# Patient Record
Sex: MALE | Race: WHITE | NOT HISPANIC OR LATINO | Employment: UNEMPLOYED | RURAL
[De-identification: names, ages, dates, MRNs, and addresses within clinical notes are randomized per-mention and may not be internally consistent; named-entity substitution may affect disease eponyms.]

---

## 2020-08-24 ENCOUNTER — HISTORICAL (OUTPATIENT)
Dept: ADMINISTRATIVE | Facility: HOSPITAL | Age: 65
End: 2020-08-24

## 2020-12-12 ENCOUNTER — HISTORICAL (OUTPATIENT)
Dept: ADMINISTRATIVE | Facility: HOSPITAL | Age: 65
End: 2020-12-12

## 2020-12-12 LAB
ALBUMIN SERPL BCP-MCNC: 3.6 G/DL (ref 3.5–5)
ALBUMIN/GLOB SERPL: 0.9 {RATIO}
ALP SERPL-CCNC: 39 U/L (ref 45–115)
ALT SERPL W P-5'-P-CCNC: 22 U/L (ref 16–61)
ANION GAP SERPL CALCULATED.3IONS-SCNC: 13 MMOL/L
APTT PPP: 26.6 SECONDS (ref 25.2–37.3)
AST SERPL W P-5'-P-CCNC: 25 U/L (ref 15–37)
BASOPHILS # BLD AUTO: 0.03 X10E3/UL (ref 0–0.2)
BASOPHILS NFR BLD AUTO: 0.5 % (ref 0–1)
BILIRUB SERPL-MCNC: 0.5 MG/DL (ref 0–1.2)
BUN SERPL-MCNC: 27 MG/DL (ref 7–18)
BUN/CREAT SERPL: 14.5
CALCIUM SERPL-MCNC: 8.9 MG/DL (ref 8.5–10.1)
CHLORIDE SERPL-SCNC: 102 MMOL/L (ref 98–107)
CO2 SERPL-SCNC: 27 MMOL/L (ref 21–32)
CREAT SERPL-MCNC: 1.86 MG/DL (ref 0.7–1.3)
EOSINOPHIL # BLD AUTO: 0.22 X10E3/UL (ref 0–0.5)
EOSINOPHIL NFR BLD AUTO: 3.8 % (ref 1–4)
ERYTHROCYTE [DISTWIDTH] IN BLOOD BY AUTOMATED COUNT: 13.2 % (ref 11.5–14.5)
GLOBULIN SER-MCNC: 3.9 G/DL (ref 2–4)
GLUCOSE SERPL-MCNC: 156 MG/DL (ref 70–105)
GLUCOSE SERPL-MCNC: 175 MG/DL (ref 74–106)
HCT VFR BLD AUTO: 41.7 % (ref 40–54)
HGB BLD-MCNC: 13.4 G/DL (ref 13.5–18)
IMM GRANULOCYTES # BLD AUTO: 0.01 X10E3/UL (ref 0–0.04)
IMM GRANULOCYTES NFR BLD: 0.2 % (ref 0–0.4)
INR BLD: 1 (ref 0–3.4)
LYMPHOCYTES # BLD AUTO: 1.77 X10E3/UL (ref 1–4.8)
LYMPHOCYTES NFR BLD AUTO: 30.9 % (ref 27–41)
MCH RBC QN AUTO: 26.2 PG (ref 27–31)
MCHC RBC AUTO-ENTMCNC: 32.1 G/DL (ref 32–36)
MCV RBC AUTO: 81.4 FL (ref 80–96)
MONOCYTES # BLD AUTO: 0.52 X10E3/UL (ref 0–0.8)
MONOCYTES NFR BLD AUTO: 9.1 % (ref 2–6)
MPC BLD CALC-MCNC: 11.4 FL (ref 9.4–12.4)
NEUTROPHILS # BLD AUTO: 3.17 X10E3/UL (ref 1.8–7.7)
NEUTROPHILS NFR BLD AUTO: 55.5 % (ref 53–65)
PLATELET # BLD AUTO: 206 X10E3/UL (ref 150–400)
POTASSIUM SERPL-SCNC: 4.3 MMOL/L (ref 3.5–5.1)
PROT SERPL-MCNC: 7.5 G/DL (ref 6.4–8.2)
PROTHROMBIN TIME: 13.2 SECONDS (ref 11.7–14.7)
RBC # BLD AUTO: 5.12 X10E6/UL (ref 4.6–6.2)
SARS-COV+SARS-COV-2 AG RESP QL IA.RAPID: NEGATIVE
SODIUM SERPL-SCNC: 138 MMOL/L (ref 136–145)
WBC # BLD AUTO: 5.72 X10E3/UL (ref 4.5–11)

## 2021-03-12 LAB
LEFT EYE DM RETINOPATHY: NORMAL
RIGHT EYE DM RETINOPATHY: NORMAL

## 2021-03-22 RX ORDER — HYDRALAZINE HYDROCHLORIDE 50 MG/1
50 TABLET, FILM COATED ORAL 4 TIMES DAILY
Qty: 120 TABLET | Refills: 5 | Status: SHIPPED | OUTPATIENT
Start: 2021-03-22 | End: 2021-08-11 | Stop reason: SDUPTHER

## 2021-03-22 RX ORDER — HYDRALAZINE HYDROCHLORIDE 50 MG/1
50 TABLET, FILM COATED ORAL 4 TIMES DAILY
COMMUNITY
End: 2021-03-22 | Stop reason: SDUPTHER

## 2021-04-05 RX ORDER — FENOFIBRATE 145 MG/1
145 TABLET, FILM COATED ORAL DAILY
Qty: 30 TABLET | Refills: 3 | Status: SHIPPED | OUTPATIENT
Start: 2021-04-05 | End: 2021-08-10 | Stop reason: SDUPTHER

## 2021-04-05 RX ORDER — FENOFIBRATE 145 MG/1
145 TABLET, FILM COATED ORAL DAILY
COMMUNITY
Start: 2021-03-08 | End: 2021-04-05 | Stop reason: SDUPTHER

## 2021-05-07 VITALS
BODY MASS INDEX: 25.58 KG/M2 | SYSTOLIC BLOOD PRESSURE: 130 MMHG | WEIGHT: 193 LBS | HEART RATE: 62 BPM | HEIGHT: 73 IN | DIASTOLIC BLOOD PRESSURE: 68 MMHG

## 2021-05-12 ENCOUNTER — OFFICE VISIT (OUTPATIENT)
Dept: CARDIOLOGY | Facility: CLINIC | Age: 66
End: 2021-05-12
Payer: COMMERCIAL

## 2021-05-12 VITALS
HEIGHT: 73 IN | RESPIRATION RATE: 16 BRPM | SYSTOLIC BLOOD PRESSURE: 140 MMHG | HEART RATE: 79 BPM | DIASTOLIC BLOOD PRESSURE: 68 MMHG | BODY MASS INDEX: 26.11 KG/M2 | WEIGHT: 197 LBS | OXYGEN SATURATION: 97 %

## 2021-05-12 DIAGNOSIS — I51.9 LEFT VENTRICULAR DIASTOLIC DYSFUNCTION: ICD-10-CM

## 2021-05-12 DIAGNOSIS — I34.0 MILD MITRAL REGURGITATION BY PRIOR ECHOCARDIOGRAM: ICD-10-CM

## 2021-05-12 DIAGNOSIS — I50.22 CHRONIC SYSTOLIC CONGESTIVE HEART FAILURE: ICD-10-CM

## 2021-05-12 DIAGNOSIS — I25.10 CORONARY ARTERY DISEASE INVOLVING NATIVE CORONARY ARTERY OF NATIVE HEART, ANGINA PRESENCE UNSPECIFIED: ICD-10-CM

## 2021-05-12 DIAGNOSIS — I10 HYPERTENSION, UNSPECIFIED TYPE: ICD-10-CM

## 2021-05-12 DIAGNOSIS — I35.8 AORTIC VALVE SCLEROSIS: ICD-10-CM

## 2021-05-12 DIAGNOSIS — I25.10 CORONARY ARTERY DISEASE, ANGINA PRESENCE UNSPECIFIED, UNSPECIFIED VESSEL OR LESION TYPE, UNSPECIFIED WHETHER NATIVE OR TRANSPLANTED HEART: Primary | ICD-10-CM

## 2021-05-12 DIAGNOSIS — R06.02 SHORTNESS OF BREATH ON EXERTION: ICD-10-CM

## 2021-05-12 DIAGNOSIS — E78.5 HYPERLIPIDEMIA, UNSPECIFIED HYPERLIPIDEMIA TYPE: ICD-10-CM

## 2021-05-12 PROCEDURE — 99212 PR OFFICE/OUTPT VISIT, EST, LEVL II, 10-19 MIN: ICD-10-PCS | Mod: S$PBB,,, | Performed by: NURSE PRACTITIONER

## 2021-05-12 PROCEDURE — 99213 OFFICE O/P EST LOW 20 MIN: CPT | Mod: PBBFAC | Performed by: NURSE PRACTITIONER

## 2021-05-12 PROCEDURE — 99213 OFFICE O/P EST LOW 20 MIN: CPT | Mod: PBBFAC,25,, | Performed by: NURSE PRACTITIONER

## 2021-05-12 PROCEDURE — 93010 EKG 12-LEAD: ICD-10-PCS | Mod: S$PBB,,, | Performed by: INTERNAL MEDICINE

## 2021-05-12 PROCEDURE — 93005 ELECTROCARDIOGRAM TRACING: CPT | Mod: PBBFAC | Performed by: INTERNAL MEDICINE

## 2021-05-12 PROCEDURE — 93010 ELECTROCARDIOGRAM REPORT: CPT | Mod: S$PBB,,, | Performed by: INTERNAL MEDICINE

## 2021-05-12 PROCEDURE — 99213 HC OFFICE/OUTPT VISIT, EST, LEVL III, 20-29 MIN: ICD-10-PCS | Mod: PBBFAC,25,, | Performed by: NURSE PRACTITIONER

## 2021-05-12 PROCEDURE — 99212 OFFICE O/P EST SF 10 MIN: CPT | Mod: S$PBB,,, | Performed by: NURSE PRACTITIONER

## 2021-05-12 RX ORDER — ATORVASTATIN CALCIUM 20 MG/1
20 TABLET, FILM COATED ORAL NIGHTLY
COMMUNITY
End: 2021-08-10 | Stop reason: ALTCHOICE

## 2021-05-12 RX ORDER — ASPIRIN 81 MG/1
81 TABLET ORAL DAILY
COMMUNITY

## 2021-06-09 RX ORDER — GABAPENTIN 300 MG/1
1 CAPSULE ORAL 2 TIMES DAILY
COMMUNITY
End: 2021-06-09 | Stop reason: SDUPTHER

## 2021-06-09 RX ORDER — GABAPENTIN 300 MG/1
300 CAPSULE ORAL 2 TIMES DAILY
Qty: 180 CAPSULE | Refills: 0 | Status: SHIPPED | OUTPATIENT
Start: 2021-06-09 | End: 2021-08-11 | Stop reason: SDUPTHER

## 2021-06-10 ENCOUNTER — OFFICE VISIT (OUTPATIENT)
Dept: PAIN MEDICINE | Facility: CLINIC | Age: 66
End: 2021-06-10
Payer: COMMERCIAL

## 2021-06-10 VITALS
HEIGHT: 73 IN | RESPIRATION RATE: 19 BRPM | SYSTOLIC BLOOD PRESSURE: 178 MMHG | WEIGHT: 199.38 LBS | BODY MASS INDEX: 26.43 KG/M2 | DIASTOLIC BLOOD PRESSURE: 67 MMHG | HEART RATE: 61 BPM

## 2021-06-10 DIAGNOSIS — Z79.899 ENCOUNTER FOR LONG-TERM (CURRENT) USE OF OTHER MEDICATIONS: ICD-10-CM

## 2021-06-10 DIAGNOSIS — M54.17 LUMBOSACRAL RADICULOPATHY: Primary | Chronic | ICD-10-CM

## 2021-06-10 DIAGNOSIS — M96.1 POSTLAMINECTOMY SYNDROME, LUMBAR REGION: Chronic | ICD-10-CM

## 2021-06-10 DIAGNOSIS — M47.812 CERVICAL SPONDYLOSIS WITHOUT MYELOPATHY: Chronic | ICD-10-CM

## 2021-06-10 LAB

## 2021-06-10 PROCEDURE — 99214 OFFICE O/P EST MOD 30 MIN: CPT | Mod: S$PBB,,, | Performed by: PHYSICIAN ASSISTANT

## 2021-06-10 PROCEDURE — 99215 OFFICE O/P EST HI 40 MIN: CPT | Mod: PBBFAC | Performed by: PHYSICIAN ASSISTANT

## 2021-06-10 PROCEDURE — G0481 DRUG SCREEN DEFINITIVE 14, URINE: ICD-10-PCS | Mod: ,,, | Performed by: CLINICAL MEDICAL LABORATORY

## 2021-06-10 PROCEDURE — 99214 PR OFFICE/OUTPT VISIT, EST, LEVL IV, 30-39 MIN: ICD-10-PCS | Mod: S$PBB,,, | Performed by: PHYSICIAN ASSISTANT

## 2021-06-10 PROCEDURE — G0481 DRUG TEST DEF 8-14 CLASSES: HCPCS | Mod: ,,, | Performed by: CLINICAL MEDICAL LABORATORY

## 2021-06-10 PROCEDURE — 80305 DRUG TEST PRSMV DIR OPT OBS: CPT | Mod: PBBFAC | Performed by: PHYSICIAN ASSISTANT

## 2021-06-10 RX ORDER — VIT C/E/ZN/COPPR/LUTEIN/ZEAXAN 250MG-90MG
1000 CAPSULE ORAL DAILY
COMMUNITY

## 2021-06-10 RX ORDER — BENAZEPRIL HYDROCHLORIDE 10 MG/1
10 TABLET ORAL
COMMUNITY
End: 2021-08-10

## 2021-06-10 RX ORDER — PHENYLEPHRINE HCL 10 MG
500 TABLET ORAL DAILY
COMMUNITY

## 2021-06-10 RX ORDER — EZETIMIBE 10 MG/1
10 TABLET ORAL DAILY
COMMUNITY
Start: 2021-04-12 | End: 2021-07-12 | Stop reason: SDUPTHER

## 2021-06-10 RX ORDER — HYDROCODONE BITARTRATE AND ACETAMINOPHEN 10; 325 MG/1; MG/1
1 TABLET ORAL EVERY 8 HOURS
Qty: 90 TABLET | Refills: 0 | Status: SHIPPED | OUTPATIENT
Start: 2021-07-12 | End: 2021-08-11

## 2021-06-10 RX ORDER — HYDROCODONE BITARTRATE AND ACETAMINOPHEN 10; 325 MG/1; MG/1
1 TABLET ORAL EVERY 8 HOURS
Qty: 90 TABLET | Refills: 0 | Status: SHIPPED | OUTPATIENT
Start: 2021-06-11 | End: 2021-07-11

## 2021-06-10 RX ORDER — INSULIN DEGLUDEC 200 U/ML
54 INJECTION, SOLUTION SUBCUTANEOUS DAILY
COMMUNITY
Start: 2021-06-08

## 2021-06-10 RX ORDER — TIZANIDINE 4 MG/1
4 TABLET ORAL EVERY 8 HOURS
COMMUNITY
End: 2021-06-10 | Stop reason: SDUPTHER

## 2021-06-10 RX ORDER — AMLODIPINE BESYLATE 5 MG/1
5 TABLET ORAL DAILY
COMMUNITY
End: 2021-08-11 | Stop reason: SDUPTHER

## 2021-06-10 RX ORDER — CLOPIDOGREL BISULFATE 75 MG/1
75 TABLET ORAL DAILY
COMMUNITY
Start: 2021-06-01 | End: 2021-08-11 | Stop reason: SDUPTHER

## 2021-06-10 RX ORDER — PLANT STANOL ESTER 450 MG
8000 TABLET ORAL
COMMUNITY

## 2021-06-10 RX ORDER — METOPROLOL SUCCINATE 25 MG/1
25 TABLET, EXTENDED RELEASE ORAL DAILY
COMMUNITY
Start: 2021-06-08 | End: 2021-08-11 | Stop reason: SDUPTHER

## 2021-06-10 RX ORDER — VITAMIN E 268 MG
400 CAPSULE ORAL DAILY
COMMUNITY

## 2021-06-10 RX ORDER — POTASSIUM CHLORIDE 20 MEQ/1
20 TABLET, EXTENDED RELEASE ORAL DAILY
COMMUNITY
Start: 2021-05-10 | End: 2021-08-11 | Stop reason: SDUPTHER

## 2021-06-10 RX ORDER — ROSUVASTATIN CALCIUM 5 MG/1
5 TABLET, COATED ORAL DAILY
COMMUNITY
Start: 2021-05-06 | End: 2021-08-04 | Stop reason: SDUPTHER

## 2021-06-10 RX ORDER — TIZANIDINE 4 MG/1
4 TABLET ORAL EVERY 8 HOURS
Qty: 90 TABLET | Refills: 2 | Status: SHIPPED | OUTPATIENT
Start: 2021-06-10 | End: 2021-09-08 | Stop reason: SDUPTHER

## 2021-06-10 RX ORDER — OMEPRAZOLE 20 MG/1
20 CAPSULE, DELAYED RELEASE ORAL DAILY
COMMUNITY
Start: 2021-05-25 | End: 2021-08-11 | Stop reason: SDUPTHER

## 2021-06-10 RX ORDER — MAGNESIUM 250 MG
1 TABLET ORAL DAILY
COMMUNITY

## 2021-06-10 RX ORDER — HYDROCODONE BITARTRATE AND ACETAMINOPHEN 10; 325 MG/1; MG/1
1 TABLET ORAL EVERY 8 HOURS
COMMUNITY
End: 2021-06-10

## 2021-06-10 RX ORDER — FENOFIBRIC ACID 135 MG/1
135 CAPSULE, DELAYED RELEASE ORAL
COMMUNITY
End: 2021-08-10 | Stop reason: SDUPTHER

## 2021-06-10 RX ORDER — HYDROCODONE BITARTRATE AND ACETAMINOPHEN 10; 325 MG/1; MG/1
1 TABLET ORAL EVERY 8 HOURS
Qty: 90 TABLET | Refills: 0 | Status: SHIPPED | OUTPATIENT
Start: 2021-08-11 | End: 2021-09-08 | Stop reason: SDUPTHER

## 2021-06-10 RX ORDER — AMIODARONE HYDROCHLORIDE 200 MG/1
TABLET ORAL
COMMUNITY
End: 2021-08-10

## 2021-06-16 LAB
6-ACETYLMORPHINE, URINE (RUSH): NEGATIVE 10 NG/ML
7-AMINOCLONAZEPAM, URINE (RUSH): NEGATIVE 25 NG/ML
A-HYDROXYALPRAZOLAM, URINE (RUSH): NEGATIVE 25 NG/ML
ACETYL FENTANYL, URINE (RUSH): NEGATIVE 2.5 NG/ML
ACETYL NORFENTANYL OXALATE, URINE (RUSH): NEGATIVE 5 NG/ML
AMPHET UR QL SCN: NEGATIVE 100 NG/ML
BENZOYLECGONINE, URINE (RUSH): NEGATIVE 100 NG/ML
BUPRENORPHINE UR QL SCN: NEGATIVE 25 NG/ML
CODEINE, URINE (RUSH): NEGATIVE 25 NG/ML
CREAT UR-MCNC: 133 MG/DL (ref 39–259)
EDDP, URINE (RUSH): NEGATIVE 25 NG/ML
FENTANYL, URINE (RUSH): NEGATIVE 2.5 NG/ML
HYDROCODONE, URINE (RUSH): >250 25 NG/ML
HYDROMORPHONE, URINE (RUSH): 102.7 25 NG/ML
LORAZEPAM, URINE (RUSH): NEGATIVE 25 NG/ML
METHADONE UR QL SCN: NEGATIVE 25 NG/ML
METHAMPHET UR QL SCN: NEGATIVE 100 NG/ML
MORPHINE, URINE (RUSH): NEGATIVE 25 NG/ML
NORBUPRENORPHINE, URINE (RUSH): NEGATIVE 25 NG/ML
NORDIAZEPAM, URINE (RUSH): NEGATIVE 25 NG/ML
NORFENTANYL OXALATE, URINE (RUSH): NEGATIVE 5 NG/ML
NORHYDROCODONE, URINE (RUSH): >500 50 NG/ML
NOROXYCODONE HCL, URINE (RUSH): NEGATIVE 50 NG/ML
OXAZEPAM, URINE (RUSH): NEGATIVE 25 NG/ML
OXYCODONE UR QL SCN: NEGATIVE 25 NG/ML
OXYMORPHONE, URINE (RUSH): NEGATIVE 25 NG/ML
PH UR STRIP: 7 PH UNITS
SP GR UR STRIP: 1.02
TAPENTADOL, URINE (RUSH): NEGATIVE 25 NG/ML
TEMAZEPAM, URINE (RUSH): NEGATIVE 25 NG/ML
THC-COOH, URINE (RUSH): NEGATIVE 25 NG/ML
TRAMADOL, URINE (RUSH): NEGATIVE 100 NG/ML

## 2021-07-12 RX ORDER — EZETIMIBE 10 MG/1
10 TABLET ORAL DAILY
Qty: 90 TABLET | Refills: 0 | Status: SHIPPED | OUTPATIENT
Start: 2021-07-12 | End: 2021-08-11 | Stop reason: SDUPTHER

## 2021-08-04 RX ORDER — ROSUVASTATIN CALCIUM 5 MG/1
5 TABLET, COATED ORAL DAILY
Qty: 90 TABLET | Refills: 0 | Status: SHIPPED | OUTPATIENT
Start: 2021-08-04 | End: 2021-08-11 | Stop reason: SDUPTHER

## 2021-08-10 RX ORDER — FENOFIBRATE 145 MG/1
145 TABLET, FILM COATED ORAL DAILY
Qty: 30 TABLET | Refills: 3 | Status: SHIPPED | OUTPATIENT
Start: 2021-08-10 | End: 2021-08-11 | Stop reason: SDUPTHER

## 2021-08-11 ENCOUNTER — OFFICE VISIT (OUTPATIENT)
Dept: FAMILY MEDICINE | Facility: CLINIC | Age: 66
End: 2021-08-11
Payer: COMMERCIAL

## 2021-08-11 VITALS
OXYGEN SATURATION: 98 % | TEMPERATURE: 99 F | DIASTOLIC BLOOD PRESSURE: 68 MMHG | BODY MASS INDEX: 26.13 KG/M2 | WEIGHT: 197.19 LBS | HEART RATE: 68 BPM | SYSTOLIC BLOOD PRESSURE: 147 MMHG | HEIGHT: 73 IN

## 2021-08-11 DIAGNOSIS — E11.9 TYPE 2 DIABETES MELLITUS WITHOUT COMPLICATION, WITH LONG-TERM CURRENT USE OF INSULIN: ICD-10-CM

## 2021-08-11 DIAGNOSIS — Z79.4 TYPE 2 DIABETES MELLITUS WITHOUT COMPLICATION, WITH LONG-TERM CURRENT USE OF INSULIN: ICD-10-CM

## 2021-08-11 DIAGNOSIS — Z12.5 SCREENING FOR MALIGNANT NEOPLASM OF PROSTATE: ICD-10-CM

## 2021-08-11 DIAGNOSIS — E78.5 HYPERLIPIDEMIA, UNSPECIFIED HYPERLIPIDEMIA TYPE: Primary | ICD-10-CM

## 2021-08-11 DIAGNOSIS — I10 HYPERTENSION, UNSPECIFIED TYPE: ICD-10-CM

## 2021-08-11 DIAGNOSIS — H10.533 CONTACT BLEPHAROCONJUNCTIVITIS OF BOTH EYES: ICD-10-CM

## 2021-08-11 LAB
ALBUMIN SERPL BCP-MCNC: 3.9 G/DL (ref 3.5–5)
ALBUMIN/GLOB SERPL: 1.2 {RATIO}
ALP SERPL-CCNC: 38 U/L (ref 45–115)
ALT SERPL W P-5'-P-CCNC: 28 U/L (ref 16–61)
ANION GAP SERPL CALCULATED.3IONS-SCNC: 9 MMOL/L (ref 7–16)
AST SERPL W P-5'-P-CCNC: 30 U/L (ref 15–37)
BASOPHILS # BLD AUTO: 0.05 K/UL (ref 0–0.2)
BASOPHILS NFR BLD AUTO: 0.8 % (ref 0–1)
BILIRUB SERPL-MCNC: 0.5 MG/DL (ref 0–1.2)
BUN SERPL-MCNC: 16 MG/DL (ref 7–18)
BUN/CREAT SERPL: 10 (ref 6–20)
CALCIUM SERPL-MCNC: 9 MG/DL (ref 8.5–10.1)
CHLORIDE SERPL-SCNC: 104 MMOL/L (ref 98–107)
CHOLEST SERPL-MCNC: 114 MG/DL (ref 0–200)
CHOLEST/HDLC SERPL: 3.6 {RATIO}
CO2 SERPL-SCNC: 31 MMOL/L (ref 21–32)
CREAT SERPL-MCNC: 1.58 MG/DL (ref 0.7–1.3)
CREAT UR-MCNC: 158 MG/DL (ref 39–259)
DIFFERENTIAL METHOD BLD: ABNORMAL
EOSINOPHIL # BLD AUTO: 0.28 K/UL (ref 0–0.5)
EOSINOPHIL NFR BLD AUTO: 4.2 % (ref 1–4)
ERYTHROCYTE [DISTWIDTH] IN BLOOD BY AUTOMATED COUNT: 13.7 % (ref 11.5–14.5)
EST. AVERAGE GLUCOSE BLD GHB EST-MCNC: 147 MG/DL
GLOBULIN SER-MCNC: 3.2 G/DL (ref 2–4)
GLUCOSE SERPL-MCNC: 149 MG/DL (ref 74–106)
HBA1C MFR BLD HPLC: 7 % (ref 4.5–6.6)
HCT VFR BLD AUTO: 41.8 % (ref 40–54)
HDLC SERPL-MCNC: 32 MG/DL (ref 40–60)
HGB BLD-MCNC: 13.2 G/DL (ref 13.5–18)
IMM GRANULOCYTES # BLD AUTO: 0.02 K/UL (ref 0–0.04)
IMM GRANULOCYTES NFR BLD: 0.3 % (ref 0–0.4)
LDLC SERPL CALC-MCNC: 42 MG/DL
LDLC/HDLC SERPL: 1.3 {RATIO}
LYMPHOCYTES # BLD AUTO: 1.82 K/UL (ref 1–4.8)
LYMPHOCYTES NFR BLD AUTO: 27.5 % (ref 27–41)
MCH RBC QN AUTO: 25.8 PG (ref 27–31)
MCHC RBC AUTO-ENTMCNC: 31.6 G/DL (ref 32–36)
MCV RBC AUTO: 81.6 FL (ref 80–96)
MICROALBUMIN UR-MCNC: 26.9 MG/DL (ref 0–2.8)
MICROALBUMIN/CREAT RATIO PNL UR: 170.3 MG/G (ref 0–30)
MONOCYTES # BLD AUTO: 0.54 K/UL (ref 0–0.8)
MONOCYTES NFR BLD AUTO: 8.1 % (ref 2–6)
MPC BLD CALC-MCNC: 12.2 FL (ref 9.4–12.4)
NEUTROPHILS # BLD AUTO: 3.92 K/UL (ref 1.8–7.7)
NEUTROPHILS NFR BLD AUTO: 59.1 % (ref 53–65)
NONHDLC SERPL-MCNC: 82 MG/DL
NRBC # BLD AUTO: 0 X10E3/UL
NRBC, AUTO (.00): 0 %
PLATELET # BLD AUTO: 200 K/UL (ref 150–400)
POTASSIUM SERPL-SCNC: 4.4 MMOL/L (ref 3.5–5.1)
PROT SERPL-MCNC: 7.1 G/DL (ref 6.4–8.2)
PSA SERPL-MCNC: 0.54 NG/ML (ref 0–4.1)
RBC # BLD AUTO: 5.12 M/UL (ref 4.6–6.2)
SODIUM SERPL-SCNC: 140 MMOL/L (ref 136–145)
TRIGL SERPL-MCNC: 201 MG/DL (ref 35–150)
VLDLC SERPL-MCNC: 40 MG/DL
WBC # BLD AUTO: 6.63 K/UL (ref 4.5–11)

## 2021-08-11 PROCEDURE — 83036 HEMOGLOBIN GLYCOSYLATED A1C: CPT | Mod: QW,,, | Performed by: CLINICAL MEDICAL LABORATORY

## 2021-08-11 PROCEDURE — 82043 UR ALBUMIN QUANTITATIVE: CPT | Mod: QW,,, | Performed by: CLINICAL MEDICAL LABORATORY

## 2021-08-11 PROCEDURE — G0103 PSA SCREENING: HCPCS | Mod: ,,, | Performed by: CLINICAL MEDICAL LABORATORY

## 2021-08-11 PROCEDURE — 82570 MICROALBUMIN / CREATININE RATIO URINE: ICD-10-PCS | Mod: QW,,, | Performed by: CLINICAL MEDICAL LABORATORY

## 2021-08-11 PROCEDURE — 85025 CBC WITH DIFFERENTIAL: ICD-10-PCS | Mod: QW,,, | Performed by: CLINICAL MEDICAL LABORATORY

## 2021-08-11 PROCEDURE — 99214 OFFICE O/P EST MOD 30 MIN: CPT | Mod: ,,, | Performed by: FAMILY MEDICINE

## 2021-08-11 PROCEDURE — 85025 COMPLETE CBC W/AUTO DIFF WBC: CPT | Mod: QW,,, | Performed by: CLINICAL MEDICAL LABORATORY

## 2021-08-11 PROCEDURE — 83036 HEMOGLOBIN A1C: ICD-10-PCS | Mod: QW,,, | Performed by: CLINICAL MEDICAL LABORATORY

## 2021-08-11 PROCEDURE — 80061 LIPID PANEL: CPT | Mod: QW,,, | Performed by: CLINICAL MEDICAL LABORATORY

## 2021-08-11 PROCEDURE — 82570 ASSAY OF URINE CREATININE: CPT | Mod: QW,,, | Performed by: CLINICAL MEDICAL LABORATORY

## 2021-08-11 PROCEDURE — 80053 COMPREHENSIVE METABOLIC PANEL: ICD-10-PCS | Mod: QW,,, | Performed by: CLINICAL MEDICAL LABORATORY

## 2021-08-11 PROCEDURE — 80053 COMPREHEN METABOLIC PANEL: CPT | Mod: QW,,, | Performed by: CLINICAL MEDICAL LABORATORY

## 2021-08-11 PROCEDURE — 80061 LIPID PANEL: ICD-10-PCS | Mod: QW,,, | Performed by: CLINICAL MEDICAL LABORATORY

## 2021-08-11 PROCEDURE — G0103 PSA, SCREENING: ICD-10-PCS | Mod: ,,, | Performed by: CLINICAL MEDICAL LABORATORY

## 2021-08-11 PROCEDURE — 82043 MICROALBUMIN / CREATININE RATIO URINE: ICD-10-PCS | Mod: QW,,, | Performed by: CLINICAL MEDICAL LABORATORY

## 2021-08-11 PROCEDURE — 99214 PR OFFICE/OUTPT VISIT, EST, LEVL IV, 30-39 MIN: ICD-10-PCS | Mod: ,,, | Performed by: FAMILY MEDICINE

## 2021-08-11 RX ORDER — GABAPENTIN 300 MG/1
300 CAPSULE ORAL 2 TIMES DAILY
Qty: 180 CAPSULE | Refills: 1 | Status: SHIPPED | OUTPATIENT
Start: 2021-08-11 | End: 2022-02-28

## 2021-08-11 RX ORDER — CLOPIDOGREL BISULFATE 75 MG/1
75 TABLET ORAL DAILY
Qty: 90 TABLET | Refills: 1 | Status: SHIPPED | OUTPATIENT
Start: 2021-08-11 | End: 2022-02-28 | Stop reason: SDUPTHER

## 2021-08-11 RX ORDER — EZETIMIBE 10 MG/1
10 TABLET ORAL DAILY
Qty: 90 TABLET | Refills: 1 | Status: SHIPPED | OUTPATIENT
Start: 2021-08-11 | End: 2022-02-28 | Stop reason: SDUPTHER

## 2021-08-11 RX ORDER — POTASSIUM CHLORIDE 20 MEQ/1
20 TABLET, EXTENDED RELEASE ORAL DAILY
Qty: 90 TABLET | Refills: 1 | Status: SHIPPED | OUTPATIENT
Start: 2021-08-11 | End: 2022-02-28 | Stop reason: SDUPTHER

## 2021-08-11 RX ORDER — AMLODIPINE BESYLATE 5 MG/1
5 TABLET ORAL DAILY
Qty: 90 TABLET | Refills: 1 | Status: SHIPPED | OUTPATIENT
Start: 2021-08-11 | End: 2022-02-28 | Stop reason: SDUPTHER

## 2021-08-11 RX ORDER — METOPROLOL SUCCINATE 25 MG/1
25 TABLET, EXTENDED RELEASE ORAL DAILY
Qty: 90 TABLET | Refills: 1 | Status: SHIPPED | OUTPATIENT
Start: 2021-08-11 | End: 2022-01-24

## 2021-08-11 RX ORDER — ROSUVASTATIN CALCIUM 5 MG/1
5 TABLET, COATED ORAL DAILY
Qty: 90 TABLET | Refills: 1 | Status: SHIPPED | OUTPATIENT
Start: 2021-08-11 | End: 2022-02-28 | Stop reason: SDUPTHER

## 2021-08-11 RX ORDER — OMEPRAZOLE 20 MG/1
20 CAPSULE, DELAYED RELEASE ORAL DAILY
Qty: 90 CAPSULE | Refills: 1 | Status: SHIPPED | OUTPATIENT
Start: 2021-08-11 | End: 2022-02-28 | Stop reason: SDUPTHER

## 2021-08-11 RX ORDER — HYDRALAZINE HYDROCHLORIDE 50 MG/1
50 TABLET, FILM COATED ORAL 4 TIMES DAILY
Qty: 360 TABLET | Refills: 1 | Status: SHIPPED | OUTPATIENT
Start: 2021-08-11 | End: 2022-02-28 | Stop reason: SDUPTHER

## 2021-08-11 RX ORDER — OLOPATADINE HYDROCHLORIDE 2 MG/ML
1 SOLUTION/ DROPS OPHTHALMIC DAILY
Qty: 5 ML | Refills: 0 | Status: SHIPPED | OUTPATIENT
Start: 2021-08-11 | End: 2023-06-07

## 2021-08-11 RX ORDER — FENOFIBRATE 145 MG/1
145 TABLET, FILM COATED ORAL DAILY
Qty: 90 TABLET | Refills: 1 | Status: SHIPPED | OUTPATIENT
Start: 2021-08-11 | End: 2022-02-28 | Stop reason: SDUPTHER

## 2021-08-11 RX ORDER — INSULIN DEGLUDEC 200 U/ML
54 INJECTION, SOLUTION SUBCUTANEOUS DAILY
Qty: 3 PEN | Refills: 2 | Status: CANCELLED | OUTPATIENT
Start: 2021-08-11

## 2021-08-25 ENCOUNTER — HOSPITAL ENCOUNTER (OUTPATIENT)
Dept: RADIOLOGY | Facility: HOSPITAL | Age: 66
Discharge: HOME | End: 2021-08-25
Attending: SURGERY
Payer: COMMERCIAL

## 2021-08-25 ENCOUNTER — OFFICE VISIT (OUTPATIENT)
Dept: SURGERY | Facility: CLINIC | Age: 66
End: 2021-08-25
Payer: COMMERCIAL

## 2021-08-25 VITALS — BODY MASS INDEX: 25.71 KG/M2 | WEIGHT: 194 LBS | HEIGHT: 73 IN | OXYGEN SATURATION: 99 % | HEART RATE: 69 BPM

## 2021-08-25 DIAGNOSIS — I73.9 PERIPHERAL VASCULAR DISEASE: ICD-10-CM

## 2021-08-25 DIAGNOSIS — I73.9 PVD (PERIPHERAL VASCULAR DISEASE): Primary | ICD-10-CM

## 2021-08-25 PROCEDURE — 93924 LWR XTR VASC STDY BILAT: CPT | Mod: TC

## 2021-08-25 PROCEDURE — 93924 LWR XTR VASC STDY BILAT: CPT | Mod: 26,,, | Performed by: SURGERY

## 2021-08-25 PROCEDURE — 99214 OFFICE O/P EST MOD 30 MIN: CPT | Mod: PBBFAC,25 | Performed by: SURGERY

## 2021-08-25 PROCEDURE — 99212 OFFICE O/P EST SF 10 MIN: CPT | Mod: S$PBB,,, | Performed by: SURGERY

## 2021-08-25 PROCEDURE — 99212 PR OFFICE/OUTPT VISIT, EST, LEVL II, 10-19 MIN: ICD-10-PCS | Mod: S$PBB,,, | Performed by: SURGERY

## 2021-08-25 PROCEDURE — 93924 US ARTERIAL DOPPLER W/ EXERCISE: ICD-10-PCS | Mod: 26,,, | Performed by: SURGERY

## 2021-09-09 ENCOUNTER — OFFICE VISIT (OUTPATIENT)
Dept: PAIN MEDICINE | Facility: CLINIC | Age: 66
End: 2021-09-09
Payer: COMMERCIAL

## 2021-09-09 VITALS
DIASTOLIC BLOOD PRESSURE: 65 MMHG | RESPIRATION RATE: 18 BRPM | WEIGHT: 199 LBS | SYSTOLIC BLOOD PRESSURE: 135 MMHG | HEIGHT: 73 IN | BODY MASS INDEX: 26.37 KG/M2 | HEART RATE: 63 BPM

## 2021-09-09 DIAGNOSIS — Z79.899 ENCOUNTER FOR LONG-TERM (CURRENT) USE OF OTHER MEDICATIONS: ICD-10-CM

## 2021-09-09 DIAGNOSIS — M47.812 CERVICAL SPONDYLOSIS WITHOUT MYELOPATHY: Chronic | ICD-10-CM

## 2021-09-09 DIAGNOSIS — M54.17 LUMBOSACRAL RADICULOPATHY: Primary | Chronic | ICD-10-CM

## 2021-09-09 LAB
CTP QC/QA: YES
POC (AMP) AMPHETAMINE: NEGATIVE
POC (BAR) BARBITURATES: NEGATIVE
POC (BUP) BUPRENORPHINE: NEGATIVE
POC (BZO) BENZODIAZEPINES: NEGATIVE
POC (COC) COCAINE: NEGATIVE
POC (MDMA) METHYLENEDIOXYMETHAMPHETAMINE 3,4: NEGATIVE
POC (MET) METHAMPHETAMINE: NEGATIVE
POC (MOP) OPIATES: ABNORMAL
POC (MTD) METHADONE: NEGATIVE
POC (OXY) OXYCODONE: NEGATIVE
POC (PCP) PHENCYCLIDINE: NEGATIVE
POC (TCA) TRICYCLIC ANTIDEPRESSANTS: NEGATIVE
POC TEMPERATURE (URINE): 94
POC THC: NEGATIVE

## 2021-09-09 PROCEDURE — 99215 OFFICE O/P EST HI 40 MIN: CPT | Mod: PBBFAC | Performed by: PHYSICIAN ASSISTANT

## 2021-09-09 PROCEDURE — 99214 PR OFFICE/OUTPT VISIT, EST, LEVL IV, 30-39 MIN: ICD-10-PCS | Mod: S$PBB,,, | Performed by: PHYSICIAN ASSISTANT

## 2021-09-09 PROCEDURE — 99214 OFFICE O/P EST MOD 30 MIN: CPT | Mod: S$PBB,,, | Performed by: PHYSICIAN ASSISTANT

## 2021-09-09 PROCEDURE — 80305 DRUG TEST PRSMV DIR OPT OBS: CPT | Mod: PBBFAC | Performed by: PHYSICIAN ASSISTANT

## 2021-09-09 RX ORDER — HYDROCODONE BITARTRATE AND ACETAMINOPHEN 10; 325 MG/1; MG/1
1 TABLET ORAL EVERY 8 HOURS
Qty: 90 TABLET | Refills: 0 | Status: SHIPPED | OUTPATIENT
Start: 2021-09-11 | End: 2021-10-11

## 2021-09-09 RX ORDER — HYDROCODONE BITARTRATE AND ACETAMINOPHEN 10; 325 MG/1; MG/1
1 TABLET ORAL EVERY 8 HOURS
Qty: 90 TABLET | Refills: 0 | Status: SHIPPED | OUTPATIENT
Start: 2021-10-11 | End: 2021-11-10

## 2021-09-09 RX ORDER — HYDROCODONE BITARTRATE AND ACETAMINOPHEN 10; 325 MG/1; MG/1
1 TABLET ORAL EVERY 8 HOURS
Qty: 90 TABLET | Refills: 0 | Status: SHIPPED | OUTPATIENT
Start: 2021-11-10 | End: 2021-12-08 | Stop reason: SDUPTHER

## 2021-09-09 RX ORDER — TIZANIDINE 4 MG/1
4 TABLET ORAL EVERY 8 HOURS
Qty: 90 TABLET | Refills: 2 | Status: SHIPPED | OUTPATIENT
Start: 2021-09-09 | End: 2021-12-08 | Stop reason: SDUPTHER

## 2021-12-09 ENCOUNTER — OFFICE VISIT (OUTPATIENT)
Dept: PAIN MEDICINE | Facility: CLINIC | Age: 66
End: 2021-12-09
Payer: COMMERCIAL

## 2021-12-09 VITALS
BODY MASS INDEX: 25.58 KG/M2 | WEIGHT: 193 LBS | RESPIRATION RATE: 17 BRPM | HEART RATE: 60 BPM | HEIGHT: 73 IN | SYSTOLIC BLOOD PRESSURE: 134 MMHG | DIASTOLIC BLOOD PRESSURE: 57 MMHG

## 2021-12-09 DIAGNOSIS — M47.812 CERVICAL SPONDYLOSIS WITHOUT MYELOPATHY: Chronic | ICD-10-CM

## 2021-12-09 DIAGNOSIS — Z79.899 ENCOUNTER FOR LONG-TERM (CURRENT) USE OF OTHER MEDICATIONS: Primary | ICD-10-CM

## 2021-12-09 DIAGNOSIS — M54.17 LUMBOSACRAL RADICULOPATHY: Chronic | ICD-10-CM

## 2021-12-09 LAB

## 2021-12-09 PROCEDURE — 4010F ACE/ARB THERAPY RXD/TAKEN: CPT | Mod: CPTII,,, | Performed by: PHYSICIAN ASSISTANT

## 2021-12-09 PROCEDURE — 4010F PR ACE/ARB THEARPY RXD/TAKEN: ICD-10-PCS | Mod: CPTII,,, | Performed by: PHYSICIAN ASSISTANT

## 2021-12-09 PROCEDURE — 3066F PR DOCUMENTATION OF TREATMENT FOR NEPHROPATHY: ICD-10-PCS | Mod: CPTII,,, | Performed by: PHYSICIAN ASSISTANT

## 2021-12-09 PROCEDURE — 99214 OFFICE O/P EST MOD 30 MIN: CPT | Mod: S$PBB,,, | Performed by: PHYSICIAN ASSISTANT

## 2021-12-09 PROCEDURE — 80305 DRUG TEST PRSMV DIR OPT OBS: CPT | Mod: PBBFAC | Performed by: PHYSICIAN ASSISTANT

## 2021-12-09 PROCEDURE — 3060F POS MICROALBUMINURIA REV: CPT | Mod: CPTII,,, | Performed by: PHYSICIAN ASSISTANT

## 2021-12-09 PROCEDURE — 99214 PR OFFICE/OUTPT VISIT, EST, LEVL IV, 30-39 MIN: ICD-10-PCS | Mod: S$PBB,,, | Performed by: PHYSICIAN ASSISTANT

## 2021-12-09 PROCEDURE — G0481 PR DRUG TEST DEF 8-14 CLASSES: ICD-10-PCS | Mod: ,,, | Performed by: CLINICAL MEDICAL LABORATORY

## 2021-12-09 PROCEDURE — G0481 DRUG TEST DEF 8-14 CLASSES: HCPCS | Mod: ,,, | Performed by: CLINICAL MEDICAL LABORATORY

## 2021-12-09 PROCEDURE — 3060F PR POS MICROALBUMINURIA RESULT DOCUMENTED/REVIEW: ICD-10-PCS | Mod: CPTII,,, | Performed by: PHYSICIAN ASSISTANT

## 2021-12-09 PROCEDURE — 99215 OFFICE O/P EST HI 40 MIN: CPT | Mod: PBBFAC | Performed by: PHYSICIAN ASSISTANT

## 2021-12-09 PROCEDURE — 3066F NEPHROPATHY DOC TX: CPT | Mod: CPTII,,, | Performed by: PHYSICIAN ASSISTANT

## 2021-12-09 RX ORDER — HYDROCODONE BITARTRATE AND ACETAMINOPHEN 10; 325 MG/1; MG/1
1 TABLET ORAL EVERY 8 HOURS
Qty: 90 TABLET | Refills: 0 | Status: SHIPPED | OUTPATIENT
Start: 2022-01-10 | End: 2022-02-09

## 2021-12-09 RX ORDER — TIZANIDINE 4 MG/1
4 TABLET ORAL EVERY 8 HOURS
Qty: 90 TABLET | Refills: 2 | Status: SHIPPED | OUTPATIENT
Start: 2021-12-09 | End: 2022-07-14 | Stop reason: SDUPTHER

## 2021-12-09 RX ORDER — HYDROCODONE BITARTRATE AND ACETAMINOPHEN 10; 325 MG/1; MG/1
1 TABLET ORAL EVERY 8 HOURS
Qty: 90 TABLET | Refills: 0 | Status: SHIPPED | OUTPATIENT
Start: 2022-02-09 | End: 2022-03-11

## 2021-12-09 RX ORDER — HYDROCODONE BITARTRATE AND ACETAMINOPHEN 10; 325 MG/1; MG/1
1 TABLET ORAL EVERY 8 HOURS
Qty: 90 TABLET | Refills: 0 | Status: SHIPPED | OUTPATIENT
Start: 2021-12-11 | End: 2022-01-10

## 2021-12-15 LAB
6-ACETYLMORPHINE, URINE (RUSH): NEGATIVE 10 NG/ML
7-AMINOCLONAZEPAM, URINE (RUSH): NEGATIVE 25 NG/ML
A-HYDROXYALPRAZOLAM, URINE (RUSH): NEGATIVE 25 NG/ML
ACETYL FENTANYL, URINE (RUSH): NEGATIVE 2.5 NG/ML
ACETYL NORFENTANYL OXALATE, URINE (RUSH): NEGATIVE 5 NG/ML
AMPHET UR QL SCN: NEGATIVE 100 NG/ML
BENZOYLECGONINE, URINE (RUSH): NEGATIVE 100 NG/ML
BUPRENORPHINE UR QL SCN: NEGATIVE 25 NG/ML
CODEINE, URINE (RUSH): NEGATIVE 25 NG/ML
CREAT UR-MCNC: 56 MG/DL (ref 39–259)
EDDP, URINE (RUSH): NEGATIVE 25 NG/ML
FENTANYL, URINE (RUSH): NEGATIVE 2.5 NG/ML
HYDROCODONE, URINE (RUSH): >250 25 NG/ML
HYDROMORPHONE, URINE (RUSH): >250 25 NG/ML
LORAZEPAM, URINE (RUSH): NEGATIVE 25 NG/ML
METHADONE UR QL SCN: NEGATIVE 25 NG/ML
METHAMPHET UR QL SCN: NEGATIVE 100 NG/ML
MORPHINE, URINE (RUSH): NEGATIVE 25 NG/ML
NORBUPRENORPHINE, URINE (RUSH): NEGATIVE 25 NG/ML
NORDIAZEPAM, URINE (RUSH): NEGATIVE 25 NG/ML
NORFENTANYL OXALATE, URINE (RUSH): NEGATIVE 5 NG/ML
NORHYDROCODONE, URINE (RUSH): 219.1 50 NG/ML
NOROXYCODONE HCL, URINE (RUSH): NEGATIVE 50 NG/ML
OXAZEPAM, URINE (RUSH): NEGATIVE 25 NG/ML
OXYCODONE UR QL SCN: NEGATIVE 25 NG/ML
OXYMORPHONE, URINE (RUSH): NEGATIVE 25 NG/ML
PH UR STRIP: 7 PH UNITS
SP GR UR STRIP: 1.02
TAPENTADOL, URINE (RUSH): NEGATIVE 25 NG/ML
TEMAZEPAM, URINE (RUSH): NEGATIVE 25 NG/ML
THC-COOH, URINE (RUSH): NEGATIVE 25 NG/ML
TRAMADOL, URINE (RUSH): NEGATIVE 100 NG/ML

## 2022-02-16 ENCOUNTER — OFFICE VISIT (OUTPATIENT)
Dept: PAIN MEDICINE | Facility: CLINIC | Age: 67
End: 2022-02-16
Payer: MEDICARE

## 2022-02-16 VITALS
BODY MASS INDEX: 24.92 KG/M2 | HEIGHT: 73 IN | SYSTOLIC BLOOD PRESSURE: 141 MMHG | HEART RATE: 68 BPM | RESPIRATION RATE: 20 BRPM | DIASTOLIC BLOOD PRESSURE: 65 MMHG | WEIGHT: 188 LBS

## 2022-02-16 DIAGNOSIS — G89.29 CHRONIC BILATERAL LOW BACK PAIN WITHOUT SCIATICA: Chronic | ICD-10-CM

## 2022-02-16 DIAGNOSIS — M54.50 CHRONIC BILATERAL LOW BACK PAIN WITHOUT SCIATICA: Chronic | ICD-10-CM

## 2022-02-16 DIAGNOSIS — M96.1 POSTLAMINECTOMY SYNDROME, LUMBAR REGION: Primary | Chronic | ICD-10-CM

## 2022-02-16 DIAGNOSIS — M54.17 LUMBOSACRAL RADICULOPATHY: Chronic | ICD-10-CM

## 2022-02-16 PROCEDURE — 3008F PR BODY MASS INDEX (BMI) DOCUMENTED: ICD-10-PCS | Mod: CPTII,,, | Performed by: PAIN MEDICINE

## 2022-02-16 PROCEDURE — 3077F PR MOST RECENT SYSTOLIC BLOOD PRESSURE >= 140 MM HG: ICD-10-PCS | Mod: CPTII,,, | Performed by: PAIN MEDICINE

## 2022-02-16 PROCEDURE — 3288F FALL RISK ASSESSMENT DOCD: CPT | Mod: CPTII,,, | Performed by: PAIN MEDICINE

## 2022-02-16 PROCEDURE — 1101F PR PT FALLS ASSESS DOC 0-1 FALLS W/OUT INJ PAST YR: ICD-10-PCS | Mod: CPTII,,, | Performed by: PAIN MEDICINE

## 2022-02-16 PROCEDURE — 3077F SYST BP >= 140 MM HG: CPT | Mod: CPTII,,, | Performed by: PAIN MEDICINE

## 2022-02-16 PROCEDURE — 3008F BODY MASS INDEX DOCD: CPT | Mod: CPTII,,, | Performed by: PAIN MEDICINE

## 2022-02-16 PROCEDURE — 3078F DIAST BP <80 MM HG: CPT | Mod: CPTII,,, | Performed by: PAIN MEDICINE

## 2022-02-16 PROCEDURE — 99214 PR OFFICE/OUTPT VISIT, EST, LEVL IV, 30-39 MIN: ICD-10-PCS | Mod: S$PBB,,, | Performed by: PAIN MEDICINE

## 2022-02-16 PROCEDURE — 1159F PR MEDICATION LIST DOCUMENTED IN MEDICAL RECORD: ICD-10-PCS | Mod: CPTII,,, | Performed by: PAIN MEDICINE

## 2022-02-16 PROCEDURE — 3078F PR MOST RECENT DIASTOLIC BLOOD PRESSURE < 80 MM HG: ICD-10-PCS | Mod: CPTII,,, | Performed by: PAIN MEDICINE

## 2022-02-16 PROCEDURE — 1159F MED LIST DOCD IN RCRD: CPT | Mod: CPTII,,, | Performed by: PAIN MEDICINE

## 2022-02-16 PROCEDURE — 1125F AMNT PAIN NOTED PAIN PRSNT: CPT | Mod: CPTII,,, | Performed by: PAIN MEDICINE

## 2022-02-16 PROCEDURE — 4010F PR ACE/ARB THEARPY RXD/TAKEN: ICD-10-PCS | Mod: CPTII,,, | Performed by: PAIN MEDICINE

## 2022-02-16 PROCEDURE — 1125F PR PAIN SEVERITY QUANTIFIED, PAIN PRESENT: ICD-10-PCS | Mod: CPTII,,, | Performed by: PAIN MEDICINE

## 2022-02-16 PROCEDURE — 3288F PR FALLS RISK ASSESSMENT DOCUMENTED: ICD-10-PCS | Mod: CPTII,,, | Performed by: PAIN MEDICINE

## 2022-02-16 PROCEDURE — 4010F ACE/ARB THERAPY RXD/TAKEN: CPT | Mod: CPTII,,, | Performed by: PAIN MEDICINE

## 2022-02-16 PROCEDURE — 1101F PT FALLS ASSESS-DOCD LE1/YR: CPT | Mod: CPTII,,, | Performed by: PAIN MEDICINE

## 2022-02-16 PROCEDURE — 99215 OFFICE O/P EST HI 40 MIN: CPT | Mod: PBBFAC | Performed by: PAIN MEDICINE

## 2022-02-16 PROCEDURE — 99214 OFFICE O/P EST MOD 30 MIN: CPT | Mod: S$PBB,,, | Performed by: PAIN MEDICINE

## 2022-02-16 RX ORDER — HYDROCODONE BITARTRATE AND ACETAMINOPHEN 10; 325 MG/1; MG/1
1 TABLET ORAL EVERY 8 HOURS PRN
Qty: 90 TABLET | Refills: 0 | Status: SHIPPED | OUTPATIENT
Start: 2022-03-18 | End: 2022-04-14 | Stop reason: SDUPTHER

## 2022-02-16 RX ORDER — HYDROCODONE BITARTRATE AND ACETAMINOPHEN 10; 325 MG/1; MG/1
1 TABLET ORAL EVERY 8 HOURS PRN
Qty: 90 TABLET | Refills: 0 | Status: SHIPPED | OUTPATIENT
Start: 2022-02-16 | End: 2022-03-18

## 2022-02-16 RX ORDER — METFORMIN HYDROCHLORIDE 500 MG/1
500 TABLET ORAL 2 TIMES DAILY WITH MEALS
COMMUNITY
End: 2022-12-14 | Stop reason: SDUPTHER

## 2022-02-16 NOTE — PROGRESS NOTES
She Disclaimer: This note has been generated using voice-recognition software. There may be typographical errors that have been missed during proof-reading        Patient ID: Micheal Taylor is a 66 y.o. male.      Chief Complaint: Low-back Pain      66-year-old male returns today for re-evaluation of post-laminectomy pain syndrome and  chronic lower back pain.  The pain started many years ago and he is status post lumbar laminectomy by Dr. Cavazos.  The pain remained intractable and he was eventually re-evaluated in [place] and by Dr. Bray.  Surgical intervention was not recommended but a spinal cord stimulator was discussed.  Prior to proceeding with the stimulator,  he required an emergency CABG,  January 23, 2020. He remains on anticoagulants.  The lower back pain remains axial with minimal leg involvement.  He notes extreme difficulty with prolonged walking and standing for prolonged periods of time.  Medications are providing some benefit.  He returns today for medication refill.              Pain Assessment  Pain Assessment: 0-10  Pain Score:   7  Pain Location: Back  Pain Orientation: Lower  Pain Descriptors: Constant,Throbbing,Dull  Pain Frequency: Continuous  Pain Onset: Awakened from sleep  Clinical Progression: Not changed  Aggravating Factors: Standing,Walking  Pain Intervention(s): Medication (See eMAR),Rest      A's of Opioid Risk Assessment  Activity:Patient can partially perform ADL.   Analgesia:Patients pain is  controlled by current medication.   Adverse Effects: Patient denies constipation or sedation.  Aberrant Behavior:  reviewed with no aberrant drug seeking/taking behavior.      Patient denies any suicidal or homicidal ideations    Physical Therapy/Home Exercise: yes          Review of Systems   Constitutional: Negative.    HENT: Negative.    Eyes: Negative.    Respiratory: Negative.    Cardiovascular: Negative.    Gastrointestinal: Negative.    Endocrine: Negative.    Genitourinary:  Negative.    Musculoskeletal: Positive for arthralgias (right shoulder), back pain and neck pain.   Integumentary:  Negative.   Allergic/Immunologic: Negative.    Neurological: Negative.    Hematological: Negative.    Psychiatric/Behavioral: Negative.              Past Medical History:   Diagnosis Date    CHF (congestive heart failure)     Chronic pain syndrome     Coronary artery disease     Diabetes mellitus, type 2     Hyperlipidemia     Hypertension     Lumbar spondylosis     Lumbosacral radiculopathy     Pain in thoracic spine     PVD (peripheral vascular disease)     Spondylosis without myelopathy or radiculopathy, cervical region      Past Surgical History:   Procedure Laterality Date    ADENOIDECTOMY      AMPUTATION      left index finger removed     CAUDAL EPIDURAL STEROID INJECTION  01/19/2016    Caudal NATHAN - Alee    CIRCUMCISION      CORONARY ARTERY BYPASS GRAFT (CABG)      FOOT SURGERY      heel surgery    HEEL SPUR SURGERY      INJECTION OF FACET JOINT Bilateral 04/16/2015    Bilateral L3-S1 Facet Injection - Alee - 4/16/15, 11/15/12 and 9/27/12    RADIOFREQUENCY THERMOCOAGULATION Left 02/26/2013    Left L3-S1 RFTC -Alee    RADIOFREQUENCY THERMOCOAGULATION Right 02/05/2013    Right L3-S1 RFTC - Alee    TONSILLECTOMY      TONSILLECTOMY, ADENOIDECTOMY, BILATERAL MYRINGOTOMY AND TUBES      TRANSFORAMINAL EPIDURAL INJECTION OF STEROID Left 06/19/2012    Left L3-4 TFESI - Alee    TRANSFORAMINAL EPIDURAL INJECTION OF STEROID Right 04/12/2013    Right L3-4 TFESI - Alee - 4/12/13 and 8/7/12    VASCULAR SURGERY       Social History     Socioeconomic History    Marital status:    Tobacco Use    Smoking status: Former Smoker     Quit date: 4/1/2006     Years since quitting: 15.8    Smokeless tobacco: Current User     Types: Snuff   Substance and Sexual Activity    Alcohol use: Never    Drug use: Never     Family History   Problem Relation Age of Onset    Diabetes Mother     Heart  disease Mother     Hypertension Mother     Liver cancer Mother     Heart disease Father     Hypertension Father      Review of patient's allergies indicates:  No Known Allergies  has a current medication list which includes the following prescription(s): hydrocodone-acetaminophen, liraglutide 0.6 mg/0.1 ml (18 mg/3 ml) subq pnij, magnesium, metoprolol succinate, olopatadine, omeprazole, potassium chloride sa, rosuvastatin, tizanidine, tresiba flextouch u-200, vitamin a, vitamin e, amlodipine, aspirin, cholecalciferol (vitamin d3), cinnamon bark, clopidogrel, docosahexaenoic acid-epa, ezetimibe, fenofibrate, gabapentin, hydralazine, hydrocodone-acetaminophen, [START ON 3/18/2022] hydrocodone-acetaminophen, insulin regular, and metformin.      Objective:  Vitals:    02/16/22 1334   BP: (!) 141/65   Pulse: 68   Resp: 20        Physical Exam  Vitals and nursing note reviewed.   Constitutional:       General: He is not in acute distress.     Appearance: Normal appearance. He is not ill-appearing, toxic-appearing or diaphoretic.   HENT:      Head: Normocephalic and atraumatic.      Nose: Nose normal.      Mouth/Throat:      Mouth: Mucous membranes are moist.   Eyes:      Extraocular Movements: Extraocular movements intact.      Pupils: Pupils are equal, round, and reactive to light.   Cardiovascular:      Rate and Rhythm: Normal rate and regular rhythm.      Heart sounds: Normal heart sounds.   Pulmonary:      Effort: Pulmonary effort is normal. No respiratory distress.      Breath sounds: Normal breath sounds. No stridor. No wheezing or rhonchi.   Abdominal:      General: Bowel sounds are normal.      Palpations: Abdomen is soft.   Musculoskeletal:         General: No swelling or deformity.      Cervical back: Normal and normal range of motion. No spasms or tenderness. No pain with movement. Normal range of motion.      Thoracic back: Normal.      Lumbar back: Tenderness present. No spasms. Decreased range of motion.  Negative right straight leg raise test and negative left straight leg raise test. No scoliosis.      Right lower leg: No edema.      Left lower leg: No edema.   Skin:     General: Skin is warm.   Neurological:      General: No focal deficit present.      Mental Status: He is alert and oriented to person, place, and time. Mental status is at baseline.      Cranial Nerves: Cranial nerves are intact. No cranial nerve deficit.      Sensory: Sensation is intact. No sensory deficit.      Motor: No weakness.      Coordination: Coordination normal.      Gait: Gait normal.      Deep Tendon Reflexes: Reflexes are normal and symmetric.   Psychiatric:         Mood and Affect: Mood normal.         Behavior: Behavior normal.           Assessment:      1. Postlaminectomy syndrome, lumbar region    2. Lumbosacral radiculopathy    3. Chronic bilateral low back pain without sciatica          Plan:  1. reviewed  2.Addiction, Dependency, Tolerance, Opioid abuse-misuse, Death, Diversion Discussed. Overdose reversal drug Naloxone discussed.  3.Refill/Continue medications for pain control and function       Requested Prescriptions     Signed Prescriptions Disp Refills    HYDROcodone-acetaminophen (NORCO)  mg per tablet 90 tablet 0     Sig: Take 1 tablet by mouth every 8 (eight) hours as needed for Pain.    HYDROcodone-acetaminophen (NORCO)  mg per tablet 90 tablet 0     Sig: Take 1 tablet by mouth every 8 (eight) hours as needed for Pain.     4.Patient will consider a trial SCS for PLPS and chronic pain syndrome  5.Follow with MARCELL Nguyễn in 2 months for re-evaluation and medication refill         report:  Reviewed and consistent with medication use as prescribed.      The total time spent for evaluation and management on 02/16/2022 including reviewing separately obtained history, performing a medically appropriate exam and evaluation, documenting clinical information in the health record, independently interpreting  results and communicating them to the patient/family/caregiver, and ordering medications/tests/procedures was between 15-29 minutes.    The above plan and management options were discussed at length with patient. Patient is in agreement with the above and verbalized understanding. It will be communicated with the referring physician via electronic record, fax, or mail.

## 2022-02-23 ENCOUNTER — OFFICE VISIT (OUTPATIENT)
Dept: FAMILY MEDICINE | Facility: CLINIC | Age: 67
End: 2022-02-23
Payer: MEDICARE

## 2022-02-23 DIAGNOSIS — Z12.11 COLON CANCER SCREENING: ICD-10-CM

## 2022-02-23 DIAGNOSIS — I10 HYPERTENSION, UNSPECIFIED TYPE: ICD-10-CM

## 2022-02-23 DIAGNOSIS — Z23 NEED FOR IMMUNIZATION AGAINST INFLUENZA: Primary | ICD-10-CM

## 2022-02-23 DIAGNOSIS — Z13.6 SCREENING FOR AAA (AORTIC ABDOMINAL ANEURYSM): ICD-10-CM

## 2022-02-23 DIAGNOSIS — Z87.891 ENCOUNTER FOR SCREENING FOR ABDOMINAL AORTIC ANEURYSM (AAA) IN PATIENT 50 YEARS OF AGE OR OLDER WITH HISTORY OF SMOKING: ICD-10-CM

## 2022-02-23 DIAGNOSIS — E11.59 TYPE 2 DIABETES MELLITUS WITH OTHER CIRCULATORY COMPLICATION, WITHOUT LONG-TERM CURRENT USE OF INSULIN: ICD-10-CM

## 2022-02-23 DIAGNOSIS — Z13.6 ENCOUNTER FOR SCREENING FOR ABDOMINAL AORTIC ANEURYSM (AAA) IN PATIENT 50 YEARS OF AGE OR OLDER WITH HISTORY OF SMOKING: ICD-10-CM

## 2022-02-23 DIAGNOSIS — I73.9 PVD (PERIPHERAL VASCULAR DISEASE): ICD-10-CM

## 2022-02-23 DIAGNOSIS — Z00.00 ENCOUNTER FOR PREVENTIVE HEALTH EXAMINATION: ICD-10-CM

## 2022-02-23 DIAGNOSIS — I25.10 CORONARY ARTERY DISEASE INVOLVING NATIVE HEART, UNSPECIFIED VESSEL OR LESION TYPE, UNSPECIFIED WHETHER ANGINA PRESENT: ICD-10-CM

## 2022-02-23 DIAGNOSIS — E78.5 HYPERLIPIDEMIA, UNSPECIFIED HYPERLIPIDEMIA TYPE: ICD-10-CM

## 2022-02-23 DIAGNOSIS — Z11.59 NEED FOR HEPATITIS C SCREENING TEST: ICD-10-CM

## 2022-02-23 PROCEDURE — 3079F DIAST BP 80-89 MM HG: CPT | Mod: ,,, | Performed by: NURSE PRACTITIONER

## 2022-02-23 PROCEDURE — 90686 IIV4 VACC NO PRSV 0.5 ML IM: CPT | Mod: ,,, | Performed by: NURSE PRACTITIONER

## 2022-02-23 PROCEDURE — 1160F PR REVIEW ALL MEDS BY PRESCRIBER/CLIN PHARMACIST DOCUMENTED: ICD-10-PCS | Mod: ,,, | Performed by: NURSE PRACTITIONER

## 2022-02-23 PROCEDURE — 4010F PR ACE/ARB THEARPY RXD/TAKEN: ICD-10-PCS | Mod: ,,, | Performed by: NURSE PRACTITIONER

## 2022-02-23 PROCEDURE — G0008 FLU VACCINE (QUAD) GREATER THAN OR EQUAL TO 3YO PRESERVATIVE FREE IM: ICD-10-PCS | Mod: ,,, | Performed by: NURSE PRACTITIONER

## 2022-02-23 PROCEDURE — 1101F PT FALLS ASSESS-DOCD LE1/YR: CPT | Mod: ,,, | Performed by: NURSE PRACTITIONER

## 2022-02-23 PROCEDURE — 3288F FALL RISK ASSESSMENT DOCD: CPT | Mod: ,,, | Performed by: NURSE PRACTITIONER

## 2022-02-23 PROCEDURE — 1160F RVW MEDS BY RX/DR IN RCRD: CPT | Mod: ,,, | Performed by: NURSE PRACTITIONER

## 2022-02-23 PROCEDURE — 90686 FLU VACCINE (QUAD) GREATER THAN OR EQUAL TO 3YO PRESERVATIVE FREE IM: ICD-10-PCS | Mod: ,,, | Performed by: NURSE PRACTITIONER

## 2022-02-23 PROCEDURE — 3051F HG A1C>EQUAL 7.0%<8.0%: CPT | Mod: ,,, | Performed by: NURSE PRACTITIONER

## 2022-02-23 PROCEDURE — 1125F PR PAIN SEVERITY QUANTIFIED, PAIN PRESENT: ICD-10-PCS | Mod: ,,, | Performed by: NURSE PRACTITIONER

## 2022-02-23 PROCEDURE — 3075F PR MOST RECENT SYSTOLIC BLOOD PRESS GE 130-139MM HG: ICD-10-PCS | Mod: ,,, | Performed by: NURSE PRACTITIONER

## 2022-02-23 PROCEDURE — 1125F AMNT PAIN NOTED PAIN PRSNT: CPT | Mod: ,,, | Performed by: NURSE PRACTITIONER

## 2022-02-23 PROCEDURE — 3079F PR MOST RECENT DIASTOLIC BLOOD PRESSURE 80-89 MM HG: ICD-10-PCS | Mod: ,,, | Performed by: NURSE PRACTITIONER

## 2022-02-23 PROCEDURE — 1101F PR PT FALLS ASSESS DOC 0-1 FALLS W/OUT INJ PAST YR: ICD-10-PCS | Mod: ,,, | Performed by: NURSE PRACTITIONER

## 2022-02-23 PROCEDURE — 3008F BODY MASS INDEX DOCD: CPT | Mod: ,,, | Performed by: NURSE PRACTITIONER

## 2022-02-23 PROCEDURE — 3051F PR MOST RECENT HEMOGLOBIN A1C LEVEL 7.0 - < 8.0%: ICD-10-PCS | Mod: ,,, | Performed by: NURSE PRACTITIONER

## 2022-02-23 PROCEDURE — G0008 ADMIN INFLUENZA VIRUS VAC: HCPCS | Mod: ,,, | Performed by: NURSE PRACTITIONER

## 2022-02-23 PROCEDURE — 4010F ACE/ARB THERAPY RXD/TAKEN: CPT | Mod: ,,, | Performed by: NURSE PRACTITIONER

## 2022-02-23 PROCEDURE — 1159F MED LIST DOCD IN RCRD: CPT | Mod: ,,, | Performed by: NURSE PRACTITIONER

## 2022-02-23 PROCEDURE — 3008F PR BODY MASS INDEX (BMI) DOCUMENTED: ICD-10-PCS | Mod: ,,, | Performed by: NURSE PRACTITIONER

## 2022-02-23 PROCEDURE — G0439 PPPS, SUBSEQ VISIT: HCPCS | Mod: ,,, | Performed by: NURSE PRACTITIONER

## 2022-02-23 PROCEDURE — 1159F PR MEDICATION LIST DOCUMENTED IN MEDICAL RECORD: ICD-10-PCS | Mod: ,,, | Performed by: NURSE PRACTITIONER

## 2022-02-23 PROCEDURE — G0439 PR MEDICARE ANNUAL WELLNESS SUBSEQUENT VISIT: ICD-10-PCS | Mod: ,,, | Performed by: NURSE PRACTITIONER

## 2022-02-23 PROCEDURE — 3075F SYST BP GE 130 - 139MM HG: CPT | Mod: ,,, | Performed by: NURSE PRACTITIONER

## 2022-02-23 PROCEDURE — 3288F PR FALLS RISK ASSESSMENT DOCUMENTED: ICD-10-PCS | Mod: ,,, | Performed by: NURSE PRACTITIONER

## 2022-02-23 RX ORDER — ZOSTER VACCINE RECOMBINANT, ADJUVANTED 50 MCG/0.5
0.5 KIT INTRAMUSCULAR ONCE
Qty: 1 EACH | Refills: 1 | Status: SHIPPED | OUTPATIENT
Start: 2022-02-23 | End: 2022-02-23

## 2022-02-23 NOTE — PATIENT INSTRUCTIONS
Counseling and Referral of Other Preventative  (Italic type indicates deductible and co-insurance are waived)    Patient Name: Micheal Taylor  Today's Date: 2/23/2022    Health Maintenance         Date Due Completion Date    Hepatitis C Screening Never done ---    COVID-19 Vaccine (1) Never done ---    Foot Exam Never done ---    TETANUS VACCINE Never done ---    Sign Pain Contract Never done ---    Colorectal Cancer Screening Never done ---    Shingles Vaccine (1 of 2) Never done ---    Eye Exam 01/12/2018 1/12/2017 (Done)    Override on 1/12/2017: Done (Eye Site)    Abdominal Aortic Aneurysm Screening Never done ---    Influenza Vaccine (1) Never done ---    Diabetes Urine Screening 08/11/2022 8/11/2021    Hemoglobin A1c 08/11/2022 8/11/2021    High Dose Statin 02/16/2023 2/16/2022    Pneumococcal Vaccines (Age 65+) (1 of 1 - PPSV23) 12/06/2023 12/6/2018    Lipid Panel 08/11/2026 8/11/2021          Orders Placed This Encounter   Procedures    Flu Vaccine - Quadrivalent *Preferred* (PF) (6 months & older)     The following information is provided to all patients.  This information is to help you find resources for any of the problems found today that may be affecting your health:                Living healthy guide: www.[facility].[place].gov      Understanding Diabetes: www.diabetes.org      Eating healthy: www.cdc.gov/healthyweight      CDC home safety checklist: www.cdc.gov/steadi/patient.html      Agency on Aging: www.goea.[place].[facility]      Alcoholics anonymous (AA): www.aa.org      Physical Activity: www.joann.nih.gov/rr3zxml      Tobacco use: www.quitwithusla.org     Counseling and Referral of Other Preventative  (Italic type indicates deductible and co-insurance are waived)    Patient Name: Micheal Taylor  Today's Date: 3/1/2022    Health Maintenance       Date Due Completion Date    Hepatitis C Screening Never done ---    COVID-19 Vaccine (1) Never done ---    Foot Exam Never done ---    TETANUS VACCINE Never done  ---    Sign Pain Contract Never done ---    Colorectal Cancer Screening Never done ---    Shingles Vaccine (1 of 2) Never done ---    Eye Exam 01/12/2018 1/12/2017 (Done)    Override on 1/12/2017: Done (Eye Site)    Abdominal Aortic Aneurysm Screening Never done ---    Hemoglobin A1c 02/11/2022 8/11/2021    Diabetes Urine Screening 08/11/2022 8/11/2021    High Dose Statin 02/16/2023 2/16/2022    Pneumococcal Vaccines (Age 65+) (1 of 1 - PPSV23) 12/06/2023 12/6/2018    Lipid Panel 02/28/2027 2/28/2022        Orders Placed This Encounter   Procedures    Flu Vaccine - Quadrivalent *Preferred* (PF) (6 months & older)     The following information is provided to all patients.  This information is to help you find resources for any of the problems found today that may be affecting your health:                Living healthy guide: www.[facility].[place].[facility]      Understanding Diabetes: www.diabetes.org      Eating healthy: www.cdc.gov/healthyweight      CDC home safety checklist: www.cdc.gov/steadi/patient.html      Agency on Aging: www.goea.[place].[facility]      Alcoholics anonymous (AA): www.aa.org      Physical Activity: www.joann.nih.gov/hv7fplu      Tobacco use: www.quitwithusla.org

## 2022-02-23 NOTE — PROGRESS NOTES
[facility]     PATIENT NAME: Micheal Taylor   : 1955    AGE: 66 y.o. DATE: 2022   MRN: [id number]        Reason for Visit / Chief Complaint: No chief complaint on file.        Micheal Taylor presents for an subsequent Medicare AWV today.     The following components were reviewed and updated:    Medical/Social/Family History:  Past Medical History:   Diagnosis Date    CHF (congestive heart failure)     Chronic pain syndrome     Coronary artery disease     Diabetes mellitus, type 2     Hyperlipidemia     Hypertension     Lumbar spondylosis     Lumbosacral radiculopathy     Pain in thoracic spine     PVD (peripheral vascular disease)     Spondylosis without myelopathy or radiculopathy, cervical region         Family History   Problem Relation Age of Onset    Diabetes Mother     Heart disease Mother     Hypertension Mother     Liver cancer Mother     Heart disease Father     Hypertension Father         Social History     Tobacco Use   Smoking Status Former Smoker    Quit date: 2006    Years since quitting: 15.9   Smokeless Tobacco Current User    Types: Snuff      Social History     Substance and Sexual Activity   Alcohol Use Never       Family History   Problem Relation Age of Onset    Diabetes Mother     Heart disease Mother     Hypertension Mother     Liver cancer Mother     Heart disease Father     Hypertension Father        Past Surgical History:   Procedure Laterality Date    ADENOIDECTOMY      AMPUTATION      left index finger removed     CAUDAL EPIDURAL STEROID INJECTION  2016    Caudal NATHAN - Alee    CIRCUMCISION      CORONARY ARTERY BYPASS GRAFT (CABG)      FOOT SURGERY      heel surgery    HEEL SPUR SURGERY      INJECTION OF FACET JOINT Bilateral 2015    Bilateral L3-S1 Facet Injection - Alee - 4/16/15, 11/15/12 and 12    RADIOFREQUENCY THERMOCOAGULATION Left 2013    Left L3-S1 RFTC -Alee     RADIOFREQUENCY THERMOCOAGULATION Right 02/05/2013    Right L3-S1 RFTC - Alee    TONSILLECTOMY      TONSILLECTOMY, ADENOIDECTOMY, BILATERAL MYRINGOTOMY AND TUBES      TRANSFORAMINAL EPIDURAL INJECTION OF STEROID Left 06/19/2012    Left L3-4 TFESI - Alee    TRANSFORAMINAL EPIDURAL INJECTION OF STEROID Right 04/12/2013    Right L3-4 TFESI - Alee - 4/12/13 and 8/7/12    VASCULAR SURGERY           · Allergies and Current Medications   Review of patient's allergies indicates:  No Known Allergies    Current Outpatient Medications:     amLODIPine (NORVASC) 5 MG tablet, Take 1 tablet (5 mg total) by mouth once daily., Disp: 90 tablet, Rfl: 1    aspirin (ECOTRIN) 81 MG EC tablet, Take 81 mg by mouth once daily., Disp: , Rfl:     cholecalciferol, vitamin D3, (VITAMIN D3) 25 mcg (1,000 unit) capsule, , Disp: , Rfl:     cinnamon bark 500 mg capsule, Take 500 mg by mouth once daily. , Disp: , Rfl:     clopidogreL (PLAVIX) 75 mg tablet, Take 1 tablet (75 mg total) by mouth once daily., Disp: 90 tablet, Rfl: 1    dapagliflozin (FARXIGA) 10 mg tablet, Take 1 tablet by mouth once daily., Disp: , Rfl:     docosahexaenoic acid-epa 120-180 mg Cap, Take 1 capsule by mouth., Disp: , Rfl:     ezetimibe (ZETIA) 10 mg tablet, Take 1 tablet (10 mg total) by mouth once daily., Disp: 90 tablet, Rfl: 1    fenofibrate (TRICOR) 145 MG tablet, Take 1 tablet (145 mg total) by mouth once daily., Disp: 90 tablet, Rfl: 1    gabapentin (NEURONTIN) 300 MG capsule, Take 1 capsule (300 mg total) by mouth 3 (three) times daily., Disp: 360 capsule, Rfl: 1    hydrALAZINE (APRESOLINE) 50 MG tablet, Take 1 tablet (50 mg total) by mouth 4 (four) times daily., Disp: 360 tablet, Rfl: 1    HYDROcodone-acetaminophen (NORCO)  mg per tablet, Take 1 tablet by mouth every 8 (eight) hours., Disp: 90 tablet, Rfl: 0    HYDROcodone-acetaminophen (NORCO)  mg per tablet, Take 1 tablet by mouth every 8 (eight) hours as needed for Pain., Disp: 90  tablet, Rfl: 0    [START ON 3/18/2022] HYDROcodone-acetaminophen (NORCO)  mg per tablet, Take 1 tablet by mouth every 8 (eight) hours as needed for Pain., Disp: 90 tablet, Rfl: 0    icosapent ethyL (VASCEPA) 1 gram Cap, Take 2 capsules by mouth 2 (two) times a day., Disp: , Rfl:     insulin regular 100 unit/mL Inj injection, Novolin R Regular U-100 Insuln  14-32 u bidwm, Disp: , Rfl:     liraglutide 0.6 mg/0.1 mL, 18 mg/3 mL, subq PNIJ (VICTOZA 2-CAITLIN) 0.6 mg/0.1 mL (18 mg/3 mL) PnIj pen, Inject 1.8 mg into the skin., Disp: , Rfl:     lisinopriL (PRINIVIL,ZESTRIL) 20 MG tablet, Take 1 tablet by mouth once daily., Disp: , Rfl:     magnesium 250 mg Tab, Take 1 tablet by mouth once daily. , Disp: , Rfl:     metFORMIN (GLUCOPHAGE) 500 MG tablet, Take 500 mg by mouth 2 (two) times daily with meals., Disp: , Rfl:     metoprolol succinate (TOPROL-XL) 25 MG 24 hr tablet, TAKE ONE TABLET BY MOUTH EVERY DAY, Disp: 90 tablet, Rfl: 1    olopatadine (PATADAY) 0.2 % Drop, Place 1 drop into both eyes once daily., Disp: 5 mL, Rfl: 0    omeprazole (PRILOSEC) 20 MG capsule, Take 1 capsule (20 mg total) by mouth once daily., Disp: 90 capsule, Rfl: 1    potassium chloride SA (K-DUR,KLOR-CON) 20 MEQ tablet, Take 1 tablet (20 mEq total) by mouth once daily., Disp: 90 tablet, Rfl: 1    rosuvastatin (CRESTOR) 5 MG tablet, Take 1 tablet (5 mg total) by mouth once daily., Disp: 90 tablet, Rfl: 1    tiZANidine (ZANAFLEX) 4 MG tablet, Take 1 tablet (4 mg total) by mouth every 8 (eight) hours., Disp: 90 tablet, Rfl: 2    TRESIBA FLEXTOUCH U-200 200 unit/mL (3 mL) insulin pen, Inject 54 Units into the skin once daily. , Disp: , Rfl:     vitamin A 8000 UNIT capsule, Take 8,000 Units by mouth., Disp: , Rfl:     vitamin E 400 UNIT capsule, Take 400 Units by mouth once daily. , Disp: , Rfl:     · Health Risk Assessment   Fall Risk: no   Obesity: BMI 24.8     Advance Directive:  Does not have an advanced directive. Verbal  education and written education included in today's AVS.   Depression: phq9 = 3    HTN: yes   T2DM: yes, followed by Endo   Tobacco use: non user; quit 2005  STI: low risk    Alcohol misuse: non user   Statin Use: yes    · Health Maintenance   Last eye exam: 2021 - release signed to get report   Last CV screen with lipids: 8/11/21   Diabetes screening with fasting glucose or A1c: 8/11/21   Colonoscopy: never - cologuard ordered at visit today   Flu Vaccine: today   Pneumonia vaccines: pneumovax 12/6/2018   COVID vaccine: declines   Hep B vaccine: nn/a   Last PSA screen: 8/11/21   AAA screening: ordered      Hepatitis C Screen: ordered  Low Dose CT Scan: n/a     Incontinence  Bowel: no  Bladder: no    Lab results available in Epic or see dates from [facility] above:   Lab Results   Component Value Date    CHOL 93 02/28/2022    CHOL 114 08/11/2021     Lab Results   Component Value Date    HDL 31 (L) 02/28/2022    HDL 32 (L) 08/11/2021     Lab Results   Component Value Date    LDLCALC 32 02/28/2022    LDLCALC 42 08/11/2021     Lab Results   Component Value Date    TRIG 152 (H) 02/28/2022    TRIG 201 (H) 08/11/2021     Lab Results   Component Value Date    CHOLHDL 3.0 02/28/2022    CHOLHDL 3.6 08/11/2021       Lab Results   Component Value Date    HGBA1C 7.0 (H) 08/11/2021       Sodium   Date Value Ref Range Status   02/28/2022 141 136 - 145 mmol/L Final     Potassium   Date Value Ref Range Status   02/28/2022 4.7 3.5 - 5.1 mmol/L Final     Chloride   Date Value Ref Range Status   02/28/2022 105 98 - 107 mmol/L Final     CO2   Date Value Ref Range Status   02/28/2022 30 21 - 32 mmol/L Final     Glucose   Date Value Ref Range Status   02/28/2022 149 (H) 74 - 106 mg/dL Final     BUN   Date Value Ref Range Status   02/28/2022 33 (H) 7 - 18 mg/dL Final     Creatinine   Date Value Ref Range Status   02/28/2022 1.65 (H) 0.70 - 1.30 mg/dL Final     Calcium   Date Value Ref Range Status   02/28/2022 8.9 8.5 - 10.1 mg/dL Final  "    Total Protein   Date Value Ref Range Status   02/28/2022 6.7 6.4 - 8.2 g/dL Final     Albumin   Date Value Ref Range Status   02/28/2022 3.5 3.5 - 5.0 g/dL Final     Bilirubin, Total   Date Value Ref Range Status   02/28/2022 0.4 0.0 - 1.2 mg/dL Final     Alk Phos   Date Value Ref Range Status   02/28/2022 32 (L) 45 - 115 U/L Final     AST   Date Value Ref Range Status   02/28/2022 21 15 - 37 U/L Final     ALT   Date Value Ref Range Status   02/28/2022 24 16 - 61 U/L Final     Anion Gap   Date Value Ref Range Status   02/28/2022 11 7 - 16 mmol/L Final     eGFR    Date Value Ref Range Status   12/12/2020 47       eGFR   Date Value Ref Range Status   02/28/2022 45 (L) >=60 mL/min/1.73m² Final         Lab Results   Component Value Date    PSA 0.543 08/11/2021           · Care Team   PCP: Dr. Morel    Eye specialist: Dr. Larson, [facility]  Neurologist: n/a   Cardiologist: Luciana Galicia NP/ Dr. Whitlock   Endo: Dr. Chelle Lang, [place], [place]              Pain Clinic: [facility]     **See Completed Assessments for Annual Wellness visit within the encounter summary    The following assessments were completed & reviewed:  · Depression Screening  · Cognitive function Screening  · Timed Get Up Test  · Whisper Test  · Vision Screen  · Health Risk Assessment  · Checklist of ADLs and IADLs      Objective  /80 (BP Location: Right arm, Patient Position: Sitting, BP Method: Large (Manual))   Pulse 68   Temp 97.9 °F (36.6 °C) (Oral)   Resp 18   Ht 6' 1" (1.854 m)   Wt 85.3 kg (188 lb)   SpO2 98%   BMI 24.80 kg/m²        Physical Exam      Assessment:     1. Need for immunization against influenza  - Flu Vaccine - Quadrivalent *Preferred* (PF) (6 months & older)    2. Encounter for preventive health examination    3. Colon cancer screening  - Cologuard Screening (Multitarget Stool DNA); Future  - Cologuard Screening (Multitarget Stool DNA)    4. Coronary artery disease involving native " heart, unspecified vessel or lesion type, unspecified whether angina present  - Radiology US Abdominal Aorta; Future    5. PVD (peripheral vascular disease)  - Radiology US Abdominal Aorta; Future    6. Screening for AAA (aortic abdominal aneurysm)  - Radiology US Abdominal Aorta; Future    7. Encounter for screening for abdominal aortic aneurysm (AAA) in patient 50 years of age or older with history of smoking  - Radiology US Abdominal Aorta; Future    8. BMI 24.0-24.9, adult    9. Hypertension, unspecified type    10. Type 2 diabetes mellitus with other circulatory complication, without long-term current use of insulin    11. Hyperlipidemia, unspecified hyperlipidemia type         Plan:    Referrals:   AAA screening  Cologuard       Advised to call office if does not hear from anyone with referral appt within 2-3 weeks to check on status of referral. Voiced understanding.    Discussed and provided with a screening schedule and personal prevention plan in accordance with USPSTF age appropriate recommendations and Medicare screening guidelines.   Education, counseling, and referrals were provided as needed.  After Visit Summary printed and given to patient which includes written education and a list of any referrals if indicated.     F/u plan for yearly AWV.    Signature: JAKOB Valenzuela      I offered to discuss advanced care planning, including how to pick a person who would make decisions for you if you were unable to make them for yourself, called a health care power of , and what kind of decisions you might make such as use of life sustaining treatments such as ventilators and tube feeding when faced with a life limiting illness recorded on a living will that they will need to know. (How you want to be cared for as you near the end of your natural life)     X Patient is interested in learning more about how to make advanced directives.  I provided them paperwork and offered to discuss this with  them.  I offered to discuss advanced care planning, including how to pick a person who would make decisions for you if you were unable to make them for yourself, called a health care power of , and what kind of decisions you might make such as use of life sustaining treatments such as ventilators and tube feeding when faced with a life limiting illness recorded on a living will that they will need to know. (How you want to be cared for as you near the end of your natural life)     X Patient is interested in learning more about how to make advanced directives.  I provided them paperwork and offered to discuss this with them.

## 2022-02-28 ENCOUNTER — OFFICE VISIT (OUTPATIENT)
Dept: FAMILY MEDICINE | Facility: CLINIC | Age: 67
End: 2022-02-28
Payer: MEDICARE

## 2022-02-28 VITALS
BODY MASS INDEX: 25.02 KG/M2 | WEIGHT: 188.81 LBS | OXYGEN SATURATION: 98 % | HEIGHT: 73 IN | TEMPERATURE: 97 F | SYSTOLIC BLOOD PRESSURE: 119 MMHG | DIASTOLIC BLOOD PRESSURE: 63 MMHG | HEART RATE: 58 BPM

## 2022-02-28 DIAGNOSIS — E11.9 TYPE 2 DIABETES MELLITUS WITHOUT COMPLICATION, WITH LONG-TERM CURRENT USE OF INSULIN: ICD-10-CM

## 2022-02-28 DIAGNOSIS — Z79.4 TYPE 2 DIABETES MELLITUS WITHOUT COMPLICATION, WITH LONG-TERM CURRENT USE OF INSULIN: ICD-10-CM

## 2022-02-28 DIAGNOSIS — R79.89 LOW TESTOSTERONE: ICD-10-CM

## 2022-02-28 DIAGNOSIS — I10 HYPERTENSION, UNSPECIFIED TYPE: Primary | ICD-10-CM

## 2022-02-28 DIAGNOSIS — E78.5 HYPERLIPIDEMIA, UNSPECIFIED HYPERLIPIDEMIA TYPE: ICD-10-CM

## 2022-02-28 LAB
ALBUMIN SERPL BCP-MCNC: 3.5 G/DL (ref 3.5–5)
ALBUMIN/GLOB SERPL: 1.1 {RATIO}
ALP SERPL-CCNC: 32 U/L (ref 45–115)
ALT SERPL W P-5'-P-CCNC: 24 U/L (ref 16–61)
ANION GAP SERPL CALCULATED.3IONS-SCNC: 11 MMOL/L (ref 7–16)
AST SERPL W P-5'-P-CCNC: 21 U/L (ref 15–37)
BASOPHILS # BLD AUTO: 0.06 K/UL (ref 0–0.2)
BASOPHILS NFR BLD AUTO: 0.9 % (ref 0–1)
BILIRUB SERPL-MCNC: 0.4 MG/DL (ref 0–1.2)
BUN SERPL-MCNC: 33 MG/DL (ref 7–18)
BUN/CREAT SERPL: 20 (ref 6–20)
CALCIUM SERPL-MCNC: 8.9 MG/DL (ref 8.5–10.1)
CHLORIDE SERPL-SCNC: 105 MMOL/L (ref 98–107)
CHOLEST SERPL-MCNC: 93 MG/DL (ref 0–200)
CHOLEST/HDLC SERPL: 3 {RATIO}
CO2 SERPL-SCNC: 30 MMOL/L (ref 21–32)
CREAT SERPL-MCNC: 1.65 MG/DL (ref 0.7–1.3)
DIFFERENTIAL METHOD BLD: ABNORMAL
EOSINOPHIL # BLD AUTO: 0.21 K/UL (ref 0–0.5)
EOSINOPHIL NFR BLD AUTO: 3.3 % (ref 1–4)
ERYTHROCYTE [DISTWIDTH] IN BLOOD BY AUTOMATED COUNT: 13.4 % (ref 11.5–14.5)
GLOBULIN SER-MCNC: 3.2 G/DL (ref 2–4)
GLUCOSE SERPL-MCNC: 149 MG/DL (ref 74–106)
HCT VFR BLD AUTO: 42.3 % (ref 40–54)
HDLC SERPL-MCNC: 31 MG/DL (ref 40–60)
HGB BLD-MCNC: 12.9 G/DL (ref 13.5–18)
IMM GRANULOCYTES # BLD AUTO: 0.02 K/UL (ref 0–0.04)
IMM GRANULOCYTES NFR BLD: 0.3 % (ref 0–0.4)
LDLC SERPL CALC-MCNC: 32 MG/DL
LDLC/HDLC SERPL: 1 {RATIO}
LYMPHOCYTES # BLD AUTO: 2.17 K/UL (ref 1–4.8)
LYMPHOCYTES NFR BLD AUTO: 34.1 % (ref 27–41)
MCH RBC QN AUTO: 26.1 PG (ref 27–31)
MCHC RBC AUTO-ENTMCNC: 30.5 G/DL (ref 32–36)
MCV RBC AUTO: 85.6 FL (ref 80–96)
MONOCYTES # BLD AUTO: 0.52 K/UL (ref 0–0.8)
MONOCYTES NFR BLD AUTO: 8.2 % (ref 2–6)
MPC BLD CALC-MCNC: 12 FL (ref 9.4–12.4)
NEUTROPHILS # BLD AUTO: 3.39 K/UL (ref 1.8–7.7)
NEUTROPHILS NFR BLD AUTO: 53.2 % (ref 53–65)
NONHDLC SERPL-MCNC: 62 MG/DL
NRBC # BLD AUTO: 0 X10E3/UL
NRBC, AUTO (.00): 0 %
PLATELET # BLD AUTO: 182 K/UL (ref 150–400)
POTASSIUM SERPL-SCNC: 4.7 MMOL/L (ref 3.5–5.1)
PROT SERPL-MCNC: 6.7 G/DL (ref 6.4–8.2)
RBC # BLD AUTO: 4.94 M/UL (ref 4.6–6.2)
SODIUM SERPL-SCNC: 141 MMOL/L (ref 136–145)
TRIGL SERPL-MCNC: 152 MG/DL (ref 35–150)
VLDLC SERPL-MCNC: 30 MG/DL
WBC # BLD AUTO: 6.37 K/UL (ref 4.5–11)

## 2022-02-28 PROCEDURE — 1101F PR PT FALLS ASSESS DOC 0-1 FALLS W/OUT INJ PAST YR: ICD-10-PCS | Mod: ,,, | Performed by: FAMILY MEDICINE

## 2022-02-28 PROCEDURE — 80053 COMPREHENSIVE METABOLIC PANEL: ICD-10-PCS | Mod: ,,, | Performed by: CLINICAL MEDICAL LABORATORY

## 2022-02-28 PROCEDURE — 3008F PR BODY MASS INDEX (BMI) DOCUMENTED: ICD-10-PCS | Mod: ,,, | Performed by: FAMILY MEDICINE

## 2022-02-28 PROCEDURE — 1159F MED LIST DOCD IN RCRD: CPT | Mod: ,,, | Performed by: FAMILY MEDICINE

## 2022-02-28 PROCEDURE — 99214 PR OFFICE/OUTPT VISIT, EST, LEVL IV, 30-39 MIN: ICD-10-PCS | Mod: ,,, | Performed by: FAMILY MEDICINE

## 2022-02-28 PROCEDURE — 99214 OFFICE O/P EST MOD 30 MIN: CPT | Mod: ,,, | Performed by: FAMILY MEDICINE

## 2022-02-28 PROCEDURE — 3288F PR FALLS RISK ASSESSMENT DOCUMENTED: ICD-10-PCS | Mod: ,,, | Performed by: FAMILY MEDICINE

## 2022-02-28 PROCEDURE — 84403 TESTOSTERONE, FREE AND TOTAL: ICD-10-PCS | Mod: 90,,, | Performed by: CLINICAL MEDICAL LABORATORY

## 2022-02-28 PROCEDURE — 80053 COMPREHEN METABOLIC PANEL: CPT | Mod: ,,, | Performed by: CLINICAL MEDICAL LABORATORY

## 2022-02-28 PROCEDURE — 84403 ASSAY OF TOTAL TESTOSTERONE: CPT | Mod: 90,,, | Performed by: CLINICAL MEDICAL LABORATORY

## 2022-02-28 PROCEDURE — 1101F PT FALLS ASSESS-DOCD LE1/YR: CPT | Mod: ,,, | Performed by: FAMILY MEDICINE

## 2022-02-28 PROCEDURE — 4010F PR ACE/ARB THEARPY RXD/TAKEN: ICD-10-PCS | Mod: ,,, | Performed by: FAMILY MEDICINE

## 2022-02-28 PROCEDURE — 4010F ACE/ARB THERAPY RXD/TAKEN: CPT | Mod: ,,, | Performed by: FAMILY MEDICINE

## 2022-02-28 PROCEDURE — 3074F PR MOST RECENT SYSTOLIC BLOOD PRESSURE < 130 MM HG: ICD-10-PCS | Mod: ,,, | Performed by: FAMILY MEDICINE

## 2022-02-28 PROCEDURE — 3051F PR MOST RECENT HEMOGLOBIN A1C LEVEL 7.0 - < 8.0%: ICD-10-PCS | Mod: ,,, | Performed by: FAMILY MEDICINE

## 2022-02-28 PROCEDURE — 3008F BODY MASS INDEX DOCD: CPT | Mod: ,,, | Performed by: FAMILY MEDICINE

## 2022-02-28 PROCEDURE — 80061 LIPID PANEL: CPT | Mod: ,,, | Performed by: CLINICAL MEDICAL LABORATORY

## 2022-02-28 PROCEDURE — 85025 CBC WITH DIFFERENTIAL: ICD-10-PCS | Mod: ,,, | Performed by: CLINICAL MEDICAL LABORATORY

## 2022-02-28 PROCEDURE — 3074F SYST BP LT 130 MM HG: CPT | Mod: ,,, | Performed by: FAMILY MEDICINE

## 2022-02-28 PROCEDURE — 80061 LIPID PANEL: ICD-10-PCS | Mod: ,,, | Performed by: CLINICAL MEDICAL LABORATORY

## 2022-02-28 PROCEDURE — 3078F DIAST BP <80 MM HG: CPT | Mod: ,,, | Performed by: FAMILY MEDICINE

## 2022-02-28 PROCEDURE — 84402 ASSAY OF FREE TESTOSTERONE: CPT | Mod: 90,,, | Performed by: CLINICAL MEDICAL LABORATORY

## 2022-02-28 PROCEDURE — 84402 TESTOSTERONE, FREE AND TOTAL: ICD-10-PCS | Mod: 90,,, | Performed by: CLINICAL MEDICAL LABORATORY

## 2022-02-28 PROCEDURE — 1159F PR MEDICATION LIST DOCUMENTED IN MEDICAL RECORD: ICD-10-PCS | Mod: ,,, | Performed by: FAMILY MEDICINE

## 2022-02-28 PROCEDURE — 3051F HG A1C>EQUAL 7.0%<8.0%: CPT | Mod: ,,, | Performed by: FAMILY MEDICINE

## 2022-02-28 PROCEDURE — 3288F FALL RISK ASSESSMENT DOCD: CPT | Mod: ,,, | Performed by: FAMILY MEDICINE

## 2022-02-28 PROCEDURE — 85025 COMPLETE CBC W/AUTO DIFF WBC: CPT | Mod: ,,, | Performed by: CLINICAL MEDICAL LABORATORY

## 2022-02-28 PROCEDURE — 3078F PR MOST RECENT DIASTOLIC BLOOD PRESSURE < 80 MM HG: ICD-10-PCS | Mod: ,,, | Performed by: FAMILY MEDICINE

## 2022-02-28 RX ORDER — FENOFIBRATE 145 MG/1
145 TABLET, FILM COATED ORAL DAILY
Qty: 90 TABLET | Refills: 1 | Status: SHIPPED | OUTPATIENT
Start: 2022-02-28 | End: 2022-05-17 | Stop reason: SDUPTHER

## 2022-02-28 RX ORDER — CLOPIDOGREL BISULFATE 75 MG/1
75 TABLET ORAL DAILY
Qty: 90 TABLET | Refills: 1 | Status: SHIPPED | OUTPATIENT
Start: 2022-02-28 | End: 2022-05-17 | Stop reason: SDUPTHER

## 2022-02-28 RX ORDER — EZETIMIBE 10 MG/1
10 TABLET ORAL DAILY
Qty: 90 TABLET | Refills: 1 | Status: SHIPPED | OUTPATIENT
Start: 2022-02-28 | End: 2022-05-17 | Stop reason: SDUPTHER

## 2022-02-28 RX ORDER — POTASSIUM CHLORIDE 20 MEQ/1
20 TABLET, EXTENDED RELEASE ORAL DAILY
Qty: 90 TABLET | Refills: 1 | Status: SHIPPED | OUTPATIENT
Start: 2022-02-28 | End: 2022-08-29 | Stop reason: SDUPTHER

## 2022-02-28 RX ORDER — DAPAGLIFLOZIN 10 MG/1
1 TABLET, FILM COATED ORAL DAILY
COMMUNITY
End: 2022-12-14 | Stop reason: SDUPTHER

## 2022-02-28 RX ORDER — HYDRALAZINE HYDROCHLORIDE 50 MG/1
50 TABLET, FILM COATED ORAL 4 TIMES DAILY
Qty: 360 TABLET | Refills: 1 | Status: SHIPPED | OUTPATIENT
Start: 2022-02-28 | End: 2022-05-17 | Stop reason: SDUPTHER

## 2022-02-28 RX ORDER — GABAPENTIN 300 MG/1
300 CAPSULE ORAL 3 TIMES DAILY
Qty: 360 CAPSULE | Refills: 1 | Status: SHIPPED | OUTPATIENT
Start: 2022-02-28 | End: 2022-08-29 | Stop reason: SDUPTHER

## 2022-02-28 RX ORDER — LISINOPRIL 20 MG/1
1 TABLET ORAL DAILY
COMMUNITY
Start: 2022-02-07 | End: 2022-05-17 | Stop reason: SDUPTHER

## 2022-02-28 RX ORDER — AMLODIPINE BESYLATE 5 MG/1
5 TABLET ORAL DAILY
Qty: 90 TABLET | Refills: 1 | Status: SHIPPED | OUTPATIENT
Start: 2022-02-28 | End: 2022-05-17 | Stop reason: SDUPTHER

## 2022-02-28 RX ORDER — ICOSAPENT ETHYL 1000 MG/1
2 CAPSULE ORAL 2 TIMES DAILY
COMMUNITY
End: 2022-05-17 | Stop reason: SDUPTHER

## 2022-02-28 RX ORDER — OMEPRAZOLE 20 MG/1
20 CAPSULE, DELAYED RELEASE ORAL DAILY
Qty: 90 CAPSULE | Refills: 1 | Status: SHIPPED | OUTPATIENT
Start: 2022-02-28 | End: 2022-08-29 | Stop reason: SDUPTHER

## 2022-02-28 RX ORDER — ROSUVASTATIN CALCIUM 5 MG/1
5 TABLET, COATED ORAL DAILY
Qty: 90 TABLET | Refills: 1 | Status: SHIPPED | OUTPATIENT
Start: 2022-02-28 | End: 2022-05-17 | Stop reason: SDUPTHER

## 2022-02-28 NOTE — PROGRESS NOTES
Rodney Morel MD   [facility]  [address] [place] [address]     PATIENT NAME: Micheal Taylor  : 1955  DATE: 22  MRN: [id number]      Billing Provider: Rodney Morel MD  Level of Service:   Patient PCP Information     Provider PCP Type    Rodney Morel MD General          Reason for Visit / Chief Complaint: Hypertension (Check up; Labs; Refills. Discuss increasing Gabapentin for feet. ), Diabetes, and Low Testosterone (In past, patient reports taking testosterone injections. Request to be checked. )         History of Present Illness / Problem Focused Workflow     Micheal Taylor presents to the clinic with Hypertension (Check up; Labs; Refills. Discuss increasing Gabapentin for feet. ), Diabetes, and Low Testosterone (In past, patient reports taking testosterone injections. Request to be checked. )     HPI    Review of Systems     Review of Systems   Constitutional: Negative for activity change, appetite change, fatigue and fever.   HENT: Negative for nasal congestion, ear pain, hearing loss, sinus pressure/congestion and sore throat.    Respiratory: Negative for cough, chest tightness and shortness of breath.    Cardiovascular: Negative for chest pain and palpitations.   Gastrointestinal: Negative for abdominal pain and fecal incontinence.   Genitourinary: Negative for bladder incontinence, difficulty urinating and erectile dysfunction.   Musculoskeletal: Negative for arthralgias.   Integumentary:  Negative for rash.   Neurological: Negative for dizziness and headaches.        Medical / Social / Family History     Past Medical History:   Diagnosis Date    CHF (congestive heart failure)     Chronic pain syndrome     Coronary artery disease     Diabetes mellitus, type 2     Hyperlipidemia     Hypertension     Lumbar spondylosis     Lumbosacral radiculopathy     Pain in thoracic spine     PVD (peripheral vascular disease)     Spondylosis without  myelopathy or radiculopathy, cervical region        Past Surgical History:   Procedure Laterality Date    ADENOIDECTOMY      AMPUTATION      left index finger removed     CAUDAL EPIDURAL STEROID INJECTION  01/19/2016    Caudal NATHAN - Alee    CIRCUMCISION      CORONARY ARTERY BYPASS GRAFT (CABG)      FOOT SURGERY      heel surgery    HEEL SPUR SURGERY      INJECTION OF FACET JOINT Bilateral 04/16/2015    Bilateral L3-S1 Facet Injection - Alee - 4/16/15, 11/15/12 and 9/27/12    RADIOFREQUENCY THERMOCOAGULATION Left 02/26/2013    Left L3-S1 RFTC -Alee    RADIOFREQUENCY THERMOCOAGULATION Right 02/05/2013    Right L3-S1 RFTC - Alee    TONSILLECTOMY      TONSILLECTOMY, ADENOIDECTOMY, BILATERAL MYRINGOTOMY AND TUBES      TRANSFORAMINAL EPIDURAL INJECTION OF STEROID Left 06/19/2012    Left L3-4 TFESI - Alee    TRANSFORAMINAL EPIDURAL INJECTION OF STEROID Right 04/12/2013    Right L3-4 TFESI - Alee - 4/12/13 and 8/7/12    VASCULAR SURGERY         Social History  Micheal Taylor  reports that he quit smoking about 15 years ago. His smokeless tobacco use includes snuff. He reports that he does not drink alcohol and does not use drugs.    Family History  Micheal Taylor  family history includes Diabetes in his mother; Heart disease in his father and mother; Hypertension in his father and mother; Liver cancer in his mother.    Medications and Allergies     Medications  Outpatient Medications Marked as Taking for the 2/28/22 encounter (Office Visit) with Rodney Morle MD   Medication Sig Dispense Refill    amLODIPine (NORVASC) 5 MG tablet Take 1 tablet (5 mg total) by mouth once daily. 90 tablet 1    aspirin (ECOTRIN) 81 MG EC tablet Take 81 mg by mouth once daily.      cholecalciferol, vitamin D3, (VITAMIN D3) 25 mcg (1,000 unit) capsule       cinnamon bark 500 mg capsule Take 500 mg by mouth once daily.       clopidogreL (PLAVIX) 75 mg tablet Take 1 tablet (75 mg total) by mouth once daily. 90 tablet 1     dapagliflozin (FARXIGA) 10 mg tablet Take 1 tablet by mouth once daily.      ezetimibe (ZETIA) 10 mg tablet Take 1 tablet (10 mg total) by mouth once daily. 90 tablet 1    fenofibrate (TRICOR) 145 MG tablet Take 1 tablet (145 mg total) by mouth once daily. 90 tablet 1    gabapentin (NEURONTIN) 300 MG capsule Take 1 capsule (300 mg total) by mouth 2 (two) times a day. 180 capsule 1    hydrALAZINE (APRESOLINE) 50 MG tablet Take 1 tablet (50 mg total) by mouth 4 (four) times daily. 360 tablet 1    HYDROcodone-acetaminophen (NORCO)  mg per tablet Take 1 tablet by mouth every 8 (eight) hours. 90 tablet 0    HYDROcodone-acetaminophen (NORCO)  mg per tablet Take 1 tablet by mouth every 8 (eight) hours as needed for Pain. 90 tablet 0    [START ON 3/18/2022] HYDROcodone-acetaminophen (NORCO)  mg per tablet Take 1 tablet by mouth every 8 (eight) hours as needed for Pain. 90 tablet 0    icosapent ethyL (VASCEPA) 1 gram Cap Take 2 capsules by mouth 2 (two) times a day.      insulin regular 100 unit/mL Inj injection Novolin R Regular U-100 Insuln   14-32 u bidwm      liraglutide 0.6 mg/0.1 mL, 18 mg/3 mL, subq PNIJ (VICTOZA 2-CAITLIN) 0.6 mg/0.1 mL (18 mg/3 mL) PnIj pen Inject 1.8 mg into the skin.      lisinopriL (PRINIVIL,ZESTRIL) 20 MG tablet Take 1 tablet by mouth once daily.      magnesium 250 mg Tab Take 1 tablet by mouth once daily.       metFORMIN (GLUCOPHAGE) 500 MG tablet Take 500 mg by mouth 2 (two) times daily with meals.      metoprolol succinate (TOPROL-XL) 25 MG 24 hr tablet TAKE ONE TABLET BY MOUTH EVERY DAY 90 tablet 1    olopatadine (PATADAY) 0.2 % Drop Place 1 drop into both eyes once daily. 5 mL 0    omeprazole (PRILOSEC) 20 MG capsule Take 1 capsule (20 mg total) by mouth once daily. 90 capsule 1    potassium chloride SA (K-DUR,KLOR-CON) 20 MEQ tablet Take 1 tablet (20 mEq total) by mouth once daily. 90 tablet 1    rosuvastatin (CRESTOR) 5 MG tablet Take 1 tablet (5 mg total)  by mouth once daily. 90 tablet 1    tiZANidine (ZANAFLEX) 4 MG tablet Take 1 tablet (4 mg total) by mouth every 8 (eight) hours. 90 tablet 2    TRESIBA FLEXTOUCH U-200 200 unit/mL (3 mL) insulin pen Inject 54 Units into the skin once daily.       vitamin A 8000 UNIT capsule Take 8,000 Units by mouth.      vitamin E 400 UNIT capsule Take 400 Units by mouth once daily.          Allergies  Review of patient's allergies indicates:  No Known Allergies    Physical Examination     Vitals:    02/28/22 0915   BP: 119/63   Pulse: (!) 58   Temp: 97 °F (36.1 °C)     Physical Exam  Constitutional:       General: He is not in acute distress.     Appearance: He is not ill-appearing.   HENT:      Head: Normocephalic and atraumatic.      Right Ear: Tympanic membrane and ear canal normal.      Left Ear: Tympanic membrane and ear canal normal.      Nose: Nose normal. No congestion or rhinorrhea.   Eyes:      Pupils: Pupils are equal, round, and reactive to light.   Cardiovascular:      Rate and Rhythm: Normal rate and regular rhythm.      Pulses: Normal pulses.      Heart sounds: No murmur heard.  Pulmonary:      Effort: No respiratory distress.      Breath sounds: No wheezing, rhonchi or rales.   Abdominal:      General: Bowel sounds are normal.      Palpations: Abdomen is soft.      Tenderness: There is no abdominal tenderness.      Hernia: No hernia is present.   Musculoskeletal:      Cervical back: Normal range of motion and neck supple.   Lymphadenopathy:      Cervical: No cervical adenopathy.   Skin:     General: Skin is warm and dry.   Neurological:      Mental Status: He is alert.   Psychiatric:         Behavior: Behavior normal.         Thought Content: Thought content normal.          Assessment and Plan (including Health Maintenance)   :    Plan:         Health Maintenance Due   Topic Date Due    Hepatitis C Screening  Never done    COVID-19 Vaccine (1) Never done    Foot Exam  Never done    TETANUS VACCINE  Never  done    Sign Pain Contract  Never done    Colorectal Cancer Screening  Never done    Shingles Vaccine (1 of 2) Never done    Eye Exam  01/12/2018    Abdominal Aortic Aneurysm Screening  Never done    Hemoglobin A1c  02/11/2022       Problem List Items Addressed This Visit        Cardiac/Vascular    Hyperlipidemia    Relevant Orders    CBC Auto Differential    Lipid Panel    Comprehensive Metabolic Panel    Hypertension - Primary    Relevant Orders    CBC Auto Differential    Lipid Panel    Comprehensive Metabolic Panel      Other Visit Diagnoses     Type 2 diabetes mellitus without complication, with long-term current use of insulin        Relevant Medications    insulin regular 100 unit/mL Inj injection    Other Relevant Orders    CBC Auto Differential    Comprehensive Metabolic Panel    Low testosterone        Relevant Orders    Testosterone, Free & Total        Hypertension, unspecified type  -     CBC Auto Differential; Future; Expected date: 02/28/2022  -     Lipid Panel; Future; Expected date: 02/28/2022  -     Comprehensive Metabolic Panel; Future; Expected date: 02/28/2022    Hyperlipidemia, unspecified hyperlipidemia type  -     CBC Auto Differential; Future; Expected date: 02/28/2022  -     Lipid Panel; Future; Expected date: 02/28/2022  -     Comprehensive Metabolic Panel; Future; Expected date: 02/28/2022    Type 2 diabetes mellitus without complication, with long-term current use of insulin  -     CBC Auto Differential; Future; Expected date: 02/28/2022  -     Comprehensive Metabolic Panel; Future; Expected date: 02/28/2022    Low testosterone  -     Testosterone, Free & Total; Future; Expected date: 02/28/2022       Health Maintenance Topics with due status: Not Due       Topic Last Completion Date    Pneumococcal Vaccines (Age 65+) 12/06/2018    Diabetes Urine Screening 08/11/2021    Lipid Panel 08/11/2021    High Dose Statin 02/16/2022       Procedures     Future Appointments   Date Time  Provider Department Center   3/10/2022  9:00 AM Pinky Huagn, BRUCEP [facility] DIOR [facility]   4/14/2022  9:15 AM MARCELL Escobedo [facility]   5/17/2022 10:30 AM MATTHEW LamaP [facility]   8/24/2022  1:00 PM [facility] VAS US1 [facility]ASCUS [facility]    8/24/2022  2:00 PM Loida Forrest MD [facility] GENSRG [facility]   2/28/2023  1:00 PM Pinky Huang, BRUCEP [facility] DIOR [facility]        No follow-ups on file.       Signature:  Rodney Morel MD  [facility]  [address] [place] [address]  [phone] Phone  [phone] Fax    Date of encounter: 2/28/22

## 2022-02-28 NOTE — PROGRESS NOTES
Rodney Morel MD   [facility]  [address] [place] [address]     PATIENT NAME: Micheal Taylor  : 1955  DATE: 22  MRN: [id number]      Billing Provider: Rodney Morel MD  Level of Service:   Patient PCP Information     Provider PCP Type    Rodney Morel MD General          Reason for Visit / Chief Complaint: Hypertension (Check up; Labs; Refills. Discuss increasing Gabapentin for feet. ), Diabetes, and Low Testosterone (In past, patient reports taking testosterone injections. Request to be checked. )         History of Present Illness / Problem Focused Workflow     Micheal Taylor presents to the clinic with Hypertension (Check up; Labs; Refills. Discuss increasing Gabapentin for feet. ), Diabetes, and Low Testosterone (In past, patient reports taking testosterone injections. Request to be checked. )     HPI    Review of Systems     Review of Systems     Medical / Social / Family History     Past Medical History:   Diagnosis Date    CHF (congestive heart failure)     Chronic pain syndrome     Coronary artery disease     Diabetes mellitus, type 2     Hyperlipidemia     Hypertension     Lumbar spondylosis     Lumbosacral radiculopathy     Pain in thoracic spine     PVD (peripheral vascular disease)     Spondylosis without myelopathy or radiculopathy, cervical region        Past Surgical History:   Procedure Laterality Date    ADENOIDECTOMY      AMPUTATION      left index finger removed     CAUDAL EPIDURAL STEROID INJECTION  2016    Caudal NATHAN - Alee    CIRCUMCISION      CORONARY ARTERY BYPASS GRAFT (CABG)      FOOT SURGERY      heel surgery    HEEL SPUR SURGERY      INJECTION OF FACET JOINT Bilateral 2015    Bilateral L3-S1 Facet Injection - Alee - 4/16/15, 11/15/12 and 12    RADIOFREQUENCY THERMOCOAGULATION Left 2013    Left L3-S1 RFTC -Alee    RADIOFREQUENCY THERMOCOAGULATION Right 2013    Right L3-S1 RFTC -  Alee    TONSILLECTOMY      TONSILLECTOMY, ADENOIDECTOMY, BILATERAL MYRINGOTOMY AND TUBES      TRANSFORAMINAL EPIDURAL INJECTION OF STEROID Left 06/19/2012    Left L3-4 TFESI - Alee    TRANSFORAMINAL EPIDURAL INJECTION OF STEROID Right 04/12/2013    Right L3-4 TFESI - Alee - 4/12/13 and 8/7/12    VASCULAR SURGERY         Social History  Micehal Taylor  reports that he quit smoking about 15 years ago. His smokeless tobacco use includes snuff. He reports that he does not drink alcohol and does not use drugs.    Family History  Micheal Taylor  family history includes Diabetes in his mother; Heart disease in his father and mother; Hypertension in his father and mother; Liver cancer in his mother.    Medications and Allergies     Medications  Outpatient Medications Marked as Taking for the 2/28/22 encounter (Office Visit) with Rodney Morel MD   Medication Sig Dispense Refill    amLODIPine (NORVASC) 5 MG tablet Take 1 tablet (5 mg total) by mouth once daily. 90 tablet 1    aspirin (ECOTRIN) 81 MG EC tablet Take 81 mg by mouth once daily.      cholecalciferol, vitamin D3, (VITAMIN D3) 25 mcg (1,000 unit) capsule       cinnamon bark 500 mg capsule Take 500 mg by mouth once daily.       clopidogreL (PLAVIX) 75 mg tablet Take 1 tablet (75 mg total) by mouth once daily. 90 tablet 1    dapagliflozin (FARXIGA) 10 mg tablet Take 1 tablet by mouth once daily.      ezetimibe (ZETIA) 10 mg tablet Take 1 tablet (10 mg total) by mouth once daily. 90 tablet 1    fenofibrate (TRICOR) 145 MG tablet Take 1 tablet (145 mg total) by mouth once daily. 90 tablet 1    gabapentin (NEURONTIN) 300 MG capsule Take 1 capsule (300 mg total) by mouth 2 (two) times a day. 180 capsule 1    hydrALAZINE (APRESOLINE) 50 MG tablet Take 1 tablet (50 mg total) by mouth 4 (four) times daily. 360 tablet 1    HYDROcodone-acetaminophen (NORCO)  mg per tablet Take 1 tablet by mouth every 8 (eight) hours. 90 tablet 0     HYDROcodone-acetaminophen (NORCO)  mg per tablet Take 1 tablet by mouth every 8 (eight) hours as needed for Pain. 90 tablet 0    [START ON 3/18/2022] HYDROcodone-acetaminophen (NORCO)  mg per tablet Take 1 tablet by mouth every 8 (eight) hours as needed for Pain. 90 tablet 0    icosapent ethyL (VASCEPA) 1 gram Cap Take 2 capsules by mouth 2 (two) times a day.      insulin regular 100 unit/mL Inj injection Novolin R Regular U-100 Insuln   14-32 u bidwm      liraglutide 0.6 mg/0.1 mL, 18 mg/3 mL, subq PNIJ (VICTOZA 2-CAITLIN) 0.6 mg/0.1 mL (18 mg/3 mL) PnIj pen Inject 1.8 mg into the skin.      lisinopriL (PRINIVIL,ZESTRIL) 20 MG tablet Take 1 tablet by mouth once daily.      magnesium 250 mg Tab Take 1 tablet by mouth once daily.       metFORMIN (GLUCOPHAGE) 500 MG tablet Take 500 mg by mouth 2 (two) times daily with meals.      metoprolol succinate (TOPROL-XL) 25 MG 24 hr tablet TAKE ONE TABLET BY MOUTH EVERY DAY 90 tablet 1    olopatadine (PATADAY) 0.2 % Drop Place 1 drop into both eyes once daily. 5 mL 0    omeprazole (PRILOSEC) 20 MG capsule Take 1 capsule (20 mg total) by mouth once daily. 90 capsule 1    potassium chloride SA (K-DUR,KLOR-CON) 20 MEQ tablet Take 1 tablet (20 mEq total) by mouth once daily. 90 tablet 1    rosuvastatin (CRESTOR) 5 MG tablet Take 1 tablet (5 mg total) by mouth once daily. 90 tablet 1    tiZANidine (ZANAFLEX) 4 MG tablet Take 1 tablet (4 mg total) by mouth every 8 (eight) hours. 90 tablet 2    TRESIBA FLEXTOUCH U-200 200 unit/mL (3 mL) insulin pen Inject 54 Units into the skin once daily.       vitamin A 8000 UNIT capsule Take 8,000 Units by mouth.      vitamin E 400 UNIT capsule Take 400 Units by mouth once daily.          Allergies  Review of patient's allergies indicates:  No Known Allergies    Physical Examination     Vitals:    02/28/22 0915   BP: 119/63   Pulse: (!) 58   Temp: 97 °F (36.1 °C)     Physical Exam     Assessment and Plan (including Health  Maintenance)   :    Plan:         Health Maintenance Due   Topic Date Due    Hepatitis C Screening  Never done    COVID-19 Vaccine (1) Never done    Foot Exam  Never done    TETANUS VACCINE  Never done    Sign Pain Contract  Never done    Colorectal Cancer Screening  Never done    Shingles Vaccine (1 of 2) Never done    Eye Exam  01/12/2018    Abdominal Aortic Aneurysm Screening  Never done    Hemoglobin A1c  02/11/2022       Problem List Items Addressed This Visit        Cardiac/Vascular    Hyperlipidemia    Relevant Orders    CBC Auto Differential    Lipid Panel    Comprehensive Metabolic Panel    Hypertension - Primary    Relevant Orders    CBC Auto Differential    Lipid Panel    Comprehensive Metabolic Panel      Other Visit Diagnoses     Type 2 diabetes mellitus without complication, with long-term current use of insulin        Relevant Medications    insulin regular 100 unit/mL Inj injection    Other Relevant Orders    CBC Auto Differential    Comprehensive Metabolic Panel    Low testosterone        Relevant Orders    Testosterone, Free & Total        Hypertension, unspecified type  -     CBC Auto Differential; Future; Expected date: 02/28/2022  -     Lipid Panel; Future; Expected date: 02/28/2022  -     Comprehensive Metabolic Panel; Future; Expected date: 02/28/2022    Hyperlipidemia, unspecified hyperlipidemia type  -     CBC Auto Differential; Future; Expected date: 02/28/2022  -     Lipid Panel; Future; Expected date: 02/28/2022  -     Comprehensive Metabolic Panel; Future; Expected date: 02/28/2022    Type 2 diabetes mellitus without complication, with long-term current use of insulin  -     CBC Auto Differential; Future; Expected date: 02/28/2022  -     Comprehensive Metabolic Panel; Future; Expected date: 02/28/2022    Low testosterone  -     Testosterone, Free & Total; Future; Expected date: 02/28/2022       Health Maintenance Topics with due status: Not Due       Topic Last Completion Date     Pneumococcal Vaccines (Age 65+) 12/06/2018    Diabetes Urine Screening 08/11/2021    Lipid Panel 08/11/2021    High Dose Statin 02/16/2022       Procedures     Future Appointments   Date Time Provider Department Center   3/10/2022  9:00 AM AMADO Scott [facility] DIOR [facility]   4/14/2022  9:15 AM MARCELL Escobedo [facility]   5/17/2022 10:30 AM MATTHEW LamaP [facility]   8/24/2022  1:00 PM [facility] VASC US1 [facility] VASCUS [facility]   8/24/2022  2:00 PM Loida Forrest MD [facility]SRG [facility]   2/28/2023  1:00 PM AMADO Scott [facility] DIOR [facility]        No follow-ups on file.       Signature:  Rodney Morel MD  [facility]  [address] [place] [address]  [phone] Phone  [phone] Fax    Date of encounter: 2/28/22

## 2022-03-01 VITALS
DIASTOLIC BLOOD PRESSURE: 80 MMHG | BODY MASS INDEX: 24.92 KG/M2 | SYSTOLIC BLOOD PRESSURE: 130 MMHG | RESPIRATION RATE: 18 BRPM | OXYGEN SATURATION: 98 % | TEMPERATURE: 98 F | WEIGHT: 188 LBS | HEART RATE: 68 BPM | HEIGHT: 73 IN

## 2022-03-01 PROBLEM — E11.59 TYPE 2 DIABETES MELLITUS WITH CIRCULATORY DISORDER, WITHOUT LONG-TERM CURRENT USE OF INSULIN: Status: ACTIVE | Noted: 2022-03-01

## 2022-03-01 PROBLEM — I73.9 PVD (PERIPHERAL VASCULAR DISEASE): Status: ACTIVE | Noted: 2022-03-01

## 2022-03-03 ENCOUNTER — HOSPITAL ENCOUNTER (OUTPATIENT)
Dept: RADIOLOGY | Facility: HOSPITAL | Age: 67
Discharge: HOME | End: 2022-03-03
Attending: NURSE PRACTITIONER
Payer: MEDICARE

## 2022-03-03 DIAGNOSIS — I73.9 PVD (PERIPHERAL VASCULAR DISEASE): ICD-10-CM

## 2022-03-03 DIAGNOSIS — Z13.6 ENCOUNTER FOR SCREENING FOR ABDOMINAL AORTIC ANEURYSM (AAA) IN PATIENT 50 YEARS OF AGE OR OLDER WITH HISTORY OF SMOKING: ICD-10-CM

## 2022-03-03 DIAGNOSIS — I25.10 CORONARY ARTERY DISEASE INVOLVING NATIVE HEART, UNSPECIFIED VESSEL OR LESION TYPE, UNSPECIFIED WHETHER ANGINA PRESENT: ICD-10-CM

## 2022-03-03 DIAGNOSIS — Z87.891 ENCOUNTER FOR SCREENING FOR ABDOMINAL AORTIC ANEURYSM (AAA) IN PATIENT 50 YEARS OF AGE OR OLDER WITH HISTORY OF SMOKING: ICD-10-CM

## 2022-03-03 DIAGNOSIS — Z13.6 SCREENING FOR AAA (AORTIC ABDOMINAL ANEURYSM): ICD-10-CM

## 2022-03-03 PROCEDURE — 76775 US EXAM ABDO BACK WALL LIM: CPT | Mod: TC

## 2022-03-06 LAB
TESTOST FREE SERPL-MCNC: 7.75 NG/DL (ref 3.47–13)
TESTOST SERPL-MCNC: 408 NG/DL (ref 240–950)

## 2022-03-10 ENCOUNTER — OFFICE VISIT (OUTPATIENT)
Dept: FAMILY MEDICINE | Facility: CLINIC | Age: 67
End: 2022-03-10
Payer: MEDICARE

## 2022-03-10 DIAGNOSIS — E78.5 HYPERLIPIDEMIA, UNSPECIFIED HYPERLIPIDEMIA TYPE: ICD-10-CM

## 2022-03-10 DIAGNOSIS — I10 HYPERTENSION, UNSPECIFIED TYPE: ICD-10-CM

## 2022-03-10 DIAGNOSIS — Z79.4 TYPE 2 DIABETES MELLITUS WITH DIABETIC NEUROPATHY, WITH LONG-TERM CURRENT USE OF INSULIN: Primary | ICD-10-CM

## 2022-03-10 DIAGNOSIS — I73.9 PVD (PERIPHERAL VASCULAR DISEASE): ICD-10-CM

## 2022-03-10 DIAGNOSIS — E11.40 TYPE 2 DIABETES MELLITUS WITH DIABETIC NEUROPATHY, WITH LONG-TERM CURRENT USE OF INSULIN: Primary | ICD-10-CM

## 2022-03-10 DIAGNOSIS — R20.8 LOSS OF PROTECTIVE SENSATION OF SKIN OF FOOT: ICD-10-CM

## 2022-03-10 PROCEDURE — 3079F PR MOST RECENT DIASTOLIC BLOOD PRESSURE 80-89 MM HG: ICD-10-PCS | Mod: ,,, | Performed by: NURSE PRACTITIONER

## 2022-03-10 PROCEDURE — 1125F AMNT PAIN NOTED PAIN PRSNT: CPT | Mod: ,,, | Performed by: NURSE PRACTITIONER

## 2022-03-10 PROCEDURE — 4010F ACE/ARB THERAPY RXD/TAKEN: CPT | Mod: ,,, | Performed by: NURSE PRACTITIONER

## 2022-03-10 PROCEDURE — 3079F DIAST BP 80-89 MM HG: CPT | Mod: ,,, | Performed by: NURSE PRACTITIONER

## 2022-03-10 PROCEDURE — 3051F HG A1C>EQUAL 7.0%<8.0%: CPT | Mod: ,,, | Performed by: NURSE PRACTITIONER

## 2022-03-10 PROCEDURE — 1159F MED LIST DOCD IN RCRD: CPT | Mod: ,,, | Performed by: NURSE PRACTITIONER

## 2022-03-10 PROCEDURE — 3051F PR MOST RECENT HEMOGLOBIN A1C LEVEL 7.0 - < 8.0%: ICD-10-PCS | Mod: ,,, | Performed by: NURSE PRACTITIONER

## 2022-03-10 PROCEDURE — 3075F SYST BP GE 130 - 139MM HG: CPT | Mod: ,,, | Performed by: NURSE PRACTITIONER

## 2022-03-10 PROCEDURE — 3008F PR BODY MASS INDEX (BMI) DOCUMENTED: ICD-10-PCS | Mod: ,,, | Performed by: NURSE PRACTITIONER

## 2022-03-10 PROCEDURE — 3008F BODY MASS INDEX DOCD: CPT | Mod: ,,, | Performed by: NURSE PRACTITIONER

## 2022-03-10 PROCEDURE — 1160F PR REVIEW ALL MEDS BY PRESCRIBER/CLIN PHARMACIST DOCUMENTED: ICD-10-PCS | Mod: ,,, | Performed by: NURSE PRACTITIONER

## 2022-03-10 PROCEDURE — 3075F PR MOST RECENT SYSTOLIC BLOOD PRESS GE 130-139MM HG: ICD-10-PCS | Mod: ,,, | Performed by: NURSE PRACTITIONER

## 2022-03-10 PROCEDURE — 1160F RVW MEDS BY RX/DR IN RCRD: CPT | Mod: ,,, | Performed by: NURSE PRACTITIONER

## 2022-03-10 PROCEDURE — 1125F PR PAIN SEVERITY QUANTIFIED, PAIN PRESENT: ICD-10-PCS | Mod: ,,, | Performed by: NURSE PRACTITIONER

## 2022-03-10 PROCEDURE — 1159F PR MEDICATION LIST DOCUMENTED IN MEDICAL RECORD: ICD-10-PCS | Mod: ,,, | Performed by: NURSE PRACTITIONER

## 2022-03-10 PROCEDURE — 4010F PR ACE/ARB THEARPY RXD/TAKEN: ICD-10-PCS | Mod: ,,, | Performed by: NURSE PRACTITIONER

## 2022-03-10 PROCEDURE — 99213 OFFICE O/P EST LOW 20 MIN: CPT | Mod: ,,, | Performed by: NURSE PRACTITIONER

## 2022-03-10 PROCEDURE — 99213 PR OFFICE/OUTPT VISIT, EST, LEVL III, 20-29 MIN: ICD-10-PCS | Mod: ,,, | Performed by: NURSE PRACTITIONER

## 2022-03-17 ENCOUNTER — PATIENT OUTREACH (OUTPATIENT)
Dept: FAMILY MEDICINE | Facility: CLINIC | Age: 67
End: 2022-03-17
Payer: MEDICARE

## 2022-04-14 ENCOUNTER — OFFICE VISIT (OUTPATIENT)
Dept: PAIN MEDICINE | Facility: CLINIC | Age: 67
End: 2022-04-14
Payer: COMMERCIAL

## 2022-04-14 ENCOUNTER — HOSPITAL ENCOUNTER (OUTPATIENT)
Dept: RADIOLOGY | Facility: HOSPITAL | Age: 67
Discharge: HOME | End: 2022-04-14
Attending: PHYSICIAN ASSISTANT
Payer: COMMERCIAL

## 2022-04-14 VITALS
DIASTOLIC BLOOD PRESSURE: 70 MMHG | HEART RATE: 66 BPM | RESPIRATION RATE: 17 BRPM | BODY MASS INDEX: 24.92 KG/M2 | WEIGHT: 188 LBS | HEIGHT: 73 IN | SYSTOLIC BLOOD PRESSURE: 152 MMHG

## 2022-04-14 DIAGNOSIS — M47.812 CERVICAL SPONDYLOSIS WITHOUT MYELOPATHY: Chronic | ICD-10-CM

## 2022-04-14 DIAGNOSIS — Z79.899 ENCOUNTER FOR LONG-TERM (CURRENT) USE OF OTHER MEDICATIONS: ICD-10-CM

## 2022-04-14 DIAGNOSIS — M54.17 LUMBOSACRAL RADICULOPATHY: Primary | Chronic | ICD-10-CM

## 2022-04-14 DIAGNOSIS — M54.9 DORSALGIA, UNSPECIFIED: ICD-10-CM

## 2022-04-14 DIAGNOSIS — M54.6 PAIN IN THORACIC SPINE: Chronic | ICD-10-CM

## 2022-04-14 DIAGNOSIS — M54.17 LUMBOSACRAL RADICULOPATHY: ICD-10-CM

## 2022-04-14 DIAGNOSIS — M96.1 POSTLAMINECTOMY SYNDROME, LUMBAR REGION: Chronic | ICD-10-CM

## 2022-04-14 PROCEDURE — 3078F PR MOST RECENT DIASTOLIC BLOOD PRESSURE < 80 MM HG: ICD-10-PCS | Mod: CPTII,,, | Performed by: PHYSICIAN ASSISTANT

## 2022-04-14 PROCEDURE — 72100 XR LUMBAR SPINE AP AND LATERAL: ICD-10-PCS | Mod: 26,,, | Performed by: STUDENT IN AN ORGANIZED HEALTH CARE EDUCATION/TRAINING PROGRAM

## 2022-04-14 PROCEDURE — 1159F MED LIST DOCD IN RCRD: CPT | Mod: CPTII,,, | Performed by: PHYSICIAN ASSISTANT

## 2022-04-14 PROCEDURE — 1101F PT FALLS ASSESS-DOCD LE1/YR: CPT | Mod: CPTII,,, | Performed by: PHYSICIAN ASSISTANT

## 2022-04-14 PROCEDURE — 3077F SYST BP >= 140 MM HG: CPT | Mod: CPTII,,, | Performed by: PHYSICIAN ASSISTANT

## 2022-04-14 PROCEDURE — 1159F PR MEDICATION LIST DOCUMENTED IN MEDICAL RECORD: ICD-10-PCS | Mod: CPTII,,, | Performed by: PHYSICIAN ASSISTANT

## 2022-04-14 PROCEDURE — 99214 OFFICE O/P EST MOD 30 MIN: CPT | Mod: S$PBB,,, | Performed by: PHYSICIAN ASSISTANT

## 2022-04-14 PROCEDURE — 73521 XR HIPS BILATERAL 2 VIEW INCL AP PELVIS: ICD-10-PCS | Mod: 26,,, | Performed by: STUDENT IN AN ORGANIZED HEALTH CARE EDUCATION/TRAINING PROGRAM

## 2022-04-14 PROCEDURE — 3078F DIAST BP <80 MM HG: CPT | Mod: CPTII,,, | Performed by: PHYSICIAN ASSISTANT

## 2022-04-14 PROCEDURE — 80305 DRUG TEST PRSMV DIR OPT OBS: CPT | Mod: PBBFAC | Performed by: PHYSICIAN ASSISTANT

## 2022-04-14 PROCEDURE — 72100 X-RAY EXAM L-S SPINE 2/3 VWS: CPT | Mod: TC

## 2022-04-14 PROCEDURE — 1125F PR PAIN SEVERITY QUANTIFIED, PAIN PRESENT: ICD-10-PCS | Mod: CPTII,,, | Performed by: PHYSICIAN ASSISTANT

## 2022-04-14 PROCEDURE — 73521 X-RAY EXAM HIPS BI 2 VIEWS: CPT | Mod: TC

## 2022-04-14 PROCEDURE — 1125F AMNT PAIN NOTED PAIN PRSNT: CPT | Mod: CPTII,,, | Performed by: PHYSICIAN ASSISTANT

## 2022-04-14 PROCEDURE — 99214 PR OFFICE/OUTPT VISIT, EST, LEVL IV, 30-39 MIN: ICD-10-PCS | Mod: S$PBB,,, | Performed by: PHYSICIAN ASSISTANT

## 2022-04-14 PROCEDURE — 4010F PR ACE/ARB THEARPY RXD/TAKEN: ICD-10-PCS | Mod: CPTII,,, | Performed by: PHYSICIAN ASSISTANT

## 2022-04-14 PROCEDURE — 4010F ACE/ARB THERAPY RXD/TAKEN: CPT | Mod: CPTII,,, | Performed by: PHYSICIAN ASSISTANT

## 2022-04-14 PROCEDURE — 1101F PR PT FALLS ASSESS DOC 0-1 FALLS W/OUT INJ PAST YR: ICD-10-PCS | Mod: CPTII,,, | Performed by: PHYSICIAN ASSISTANT

## 2022-04-14 PROCEDURE — 99215 OFFICE O/P EST HI 40 MIN: CPT | Mod: PBBFAC | Performed by: PHYSICIAN ASSISTANT

## 2022-04-14 PROCEDURE — 3288F PR FALLS RISK ASSESSMENT DOCUMENTED: ICD-10-PCS | Mod: CPTII,,, | Performed by: PHYSICIAN ASSISTANT

## 2022-04-14 PROCEDURE — 3077F PR MOST RECENT SYSTOLIC BLOOD PRESSURE >= 140 MM HG: ICD-10-PCS | Mod: CPTII,,, | Performed by: PHYSICIAN ASSISTANT

## 2022-04-14 PROCEDURE — 3008F PR BODY MASS INDEX (BMI) DOCUMENTED: ICD-10-PCS | Mod: CPTII,,, | Performed by: PHYSICIAN ASSISTANT

## 2022-04-14 PROCEDURE — 3288F FALL RISK ASSESSMENT DOCD: CPT | Mod: CPTII,,, | Performed by: PHYSICIAN ASSISTANT

## 2022-04-14 PROCEDURE — 3008F BODY MASS INDEX DOCD: CPT | Mod: CPTII,,, | Performed by: PHYSICIAN ASSISTANT

## 2022-04-14 PROCEDURE — 72100 X-RAY EXAM L-S SPINE 2/3 VWS: CPT | Mod: 26,,, | Performed by: STUDENT IN AN ORGANIZED HEALTH CARE EDUCATION/TRAINING PROGRAM

## 2022-04-14 PROCEDURE — 73521 X-RAY EXAM HIPS BI 2 VIEWS: CPT | Mod: 26,,, | Performed by: STUDENT IN AN ORGANIZED HEALTH CARE EDUCATION/TRAINING PROGRAM

## 2022-04-14 RX ORDER — HYDROCODONE BITARTRATE AND ACETAMINOPHEN 10; 325 MG/1; MG/1
1 TABLET ORAL EVERY 8 HOURS
Qty: 90 TABLET | Refills: 0 | Status: SHIPPED | OUTPATIENT
Start: 2022-04-15 | End: 2022-05-15

## 2022-04-14 RX ORDER — HYDROCODONE BITARTRATE AND ACETAMINOPHEN 10; 325 MG/1; MG/1
1 TABLET ORAL EVERY 8 HOURS
Qty: 90 TABLET | Refills: 0 | Status: SHIPPED | OUTPATIENT
Start: 2022-05-17 | End: 2022-06-16

## 2022-04-14 RX ORDER — HYDROCODONE BITARTRATE AND ACETAMINOPHEN 10; 325 MG/1; MG/1
1 TABLET ORAL EVERY 8 HOURS
Qty: 90 TABLET | Refills: 0 | Status: SHIPPED | OUTPATIENT
Start: 2022-06-16 | End: 2022-07-13 | Stop reason: SDUPTHER

## 2022-04-14 NOTE — PROGRESS NOTES
Disclaimer:  This note has been generated using voice recognition software.  There may be type of graft focal areas that have been missed during a proof reading      Subjective:      Patient ID: Micheal Taylor is a 66 y.o. male.    Chief Complaint: Low-back Pain      Pain  This is a chronic problem. The current episode started more than 1 year ago. The problem occurs daily. The problem has been unchanged. Associated symptoms include arthralgias and neck pain. Pertinent negatives include no change in bowel habit, chest pain, chills, coughing, diaphoresis, fever, rash, sore throat, vertigo or vomiting.     Review of Systems   Constitutional: Negative for appetite change, chills, diaphoresis and fever.   HENT: Negative for drooling, ear discharge, ear pain, facial swelling, mouth sores, nosebleeds, sore throat, trouble swallowing, voice change and goiter.    Eyes: Negative for photophobia, pain, discharge, redness and visual disturbance.   Respiratory: Negative for apnea, cough, choking, chest tightness, shortness of breath, wheezing and stridor.    Cardiovascular: Negative for chest pain, palpitations and leg swelling.   Gastrointestinal: Negative for abdominal distention, change in bowel habit, diarrhea, rectal pain, vomiting, fecal incontinence and change in bowel habit.   Endocrine: Negative for cold intolerance, heat intolerance, polydipsia, polyphagia and polyuria.   Genitourinary: Negative for bladder incontinence, dysuria, flank pain and frequency.   Musculoskeletal: Positive for arthralgias, back pain, leg pain, neck pain and neck stiffness.   Integumentary:  Negative for color change, pallor and rash.   Neurological: Negative for dizziness, vertigo, seizures, syncope, facial asymmetry, speech difficulty, light-headedness, disturbances in coordination, memory loss and coordination difficulties.   Hematological: Negative for adenopathy. Does not bruise/bleed easily.   Psychiatric/Behavioral: Negative for  "agitation, behavioral problems, confusion, decreased concentration, dysphoric mood, hallucinations, self-injury and suicidal ideas. The patient is not nervous/anxious and is not hyperactive.             Objective:  Vitals:    04/14/22 0933   BP: (!) 152/70   Pulse: 66   Resp: 17   Weight: 85.3 kg (188 lb)   Height: 6' 1" (1.854 m)   PainSc:   7         Physical Exam  Vitals and nursing note reviewed. Exam conducted with a chaperone present.   Constitutional:       General: He is awake. He is not in acute distress.     Appearance: Normal appearance. He is not toxic-appearing or diaphoretic.   HENT:      Head: Normocephalic and atraumatic.      Nose: Nose normal.      Mouth/Throat:      Mouth: Mucous membranes are moist.      Pharynx: Oropharynx is clear.   Eyes:      Conjunctiva/sclera: Conjunctivae normal.      Pupils: Pupils are equal, round, and reactive to light.   Cardiovascular:      Rate and Rhythm: Normal rate.   Pulmonary:      Effort: Pulmonary effort is normal. No respiratory distress.   Abdominal:      Palpations: Abdomen is soft.   Musculoskeletal:      Right shoulder: Tenderness present.      Left shoulder: Tenderness present.      Cervical back: Normal range of motion and neck supple. Tenderness present.      Thoracic back: Tenderness present.      Lumbar back: Tenderness present. Decreased range of motion. Positive right straight leg raise test.   Skin:     General: Skin is warm and dry.   Neurological:      General: No focal deficit present.      Mental Status: He is alert and oriented to person, place, and time. Mental status is at baseline.      Cranial Nerves: Cranial nerves are intact. No cranial nerve deficit (II-XII).      Deep Tendon Reflexes:      Reflex Scores:       Patellar reflexes are 1+ on the right side and 1+ on the left side.       Achilles reflexes are 0 on the right side and 1+ on the left side.     Comments: L1 motor strength on the right hip flexion iliopsoas 5/5  L1 motor " strength on the left hip flexion iliopsoas 5/5  L2-L4 motor strength on the right knee extension quadriceps 5/5, tibialis anterior 5/5  L2-L4 motor strength on the Left knee extension quadriceps 5/5, tibialis anterior 5/5  L5 motor strength on the right  extensor hallucis longus 4/5  L5 motor strength on the left extensor hallucis longus 4/5  S1 motor strength on the right plantar flexion gastrocnemius 3/5  S1 motor strength on the left plantar flexion gastrocnemius 4/5  Sensation on the right L2 normal, L3 normal, L4 normal, L5 normal, LS 1 diminished  Sensation on the left L2 normal, L3 normal, L4 normal, L5 normal, S1 normal     Psychiatric:         Mood and Affect: Mood normal.         Behavior: Behavior normal. Behavior is cooperative.         Thought Content: Thought content normal.           Orders Placed This Encounter   Procedures    X-Ray Lumbar Complete Including Flex And Ext     Standing Status:   Future     Standing Expiration Date:   4/14/2023     Order Specific Question:   May the Radiologist modify the order per protocol to meet the clinical needs of the patient?     Answer:   Yes     Order Specific Question:   Does the patient have a neck collar or brace on?     Answer:   No    X-Ray Hips Bilateral 2 View Inc AP Pelvis     Standing Status:   Future     Standing Expiration Date:   4/14/2023     Order Specific Question:   Does the patient have a splint or a brace?     Answer:   No     Order Specific Question:   Does the patient have a cast?     Answer:   No     Order Specific Question:   May the Radiologist modify the order per protocol to meet the clinical needs of the patient?     Answer:   Yes     Order Specific Question:   Release to patient     Answer:   Immediate    MRI Lumbar Spine W WO Cont     Standing Status:   Future     Standing Expiration Date:   4/14/2023     Order Specific Question:   Does the patient have a pacemaker or a defibrilator (Note: Some facilities may not be able to  schedule an MRI for patients with pacemakers and defibrillators. You should contact your local radiology department to determine if this is the case.)?     Answer:   No     Order Specific Question:   Does the patient have an aneurysm or surgical clip, pump, nerve/brain stimulator, middle/inner ear prosthesis, or other metal implant or foreign object (bullet, shrapnel)? If they have a card related to their implant, ask them to bring it. Issues related to the implant may cause the MRI to be delayed.     Answer:   No     Order Specific Question:   Is the patient claustrophobic?     Answer:   No     Order Specific Question:   Will the patient require sedation?     Answer:   No     Order Specific Question:   Does the patient have any of the following conditions? Diabetes, History of Renal Disease or Hypertension requiring medical therapy?     Answer:   No     Order Specific Question:   May the Radiologist modify the order per protocol to meet the clinical needs of the patient?     Answer:   Yes     Order Specific Question:   Is this part of a Research Study?     Answer:   No     Order Specific Question:   Recist criteria?     Answer:   No     Order Specific Question:   Will this service be billed to a Worker's Comp policy?     Answer:   No     Order Specific Question:   Does the patient have on a skin patch for medication with aluminized backing?     Answer:   No    Creatinine, serum     Standing Status:   Future     Standing Expiration Date:   6/13/2023    POCT Urine Drug Screen Presump     Interpretive Information:     Negative:  No drug detected at the cut off level.   Positive:  This result represents presumptive positive for the   tested drug, other substances may yield a positive response other   than the analyte of interest. This result should be utilized for   diagnostic purpose only. Confirmation testing will be performed upon physician request only.           Radiology US Abdominal Aorta  Narrative:  EXAMINATION:  US ABDOMINAL AORTA    CLINICAL HISTORY:  Atherosclerotic heart disease of native coronary artery without angina pectoris    COMPARISON:  None.    TECHNIQUE:  Multiple longitudinal and transverse real-time sonographic images of the abdominal aorta are obtained.    FINDINGS:  The aorta measures 1.6, 1.6, and 1.8 cm at its proximal, mid and distal aspects respectively.    The right and left common iliac arteries measure 0.9 and 0.7 cm in diameter respectively.  Impression: No convincing sonographic evidence of significant abdominal aortic aneurysm.    Point of Service: [facility]    Electronically signed by: Antonio Orozco  Date:    03/03/2022  Time:    08:45       Office Visit on 02/28/2022   Component Date Value Ref Range Status    Triglycerides 02/28/2022 152 (A) 35 - 150 mg/dL Final    Cholesterol 02/28/2022 93  0 - 200 mg/dL Final    HDL Cholesterol 02/28/2022 31 (A) 40 - 60 mg/dL Final    Cholesterol/HDL Ratio (Risk Factor) 02/28/2022 3.0   Final    Non-HDL 02/28/2022 62  mg/dL Final    LDL Calculated 02/28/2022 32  mg/dL Final    LDL/HDL 02/28/2022 1.0   Final    VLDL 02/28/2022 30  mg/dL Final    Sodium 02/28/2022 141  136 - 145 mmol/L Final    Potassium 02/28/2022 4.7  3.5 - 5.1 mmol/L Final    Chloride 02/28/2022 105  98 - 107 mmol/L Final    CO2 02/28/2022 30  21 - 32 mmol/L Final    Anion Gap 02/28/2022 11  7 - 16 mmol/L Final    Glucose 02/28/2022 149 (A) 74 - 106 mg/dL Final    BUN 02/28/2022 33 (A) 7 - 18 mg/dL Final    Creatinine 02/28/2022 1.65 (A) 0.70 - 1.30 mg/dL Final    BUN/Creatinine Ratio 02/28/2022 20  6 - 20 Final    Calcium 02/28/2022 8.9  8.5 - 10.1 mg/dL Final    Total Protein 02/28/2022 6.7  6.4 - 8.2 g/dL Final    Albumin 02/28/2022 3.5  3.5 - 5.0 g/dL Final    Globulin 02/28/2022 3.2  2.0 - 4.0 g/dL Final    A/G Ratio 02/28/2022 1.1   Final    Bilirubin, Total 02/28/2022 0.4  0.0 - 1.2 mg/dL Final    Alk Phos 02/28/2022 32 (A) 45 - 115  U/L Final    ALT 02/28/2022 24  16 - 61 U/L Final    AST 02/28/2022 21  15 - 37 U/L Final    eGFR 02/28/2022 45 (A) >=60 mL/min/1.73m² Final    Testosterone, Free 02/28/2022 7.75  3.47 - 13.0 ng/dL Final    Testosterone, Total 02/28/2022 408  240 - 950 ng/dL Final    WBC 02/28/2022 6.37  4.50 - 11.00 K/uL Final    RBC 02/28/2022 4.94  4.60 - 6.20 M/uL Final    Hemoglobin 02/28/2022 12.9 (A) 13.5 - 18.0 g/dL Final    Hematocrit 02/28/2022 42.3  40.0 - 54.0 % Final    MCV 02/28/2022 85.6  80.0 - 96.0 fL Final    MCH 02/28/2022 26.1 (A) 27.0 - 31.0 pg Final    MCHC 02/28/2022 30.5 (A) 32.0 - 36.0 g/dL Final    RDW 02/28/2022 13.4  11.5 - 14.5 % Final    Platelet Count 02/28/2022 182  150 - 400 K/uL Final    MPV 02/28/2022 12.0  9.4 - 12.4 fL Final    Neutrophils % 02/28/2022 53.2  53.0 - 65.0 % Final    Lymphocytes % 02/28/2022 34.1  27.0 - 41.0 % Final    Monocytes % 02/28/2022 8.2 (A) 2.0 - 6.0 % Final    Eosinophils % 02/28/2022 3.3  1.0 - 4.0 % Final    Basophils % 02/28/2022 0.9  0.0 - 1.0 % Final    Immature Granulocytes % 02/28/2022 0.3  0.0 - 0.4 % Final    nRBC, Auto 02/28/2022 0.0  <=0.0 % Final    Neutrophils, Abs 02/28/2022 3.39  1.80 - 7.70 K/uL Final    Lymphocytes, Absolute 02/28/2022 2.17  1.00 - 4.80 K/uL Final    Monocytes, Absolute 02/28/2022 0.52  0.00 - 0.80 K/uL Final    Eosinophils, Absolute 02/28/2022 0.21  0.00 - 0.50 K/uL Final    Basophils, Absolute 02/28/2022 0.06  0.00 - 0.20 K/uL Final    Immature Granulocytes, Absolute 02/28/2022 0.02  0.00 - 0.04 K/uL Final    nRBC, Absolute 02/28/2022 0.00  <=0.00 x10e3/uL Final    Diff Type 02/28/2022 Auto   Final   Office Visit on 12/09/2021   Component Date Value Ref Range Status    POC Amphetamines 12/09/2021 Negative  Negative, Inconclusive Final    POC Barbiturates 12/09/2021 Negative  Negative, Inconclusive Final    POC Benzodiazepines 12/09/2021 Negative  Negative, Inconclusive Final    POC Cocaine 12/09/2021  Negative  Negative, Inconclusive Final    POC THC 12/09/2021 Negative  Negative, Inconclusive Final    POC Methadone 12/09/2021 Negative  Negative, Inconclusive Final    POC Methamphetamine 12/09/2021 Negative  Negative, Inconclusive Final    POC Opiates 12/09/2021 Negative  Negative, Inconclusive Final    POC Oxycodone 12/09/2021 Negative  Negative, Inconclusive Final    POC Phencyclidine 12/09/2021 Negative  Negative, Inconclusive Final    POC Methylenedioxymethamphetamine * 12/09/2021 Negative  Negative, Inconclusive Final    POC Tricyclic Antidepressants 12/09/2021 Negative  Negative, Inconclusive Final    POC Buprenorphine 12/09/2021 Negative   Final     Acceptable 12/09/2021 Yes   Final    POC Temperature (Urine) 12/09/2021 94   Final    pH, UA 12/09/2021 7.0  5.0, 5.5, 6.0, 6.5, 7.0, 7.5, 8.0 pH Units Final    Specific [place], UA 12/09/2021 1.020  <=1.005, 1.010, 1.015, 1.020, 1.025, 1.030 Final    Creatinine, Urine 12/09/2021 56  39 - 259 mg/dL Final    6-Acetylmorphine 12/09/2021 Negative  10 ng/mL Final    7-Aminoclonazepam 12/09/2021 Negative  Negative 25 ng/mL Final    a-Hydroxyalprazolam 12/09/2021 Negative  25 ng/mL Final    Acetyl Fentanyl 12/09/2021 Negative  2.5 ng/mL Final    Acetyl Norfentanyl Oxalate 12/09/2021 Negative  5 ng/mL Final    Benzoylecgonine 12/09/2021 Negative  100 ng/mL Final    Buprenorphine 12/09/2021 Negative  25 ng/mL Final    Codeine 12/09/2021 Negative  25 ng/mL Final    EDDP 12/09/2021 Negative  25 ng/mL Final    Fentanyl 12/09/2021 Negative  2.5 ng/mL Final    Hydrocodone 12/09/2021 >250.0 (A) <25.0 25 ng/mL Final    Hydromorphone 12/09/2021 >250.0 (A) <25.0 25 ng/mL Final    Lorazepam 12/09/2021 Negative  25 ng/mL Final    Morphine 12/09/2021 Negative  25 ng/mL Final    Norbuprenorphine 12/09/2021 Negative  25 ng/mL Final    Nordiazepam 12/09/2021 Negative  25 ng/mL Final    Norfentanyl Oxalate 12/09/2021 Negative  5 ng/mL  Final    Norhydrocodone 12/09/2021 219.1 (A) <50.0 50 ng/mL Final    Noroxycodone HCL 12/09/2021 Negative  50 ng/mL Final    Oxazepam 12/09/2021 Negative  25 ng/mL Final    Oxymorphone 12/09/2021 Negative  25 ng/mL Final    Tapentadol 12/09/2021 Negative  25 ng/mL Final    Temazepam 12/09/2021 Negative  25 ng/mL Final    THC-COOH 12/09/2021 Negative  25 ng/mL Final    Tramadol 12/09/2021 Negative  100 ng/mL Final    Amphetamine, Urine 12/09/2021 Negative  Negative 100 ng/mL Final    Methamphetamines, Urine 12/09/2021 Negative  Negative 100 ng/mL Final    Methadone, Urine 12/09/2021 Negative  Negative 25 ng/mL Final    Oxycodone, Urine 12/09/2021 Negative  Negative 25 ng/mL Final           Assessment:      1. Lumbosacral radiculopathy    2. Encounter for long-term (current) use of other medications    3. Cervical spondylosis without myelopathy    4. Postlaminectomy syndrome, lumbar region    5. Pain in thoracic spine    6. Dorsalgia, unspecified            A's of Opioid Risk Assessment  Activity:Patient can perform ADL.   Analgesia:Patients pain is partially controlled by current medication. Patient has tried OTC medications such as Tylenol and Ibuprofen with out relief.   Adverse Effects: Patient denies constipation or sedation.  Aberrant Behavior:  reviewed with no aberrant drug seeking/taking behavior.  Overdose reversal drug naloxone discussed    Drug screen reviewed      Requested Prescriptions     Signed Prescriptions Disp Refills    HYDROcodone-acetaminophen (NORCO)  mg per tablet 90 tablet 0     Sig: Take 1 tablet by mouth every 8 (eight) hours.    HYDROcodone-acetaminophen (NORCO)  mg per tablet 90 tablet 0     Sig: Take 1 tablet by mouth every 8 (eight) hours.    HYDROcodone-acetaminophen (NORCO)  mg per tablet 90 tablet 0     Sig: Take 1 tablet by mouth every 8 (eight) hours.         Plan:    [facility] closed on weekends    Complaint of back pain right leg pain  radicular in nature worse with standing walking laughing or coughing can have some sharp stabbing components radiating into the right leg with increased activity he states has been ongoing for more than 3 months now    Patient has a history of multiple back surgeries    Requesting further investigation for his increasing radicular type symptoms    X-ray lumbar spine flexion-extension views x-ray bilateral hips    Cannot tolerate physical therapy due to increasing radicular symptoms    MRI lumbar spine without contrast history multiple lumbar surgeries, lumbar radiculopathy  MRI for consideration procedure/surgery    I have given the patient medically directed home exercise program    Continue current medication    Follow-up 3 months    Dr. Callahan, February 2023    Bring original prescription medication bottles/container/box with labels to each visit

## 2022-04-19 VITALS
RESPIRATION RATE: 18 BRPM | WEIGHT: 188 LBS | HEIGHT: 73 IN | OXYGEN SATURATION: 98 % | SYSTOLIC BLOOD PRESSURE: 130 MMHG | TEMPERATURE: 98 F | HEART RATE: 76 BPM | BODY MASS INDEX: 24.92 KG/M2 | DIASTOLIC BLOOD PRESSURE: 84 MMHG

## 2022-04-19 PROBLEM — E11.40 TYPE 2 DIABETES MELLITUS WITH DIABETIC NEUROPATHY, WITH LONG-TERM CURRENT USE OF INSULIN: Status: ACTIVE | Noted: 2022-04-19

## 2022-04-19 PROBLEM — Z79.4 TYPE 2 DIABETES MELLITUS WITH DIABETIC NEUROPATHY, WITH LONG-TERM CURRENT USE OF INSULIN: Status: ACTIVE | Noted: 2022-04-19

## 2022-04-19 PROBLEM — R20.8 LOSS OF PROTECTIVE SENSATION OF SKIN OF FOOT: Status: ACTIVE | Noted: 2022-04-19

## 2022-04-19 NOTE — PROGRESS NOTES
AMADO Sctot   [place] [address]  [phone]      PATIENT NAME: Micheal Taylor  : 1955  DATE: 3/10/22  MRN: [id number]      Billing Provider: AMADO Scott  Level of Service:   Patient PCP Information     Provider PCP Type    Rodney Morel MD General          Reason for Visit / Chief Complaint: Diabetic Foot Exam       Update PCP  Update Chief Complaint         History of Present Illness / Problem Focused Workflow     Micheal Taylor presents to the clinic with Diabetic Foot Exam     PP for diabetic foot exam. Reports history of numbness and tingling to feet and also has a hx of PVD.     Diabetes  He presents for his follow-up diabetic visit. He has type 2 diabetes mellitus. No MedicAlert identification noted. His disease course has been stable. There are no hypoglycemic associated symptoms. Associated symptoms include fatigue and foot paresthesias. Pertinent negatives for diabetes include no blurred vision, no chest pain, no foot ulcerations, no polydipsia, no polyphagia, no polyuria, no visual change, no weakness and no weight loss. There are no hypoglycemic complications. Symptoms are stable. Diabetic complications include heart disease, peripheral neuropathy and PVD. Risk factors for coronary artery disease include sedentary lifestyle, male sex, hypertension, diabetes mellitus, dyslipidemia, family history and tobacco exposure. Current diabetic treatment includes diet, insulin injections and oral agent (monotherapy). He is compliant with treatment most of the time. An ACE inhibitor/angiotensin II receptor blocker is being taken.       Review of Systems     Review of Systems   Constitutional: Positive for fatigue. Negative for weight loss.   Eyes: Negative for blurred vision.   Respiratory: Negative for shortness of breath.    Cardiovascular: Negative for chest pain and leg swelling.   Endocrine:  Negative for polydipsia, polyphagia and polyuria.   Neurological: Negative for weakness.       Medical / Social / Family History     Past Medical History:   Diagnosis Date    CHF (congestive heart failure)     Chronic pain syndrome     Coronary artery disease     Diabetes mellitus, type 2     Hyperlipidemia     Hypertension     Lumbar spondylosis     Lumbosacral radiculopathy     Pain in thoracic spine     PVD (peripheral vascular disease)     Spondylosis without myelopathy or radiculopathy, cervical region        Past Surgical History:   Procedure Laterality Date    ADENOIDECTOMY      AMPUTATION      left index finger removed     CAUDAL EPIDURAL STEROID INJECTION  01/19/2016    Caudal NATHAN - Alee    CIRCUMCISION      CORONARY ARTERY BYPASS GRAFT (CABG)      FOOT SURGERY      heel surgery    HEEL SPUR SURGERY      INJECTION OF FACET JOINT Bilateral 04/16/2015    Bilateral L3-S1 Facet Injection - Alee - 4/16/15, 11/15/12 and 9/27/12    RADIOFREQUENCY THERMOCOAGULATION Left 02/26/2013    Left L3-S1 RFTC -Alee    RADIOFREQUENCY THERMOCOAGULATION Right 02/05/2013    Right L3-S1 RFTC - Alee    TONSILLECTOMY      TONSILLECTOMY, ADENOIDECTOMY, BILATERAL MYRINGOTOMY AND TUBES      TRANSFORAMINAL EPIDURAL INJECTION OF STEROID Left 06/19/2012    Left L3-4 TFESI - Alee    TRANSFORAMINAL EPIDURAL INJECTION OF STEROID Right 04/12/2013    Right L3-4 TFESI - Alee - 4/12/13 and 8/7/12    VASCULAR SURGERY         Social History    reports that he quit smoking about 16 years ago. His smokeless tobacco use includes snuff. He reports that he does not drink alcohol and does not use drugs.    Family History  's family history includes Diabetes in his mother; Heart disease in his father and mother; Hypertension in his father and mother; Liver cancer in his mother.    Medications and Allergies     Medications  No outpatient medications have been marked as taking for the 3/10/22 encounter (Office Visit) with Pinky  Kendra Sal, FNP.       Allergies  Review of patient's allergies indicates:  No Known Allergies    Physical Examination     Vitals:    04/19/22 1300   BP: 130/84   Pulse: 76   Resp: 18   Temp: 97.6 °F (36.4 °C)     Physical Exam  Constitutional:       Appearance: Normal appearance.   HENT:      Head: Normocephalic.      Right Ear: Tympanic membrane normal.      Left Ear: Tympanic membrane normal.      Nose: Nose normal.      Mouth/Throat:      Mouth: Mucous membranes are moist.   Cardiovascular:      Rate and Rhythm: Normal rate and regular rhythm.      Pulses:           Dorsalis pedis pulses are 1+ on the right side and 1+ on the left side.        Posterior tibial pulses are 0 on the right side and 0 on the left side.      Heart sounds: Normal heart sounds.   Pulmonary:      Effort: Pulmonary effort is normal. No respiratory distress.      Breath sounds: Normal breath sounds. No wheezing, rhonchi or rales.   Musculoskeletal:         General: Normal range of motion.      Right foot: Normal range of motion. No deformity, bunion, Charcot foot, foot drop or prominent metatarsal heads.      Left foot: Normal range of motion. No deformity, bunion, Charcot foot, foot drop or prominent metatarsal heads.        Feet:    Feet:      Right foot:      Protective Sensation: 9 sites tested. 2 sites sensed.      Skin integrity: Skin integrity normal.      Toenail Condition: Right toenails are abnormally thick.      Left foot:      Protective Sensation: 9 sites tested. 4 sites sensed.      Skin integrity: Skin integrity normal.      Toenail Condition: Left toenails are abnormally thick.   Skin:     General: Skin is warm and dry.   Neurological:      General: No focal deficit present.      Mental Status: He is alert and oriented to person, place, and time.       Protective Sensation (w/ 10 gram monofilament):  Right: abnormal. See foot exam  Left: abnormal - See foot exam    Visual Inspection:  Nails Intact  without evidence of  Ulceration, Blister, Skin Breakdown, Erythema, Increased Warmth, Dry Skin. Onychomycosis present to both great toenails.    Pedal Pulses:   Right: Diminished  Left: Diminished    Posterior tibialis:   Right:Absent  Left: Absent    Assessment and Plan (including Health Maintenance)      Problem List  Smart Sets  Document Outside HM   :    Plan: MR. PAULA HAS A HX OF TYPE 2 DM WITH BOTH PVD AND LOSS OF PROTECTIVE SENSATION. HE WOULD BENEFIT FROM DM SHOES.   MR. APULA IS UNDER DR. FRANKLIN'S DIRECT CARE IN A COMPREHENSIVE PLAN FOR TREATMENT OF DM. HE HAS EXAMINED MR. PAULA AND AGREES WITH ABOVE ASSESSMENT.        Health Maintenance Due   Topic Date Due    Hepatitis C Screening  Never done    COVID-19 Vaccine (1) Never done    Foot Exam  Never done    TETANUS VACCINE  Never done    Sign Pain Contract  Never done    Colorectal Cancer Screening  Never done    Pneumococcal Vaccines (Age 65+) (1 - PCV) 11/16/2020    Hemoglobin A1c  02/11/2022    Eye Exam  03/12/2022       Problem List Items Addressed This Visit        Cardiac/Vascular    Hyperlipidemia    Hypertension - Primary    PVD (peripheral vascular disease)       Endocrine    Type 2 diabetes mellitus with diabetic neuropathy, with long-term current use of insulin       Other    Loss of protective sensation of skin of foot          Health Maintenance Topics with due status: Not Due       Topic Last Completion Date    Diabetes Urine Screening 08/11/2021    Lipid Panel 02/28/2022    Shingles Vaccine 03/10/2022    High Dose Statin 04/14/2022    Aspirin/Antiplatelet Therapy 04/14/2022       Future Appointments   Date Time Provider Department Center   4/28/2022  9:15 AM [facility] MRI1 RFNDC MRI [facility]   5/17/2022 10:30 AM IVY Lama OB CARD [facility]   7/14/2022  9:15 AM MARCELL Escobedo RASCC PNTRE [facility]   8/24/2022  1:00 PM [facility] VASC US1 OB VASCUS [facility]   8/24/2022  2:00 PM Loida Forrest MD OB GENSRG [facility]    8/29/2022  8:40 [facility]   2/28/2023  1:00 PM [facility] [place]            Signature:  AMADO Scott  [address] [address]  [phone]    Date of encounter: 3/10/22

## 2022-05-17 ENCOUNTER — OFFICE VISIT (OUTPATIENT)
Dept: CARDIOLOGY | Facility: CLINIC | Age: 67
End: 2022-05-17
Payer: COMMERCIAL

## 2022-05-17 VITALS
DIASTOLIC BLOOD PRESSURE: 42 MMHG | OXYGEN SATURATION: 98 % | SYSTOLIC BLOOD PRESSURE: 76 MMHG | HEART RATE: 58 BPM | BODY MASS INDEX: 24.78 KG/M2 | WEIGHT: 187 LBS | HEIGHT: 73 IN

## 2022-05-17 DIAGNOSIS — I10 HYPERTENSION, UNSPECIFIED TYPE: ICD-10-CM

## 2022-05-17 DIAGNOSIS — E78.5 HYPERLIPIDEMIA, UNSPECIFIED HYPERLIPIDEMIA TYPE: ICD-10-CM

## 2022-05-17 DIAGNOSIS — I25.10 CORONARY ARTERY DISEASE INVOLVING NATIVE CORONARY ARTERY OF NATIVE HEART, UNSPECIFIED WHETHER ANGINA PRESENT: Primary | ICD-10-CM

## 2022-05-17 DIAGNOSIS — I25.9 CHEST PAIN DUE TO MYOCARDIAL ISCHEMIA, UNSPECIFIED ISCHEMIC CHEST PAIN TYPE: ICD-10-CM

## 2022-05-17 PROCEDURE — 3078F PR MOST RECENT DIASTOLIC BLOOD PRESSURE < 80 MM HG: ICD-10-PCS | Mod: CPTII,,, | Performed by: NURSE PRACTITIONER

## 2022-05-17 PROCEDURE — 4010F ACE/ARB THERAPY RXD/TAKEN: CPT | Mod: CPTII,,, | Performed by: NURSE PRACTITIONER

## 2022-05-17 PROCEDURE — 3074F PR MOST RECENT SYSTOLIC BLOOD PRESSURE < 130 MM HG: ICD-10-PCS | Mod: CPTII,,, | Performed by: NURSE PRACTITIONER

## 2022-05-17 PROCEDURE — 1159F PR MEDICATION LIST DOCUMENTED IN MEDICAL RECORD: ICD-10-PCS | Mod: CPTII,,, | Performed by: NURSE PRACTITIONER

## 2022-05-17 PROCEDURE — 4010F PR ACE/ARB THEARPY RXD/TAKEN: ICD-10-PCS | Mod: CPTII,,, | Performed by: NURSE PRACTITIONER

## 2022-05-17 PROCEDURE — 1160F RVW MEDS BY RX/DR IN RCRD: CPT | Mod: CPTII,,, | Performed by: NURSE PRACTITIONER

## 2022-05-17 PROCEDURE — 3008F PR BODY MASS INDEX (BMI) DOCUMENTED: ICD-10-PCS | Mod: CPTII,,, | Performed by: NURSE PRACTITIONER

## 2022-05-17 PROCEDURE — 93010 EKG 12-LEAD: ICD-10-PCS | Mod: S$PBB,,, | Performed by: INTERNAL MEDICINE

## 2022-05-17 PROCEDURE — 1159F MED LIST DOCD IN RCRD: CPT | Mod: CPTII,,, | Performed by: NURSE PRACTITIONER

## 2022-05-17 PROCEDURE — 99214 OFFICE O/P EST MOD 30 MIN: CPT | Mod: S$PBB,,, | Performed by: NURSE PRACTITIONER

## 2022-05-17 PROCEDURE — 93005 ELECTROCARDIOGRAM TRACING: CPT | Mod: PBBFAC | Performed by: INTERNAL MEDICINE

## 2022-05-17 PROCEDURE — 99214 PR OFFICE/OUTPT VISIT, EST, LEVL IV, 30-39 MIN: ICD-10-PCS | Mod: S$PBB,,, | Performed by: NURSE PRACTITIONER

## 2022-05-17 PROCEDURE — 3078F DIAST BP <80 MM HG: CPT | Mod: CPTII,,, | Performed by: NURSE PRACTITIONER

## 2022-05-17 PROCEDURE — 99214 OFFICE O/P EST MOD 30 MIN: CPT | Mod: PBBFAC | Performed by: NURSE PRACTITIONER

## 2022-05-17 PROCEDURE — 93010 ELECTROCARDIOGRAM REPORT: CPT | Mod: S$PBB,,, | Performed by: INTERNAL MEDICINE

## 2022-05-17 PROCEDURE — 3008F BODY MASS INDEX DOCD: CPT | Mod: CPTII,,, | Performed by: NURSE PRACTITIONER

## 2022-05-17 PROCEDURE — 3074F SYST BP LT 130 MM HG: CPT | Mod: CPTII,,, | Performed by: NURSE PRACTITIONER

## 2022-05-17 PROCEDURE — 1160F PR REVIEW ALL MEDS BY PRESCRIBER/CLIN PHARMACIST DOCUMENTED: ICD-10-PCS | Mod: CPTII,,, | Performed by: NURSE PRACTITIONER

## 2022-05-17 RX ORDER — ROSUVASTATIN CALCIUM 5 MG/1
5 TABLET, COATED ORAL DAILY
Qty: 90 TABLET | Refills: 1 | Status: ON HOLD | OUTPATIENT
Start: 2022-05-17 | End: 2022-07-20 | Stop reason: SDUPTHER

## 2022-05-17 RX ORDER — POTASSIUM CHLORIDE 20 MEQ/1
20 TABLET, EXTENDED RELEASE ORAL DAILY
Qty: 90 TABLET | Refills: 1 | Status: CANCELLED | OUTPATIENT
Start: 2022-05-17

## 2022-05-17 RX ORDER — CLOPIDOGREL BISULFATE 75 MG/1
75 TABLET ORAL DAILY
Qty: 90 TABLET | Refills: 1 | Status: SHIPPED | OUTPATIENT
Start: 2022-05-17 | End: 2022-12-16

## 2022-05-17 RX ORDER — HYDRALAZINE HYDROCHLORIDE 50 MG/1
50 TABLET, FILM COATED ORAL 4 TIMES DAILY
Qty: 360 TABLET | Refills: 1 | Status: SHIPPED | OUTPATIENT
Start: 2022-05-17 | End: 2023-05-31 | Stop reason: SDUPTHER

## 2022-05-17 RX ORDER — AMLODIPINE BESYLATE 5 MG/1
5 TABLET ORAL DAILY
Qty: 90 TABLET | Refills: 1 | Status: SHIPPED | OUTPATIENT
Start: 2022-05-17 | End: 2023-11-30 | Stop reason: SDUPTHER

## 2022-05-17 RX ORDER — ICOSAPENT ETHYL 1000 MG/1
2 CAPSULE ORAL 2 TIMES DAILY
Qty: 360 CAPSULE | Refills: 1 | Status: SHIPPED | OUTPATIENT
Start: 2022-05-17 | End: 2022-11-13

## 2022-05-17 RX ORDER — NITROGLYCERIN 0.4 MG/1
0.4 TABLET SUBLINGUAL EVERY 5 MIN PRN
Qty: 25 TABLET | Refills: 3 | Status: SHIPPED | OUTPATIENT
Start: 2022-05-17 | End: 2024-06-07

## 2022-05-17 RX ORDER — LISINOPRIL 20 MG/1
20 TABLET ORAL DAILY
Qty: 90 TABLET | Refills: 1 | Status: SHIPPED | OUTPATIENT
Start: 2022-05-17 | End: 2022-12-09 | Stop reason: SDUPTHER

## 2022-05-17 RX ORDER — EZETIMIBE 10 MG/1
10 TABLET ORAL DAILY
Qty: 90 TABLET | Refills: 1 | Status: SHIPPED | OUTPATIENT
Start: 2022-05-17 | End: 2023-05-31 | Stop reason: SDUPTHER

## 2022-05-17 RX ORDER — FENOFIBRATE 145 MG/1
145 TABLET, FILM COATED ORAL DAILY
Qty: 90 TABLET | Refills: 1 | Status: SHIPPED | OUTPATIENT
Start: 2022-05-17 | End: 2023-03-29 | Stop reason: SDUPTHER

## 2022-05-17 RX ORDER — METOPROLOL SUCCINATE 25 MG/1
25 TABLET, EXTENDED RELEASE ORAL DAILY
Qty: 90 TABLET | Refills: 1 | Status: SHIPPED | OUTPATIENT
Start: 2022-05-17 | End: 2023-03-29 | Stop reason: SDUPTHER

## 2022-05-17 NOTE — PROGRESS NOTES
[facility] note        DATE OF SERVICE: 05/17/2022       PCP: Rodney Morel MD      CHIEF COMPLAINT:   Chief Complaint   Patient presents with    Chest Pain     Woke him up last night. Took nitro and relieved pain.    Palpitations     At times mainly when he is asleep.        HISTORY OF PRESENT ILLNESS:  Micheal Taylor is a 66 y.o. male with a PMH of   Past Medical History:   Diagnosis Date    CHF (congestive heart failure)     Chronic pain syndrome     Coronary artery disease     Diabetes mellitus, type 2     Hyperlipidemia     Hypertension     Lumbar spondylosis     Lumbosacral radiculopathy     Pain in thoracic spine     PVD (peripheral vascular disease)     Spondylosis without myelopathy or radiculopathy, cervical region      who presents for routine follow up and c/o an episode of cp last evening that was c/w the pain prior to his surgery. It lasted 2-3 minutes and resolved with sublingual ntg.   Chief Complaint   Patient presents with    Chest Pain     Woke him up last night. Took nitro and relieved pain.    Palpitations     At times mainly when he is asleep.            PAST MEDICAL HISTORY:  Past Medical History:   Diagnosis Date    CHF (congestive heart failure)     Chronic pain syndrome     Coronary artery disease     Diabetes mellitus, type 2     Hyperlipidemia     Hypertension     Lumbar spondylosis     Lumbosacral radiculopathy     Pain in thoracic spine     PVD (peripheral vascular disease)     Spondylosis without myelopathy or radiculopathy, cervical region        PAST SURGICAL HISTORY:  Past Surgical History:   Procedure Laterality Date    ADENOIDECTOMY      AMPUTATION      left index finger removed     CAUDAL EPIDURAL STEROID INJECTION  01/19/2016    Caudal NATHAN - Alee    CIRCUMCISION      CORONARY ARTERY BYPASS GRAFT (CABG)      FOOT SURGERY      heel surgery    HEEL SPUR SURGERY      INJECTION OF FACET JOINT Bilateral 04/16/2015    Bilateral L3-S1  Facet Injection - Alee - 4/16/15, 11/15/12 and 12    RADIOFREQUENCY THERMOCOAGULATION Left 2013    Left L3-S1 RFTC -Alee    RADIOFREQUENCY THERMOCOAGULATION Right 2013    Right L3-S1 RFTC - Alee    TONSILLECTOMY      TONSILLECTOMY, ADENOIDECTOMY, BILATERAL MYRINGOTOMY AND TUBES      TRANSFORAMINAL EPIDURAL INJECTION OF STEROID Left 2012    Left L3-4 TFESI - Alee    TRANSFORAMINAL EPIDURAL INJECTION OF STEROID Right 2013    Right L3-4 TFESI - Alee - 13 and 12    VASCULAR SURGERY         SOCIAL HISTORY:  Social History     Socioeconomic History    Marital status:    Tobacco Use    Smoking status: Former Smoker     Quit date: 2006     Years since quittin.1    Smokeless tobacco: Current User     Types: Snuff   Substance and Sexual Activity    Alcohol use: Never    Drug use: Never       FAMILY HISTORY:  Family History   Problem Relation Age of Onset    Diabetes Mother     Heart disease Mother     Hypertension Mother     Liver cancer Mother     Heart disease Father     Hypertension Father          ALLERGIES:  Review of patient's allergies indicates:  No Known Allergies     MEDICATIONS:    Current Outpatient Medications:     amLODIPine (NORVASC) 5 MG tablet, Take 1 tablet (5 mg total) by mouth once daily., Disp: 90 tablet, Rfl: 1    aspirin (ECOTRIN) 81 MG EC tablet, Take 81 mg by mouth once daily., Disp: , Rfl:     cholecalciferol, vitamin D3, (VITAMIN D3) 25 mcg (1,000 unit) capsule, , Disp: , Rfl:     cinnamon bark 500 mg capsule, Take 500 mg by mouth once daily. , Disp: , Rfl:     clopidogreL (PLAVIX) 75 mg tablet, Take 1 tablet (75 mg total) by mouth once daily., Disp: 90 tablet, Rfl: 1    dapagliflozin (FARXIGA) 10 mg tablet, Take 1 tablet by mouth once daily., Disp: , Rfl:     docosahexaenoic acid-epa 120-180 mg Cap, Take 1 capsule by mouth., Disp: , Rfl:     ezetimibe (ZETIA) 10 mg tablet, Take 1 tablet (10 mg total) by mouth once daily.,  Disp: 90 tablet, Rfl: 1    fenofibrate (TRICOR) 145 MG tablet, Take 1 tablet (145 mg total) by mouth once daily., Disp: 90 tablet, Rfl: 1    gabapentin (NEURONTIN) 300 MG capsule, Take 1 capsule (300 mg total) by mouth 3 (three) times daily., Disp: 360 capsule, Rfl: 1    hydrALAZINE (APRESOLINE) 50 MG tablet, Take 1 tablet (50 mg total) by mouth 4 (four) times daily., Disp: 360 tablet, Rfl: 1    HYDROcodone-acetaminophen (NORCO)  mg per tablet, Take 1 tablet by mouth every 8 (eight) hours., Disp: 90 tablet, Rfl: 0    [START ON 6/16/2022] HYDROcodone-acetaminophen (NORCO)  mg per tablet, Take 1 tablet by mouth every 8 (eight) hours., Disp: 90 tablet, Rfl: 0    icosapent ethyL (VASCEPA) 1 gram Cap, Take 2 capsules (2,000 mg total) by mouth 2 (two) times a day., Disp: 360 capsule, Rfl: 1    insulin regular 100 unit/mL Inj injection, Novolin R Regular U-100 Insuln  14-32 u bidwm, Disp: , Rfl:     liraglutide 0.6 mg/0.1 mL, 18 mg/3 mL, subq PNIJ (VICTOZA 2-CAITLIN) 0.6 mg/0.1 mL (18 mg/3 mL) PnIj pen, Inject 1.8 mg into the skin., Disp: , Rfl:     lisinopriL (PRINIVIL,ZESTRIL) 20 MG tablet, Take 1 tablet (20 mg total) by mouth once daily., Disp: 90 tablet, Rfl: 1    magnesium 250 mg Tab, Take 1 tablet by mouth once daily. , Disp: , Rfl:     metFORMIN (GLUCOPHAGE) 500 MG tablet, Take 500 mg by mouth 2 (two) times daily with meals., Disp: , Rfl:     metoprolol succinate (TOPROL-XL) 25 MG 24 hr tablet, Take 1 tablet (25 mg total) by mouth once daily., Disp: 90 tablet, Rfl: 1    nitroGLYCERIN (NITROSTAT) 0.4 MG SL tablet, Place 1 tablet (0.4 mg total) under the tongue every 5 (five) minutes as needed for Chest pain., Disp: 25 tablet, Rfl: 3    olopatadine (PATADAY) 0.2 % Drop, Place 1 drop into both eyes once daily., Disp: 5 mL, Rfl: 0    omeprazole (PRILOSEC) 20 MG capsule, Take 1 capsule (20 mg total) by mouth once daily., Disp: 90 capsule, Rfl: 1    potassium chloride SA (K-DUR,KLOR-CON) 20 MEQ  "tablet, Take 1 tablet (20 mEq total) by mouth once daily., Disp: 90 tablet, Rfl: 1    rosuvastatin (CRESTOR) 5 MG tablet, Take 1 tablet (5 mg total) by mouth once daily., Disp: 90 tablet, Rfl: 1    TRESIBA FLEXTOUCH U-200 200 unit/mL (3 mL) insulin pen, Inject 54 Units into the skin once daily. , Disp: , Rfl:     vitamin A 8000 UNIT capsule, Take 8,000 Units by mouth., Disp: , Rfl:     vitamin E 400 UNIT capsule, Take 400 Units by mouth once daily. , Disp: , Rfl:   Medications have been reviewed abut not reconciled. He once again did not bring his meds in.     PHYSICAL EXAM:  BP (!) 76/42 (BP Location: Left arm, Patient Position: Sitting)   Pulse (!) 58   Ht 6' 1" (1.854 m)   Wt 84.8 kg (187 lb)   SpO2 98%   BMI 24.67 kg/m²   Wt Readings from Last 3 Encounters:   05/17/22 84.8 kg (187 lb)   04/14/22 85.3 kg (188 lb)   04/19/22 85.3 kg (188 lb)      Body mass index is 24.67 kg/m².    Physical Exam  Vitals and nursing note reviewed.   Constitutional:       Appearance: Normal appearance. He is normal weight.   HENT:      Head: Normocephalic and atraumatic.   Eyes:      Pupils: Pupils are equal, round, and reactive to light.   Neck:      Vascular: No carotid bruit.   Cardiovascular:      Rate and Rhythm: Normal rate and regular rhythm.      Pulses: Normal pulses.      Heart sounds: Normal heart sounds.   Pulmonary:      Effort: Pulmonary effort is normal.      Breath sounds: Normal breath sounds.   Abdominal:      General: Bowel sounds are normal.      Palpations: Abdomen is soft.   Musculoskeletal:      Cervical back: Neck supple.      Right lower leg: No edema.      Left lower leg: No edema.   Skin:     General: Skin is warm and dry.      Capillary Refill: Capillary refill takes less than 2 seconds.   Neurological:      General: No focal deficit present.      Mental Status: He is alert and oriented to person, place, and time.   Psychiatric:         Mood and Affect: Mood normal.         Behavior: Behavior " normal.         LABS REVIEWED:  Lab Results   Component Value Date    WBC 6.37 02/28/2022    RBC 4.94 02/28/2022    HGB 12.9 (L) 02/28/2022    HCT 42.3 02/28/2022    MCV 85.6 02/28/2022    MCH 26.1 (L) 02/28/2022    MCHC 30.5 (L) 02/28/2022    RDW 13.4 02/28/2022     02/28/2022    MPV 12.0 02/28/2022    NRBC 0.0 02/28/2022    INR 1.0 12/12/2020     Lab Results   Component Value Date     02/28/2022    K 4.7 02/28/2022     02/28/2022    CO2 30 02/28/2022    BUN 33 (H) 02/28/2022     Lab Results   Component Value Date    AST 21 02/28/2022    ALT 24 02/28/2022     Lab Results   Component Value Date     (H) 02/28/2022    HGBA1C 7.0 (H) 08/11/2021     Lab Results   Component Value Date    CHOL 93 02/28/2022    HDL 31 (L) 02/28/2022    TRIG 152 (H) 02/28/2022    CHOLHDL 3.0 02/28/2022       CARDIAC STUDIES REVIEWED:EKG: sinus bradycardia; HR 57 bpm; no significant change from EKG done 5/12/2021         ASSESSMENT:   Patient Active Problem List   Diagnosis    CHF (congestive heart failure)    Coronary artery disease    Hyperlipidemia    Hypertension    Left ventricular diastolic dysfunction    Aortic valve sclerosis    Mild mitral regurgitation by prior echocardiogram    Shortness of breath on exertion    Lumbosacral radiculopathy    Cervical spondylosis without myelopathy    Postlaminectomy syndrome, lumbar region    PVD (peripheral vascular disease)    Type 2 diabetes mellitus with circulatory disorder, without long-term current use of insulin    Pain in thoracic spine    Type 2 diabetes mellitus with diabetic neuropathy, with long-term current use of insulin    Loss of protective sensation of skin of foot            Problem List Items Addressed This Visit        Cardiac/Vascular    Coronary artery disease - Primary    Overview     S/p CABG 1/23/2020- Dr. Levy  SVG to the dLAD; SVG to the OM and SVG to the PL  2/7/2019 BECKIE dLAD and mid LAD to overlap the BECKIE in the dLAD            Relevant Medications    clopidogreL (PLAVIX) 75 mg tablet    nitroGLYCERIN (NITROSTAT) 0.4 MG SL tablet    Other Relevant Orders    EKG 12-lead    Echo Saline Bubble? No    Nuclear Stress Test    Hyperlipidemia    Relevant Medications    rosuvastatin (CRESTOR) 5 MG tablet    icosapent ethyL (VASCEPA) 1 gram Cap    fenofibrate (TRICOR) 145 MG tablet    ezetimibe (ZETIA) 10 mg tablet    Hypertension    Relevant Medications    metoprolol succinate (TOPROL-XL) 25 MG 24 hr tablet    lisinopriL (PRINIVIL,ZESTRIL) 20 MG tablet    hydrALAZINE (APRESOLINE) 50 MG tablet    amLODIPine (NORVASC) 5 MG tablet      Other Visit Diagnoses     Chest pain due to myocardial ischemia, unspecified ischemic chest pain type        Relevant Medications    nitroGLYCERIN (NITROSTAT) 0.4 MG SL tablet    Other Relevant Orders    Echo Saline Bubble? No    NM Myocardial Perfusion Spect Multi Pharmacologic    Nuclear Stress Test           PLAN: The patient did not bring his meds in again today. He has been asked to do this so that I can send in refills for his meds. I will send in today but if he wishes to have further refills from me, he will need to bring his meds in for review and reconciliation.   Orders Placed This Encounter   Procedures    NM Myocardial Perfusion Spect Multi Pharmacologic     Standing Status:   Future     Standing Expiration Date:   5/17/2023     Order Specific Question:   May the Radiologist modify the order per protocol to meet the clinical needs of the patient?     Answer:   Yes     Order Specific Question:   Stress Medication to use:     Answer:   Regadenoson     Order Specific Question:   Will a Cardiologist read this study?     Answer:   Yes    Nuclear Stress Test     Standing Status:   Future     Standing Expiration Date:   5/17/2023     Order Specific Question:   Which stress agent will be used?     Answer:   Pharm     Order Specific Question:   Which medicaton for the stress procedure?     Answer:   Regadenoson      Order Specific Question:   Physician to read study:     Answer:   DYLAN RAMIRES [[id number]]     Order Specific Question:   Release to patient     Answer:   Immediate    EKG 12-lead     Standing Status:   Future     Number of Occurrences:   1     Standing Expiration Date:   5/17/2023    Echo Saline Bubble? No     Standing Status:   Future     Standing Expiration Date:   5/17/2023     Order Specific Question:   Limited Echo?     Answer:   No     Order Specific Question:   Saline Bubble?     Answer:   No     Order Specific Question:   Ultrasound enhancing contrast?     Answer:   No     Order Specific Question:   Physician to read study:     Answer:   DYLAN RAMIRES [[id number]]     Order Specific Question:   Adult congenital patient?     Answer:   No     Order Specific Question:   Oncology Patient?     Answer:   No      RTC: after tests

## 2022-05-25 ENCOUNTER — HOSPITAL ENCOUNTER (OUTPATIENT)
Dept: RADIOLOGY | Facility: HOSPITAL | Age: 67
Discharge: HOME | End: 2022-05-25
Attending: NURSE PRACTITIONER
Payer: COMMERCIAL

## 2022-05-25 ENCOUNTER — HOSPITAL ENCOUNTER (OUTPATIENT)
Dept: CARDIOLOGY | Facility: HOSPITAL | Age: 67
Discharge: HOME | End: 2022-05-25
Attending: NURSE PRACTITIONER
Payer: COMMERCIAL

## 2022-05-25 VITALS
SYSTOLIC BLOOD PRESSURE: 136 MMHG | BODY MASS INDEX: 24.78 KG/M2 | HEIGHT: 73 IN | DIASTOLIC BLOOD PRESSURE: 67 MMHG | WEIGHT: 187 LBS | HEART RATE: 66 BPM

## 2022-05-25 VITALS — HEIGHT: 72 IN | WEIGHT: 187 LBS | BODY MASS INDEX: 25.33 KG/M2

## 2022-05-25 DIAGNOSIS — I25.10 CORONARY ARTERY DISEASE INVOLVING NATIVE CORONARY ARTERY OF NATIVE HEART, UNSPECIFIED WHETHER ANGINA PRESENT: ICD-10-CM

## 2022-05-25 DIAGNOSIS — I25.9 CHEST PAIN DUE TO MYOCARDIAL ISCHEMIA, UNSPECIFIED ISCHEMIC CHEST PAIN TYPE: ICD-10-CM

## 2022-05-25 LAB
CV STRESS BASE HR: 66 BPM
DIASTOLIC BLOOD PRESSURE: 67 MMHG
OHS CV CPX 1 MINUTE RECOVERY HEART RATE: 86 BPM
OHS CV CPX 85 PERCENT MAX PREDICTED HEART RATE MALE: 131
OHS CV CPX MAX PREDICTED HEART RATE: 154
OHS CV CPX PATIENT IS FEMALE: 0
OHS CV CPX PATIENT IS MALE: 1
OHS CV CPX PEAK DIASTOLIC BLOOD PRESSURE: 67 MMHG
OHS CV CPX PEAK HEAR RATE: 90 BPM
OHS CV CPX PEAK RATE PRESSURE PRODUCT: NORMAL
OHS CV CPX PEAK SYSTOLIC BLOOD PRESSURE: 136 MMHG
OHS CV CPX PERCENT MAX PREDICTED HEART RATE ACHIEVED: 58
OHS CV CPX RATE PRESSURE PRODUCT PRESENTING: 8976
SYSTOLIC BLOOD PRESSURE: 136 MMHG

## 2022-05-25 PROCEDURE — A9500 TC99M SESTAMIBI: HCPCS

## 2022-05-25 PROCEDURE — 78452 NM MYOCARDIAL PERFUSION SPECT MULTI PHARM: ICD-10-PCS | Mod: 26,,, | Performed by: INTERNAL MEDICINE

## 2022-05-25 PROCEDURE — 93018 NUCLEAR STRESS TEST (CUPID ONLY): ICD-10-PCS | Mod: ,,, | Performed by: INTERNAL MEDICINE

## 2022-05-25 PROCEDURE — 93306 TTE W/DOPPLER COMPLETE: CPT

## 2022-05-25 PROCEDURE — 93306 ECHO (CUPID ONLY): ICD-10-PCS | Mod: 26,,, | Performed by: INTERNAL MEDICINE

## 2022-05-25 PROCEDURE — 93306 TTE W/DOPPLER COMPLETE: CPT | Mod: 26,,, | Performed by: INTERNAL MEDICINE

## 2022-05-25 PROCEDURE — 93017 CV STRESS TEST TRACING ONLY: CPT

## 2022-05-25 PROCEDURE — 93018 CV STRESS TEST I&R ONLY: CPT | Mod: ,,, | Performed by: INTERNAL MEDICINE

## 2022-05-25 PROCEDURE — 93016 CV STRESS TEST SUPVJ ONLY: CPT | Mod: ,,, | Performed by: NURSE PRACTITIONER

## 2022-05-25 PROCEDURE — 63600175 PHARM REV CODE 636 W HCPCS: Performed by: NURSE PRACTITIONER

## 2022-05-25 PROCEDURE — 78452 HT MUSCLE IMAGE SPECT MULT: CPT | Mod: 26,,, | Performed by: INTERNAL MEDICINE

## 2022-05-25 PROCEDURE — 93016 NUCLEAR STRESS TEST (CUPID ONLY): ICD-10-PCS | Mod: ,,, | Performed by: NURSE PRACTITIONER

## 2022-05-25 RX ORDER — REGADENOSON 0.08 MG/ML
0.4 INJECTION, SOLUTION INTRAVENOUS ONCE
Status: COMPLETED | OUTPATIENT
Start: 2022-05-25 | End: 2022-05-25

## 2022-05-25 RX ADMIN — REGADENOSON 0.4 MG: 0.08 INJECTION, SOLUTION INTRAVENOUS at 10:05

## 2022-05-25 NOTE — PROGRESS NOTES
No evidence of ischemia; inferior wall was hypokinetic at [facility] prior to CABG 1/2020. His EF was 35% prior to CABG 1/2020

## 2022-06-14 LAB
AORTIC VALVE CUSP SEPERATION: 19.9 CM
AV INDEX (PROSTH): 0.68
AV MEAN GRADIENT: 4 MMHG
AV PEAK GRADIENT: 9 MMHG
AV VALVE AREA: 2.15 CM2
BSA FOR ECHO PROCEDURE: 2.08 M2
CV ECHO LV RWT: 0.57 CM
DOP CALC AO PEAK VEL: 1.5 M/S
DOP CALC AO VTI: 28.08 CM
DOP CALC LVOT AREA: 3.1 CM2
DOP CALC LVOT DIAMETER: 2 CM
DOP CALC LVOT STROKE VOLUME: 60.29 CM3
DOP CALCLVOT PEAK VEL VTI: 19.2 CM
E WAVE DECELERATION TIME: 221 MSEC
E/A RATIO: 1.01
E/E' RATIO: 7.62 M/S
ECHO LV POSTERIOR WALL: 1.31 CM (ref 0.6–1.1)
EJECTION FRACTION: 55 %
FRACTIONAL SHORTENING: 50 % (ref 28–44)
INTERVENTRICULAR SEPTUM: 1.29 CM (ref 0.6–1.1)
IVC DIAMETER: 1.29 CM
LEFT ATRIUM SIZE: 3.38 CM
LEFT INTERNAL DIMENSION IN SYSTOLE: 2.29 CM (ref 2.1–4)
LEFT VENTRICLE DIASTOLIC VOLUME INDEX: 47.5 ML/M2
LEFT VENTRICLE DIASTOLIC VOLUME: 98.33 ML
LEFT VENTRICLE MASS INDEX: 112 G/M2
LEFT VENTRICLE SYSTOLIC VOLUME INDEX: 8.7 ML/M2
LEFT VENTRICLE SYSTOLIC VOLUME: 17.92 ML
LEFT VENTRICULAR INTERNAL DIMENSION IN DIASTOLE: 4.62 CM (ref 3.5–6)
LEFT VENTRICULAR MASS: 231.69 G
LV LATERAL E/E' RATIO: 6.67 M/S
LV SEPTAL E/E' RATIO: 8.89 M/S
LVOT MG: 1 MMHG
MV PEAK A VEL: 0.79 M/S
MV PEAK E VEL: 0.8 M/S
TDI LATERAL: 0.12 M/S
TDI SEPTAL: 0.09 M/S
TDI: 0.11 M/S

## 2022-06-15 ENCOUNTER — OFFICE VISIT (OUTPATIENT)
Dept: CARDIOLOGY | Facility: CLINIC | Age: 67
End: 2022-06-15
Payer: COMMERCIAL

## 2022-06-15 VITALS
OXYGEN SATURATION: 97 % | DIASTOLIC BLOOD PRESSURE: 58 MMHG | SYSTOLIC BLOOD PRESSURE: 128 MMHG | BODY MASS INDEX: 24.25 KG/M2 | HEART RATE: 58 BPM | WEIGHT: 183 LBS | HEIGHT: 73 IN

## 2022-06-15 DIAGNOSIS — R07.9 CHEST PAIN, UNSPECIFIED TYPE: ICD-10-CM

## 2022-06-15 DIAGNOSIS — R94.31 NONSPECIFIC ABNORMAL ELECTROCARDIOGRAM (ECG) (EKG): ICD-10-CM

## 2022-06-15 DIAGNOSIS — R94.30 ABNORMAL RESULTS OF CARDIOVASCULAR FUNCTION STUDIES: ICD-10-CM

## 2022-06-15 DIAGNOSIS — I25.10 CORONARY ARTERY DISEASE DUE TO LIPID RICH PLAQUE: Primary | ICD-10-CM

## 2022-06-15 DIAGNOSIS — Z95.1 HX OF CABG: ICD-10-CM

## 2022-06-15 DIAGNOSIS — I73.9 PVD (PERIPHERAL VASCULAR DISEASE): ICD-10-CM

## 2022-06-15 DIAGNOSIS — I25.2 OLD MI (MYOCARDIAL INFARCTION): ICD-10-CM

## 2022-06-15 DIAGNOSIS — I25.83 CORONARY ARTERY DISEASE DUE TO LIPID RICH PLAQUE: Primary | ICD-10-CM

## 2022-06-15 PROCEDURE — 4010F PR ACE/ARB THEARPY RXD/TAKEN: ICD-10-PCS | Mod: CPTII,,, | Performed by: NURSE PRACTITIONER

## 2022-06-15 PROCEDURE — 4010F ACE/ARB THERAPY RXD/TAKEN: CPT | Mod: CPTII,,, | Performed by: NURSE PRACTITIONER

## 2022-06-15 PROCEDURE — 1159F MED LIST DOCD IN RCRD: CPT | Mod: CPTII,,, | Performed by: NURSE PRACTITIONER

## 2022-06-15 PROCEDURE — 3008F PR BODY MASS INDEX (BMI) DOCUMENTED: ICD-10-PCS | Mod: CPTII,,, | Performed by: NURSE PRACTITIONER

## 2022-06-15 PROCEDURE — 3074F PR MOST RECENT SYSTOLIC BLOOD PRESSURE < 130 MM HG: ICD-10-PCS | Mod: CPTII,,, | Performed by: NURSE PRACTITIONER

## 2022-06-15 PROCEDURE — 3078F DIAST BP <80 MM HG: CPT | Mod: CPTII,,, | Performed by: NURSE PRACTITIONER

## 2022-06-15 PROCEDURE — 3074F SYST BP LT 130 MM HG: CPT | Mod: CPTII,,, | Performed by: NURSE PRACTITIONER

## 2022-06-15 PROCEDURE — 1160F PR REVIEW ALL MEDS BY PRESCRIBER/CLIN PHARMACIST DOCUMENTED: ICD-10-PCS | Mod: CPTII,,, | Performed by: NURSE PRACTITIONER

## 2022-06-15 PROCEDURE — 1159F PR MEDICATION LIST DOCUMENTED IN MEDICAL RECORD: ICD-10-PCS | Mod: CPTII,,, | Performed by: NURSE PRACTITIONER

## 2022-06-15 PROCEDURE — 3078F PR MOST RECENT DIASTOLIC BLOOD PRESSURE < 80 MM HG: ICD-10-PCS | Mod: CPTII,,, | Performed by: NURSE PRACTITIONER

## 2022-06-15 PROCEDURE — 99213 OFFICE O/P EST LOW 20 MIN: CPT | Mod: PBBFAC | Performed by: NURSE PRACTITIONER

## 2022-06-15 PROCEDURE — 99214 PR OFFICE/OUTPT VISIT, EST, LEVL IV, 30-39 MIN: ICD-10-PCS | Mod: S$PBB,,, | Performed by: NURSE PRACTITIONER

## 2022-06-15 PROCEDURE — 3008F BODY MASS INDEX DOCD: CPT | Mod: CPTII,,, | Performed by: NURSE PRACTITIONER

## 2022-06-15 PROCEDURE — 99214 OFFICE O/P EST MOD 30 MIN: CPT | Mod: S$PBB,,, | Performed by: NURSE PRACTITIONER

## 2022-06-15 PROCEDURE — 1160F RVW MEDS BY RX/DR IN RCRD: CPT | Mod: CPTII,,, | Performed by: NURSE PRACTITIONER

## 2022-06-15 NOTE — PROGRESS NOTES
[facility] note        DATE OF SERVICE: 06/16/2022       PCP: Rodney Morel MD      CHIEF COMPLAINT:   Chief Complaint   Patient presents with    Chest Pain     With exertion, every now and then.        HISTORY OF PRESENT ILLNESS:  Micheal Taylor is a 66 y.o. male with a PMH of   Past Medical History:   Diagnosis Date    CHF (congestive heart failure)     Chronic pain syndrome     Coronary artery disease     Diabetes mellitus, type 2     Hyperlipidemia     Hypertension     Lumbar spondylosis     Lumbosacral radiculopathy     Pain in thoracic spine     PVD (peripheral vascular disease)     Spondylosis without myelopathy or radiculopathy, cervical region      who presents for follow up to discuss results of the lexiscan and echo. The patient reports and episode of left sided chest pain radiating to his shoulders bilaterally and down his left arm while walking to the bath room a few days ago. This was relieved with rest and sublingual ntg was not needed. This pain is similar in character to his pain with his prior MI.  Chief Complaint   Patient presents with    Chest Pain     With exertion, every now and then.            PAST MEDICAL HISTORY:  Past Medical History:   Diagnosis Date    CHF (congestive heart failure)     Chronic pain syndrome     Coronary artery disease     Diabetes mellitus, type 2     Hyperlipidemia     Hypertension     Lumbar spondylosis     Lumbosacral radiculopathy     Pain in thoracic spine     PVD (peripheral vascular disease)     Spondylosis without myelopathy or radiculopathy, cervical region        PAST SURGICAL HISTORY:  Past Surgical History:   Procedure Laterality Date    ADENOIDECTOMY      AMPUTATION      left index finger removed     CAUDAL EPIDURAL STEROID INJECTION  01/19/2016    Caudal NATHAN - [facility]    CIRCUMCISION      CORONARY ARTERY BYPASS GRAFT (CABG)      FOOT SURGERY      heel surgery    HEEL SPUR SURGERY      INJECTION OF FACET JOINT  Bilateral 2015    Bilateral L3-S1 Facet Injection - [facility] - 4/16/15, 11/15/12 and 12    RADIOFREQUENCY THERMOCOAGULATION Left 2013    Left L3-S1 RFTC -Alee    RADIOFREQUENCY THERMOCOAGULATION Right 2013    Right L3-S1 RFTC - [facility]    TONSILLECTOMY      TONSILLECTOMY, ADENOIDECTOMY, BILATERAL MYRINGOTOMY AND TUBES      TRANSFORAMINAL EPIDURAL INJECTION OF STEROID Left 2012    Left L3-4 TFESI - [facility]    TRANSFORAMINAL EPIDURAL INJECTION OF STEROID Right 2013    Right L3-4 TFESI - [facility] - 13 and 12    VASCULAR SURGERY         SOCIAL HISTORY:  Social History     Socioeconomic History    Marital status:    Tobacco Use    Smoking status: Former Smoker     Quit date: 2006     Years since quittin.2    Smokeless tobacco: Current User     Types: Snuff   Substance and Sexual Activity    Alcohol use: Never    Drug use: Never       FAMILY HISTORY:  Family History   Problem Relation Age of Onset    Diabetes Mother     Heart disease Mother     Hypertension Mother     Liver cancer Mother     Heart disease Father     Hypertension Father          ALLERGIES:  Review of patient's allergies indicates:  No Known Allergies     MEDICATIONS:    Current Outpatient Medications:     amLODIPine (NORVASC) 5 MG tablet, Take 1 tablet (5 mg total) by mouth once daily., Disp: 90 tablet, Rfl: 1    aspirin (ECOTRIN) 81 MG EC tablet, Take 81 mg by mouth once daily., Disp: , Rfl:     cholecalciferol, vitamin D3, (VITAMIN D3) 25 mcg (1,000 unit) capsule, , Disp: , Rfl:     cinnamon bark 500 mg capsule, Take 500 mg by mouth once daily. , Disp: , Rfl:     clopidogreL (PLAVIX) 75 mg tablet, Take 1 tablet (75 mg total) by mouth once daily., Disp: 90 tablet, Rfl: 1    dapagliflozin (FARXIGA) 10 mg tablet, Take 1 tablet by mouth once daily., Disp: , Rfl:     docosahexaenoic acid-epa 120-180 mg Cap, Take 1 capsule by mouth., Disp: , Rfl:     ezetimibe (ZETIA) 10 mg tablet, Take 1  tablet (10 mg total) by mouth once daily., Disp: 90 tablet, Rfl: 1    fenofibrate (TRICOR) 145 MG tablet, Take 1 tablet (145 mg total) by mouth once daily., Disp: 90 tablet, Rfl: 1    gabapentin (NEURONTIN) 300 MG capsule, Take 1 capsule (300 mg total) by mouth 3 (three) times daily., Disp: 360 capsule, Rfl: 1    hydrALAZINE (APRESOLINE) 50 MG tablet, Take 1 tablet (50 mg total) by mouth 4 (four) times daily., Disp: 360 tablet, Rfl: 1    HYDROcodone-acetaminophen (NORCO)  mg per tablet, Take 1 tablet by mouth every 8 (eight) hours., Disp: 90 tablet, Rfl: 0    HYDROcodone-acetaminophen (NORCO)  mg per tablet, Take 1 tablet by mouth every 8 (eight) hours., Disp: 90 tablet, Rfl: 0    icosapent ethyL (VASCEPA) 1 gram Cap, Take 2 capsules (2,000 mg total) by mouth 2 (two) times a day., Disp: 360 capsule, Rfl: 1    insulin regular 100 unit/mL Inj injection, Novolin R Regular U-100 Insuln  14-32 u bidwm, Disp: , Rfl:     liraglutide 0.6 mg/0.1 mL, 18 mg/3 mL, subq PNIJ (VICTOZA 2-CAITLIN) 0.6 mg/0.1 mL (18 mg/3 mL) PnIj pen, Inject 1.8 mg into the skin., Disp: , Rfl:     lisinopriL (PRINIVIL,ZESTRIL) 20 MG tablet, Take 1 tablet (20 mg total) by mouth once daily., Disp: 90 tablet, Rfl: 1    magnesium 250 mg Tab, Take 1 tablet by mouth once daily. , Disp: , Rfl:     metFORMIN (GLUCOPHAGE) 500 MG tablet, Take 500 mg by mouth 2 (two) times daily with meals., Disp: , Rfl:     metoprolol succinate (TOPROL-XL) 25 MG 24 hr tablet, Take 1 tablet (25 mg total) by mouth once daily., Disp: 90 tablet, Rfl: 1    nitroGLYCERIN (NITROSTAT) 0.4 MG SL tablet, Place 1 tablet (0.4 mg total) under the tongue every 5 (five) minutes as needed for Chest pain., Disp: 25 tablet, Rfl: 3    olopatadine (PATADAY) 0.2 % Drop, Place 1 drop into both eyes once daily., Disp: 5 mL, Rfl: 0    omeprazole (PRILOSEC) 20 MG capsule, Take 1 capsule (20 mg total) by mouth once daily., Disp: 90 capsule, Rfl: 1    potassium chloride SA  "(K-DUR,KLOR-CON) 20 MEQ tablet, Take 1 tablet (20 mEq total) by mouth once daily., Disp: 90 tablet, Rfl: 1    rosuvastatin (CRESTOR) 5 MG tablet, Take 1 tablet (5 mg total) by mouth once daily., Disp: 90 tablet, Rfl: 1    TRESIBA FLEXTOUCH U-200 200 unit/mL (3 mL) insulin pen, Inject 54 Units into the skin once daily. , Disp: , Rfl:     vitamin A 8000 UNIT capsule, Take 8,000 Units by mouth., Disp: , Rfl:     vitamin E 400 UNIT capsule, Take 400 Units by mouth once daily. , Disp: , Rfl:   Medications have been reviewed but not reconciled. He did not bring his meds today.    PHYSICAL EXAM:  BP (!) 128/58 (BP Location: Left arm, Patient Position: Sitting)   Pulse (!) 58   Ht 6' 1" (1.854 m)   Wt 83 kg (183 lb)   SpO2 97%   BMI 24.14 kg/m²   Wt Readings from Last 3 Encounters:   06/15/22 83 kg (183 lb)   05/25/22 84.8 kg (187 lb)   05/25/22 84.8 kg (187 lb)      Body mass index is 24.14 kg/m².    Physical Exam  Vitals and nursing note reviewed.   Constitutional:       Appearance: Normal appearance. He is normal weight.   HENT:      Head: Normocephalic and atraumatic.   Eyes:      Pupils: Pupils are equal, round, and reactive to light.   Neck:      Vascular: No carotid bruit.   Cardiovascular:      Rate and Rhythm: Regular rhythm. Bradycardia present.      Pulses: Normal pulses.      Heart sounds: Normal heart sounds.   Pulmonary:      Effort: Pulmonary effort is normal.      Breath sounds: Normal breath sounds.   Abdominal:      General: Bowel sounds are normal.      Palpations: Abdomen is soft.   Musculoskeletal:      Cervical back: Neck supple.      Right lower leg: No edema.      Left lower leg: No edema.   Skin:     General: Skin is warm and dry.      Capillary Refill: Capillary refill takes less than 2 seconds.   Neurological:      General: No focal deficit present.      Mental Status: He is alert and oriented to person, place, and time.   Psychiatric:         Mood and Affect: Mood normal.         " Behavior: Behavior normal.         LABS REVIEWED:  Lab Results   Component Value Date    WBC 6.37 2022    RBC 4.94 2022    HGB 12.9 (L) 2022    HCT 42.3 2022    MCV 85.6 2022    MCH 26.1 (L) 2022    MCHC 30.5 (L) 2022    RDW 13.4 2022     2022    MPV 12.0 2022    NRBC 0.0 2022    INR 1.0 2020     Lab Results   Component Value Date     2022    K 4.7 2022     2022    CO2 30 2022    BUN 33 (H) 2022     Lab Results   Component Value Date    AST 21 2022    ALT 24 2022     Lab Results   Component Value Date     (H) 2022    HGBA1C 7.0 (H) 2021     Lab Results   Component Value Date    CHOL 93 2022    HDL 31 (L) 2022    TRIG 152 (H) 2022    CHOLHDL 3.0 2022       CARDIAC STUDIES REVIEWED:EK2022 sinus bradycardia; HR 57 bpm; evidence of prior inferior MI - abnormal EKG    Stress test  Results for orders placed during the hospital encounter of 22    Nuclear Stress Test    Interpretation Summary    The EKG portion of this study is negative for ischemia.    The patient reported no chest pain during the stress test.    There were no arrhythmias during stress.     nuclear images  Impression:     1.  Large inferolateral wall fixed perfusion defect along the base in stressed images showing no improvement in rest consistent with scar but little evidence of ischemia.     2.  Normal left ventricular size with anterior and septal hypokinesis and overall mildly impaired left ventricular systolic function, ejection fraction 45%.        Electronically signed by: Venu Whitlock  Date:                                            2022  Time:                                           12:23       echocardiogram  Results for orders placed during the hospital encounter of 22    Echo Saline Bubble? No    Interpretation Summary  · The left  ventricle is normal in size with moderate concentric hypertrophy and normal systolic function.  · The estimated ejection fraction is 55%.  · Atrial fibrillation not observed.  · Normal left ventricular diastolic function.  · Mild-to-moderate mitral regurgitation.     Heart cath  No results found for this or any previous visit.           ASSESSMENT:   Patient Active Problem List   Diagnosis    CHF (congestive heart failure)    Coronary artery disease    Hyperlipidemia    Hypertension    Left ventricular diastolic dysfunction    Aortic valve sclerosis    Mild mitral regurgitation by prior echocardiogram    Shortness of breath on exertion    Lumbosacral radiculopathy    Cervical spondylosis without myelopathy    Postlaminectomy syndrome, lumbar region    PVD (peripheral vascular disease)    Type 2 diabetes mellitus with circulatory disorder, without long-term current use of insulin    Pain in thoracic spine    Type 2 diabetes mellitus with diabetic neuropathy, with long-term current use of insulin    Loss of protective sensation of skin of foot    Chest pain    Abnormal results of cardiovascular function studies    Nonspecific abnormal electrocardiogram (ECG) (EKG)    Old MI (myocardial infarction)            Problem List Items Addressed This Visit        Cardiac/Vascular    Abnormal results of cardiovascular function studies    Overview     Large inferior wall fixed perfusion defect along the base in stress images  Normal LV size with anterior and septal hypokinesis and overall mildly impaired LVSF, EF 45%           Relevant Orders    Case Request-Cath Lab: Left heart cath, Percutaneous coronary intervention (Completed)    Chest pain    Overview     Similar to pain at time of MI  Intermittent; occurs with exertion; resolves with rest; has not needed sublingual ntg           Relevant Orders    Case Request-Cath Lab: Left heart cath, Percutaneous coronary intervention (Completed)    Coronary artery  disease - Primary    Overview     S/p CABG 1/23/2020- Dr. Levy  SVG to the dLAD; SVG to the OM and SVG to the PL  2/7/2019 BECKIE dLAD and mid LAD to overlap the BECKIE in the dLAD           Relevant Orders    Case Request-Cath Lab: Left heart cath, Percutaneous coronary intervention (Completed)    Nonspecific abnormal electrocardiogram (ECG) (EKG)    Old MI (myocardial infarction)    Overview     X 3           PVD (peripheral vascular disease)    Overview     Patient reports h/o iliac stents bilaterally by Dr. Forrest             Other Visit Diagnoses     Hx of CABG        Relevant Orders    Case Request-Cath Lab: Left heart cath, Percutaneous coronary intervention (Completed)           PLAN:  D/w the patient LHC with poss PCI. He wishes to proceed.   RTC: after LHC with poss pci

## 2022-06-16 DIAGNOSIS — Z01.812 PRE-OPERATIVE LABORATORY EXAMINATION: Primary | ICD-10-CM

## 2022-06-16 DIAGNOSIS — Z79.899 OTHER LONG TERM (CURRENT) DRUG THERAPY: ICD-10-CM

## 2022-06-16 DIAGNOSIS — Z01.818 OTHER SPECIFIED PRE-OPERATIVE EXAMINATION: ICD-10-CM

## 2022-06-16 DIAGNOSIS — R07.9 CHEST PAIN, UNSPECIFIED TYPE: ICD-10-CM

## 2022-06-16 RX ORDER — DIPHENHYDRAMINE HCL 25 MG
50 CAPSULE ORAL
Status: CANCELLED | OUTPATIENT
Start: 2022-07-19

## 2022-06-16 RX ORDER — DIAZEPAM 2 MG/1
5 TABLET ORAL ONCE
Status: CANCELLED | OUTPATIENT
Start: 2022-07-19

## 2022-07-13 NOTE — PROGRESS NOTES
Subjective:      Patient ID: Micheal Tayolr is a 66 y.o. male.    Chief Complaint: Back Pain, Joint Pain, and Neck Pain      Pain  This is a chronic problem. The current episode started more than 1 year ago. The problem occurs daily. The problem has been waxing and waning. Associated symptoms include arthralgias and neck pain. Pertinent negatives include no change in bowel habit, chest pain, chills, coughing, diaphoresis, fever, rash, sore throat, vertigo or vomiting.     Review of Systems   Constitutional: Negative for appetite change, chills, diaphoresis and fever.   HENT: Negative for drooling, ear discharge, ear pain, facial swelling, mouth sores, nosebleeds, sore throat, trouble swallowing, voice change and goiter.    Eyes: Negative for photophobia, pain, discharge, redness and visual disturbance.   Respiratory: Negative for apnea, cough, choking, chest tightness, shortness of breath, wheezing and stridor.    Cardiovascular: Negative for chest pain, palpitations and leg swelling.   Gastrointestinal: Negative for abdominal distention, change in bowel habit, diarrhea, rectal pain, vomiting, fecal incontinence and change in bowel habit.   Endocrine: Negative for cold intolerance, heat intolerance, polydipsia, polyphagia and polyuria.   Genitourinary: Negative for bladder incontinence, dysuria, flank pain and frequency.   Musculoskeletal: Positive for arthralgias, back pain, leg pain, neck pain and neck stiffness.   Integumentary:  Negative for color change, pallor and rash.   Neurological: Negative for dizziness, vertigo, seizures, syncope, facial asymmetry, speech difficulty, light-headedness, disturbances in coordination, memory loss and coordination difficulties.   Hematological: Negative for adenopathy. Does not bruise/bleed easily.   Psychiatric/Behavioral: Negative for agitation, behavioral problems, confusion, decreased concentration, dysphoric mood, hallucinations, self-injury and suicidal ideas. The patient  "is not nervous/anxious and is not hyperactive.             Objective:  Vitals:    07/14/22 0949 07/14/22 0950   BP: 137/89    Pulse: 66    Weight: 86.6 kg (191 lb)    Height: 6' 1" (1.854 m)    PainSc:   7   7         Physical Exam  Vitals and nursing note reviewed. Exam conducted with a chaperone present.   Constitutional:       General: He is awake. He is not in acute distress.     Appearance: Normal appearance. He is not toxic-appearing or diaphoretic.   HENT:      Head: Normocephalic and atraumatic.      Nose: Nose normal.      Mouth/Throat:      Mouth: Mucous membranes are moist.      Pharynx: Oropharynx is clear.   Eyes:      Conjunctiva/sclera: Conjunctivae normal.      Pupils: Pupils are equal, round, and reactive to light.   Cardiovascular:      Rate and Rhythm: Normal rate.   Pulmonary:      Effort: Pulmonary effort is normal. No respiratory distress.   Abdominal:      Palpations: Abdomen is soft.   Musculoskeletal:      Right shoulder: Tenderness present.      Left shoulder: Tenderness present.      Cervical back: Normal range of motion and neck supple. Tenderness present.      Thoracic back: Tenderness present.      Lumbar back: Tenderness present. Decreased range of motion.   Skin:     General: Skin is warm and dry.   Neurological:      General: No focal deficit present.      Mental Status: He is alert and oriented to person, place, and time. Mental status is at baseline.      Cranial Nerves: No cranial nerve deficit (II-XII).   Psychiatric:         Mood and Affect: Mood normal.         Behavior: Behavior normal. Behavior is cooperative.         Thought Content: Thought content normal.           Orders Placed This Encounter   Procedures    POCT Urine Drug Screen Presump     Interpretive Information:     Negative:  No drug detected at the cut off level.   Positive:  This result represents presumptive positive for the   tested drug, other substances may yield a positive response other   than the analyte " of interest. This result should be utilized for   diagnostic purpose only. Confirmation testing will be performed upon physician request only.           Echo Saline Bubble? No  · The left ventricle is normal in size with moderate concentric   hypertrophy and normal systolic function.  · The estimated ejection fraction is 55%.  · Atrial fibrillation not observed.  · Normal left ventricular diastolic function.  · Mild-to-moderate mitral regurgitation.          Hospital Outpatient Visit on 05/25/2022   Component Date Value Ref Range Status    BSA 05/25/2022 2.08  m2 Final    TDI SEPTAL 05/25/2022 0.09  m/s Final    LV LATERAL E/E' RATIO 05/25/2022 6.67  m/s Final    LV SEPTAL E/E' RATIO 05/25/2022 8.89  m/s Final    IVC diameter 05/25/2022 1.29  cm Final    EF 05/25/2022 55  % Final    Left Ventricular Outflow Tract Kay* 05/25/2022 1.00  mmHg Final    AORTIC VALVE CUSP SEPERATION 05/25/2022 [id number]  cm Final    TDI LATERAL 05/25/2022 0.12  m/s Final    LVIDd 05/25/2022 4.62  3.5 - 6.0 cm Final    IVS 05/25/2022 1.29 (A) 0.6 - 1.1 cm Final    Posterior Wall 05/25/2022 1.31 (A) 0.6 - 1.1 cm Final    LVIDs 05/25/2022 2.29  2.1 - 4.0 cm Final    FS 05/25/2022 50  28 - 44 % Final    LV mass 05/25/2022 231.69  g Final    LA size 05/25/2022 3.38  cm Final    Left Ventricle Relative Wall Thick* 05/25/2022 0.57  cm Final    AV mean gradient 05/25/2022 4  mmHg Final    AV valve area 05/25/2022 2.15  cm2 Final    AV index (prosthetic) 05/25/2022 0.68   Final    E/A ratio 05/25/2022 1.01   Final    Mean e' 05/25/2022 0.11  m/s Final    E wave deceleration time 05/25/2022 221  msec Final    LVOT diameter 05/25/2022 2.00  cm Final    LVOT area 05/25/2022 3.1  cm2 Final    LVOT peak VTI 05/25/2022 19.20  cm Final    Ao peak amy 05/25/2022 1.5  m/s Final    Ao VTI 05/25/2022 28.08  cm Final    LVOT stroke volume 05/25/2022 60.29  cm3 Final    AV peak gradient 05/25/2022 9  mmHg Final    E/E'  ratio 05/25/2022 7.62  m/s Final    MV Peak E Shay 05/25/2022 0.80  m/s Final    MV Peak A Shay 05/25/2022 0.79  m/s Final    LV Systolic Volume 05/25/2022 17.92  mL Final    LV Systolic Volume Index 05/25/2022 8.7  mL/m2 Final    LV Diastolic Volume 05/25/2022 98.33  mL Final    LV Diastolic Volume Index 05/25/2022 47.50  mL/m2 Final    LV Mass Index 05/25/2022 112  g/m2 Final   Hospital Outpatient Visit on 05/25/2022   Component Date Value Ref Range Status    85% Max Predicted HR 05/25/2022 131   Final    Max Predicted HR 05/25/2022 154   Final    OHS CV CPX PATIENT IS MALE 05/25/2022 1.0   Final    OHS CV CPX PATIENT IS FEMALE 05/25/2022 0.0   Final    HR at rest 05/25/2022 66  bpm Final    Systolic blood pressure 05/25/2022 136  mmHg Final    Diastolic blood pressure 05/25/2022 67  mmHg Final    RPP 05/25/2022 8,976   Final    Peak HR 05/25/2022 90  bpm Final    Peak Systolic BP 05/25/2022 136  mmHg Final    Peak Diatolic BP 05/25/2022 67  mmHg Final    Peak RPP 05/25/2022 12,240   Final    % Max HR Achieved 05/25/2022 58   Final    1 Minute Recovery HR 05/25/2022 86  bpm Final   Lab Visit on 04/28/2022   Component Date Value Ref Range Status    Creatinine 04/28/2022 1.91 (A) 0.70 - 1.30 mg/dL Final    eGFR 04/28/2022 38 (A) >=60 mL/min/1.73m² Final   Office Visit on 04/14/2022   Component Date Value Ref Range Status    POC Amphetamines 04/14/2022 Negative  Negative, Inconclusive Final    POC Barbiturates 04/14/2022 Negative  Negative, Inconclusive Final    POC Benzodiazepines 04/14/2022 Negative  Negative, Inconclusive Final    POC Cocaine 04/14/2022 Negative  Negative, Inconclusive Final    POC THC 04/14/2022 Negative  Negative, Inconclusive Final    POC Methadone 04/14/2022 Negative  Negative, Inconclusive Final    POC Methamphetamine 04/14/2022 Negative  Negative, Inconclusive Final    POC Opiates 04/14/2022 Presumptive Positive (A) Negative, Inconclusive Final    POC Oxycodone  04/14/2022 Negative  Negative, Inconclusive Final    POC Phencyclidine 04/14/2022 Negative  Negative, Inconclusive Final    POC Methylenedioxymethamphetamine * 04/14/2022 Negative  Negative, Inconclusive Final    POC Tricyclic Antidepressants 04/14/2022 Negative  Negative, Inconclusive Final    POC Buprenorphine 04/14/2022 Negative   Final     Acceptable 04/14/2022 Yes   Final    POC Temperature (Urine) 04/14/2022 94   Final   Office Visit on 02/28/2022   Component Date Value Ref Range Status    Triglycerides 02/28/2022 152 (A) 35 - 150 mg/dL Final    Cholesterol 02/28/2022 93  0 - 200 mg/dL Final    HDL Cholesterol 02/28/2022 31 (A) 40 - 60 mg/dL Final    Cholesterol/HDL Ratio (Risk Factor) 02/28/2022 3.0   Final    Non-HDL 02/28/2022 62  mg/dL Final    LDL Calculated 02/28/2022 32  mg/dL Final    LDL/HDL 02/28/2022 1.0   Final    VLDL 02/28/2022 30  mg/dL Final    Sodium 02/28/2022 141  136 - 145 mmol/L Final    Potassium 02/28/2022 4.7  3.5 - 5.1 mmol/L Final    Chloride 02/28/2022 105  98 - 107 mmol/L Final    CO2 02/28/2022 30  21 - 32 mmol/L Final    Anion Gap 02/28/2022 11  7 - 16 mmol/L Final    Glucose 02/28/2022 149 (A) 74 - 106 mg/dL Final    BUN 02/28/2022 33 (A) 7 - 18 mg/dL Final    Creatinine 02/28/2022 1.65 (A) 0.70 - 1.30 mg/dL Final    BUN/Creatinine Ratio 02/28/2022 20  6 - 20 Final    Calcium 02/28/2022 8.9  8.5 - 10.1 mg/dL Final    Total Protein 02/28/2022 6.7  6.4 - 8.2 g/dL Final    Albumin 02/28/2022 3.5  3.5 - 5.0 g/dL Final    Globulin 02/28/2022 3.2  2.0 - 4.0 g/dL Final    A/G Ratio 02/28/2022 1.1   Final    Bilirubin, Total 02/28/2022 0.4  0.0 - 1.2 mg/dL Final    Alk Phos 02/28/2022 32 (A) 45 - 115 U/L Final    ALT 02/28/2022 24  16 - 61 U/L Final    AST 02/28/2022 21  15 - 37 U/L Final    eGFR 02/28/2022 45 (A) >=60 mL/min/1.73m² Final    Testosterone, Free 02/28/2022 7.75  3.47 - 13.0 ng/dL Final    Testosterone, Total 02/28/2022 408   240 - 950 ng/dL Final    WBC 02/28/2022 6.37  4.50 - 11.00 K/uL Final    RBC 02/28/2022 4.94  4.60 - 6.20 M/uL Final    Hemoglobin 02/28/2022 12.9 (A) 13.5 - 18.0 g/dL Final    Hematocrit 02/28/2022 42.3  40.0 - 54.0 % Final    MCV 02/28/2022 85.6  80.0 - 96.0 fL Final    MCH 02/28/2022 26.1 (A) 27.0 - 31.0 pg Final    MCHC 02/28/2022 30.5 (A) 32.0 - 36.0 g/dL Final    RDW 02/28/2022 13.4  11.5 - 14.5 % Final    Platelet Count 02/28/2022 182  150 - 400 K/uL Final    MPV 02/28/2022 12.0  9.4 - 12.4 fL Final    Neutrophils % 02/28/2022 53.2  53.0 - 65.0 % Final    Lymphocytes % 02/28/2022 34.1  27.0 - 41.0 % Final    Monocytes % 02/28/2022 8.2 (A) 2.0 - 6.0 % Final    Eosinophils % 02/28/2022 3.3  1.0 - 4.0 % Final    Basophils % 02/28/2022 0.9  0.0 - 1.0 % Final    Immature Granulocytes % 02/28/2022 0.3  0.0 - 0.4 % Final    nRBC, Auto 02/28/2022 0.0  <=0.0 % Final    Neutrophils, Abs 02/28/2022 3.39  1.80 - 7.70 K/uL Final    Lymphocytes, Absolute 02/28/2022 2.17  1.00 - 4.80 K/uL Final    Monocytes, Absolute 02/28/2022 0.52  0.00 - 0.80 K/uL Final    Eosinophils, Absolute 02/28/2022 0.21  0.00 - 0.50 K/uL Final    Basophils, Absolute 02/28/2022 0.06  0.00 - 0.20 K/uL Final    Immature Granulocytes, Absolute 02/28/2022 0.02  0.00 - 0.04 K/uL Final    nRBC, Absolute 02/28/2022 0.00  <=0.00 x10e3/uL Final    Diff Type 02/28/2022 Auto   Final           Assessment:      1. Lumbosacral radiculopathy    2. Cervical spondylosis without myelopathy    3. Postlaminectomy syndrome, lumbar region    4. Pain in thoracic spine    5. Encounter for long-term (current) use of other medications            A's of Opioid Risk Assessment  Activity:Patient can perform ADL.   Analgesia:Patients pain is partially controlled by current medication. Patient has tried OTC medications such as Tylenol and Ibuprofen with out relief.   Adverse Effects: Patient denies constipation or sedation.  Aberrant Behavior:   reviewed with no aberrant drug seeking/taking behavior.  Overdose reversal drug naloxone discussed    Drug screen reviewed      Requested Prescriptions     Signed Prescriptions Disp Refills    HYDROcodone-acetaminophen (NORCO)  mg per tablet 90 tablet 0     Sig: Take 1 tablet by mouth every 8 (eight) hours.    tiZANidine (ZANAFLEX) 4 MG tablet 90 tablet 2     Sig: Take 1 tablet (4 mg total) by mouth every 8 (eight) hours.    HYDROcodone-acetaminophen (NORCO)  mg per tablet 90 tablet 0     Sig: Take 1 tablet by mouth every 8 (eight) hours.    HYDROcodone-acetaminophen (NORCO)  mg per tablet 90 tablet 0     Sig: Take 1 tablet by mouth every 8 (eight) hours.         Plan:    [place] [facility] closed on weekends    Has heart catheterization next Tuesday [facility]    Continues complaint back pain buttock and leg pain radicular in nature    MRI lumbar spine [facility] April 2022 multiple level degenerative changes neuroforaminal stenosis postop changes noted    Patient considering further procedures/spinal cord stimulator trial    He would like to continue conservative management    Continue home exercise program as directed    Continue current medication    Follow-up 3 months    Dr. Callahan, February 2023    Bring original prescription medication bottles/container/box with labels to each visit

## 2022-07-14 ENCOUNTER — OFFICE VISIT (OUTPATIENT)
Dept: PAIN MEDICINE | Facility: CLINIC | Age: 67
End: 2022-07-14
Payer: COMMERCIAL

## 2022-07-14 ENCOUNTER — HOSPITAL ENCOUNTER (OUTPATIENT)
Dept: RADIOLOGY | Facility: HOSPITAL | Age: 67
Discharge: HOME | End: 2022-07-14
Attending: NURSE PRACTITIONER
Payer: COMMERCIAL

## 2022-07-14 VITALS
DIASTOLIC BLOOD PRESSURE: 89 MMHG | WEIGHT: 191 LBS | BODY MASS INDEX: 25.31 KG/M2 | SYSTOLIC BLOOD PRESSURE: 137 MMHG | HEIGHT: 73 IN | HEART RATE: 66 BPM

## 2022-07-14 DIAGNOSIS — M47.812 CERVICAL SPONDYLOSIS WITHOUT MYELOPATHY: Chronic | ICD-10-CM

## 2022-07-14 DIAGNOSIS — Z01.818 OTHER SPECIFIED PRE-OPERATIVE EXAMINATION: ICD-10-CM

## 2022-07-14 DIAGNOSIS — M54.17 LUMBOSACRAL RADICULOPATHY: Primary | Chronic | ICD-10-CM

## 2022-07-14 DIAGNOSIS — M96.1 POSTLAMINECTOMY SYNDROME, LUMBAR REGION: Chronic | ICD-10-CM

## 2022-07-14 DIAGNOSIS — Z79.899 ENCOUNTER FOR LONG-TERM (CURRENT) USE OF OTHER MEDICATIONS: ICD-10-CM

## 2022-07-14 DIAGNOSIS — M54.6 PAIN IN THORACIC SPINE: Chronic | ICD-10-CM

## 2022-07-14 DIAGNOSIS — Z01.818 OTHER SPECIFIED PRE-OPERATIVE EXAMINATION: Primary | ICD-10-CM

## 2022-07-14 LAB
CTP QC/QA: YES
POC (AMP) AMPHETAMINE: NEGATIVE
POC (BAR) BARBITURATES: NEGATIVE
POC (BUP) BUPRENORPHINE: NEGATIVE
POC (BZO) BENZODIAZEPINES: NEGATIVE
POC (COC) COCAINE: NEGATIVE
POC (MDMA) METHYLENEDIOXYMETHAMPHETAMINE 3,4: NEGATIVE
POC (MET) METHAMPHETAMINE: NEGATIVE
POC (MOP) OPIATES: ABNORMAL
POC (MTD) METHADONE: NEGATIVE
POC (OXY) OXYCODONE: NEGATIVE
POC (PCP) PHENCYCLIDINE: NEGATIVE
POC (TCA) TRICYCLIC ANTIDEPRESSANTS: NEGATIVE
POC TEMPERATURE (URINE): 90
POC THC: NEGATIVE

## 2022-07-14 PROCEDURE — 1125F AMNT PAIN NOTED PAIN PRSNT: CPT | Mod: CPTII,,, | Performed by: PHYSICIAN ASSISTANT

## 2022-07-14 PROCEDURE — 99215 OFFICE O/P EST HI 40 MIN: CPT | Mod: PBBFAC,25 | Performed by: PHYSICIAN ASSISTANT

## 2022-07-14 PROCEDURE — 3008F BODY MASS INDEX DOCD: CPT | Mod: CPTII,,, | Performed by: PHYSICIAN ASSISTANT

## 2022-07-14 PROCEDURE — 3075F PR MOST RECENT SYSTOLIC BLOOD PRESS GE 130-139MM HG: ICD-10-PCS | Mod: CPTII,,, | Performed by: PHYSICIAN ASSISTANT

## 2022-07-14 PROCEDURE — 80305 DRUG TEST PRSMV DIR OPT OBS: CPT | Mod: PBBFAC | Performed by: PHYSICIAN ASSISTANT

## 2022-07-14 PROCEDURE — 71046 X-RAY EXAM CHEST 2 VIEWS: CPT | Mod: TC

## 2022-07-14 PROCEDURE — 3288F FALL RISK ASSESSMENT DOCD: CPT | Mod: CPTII,,, | Performed by: PHYSICIAN ASSISTANT

## 2022-07-14 PROCEDURE — 4010F ACE/ARB THERAPY RXD/TAKEN: CPT | Mod: CPTII,,, | Performed by: PHYSICIAN ASSISTANT

## 2022-07-14 PROCEDURE — 71046 X-RAY EXAM CHEST 2 VIEWS: CPT | Mod: 26,,, | Performed by: RADIOLOGY

## 2022-07-14 PROCEDURE — 1159F MED LIST DOCD IN RCRD: CPT | Mod: CPTII,,, | Performed by: PHYSICIAN ASSISTANT

## 2022-07-14 PROCEDURE — 3079F DIAST BP 80-89 MM HG: CPT | Mod: CPTII,,, | Performed by: PHYSICIAN ASSISTANT

## 2022-07-14 PROCEDURE — 1125F PR PAIN SEVERITY QUANTIFIED, PAIN PRESENT: ICD-10-PCS | Mod: CPTII,,, | Performed by: PHYSICIAN ASSISTANT

## 2022-07-14 PROCEDURE — 4010F PR ACE/ARB THEARPY RXD/TAKEN: ICD-10-PCS | Mod: CPTII,,, | Performed by: PHYSICIAN ASSISTANT

## 2022-07-14 PROCEDURE — 1101F PR PT FALLS ASSESS DOC 0-1 FALLS W/OUT INJ PAST YR: ICD-10-PCS | Mod: CPTII,,, | Performed by: PHYSICIAN ASSISTANT

## 2022-07-14 PROCEDURE — 99214 PR OFFICE/OUTPT VISIT, EST, LEVL IV, 30-39 MIN: ICD-10-PCS | Mod: S$PBB,,, | Performed by: PHYSICIAN ASSISTANT

## 2022-07-14 PROCEDURE — 1159F PR MEDICATION LIST DOCUMENTED IN MEDICAL RECORD: ICD-10-PCS | Mod: CPTII,,, | Performed by: PHYSICIAN ASSISTANT

## 2022-07-14 PROCEDURE — 3008F PR BODY MASS INDEX (BMI) DOCUMENTED: ICD-10-PCS | Mod: CPTII,,, | Performed by: PHYSICIAN ASSISTANT

## 2022-07-14 PROCEDURE — 71046 XR CHEST PA AND LATERAL: ICD-10-PCS | Mod: 26,,, | Performed by: RADIOLOGY

## 2022-07-14 PROCEDURE — 1101F PT FALLS ASSESS-DOCD LE1/YR: CPT | Mod: CPTII,,, | Performed by: PHYSICIAN ASSISTANT

## 2022-07-14 PROCEDURE — 99214 OFFICE O/P EST MOD 30 MIN: CPT | Mod: S$PBB,,, | Performed by: PHYSICIAN ASSISTANT

## 2022-07-14 PROCEDURE — 3079F PR MOST RECENT DIASTOLIC BLOOD PRESSURE 80-89 MM HG: ICD-10-PCS | Mod: CPTII,,, | Performed by: PHYSICIAN ASSISTANT

## 2022-07-14 PROCEDURE — 3288F PR FALLS RISK ASSESSMENT DOCUMENTED: ICD-10-PCS | Mod: CPTII,,, | Performed by: PHYSICIAN ASSISTANT

## 2022-07-14 PROCEDURE — 3075F SYST BP GE 130 - 139MM HG: CPT | Mod: CPTII,,, | Performed by: PHYSICIAN ASSISTANT

## 2022-07-14 RX ORDER — HYDROCODONE BITARTRATE AND ACETAMINOPHEN 10; 325 MG/1; MG/1
1 TABLET ORAL EVERY 8 HOURS
Qty: 90 TABLET | Refills: 0 | Status: SHIPPED | OUTPATIENT
Start: 2022-09-14 | End: 2022-10-04 | Stop reason: SDUPTHER

## 2022-07-14 RX ORDER — TIZANIDINE 4 MG/1
4 TABLET ORAL EVERY 8 HOURS
Qty: 90 TABLET | Refills: 2 | Status: SHIPPED | OUTPATIENT
Start: 2022-07-14 | End: 2022-10-04 | Stop reason: SDUPTHER

## 2022-07-14 RX ORDER — HYDROCODONE BITARTRATE AND ACETAMINOPHEN 10; 325 MG/1; MG/1
1 TABLET ORAL EVERY 8 HOURS
Qty: 90 TABLET | Refills: 0 | Status: SHIPPED | OUTPATIENT
Start: 2022-07-15 | End: 2022-08-14

## 2022-07-14 RX ORDER — HYDROCODONE BITARTRATE AND ACETAMINOPHEN 10; 325 MG/1; MG/1
1 TABLET ORAL EVERY 8 HOURS
Qty: 90 TABLET | Refills: 0 | Status: SHIPPED | OUTPATIENT
Start: 2022-08-15 | End: 2022-09-14

## 2022-07-19 ENCOUNTER — HOSPITAL ENCOUNTER (OUTPATIENT)
Facility: HOSPITAL | Age: 67
Discharge: HOME | End: 2022-07-20
Attending: INTERNAL MEDICINE | Admitting: INTERNAL MEDICINE
Payer: COMMERCIAL

## 2022-07-19 DIAGNOSIS — R07.9 CHEST PAIN, UNSPECIFIED TYPE: ICD-10-CM

## 2022-07-19 DIAGNOSIS — I25.10 CAD (CORONARY ARTERY DISEASE): ICD-10-CM

## 2022-07-19 DIAGNOSIS — R94.30 ABNORMAL RESULTS OF CARDIOVASCULAR FUNCTION STUDIES: ICD-10-CM

## 2022-07-19 DIAGNOSIS — E78.5 HYPERLIPIDEMIA, UNSPECIFIED HYPERLIPIDEMIA TYPE: ICD-10-CM

## 2022-07-19 DIAGNOSIS — I25.110 CORONARY ARTERY DISEASE INVOLVING NATIVE CORONARY ARTERY OF NATIVE HEART WITH UNSTABLE ANGINA PECTORIS: Primary | ICD-10-CM

## 2022-07-19 DIAGNOSIS — Z98.61 CAD S/P PERCUTANEOUS CORONARY ANGIOPLASTY: ICD-10-CM

## 2022-07-19 DIAGNOSIS — I25.83 CORONARY ARTERY DISEASE DUE TO LIPID RICH PLAQUE: ICD-10-CM

## 2022-07-19 DIAGNOSIS — Z95.1 HX OF CABG: ICD-10-CM

## 2022-07-19 DIAGNOSIS — I25.10 CAD S/P PERCUTANEOUS CORONARY ANGIOPLASTY: ICD-10-CM

## 2022-07-19 DIAGNOSIS — R94.31 ABNORMAL EKG: ICD-10-CM

## 2022-07-19 DIAGNOSIS — I25.10 CORONARY ARTERY DISEASE DUE TO LIPID RICH PLAQUE: ICD-10-CM

## 2022-07-19 DIAGNOSIS — R07.9 CHEST PAIN: ICD-10-CM

## 2022-07-19 LAB
BASOPHILS # BLD AUTO: 0.03 K/UL (ref 0–0.2)
BASOPHILS NFR BLD AUTO: 0.4 % (ref 0–1)
CATH EF QUANTITATIVE: 25 %
DIFFERENTIAL METHOD BLD: ABNORMAL
EOSINOPHIL # BLD AUTO: 0.24 K/UL (ref 0–0.5)
EOSINOPHIL NFR BLD AUTO: 3.5 % (ref 1–4)
ERYTHROCYTE [DISTWIDTH] IN BLOOD BY AUTOMATED COUNT: 14.1 % (ref 11.5–14.5)
GLUCOSE SERPL-MCNC: 121 MG/DL (ref 70–105)
HCT VFR BLD AUTO: 38.2 % (ref 40–54)
HGB BLD-MCNC: 12.6 G/DL (ref 13.5–18)
IMM GRANULOCYTES # BLD AUTO: 0.03 K/UL (ref 0–0.04)
IMM GRANULOCYTES NFR BLD: 0.4 % (ref 0–0.4)
LYMPHOCYTES # BLD AUTO: 2.49 K/UL (ref 1–4.8)
LYMPHOCYTES NFR BLD AUTO: 36.5 % (ref 27–41)
MCH RBC QN AUTO: 27.5 PG (ref 27–31)
MCHC RBC AUTO-ENTMCNC: 33 G/DL (ref 32–36)
MCV RBC AUTO: 83.2 FL (ref 80–96)
MONOCYTES # BLD AUTO: 0.56 K/UL (ref 0–0.8)
MONOCYTES NFR BLD AUTO: 8.2 % (ref 2–6)
MPC BLD CALC-MCNC: 11.2 FL (ref 9.4–12.4)
NEUTROPHILS # BLD AUTO: 3.48 K/UL (ref 1.8–7.7)
NEUTROPHILS NFR BLD AUTO: 51 % (ref 53–65)
NRBC # BLD AUTO: 0 X10E3/UL
NRBC, AUTO (.00): 0 %
PLATELET # BLD AUTO: 187 K/UL (ref 150–400)
RBC # BLD AUTO: 4.59 M/UL (ref 4.6–6.2)
SARS-COV-2 RDRP RESP QL NAA+PROBE: NEGATIVE
WBC # BLD AUTO: 6.83 K/UL (ref 4.5–11)

## 2022-07-19 PROCEDURE — 85025 COMPLETE CBC W/AUTO DIFF WBC: CPT | Performed by: INTERNAL MEDICINE

## 2022-07-19 PROCEDURE — 92928 PRQ TCAT PLMT NTRAC ST 1 LES: CPT | Mod: LM,,, | Performed by: INTERNAL MEDICINE

## 2022-07-19 PROCEDURE — C1760 CLOSURE DEV, VASC: HCPCS | Performed by: INTERNAL MEDICINE

## 2022-07-19 PROCEDURE — 25500020 PHARM REV CODE 255: Performed by: INTERNAL MEDICINE

## 2022-07-19 PROCEDURE — C1725 CATH, TRANSLUMIN NON-LASER: HCPCS | Performed by: INTERNAL MEDICINE

## 2022-07-19 PROCEDURE — 25000003 PHARM REV CODE 250: Performed by: INTERNAL MEDICINE

## 2022-07-19 PROCEDURE — 36415 COLL VENOUS BLD VENIPUNCTURE: CPT | Performed by: INTERNAL MEDICINE

## 2022-07-19 PROCEDURE — 82962 GLUCOSE BLOOD TEST: CPT

## 2022-07-19 PROCEDURE — 94761 N-INVAS EAR/PLS OXIMETRY MLT: CPT

## 2022-07-19 PROCEDURE — 99152 MOD SED SAME PHYS/QHP 5/>YRS: CPT | Performed by: INTERNAL MEDICINE

## 2022-07-19 PROCEDURE — 93459 L HRT ART/GRFT ANGIO: CPT | Mod: 59 | Performed by: INTERNAL MEDICINE

## 2022-07-19 PROCEDURE — 27000080 OPTIME MED/SURG SUP & DEVICES GENERAL CLASSIFICATION: Performed by: INTERNAL MEDICINE

## 2022-07-19 PROCEDURE — A4217 STERILE WATER/SALINE, 500 ML: HCPCS | Performed by: INTERNAL MEDICINE

## 2022-07-19 PROCEDURE — 87635 SARS-COV-2 COVID-19 AMP PRB: CPT | Performed by: INTERNAL MEDICINE

## 2022-07-19 PROCEDURE — C1887 CATHETER, GUIDING: HCPCS | Performed by: INTERNAL MEDICINE

## 2022-07-19 PROCEDURE — 93459: ICD-10-PCS | Mod: 26,XU,, | Performed by: INTERNAL MEDICINE

## 2022-07-19 PROCEDURE — 27800903 OPTIME MED/SURG SUP & DEVICES OTHER IMPLANTS: Performed by: INTERNAL MEDICINE

## 2022-07-19 PROCEDURE — C1894 INTRO/SHEATH, NON-LASER: HCPCS | Performed by: INTERNAL MEDICINE

## 2022-07-19 PROCEDURE — 27100168 OPTIME MED/SURG SUP & DEVICES NON-STERILE SUPPLY: Performed by: INTERNAL MEDICINE

## 2022-07-19 PROCEDURE — 92928 PR STENT: ICD-10-PCS | Mod: LM,,, | Performed by: INTERNAL MEDICINE

## 2022-07-19 PROCEDURE — 93459 L HRT ART/GRFT ANGIO: CPT | Mod: 26,XU,, | Performed by: INTERNAL MEDICINE

## 2022-07-19 PROCEDURE — C1769 GUIDE WIRE: HCPCS | Performed by: INTERNAL MEDICINE

## 2022-07-19 PROCEDURE — C1874 STENT, COATED/COV W/DEL SYS: HCPCS | Performed by: INTERNAL MEDICINE

## 2022-07-19 PROCEDURE — C9600 PERC DRUG-EL COR STENT SING: HCPCS | Mod: LM | Performed by: INTERNAL MEDICINE

## 2022-07-19 PROCEDURE — 27201423 OPTIME MED/SURG SUP & DEVICES STERILE SUPPLY: Performed by: INTERNAL MEDICINE

## 2022-07-19 PROCEDURE — 63600175 PHARM REV CODE 636 W HCPCS: Performed by: INTERNAL MEDICINE

## 2022-07-19 PROCEDURE — 99153 MOD SED SAME PHYS/QHP EA: CPT | Performed by: INTERNAL MEDICINE

## 2022-07-19 DEVICE — IMPLANTABLE DEVICE: Type: IMPLANTABLE DEVICE | Site: CORONARY | Status: FUNCTIONAL

## 2022-07-19 RX ORDER — MIDAZOLAM HYDROCHLORIDE 1 MG/ML
INJECTION INTRAMUSCULAR; INTRAVENOUS
Status: DISCONTINUED | OUTPATIENT
Start: 2022-07-19 | End: 2022-07-19 | Stop reason: HOSPADM

## 2022-07-19 RX ORDER — CLOPIDOGREL BISULFATE 75 MG/1
75 TABLET ORAL DAILY
Status: DISCONTINUED | OUTPATIENT
Start: 2022-07-20 | End: 2022-07-20 | Stop reason: HOSPADM

## 2022-07-19 RX ORDER — ACETAMINOPHEN 325 MG/1
650 TABLET ORAL EVERY 4 HOURS PRN
Status: DISCONTINUED | OUTPATIENT
Start: 2022-07-19 | End: 2022-07-20 | Stop reason: HOSPADM

## 2022-07-19 RX ORDER — DIAZEPAM 5 MG/1
5 TABLET ORAL ONCE
Status: DISCONTINUED | OUTPATIENT
Start: 2022-07-19 | End: 2022-07-20 | Stop reason: HOSPADM

## 2022-07-19 RX ORDER — SODIUM CHLORIDE 450 MG/100ML
INJECTION, SOLUTION INTRAVENOUS
Status: DISCONTINUED | OUTPATIENT
Start: 2022-07-19 | End: 2022-07-20 | Stop reason: HOSPADM

## 2022-07-19 RX ORDER — DIPHENHYDRAMINE HCL 25 MG
50 CAPSULE ORAL
Status: DISCONTINUED | OUTPATIENT
Start: 2022-07-19 | End: 2022-07-19 | Stop reason: HOSPADM

## 2022-07-19 RX ORDER — FENTANYL CITRATE 50 UG/ML
INJECTION, SOLUTION INTRAMUSCULAR; INTRAVENOUS
Status: DISCONTINUED | OUTPATIENT
Start: 2022-07-19 | End: 2022-07-19 | Stop reason: HOSPADM

## 2022-07-19 RX ORDER — HEPARIN SOD,PORCINE/0.9 % NACL 1000/500ML
INTRAVENOUS SOLUTION INTRAVENOUS
Status: DISCONTINUED | OUTPATIENT
Start: 2022-07-19 | End: 2022-07-19 | Stop reason: HOSPADM

## 2022-07-19 RX ORDER — LIDOCAINE HYDROCHLORIDE 10 MG/ML
INJECTION INFILTRATION; PERINEURAL
Status: DISCONTINUED | OUTPATIENT
Start: 2022-07-19 | End: 2022-07-19 | Stop reason: HOSPADM

## 2022-07-19 RX ORDER — ONDANSETRON 4 MG/1
8 TABLET, ORALLY DISINTEGRATING ORAL EVERY 8 HOURS PRN
Status: DISCONTINUED | OUTPATIENT
Start: 2022-07-19 | End: 2022-07-20 | Stop reason: HOSPADM

## 2022-07-19 RX ORDER — INDOMETHACIN 25 MG/1
150 CAPSULE ORAL ONCE
Status: DISCONTINUED | OUTPATIENT
Start: 2022-07-19 | End: 2022-07-19

## 2022-07-19 RX ORDER — SODIUM CHLORIDE 450 MG/100ML
INJECTION, SOLUTION INTRAVENOUS CONTINUOUS
Status: DISCONTINUED | OUTPATIENT
Start: 2022-07-19 | End: 2022-07-20 | Stop reason: HOSPADM

## 2022-07-19 RX ORDER — CLOPIDOGREL 300 MG/1
TABLET, FILM COATED ORAL
Status: DISCONTINUED | OUTPATIENT
Start: 2022-07-19 | End: 2022-07-19 | Stop reason: HOSPADM

## 2022-07-19 RX ADMIN — SODIUM BICARBONATE: 84 INJECTION, SOLUTION INTRAVENOUS at 12:07

## 2022-07-19 NOTE — Clinical Note
160 ml of contrast were injected throughout the case. 140 mL of contrast was the total wasted during the case. 300 mL was the total amount used during the case.

## 2022-07-19 NOTE — PLAN OF CARE
Problem: Adult Inpatient Plan of Care  Goal: Plan of Care Review  Outcome: Ongoing, Progressing  Goal: Patient-Specific Goal (Individualized)  Outcome: Ongoing, Progressing  Goal: Absence of Hospital-Acquired Illness or Injury  Outcome: Ongoing, Progressing  Goal: Optimal Comfort and Wellbeing  Outcome: Ongoing, Progressing  Goal: Readiness for Transition of Care  Outcome: Ongoing, Progressing     Problem: Diabetes Comorbidity  Goal: Blood Glucose Level Within Targeted Range  Outcome: Ongoing, Progressing     Problem: Fall Injury Risk  Goal: Absence of Fall and Fall-Related Injury  Outcome: Ongoing, Progressing     Problem: Arrhythmia/Dysrhythmia (Cardiac Catheterization)  Goal: Stable Heart Rate and Rhythm  Outcome: Ongoing, Progressing     Problem: Bleeding (Cardiac Catheterization)  Goal: Absence of Bleeding  Outcome: Ongoing, Progressing     Problem: Contrast-Induced Injury Risk (Cardiac Catheterization)  Goal: Absence of Contrast-Induced Injury  Outcome: Ongoing, Progressing     Problem: Embolism (Cardiac Catheterization)  Goal: Absence of Embolism Signs and Symptoms  Outcome: Ongoing, Progressing     Problem: Ongoing Anesthesia/Sedation Effects (Cardiac Catheterization)  Goal: Anesthesia/Sedation Recovery  Outcome: Ongoing, Progressing     Problem: Pain (Cardiac Catheterization)  Goal: Acceptable Pain Control  Outcome: Ongoing, Progressing     Problem: Vascular Access Protection (Cardiac Catheterization)  Goal: Absence of Vascular Access Complication  Outcome: Ongoing, Progressing   Oriented to room/unit - reviewed POC

## 2022-07-19 NOTE — H&P
DATE OF SERVICE: 07/19/2022        PCP: Rodney Morel MD        CHIEF COMPLAINT:        Chief Complaint   Patient presents with    Chest Pain       With exertion, every now and then.         HISTORY OF PRESENT ILLNESS:  Micheal Tyalor is a 66 y.o. male with a PMH of        Past Medical History:   Diagnosis Date    CHF (congestive heart failure)      Chronic pain syndrome      Coronary artery disease      Diabetes mellitus, type 2      Hyperlipidemia      Hypertension      Lumbar spondylosis      Lumbosacral radiculopathy      Pain in thoracic spine      PVD (peripheral vascular disease)      Spondylosis without myelopathy or radiculopathy, cervical region       who presents for follow up to discuss results of the lexiscan and echo. The patient reports and episode of left sided chest pain radiating to his shoulders bilaterally and down his left arm while walking to the bath room a few days ago. This was relieved with rest and sublingual ntg was not needed. This pain is similar in character to his pain with his prior MI.                  PAST MEDICAL HISTORY:       Past Medical History:   Diagnosis Date    CHF (congestive heart failure)      Chronic pain syndrome      Coronary artery disease      Diabetes mellitus, type 2      Hyperlipidemia      Hypertension      Lumbar spondylosis      Lumbosacral radiculopathy      Pain in thoracic spine      PVD (peripheral vascular disease)      Spondylosis without myelopathy or radiculopathy, cervical region           PAST SURGICAL HISTORY:        Past Surgical History:   Procedure Laterality Date    ADENOIDECTOMY        AMPUTATION         left index finger removed     CAUDAL EPIDURAL STEROID INJECTION   01/19/2016     Caudal NATHAN - [facility]    CIRCUMCISION        CORONARY ARTERY BYPASS GRAFT (CABG)        FOOT SURGERY         heel surgery    HEEL SPUR SURGERY        INJECTION OF FACET JOINT Bilateral 04/16/2015     Bilateral L3-S1 Facet Injection  - [facility] - 4/16/15, 11/15/12 and 12    RADIOFREQUENCY THERMOCOAGULATION Left 2013     Left L3-S1 RFTC -Alee    RADIOFREQUENCY THERMOCOAGULATION Right 2013     Right L3-S1 RFTC - [facility]    TONSILLECTOMY        TONSILLECTOMY, ADENOIDECTOMY, BILATERAL MYRINGOTOMY AND TUBES        TRANSFORAMINAL EPIDURAL INJECTION OF STEROID Left 2012     Left L3-4 TFESI - [facility]    TRANSFORAMINAL EPIDURAL INJECTION OF STEROID Right 2013     Right L3-4 TFESI - [facility] - 13 and 12    VASCULAR SURGERY             SOCIAL HISTORY:  Social History               Socioeconomic History    Marital status:    Tobacco Use    Smoking status: Former Smoker       Quit date: 2006       Years since quittin.2    Smokeless tobacco: Current User       Types: Snuff   Substance and Sexual Activity    Alcohol use: Never    Drug use: Never            FAMILY HISTORY:        Family History   Problem Relation Age of Onset    Diabetes Mother      Heart disease Mother      Hypertension Mother      Liver cancer Mother      Heart disease Father      Hypertension Father              ALLERGIES:  Review of patient's allergies indicates:  No Known Allergies      MEDICATIONS:     Current Outpatient Medications:     amLODIPine (NORVASC) 5 MG tablet, Take 1 tablet (5 mg total) by mouth once daily., Disp: 90 tablet, Rfl: 1    aspirin (ECOTRIN) 81 MG EC tablet, Take 81 mg by mouth once daily., Disp: , Rfl:     cholecalciferol, vitamin D3, (VITAMIN D3) 25 mcg (1,000 unit) capsule, , Disp: , Rfl:     cinnamon bark 500 mg capsule, Take 500 mg by mouth once daily. , Disp: , Rfl:     clopidogreL (PLAVIX) 75 mg tablet, Take 1 tablet (75 mg total) by mouth once daily., Disp: 90 tablet, Rfl: 1    dapagliflozin (FARXIGA) 10 mg tablet, Take 1 tablet by mouth once daily., Disp: , Rfl:     docosahexaenoic acid-epa 120-180 mg Cap, Take 1 capsule by mouth., Disp: , Rfl:     ezetimibe (ZETIA) 10 mg tablet, Take 1 tablet (10  mg total) by mouth once daily., Disp: 90 tablet, Rfl: 1    fenofibrate (TRICOR) 145 MG tablet, Take 1 tablet (145 mg total) by mouth once daily., Disp: 90 tablet, Rfl: 1    gabapentin (NEURONTIN) 300 MG capsule, Take 1 capsule (300 mg total) by mouth 3 (three) times daily., Disp: 360 capsule, Rfl: 1    hydrALAZINE (APRESOLINE) 50 MG tablet, Take 1 tablet (50 mg total) by mouth 4 (four) times daily., Disp: 360 tablet, Rfl: 1    HYDROcodone-acetaminophen (NORCO)  mg per tablet, Take 1 tablet by mouth every 8 (eight) hours., Disp: 90 tablet, Rfl: 0    HYDROcodone-acetaminophen (NORCO)  mg per tablet, Take 1 tablet by mouth every 8 (eight) hours., Disp: 90 tablet, Rfl: 0    icosapent ethyL (VASCEPA) 1 gram Cap, Take 2 capsules (2,000 mg total) by mouth 2 (two) times a day., Disp: 360 capsule, Rfl: 1    insulin regular 100 unit/mL Inj injection, Novolin R Regular U-100 Insuln  14-32 u bidwm, Disp: , Rfl:     liraglutide 0.6 mg/0.1 mL, 18 mg/3 mL, subq PNIJ (VICTOZA 2-CAITLIN) 0.6 mg/0.1 mL (18 mg/3 mL) PnIj pen, Inject 1.8 mg into the skin., Disp: , Rfl:     lisinopriL (PRINIVIL,ZESTRIL) 20 MG tablet, Take 1 tablet (20 mg total) by mouth once daily., Disp: 90 tablet, Rfl: 1    magnesium 250 mg Tab, Take 1 tablet by mouth once daily. , Disp: , Rfl:     metFORMIN (GLUCOPHAGE) 500 MG tablet, Take 500 mg by mouth 2 (two) times daily with meals., Disp: , Rfl:     metoprolol succinate (TOPROL-XL) 25 MG 24 hr tablet, Take 1 tablet (25 mg total) by mouth once daily., Disp: 90 tablet, Rfl: 1    nitroGLYCERIN (NITROSTAT) 0.4 MG SL tablet, Place 1 tablet (0.4 mg total) under the tongue every 5 (five) minutes as needed for Chest pain., Disp: 25 tablet, Rfl: 3    olopatadine (PATADAY) 0.2 % Drop, Place 1 drop into both eyes once daily., Disp: 5 mL, Rfl: 0    omeprazole (PRILOSEC) 20 MG capsule, Take 1 capsule (20 mg total) by mouth once daily., Disp: 90 capsule, Rfl: 1    potassium chloride SA (K-DUR,KLOR-CON)  "20 MEQ tablet, Take 1 tablet (20 mEq total) by mouth once daily., Disp: 90 tablet, Rfl: 1    rosuvastatin (CRESTOR) 5 MG tablet, Take 1 tablet (5 mg total) by mouth once daily., Disp: 90 tablet, Rfl: 1    TRESIBA FLEXTOUCH U-200 200 unit/mL (3 mL) insulin pen, Inject 54 Units into the skin once daily. , Disp: , Rfl:     vitamin A 8000 UNIT capsule, Take 8,000 Units by mouth., Disp: , Rfl:     vitamin E 400 UNIT capsule, Take 400 Units by mouth once daily. , Disp: , Rfl:   Medications have been reviewed but not reconciled. He did not bring his meds today.     PHYSICAL EXAM:  BP (!) 149/72(BP Location: Left arm, Patient Position: Sitting)   Pulse (!) 58   Ht 6' 1" (1.854 m)   Wt 83 kg (183 lb)   SpO2 95%   BMI 24.14 kg/m²  Resp 14  Temp 97.0      Wt Readings from Last 3 Encounters:   06/15/22 83 kg (183 lb)   05/25/22 84.8 kg (187 lb)   05/25/22 84.8 kg (187 lb)      Body mass index is 24.14 kg/m².     Physical Exam  Vitals and nursing note reviewed.   Constitutional:       Appearance: Normal appearance. He is normal weight.   HENT:      Head: Normocephalic and atraumatic.   Eyes:      Pupils: Pupils are equal, round, and reactive to light.   Neck:      Vascular: No carotid bruit.   Cardiovascular:      Rate and Rhythm: Regular rhythm. Bradycardia present.      Pulses: Normal pulses.      Heart sounds: Normal heart sounds.   Pulmonary:      Effort: Pulmonary effort is normal.      Breath sounds: Normal breath sounds.   Abdominal:      General: Bowel sounds are normal.      Palpations: Abdomen is soft.   Musculoskeletal:      Cervical back: Neck supple.      Right lower leg: No edema.      Left lower leg: No edema.   Skin:     General: Skin is warm and dry.      Capillary Refill: Capillary refill takes less than 2 seconds.   Neurological:      General: No focal deficit present.      Mental Status: He is alert and oriented to person, place, and time.   Psychiatric:         Mood and Affect: Mood normal.        "  Behavior: Behavior normal.         CARDIAC STUDIES REVIEWED:EK2022 sinus bradycardia; HR 57 bpm; evidence of prior inferior MI - abnormal EKG     Stress test  Results for orders placed during the hospital encounter of 22     Nuclear Stress Test     Interpretation Summary    The EKG portion of this study is negative for ischemia.    The patient reported no chest pain during the stress test.    There were no arrhythmias during stress.      nuclear images  Impression:     1.  Large inferolateral wall fixed perfusion defect along the base in stressed images showing no improvement in rest consistent with scar but little evidence of ischemia.     2.  Normal left ventricular size with anterior and septal hypokinesis and overall mildly impaired left ventricular systolic function, ejection fraction 45%.        Electronically signed by: Venu Whitlock  Date:                                            2022  Time:                                           12:23        echocardiogram  Results for orders placed during the hospital encounter of 22     Echo Saline Bubble? No     Interpretation Summary  · The left ventricle is normal in size with moderate concentric hypertrophy and normal systolic function.  · The estimated ejection fraction is 55%.  · Atrial fibrillation not observed.  · Normal left ventricular diastolic function.  · Mild-to-moderate mitral regurgitation.     Heart cath  No results found for this or any previous visit.              ASSESSMENT:       Patient Active Problem List   Diagnosis    CHF (congestive heart failure)    Coronary artery disease    Hyperlipidemia    Hypertension    Left ventricular diastolic dysfunction    Aortic valve sclerosis    Mild mitral regurgitation by prior echocardiogram    Shortness of breath on exertion    Lumbosacral radiculopathy    Cervical spondylosis without myelopathy    Postlaminectomy syndrome, lumbar region    PVD (peripheral  vascular disease)    Type 2 diabetes mellitus with circulatory disorder, without long-term current use of insulin    Pain in thoracic spine    Type 2 diabetes mellitus with diabetic neuropathy, with long-term current use of insulin    Loss of protective sensation of skin of foot    Chest pain    Abnormal results of cardiovascular function studies    Nonspecific abnormal electrocardiogram (ECG) (EKG)    Old MI (myocardial infarction)              Problem List Items Addressed This Visit                 Cardiac/Vascular      Abnormal results of cardiovascular function studies      Overview        Large inferior wall fixed perfusion defect along the base in stress images  Normal LV size with anterior and septal hypokinesis and overall mildly impaired LVSF, EF 45%               Relevant Orders      Case Request-Cath Lab: Left heart cath, Percutaneous coronary intervention (Completed)      Chest pain      Overview        Similar to pain at time of MI  Intermittent; occurs with exertion; resolves with rest; has not needed sublingual ntg               Relevant Orders      Case Request-Cath Lab: Left heart cath, Percutaneous coronary intervention (Completed)      Coronary artery disease - Primary      Overview        S/p CABG 1/23/2020- Dr. Levy  SVG to the dLAD; SVG to the OM and SVG to the PL  2/7/2019 BECKIE dLAD and mid LAD to overlap the BECKIE in the dLAD               Relevant Orders      Case Request-Cath Lab: Left heart cath, Percutaneous coronary intervention (Completed)      Nonspecific abnormal electrocardiogram (ECG) (EKG)      Old MI (myocardial infarction)      Overview        X 3               PVD (peripheral vascular disease)      Overview        Patient reports h/o iliac stents bilaterally by Dr. Forrest                     Other Visit Diagnoses      Hx of CABG         Relevant Orders     Case Request-Cath Lab: Left heart cath, Percutaneous coronary intervention (Completed)             PLAN:  [facility] with poss  PCI. He wishes to proceed.   Risk, benefits, and alternative were explained.    RTC: after LHC with poss pci

## 2022-07-20 VITALS
HEIGHT: 72 IN | HEART RATE: 59 BPM | SYSTOLIC BLOOD PRESSURE: 119 MMHG | RESPIRATION RATE: 17 BRPM | DIASTOLIC BLOOD PRESSURE: 48 MMHG | BODY MASS INDEX: 24.56 KG/M2 | OXYGEN SATURATION: 96 % | TEMPERATURE: 98 F | WEIGHT: 181.31 LBS

## 2022-07-20 LAB
GLUCOSE SERPL-MCNC: 110 MG/DL (ref 70–105)
GLUCOSE SERPL-MCNC: 165 MG/DL (ref 70–105)

## 2022-07-20 PROCEDURE — 82962 GLUCOSE BLOOD TEST: CPT

## 2022-07-20 PROCEDURE — 93010 EKG 12-LEAD: ICD-10-PCS | Mod: ,,, | Performed by: INTERNAL MEDICINE

## 2022-07-20 PROCEDURE — A4217 STERILE WATER/SALINE, 500 ML: HCPCS | Performed by: INTERNAL MEDICINE

## 2022-07-20 PROCEDURE — 25000003 PHARM REV CODE 250: Performed by: INTERNAL MEDICINE

## 2022-07-20 PROCEDURE — 93005 ELECTROCARDIOGRAM TRACING: CPT

## 2022-07-20 PROCEDURE — 93010 ELECTROCARDIOGRAM REPORT: CPT | Mod: ,,, | Performed by: INTERNAL MEDICINE

## 2022-07-20 RX ORDER — ROSUVASTATIN CALCIUM 5 MG/1
40 TABLET, COATED ORAL NIGHTLY
Qty: 30 TABLET | Refills: 1 | Status: SHIPPED | OUTPATIENT
Start: 2022-07-20 | End: 2022-09-09

## 2022-07-20 RX ADMIN — SODIUM BICARBONATE: 84 INJECTION, SOLUTION INTRAVENOUS at 05:07

## 2022-07-20 RX ADMIN — CLOPIDOGREL 75 MG: 75 TABLET, FILM COATED ORAL at 09:07

## 2022-07-20 NOTE — ASSESSMENT & PLAN NOTE
- S/P PCI of LM  - Increased Crestor from 5mg to 40mg daily; Will need liver enzymes evaluated at follow up.  - Consider adding Repatha at follow up  - Continue home meds including asa and plavix  - Follow up with Dr. Whitlock in 4 weeks

## 2022-07-20 NOTE — PLAN OF CARE
Problem: Adult Inpatient Plan of Care  Goal: Plan of Care Review  7/20/2022 1629 by Kev Nogueira RN  Outcome: Met  7/20/2022 1321 by Kev Nogueira RN  Outcome: Ongoing, Progressing  Goal: Patient-Specific Goal (Individualized)  7/20/2022 1629 by Kev Nogueira RN  Outcome: Met  7/20/2022 1321 by Kev Nogueira RN  Outcome: Ongoing, Progressing  Goal: Absence of Hospital-Acquired Illness or Injury  7/20/2022 1629 by Kev Nogueira RN  Outcome: Met  7/20/2022 1321 by Kev Nogueira RN  Outcome: Ongoing, Progressing  Goal: Optimal Comfort and Wellbeing  7/20/2022 1629 by Kev Nogueira RN  Outcome: Met  7/20/2022 1321 by Kev Nogueira RN  Outcome: Ongoing, Progressing  Goal: Readiness for Transition of Care  7/20/2022 1629 by Kev Nogueira RN  Outcome: Met  7/20/2022 1321 by Kev Nogueira RN  Outcome: Ongoing, Progressing     Problem: Diabetes Comorbidity  Goal: Blood Glucose Level Within Targeted Range  7/20/2022 1629 by Kev Nogueira RN  Outcome: Met  7/20/2022 1321 by Kev Nogueira RN  Outcome: Ongoing, Progressing     Problem: Fall Injury Risk  Goal: Absence of Fall and Fall-Related Injury  7/20/2022 1629 by Kev Nogueira RN  Outcome: Met  7/20/2022 1321 by Kev Nogueira RN  Outcome: Ongoing, Progressing     Problem: Arrhythmia/Dysrhythmia (Cardiac Catheterization)  Goal: Stable Heart Rate and Rhythm  7/20/2022 1629 by Kev Nogueira RN  Outcome: Met  7/20/2022 1321 by Kev Nogueira RN  Outcome: Ongoing, Progressing     Problem: Bleeding (Cardiac Catheterization)  Goal: Absence of Bleeding  7/20/2022 1629 by Kev Nogueira RN  Outcome: Met  7/20/2022 1321 by Kev Nogueira RN  Outcome: Ongoing, Progressing     Problem: Contrast-Induced Injury Risk (Cardiac Catheterization)  Goal: Absence of Contrast-Induced Injury  7/20/2022 1629 by Kev Nogueira RN  Outcome: Met  7/20/2022 1321 by Kev Nogueira, RN  Outcome: Ongoing, Progressing     Problem: Embolism (Cardiac  Catheterization)  Goal: Absence of Embolism Signs and Symptoms  7/20/2022 1629 by Kev Nogueira RN  Outcome: Met  7/20/2022 1321 by Kev Nogueira RN  Outcome: Ongoing, Progressing     Problem: Ongoing Anesthesia/Sedation Effects (Cardiac Catheterization)  Goal: Anesthesia/Sedation Recovery  7/20/2022 1629 by Kev Nogueira RN  Outcome: Met  7/20/2022 1321 by Kev Nogueira RN  Outcome: Ongoing, Progressing     Problem: Pain (Cardiac Catheterization)  Goal: Acceptable Pain Control  7/20/2022 1629 by Kev Nogueira RN  Outcome: Met  7/20/2022 1321 by Kev Nogueira RN  Outcome: Ongoing, Progressing     Problem: Vascular Access Protection (Cardiac Catheterization)  Goal: Absence of Vascular Access Complication  7/20/2022 1629 by Kev Nogueira RN  Outcome: Met  7/20/2022 1321 by Kev Nogueira RN  Outcome: Ongoing, Progressing

## 2022-07-20 NOTE — DISCHARGE SUMMARY
[facility] - Orthopedic  Cardiology  Discharge Summary      Patient Name: Micheal Taylor  MRN: [id number]  Admission Date: 7/19/2022  Hospital Length of Stay: 0 days  Discharge Date and Time:  07/20/2022 6:08 PM  Attending Physician: No att. providers found    Discharging Provider: AMADO Johnson  Primary Care Physician: Rodney Morel MD    HPI:   No notes on file    Procedure(s) (LRB):  Left heart cath (Left)  Percutaneous coronary intervention (N/A)     Indwelling Lines/Drains at time of discharge:  Lines/Drains/Airways     None                 Hospital Course:  67 y/o male with PMH of CAD s/p CABG, CHF, DM, HLD, PVD, and chronic back pain presented for scheduled outpatient LHC for unstable angia. He underwent LHC with results following;    Physicians    Panel Physicians Referring Physician Case Authorizing Physician   Venu Whitlock MD (Primary)  IVY Lama       Indications    Chest pain [R07.9 (ICD-10-CM)]   Abnormal results of cardiovascular function studies [R94.30 (ICD-10-CM)]   CAD (coronary artery disease) [I25.10 (ICD-10-CM)]   Abnormal EKG [R94.31 (ICD-10-CM)]   Hx of CABG [Z95.1 (ICD-10-CM)]     Summary       · There was severe left ventricular systolic dysfunction.  · The left ventricular end diastolic pressure was severely elevated.  · The pre-procedure left ventricular end diastolic pressure was 30.  · The ejection fraction was calculated to be 25%.  · There was no mitral valve regurgitation.  · The Mid LM lesion was 95% stenosed with 15% stenosis post-intervention.  · A STENT S Usentric US MR 4.00 X 12MM stent was successfully placed at 14 THERESE for 14 sec.  · The Ost Cx to Prox Cx lesion was 100% stenosed.  · The 1st Mrg lesion was 99% stenosed.  · The Mid Cx lesion was 100% stenosed.  · The Prox RCA to Mid RCA lesion was 99% stenosed.  · The Mid RCA lesion was 100% stenosed.  · The estimated blood loss was none.  · There was three vessel coronary artery disease. There  was left main disease.     The procedure log was documented by Documenter: RT Pa and verified by Venu Whitlock MD.     Date: 7/19/2022  Time: 4:10 PM     Impression:     1. Severe 3 vessel coronary artery disease including 90% distal left main stenosis.     2. Patent saphenous vein graft to the mid LAD with 90% mid LAD stenosis above the graft.     3. Patent saphenous vein graft to the OM branch of the circumflex but poor runoff with severe disease either side of the anastomosis.     4. Closed saphenous vein graft to the RCA.  (PL).     5. Dilated AS, ischemic cardiomyopathy with severely impaired left ventricular systolic function, ejection fraction 25%.     6. No significant mitral regurgitation.     7. Successful primary stent of the distal left main coronary artery using a 4.0 x 12 mm negative drawn [place] BECKIE.     Plan:     1. Aspirin and Plavix daily.  Will load with Plavix in the cath lab.     2. Risk factor modification.     3. Observe overnight consider discharge tomorrow.           Procedures    Left heart cath   Percutaneous coronary intervention     7/20/2022:  Patient seen today, denies chest pain or sob. Right groin stable, no bleeding or hematoma. He is hemodynamically stable and ready for discharge. I increased his Crestor from 5mg to 40mg daily, may consider adding Repatha at follow up. Continued remainder of his home medications. He will need liver enzymes evaluated at his cardiology follow up in 4-6 weeks.          Goals of Care Treatment Preferences:  Code Status: Full Code      Consults:     Significant Diagnostic Studies: Labs:   BMP: No results for input(s): GLU, NA, K, CL, CO2, BUN, CREATININE, CALCIUM, MG in the last 48 hours., CMP No results for input(s): NA, K, CL, CO2, GLU, BUN, CREATININE, CALCIUM, PROT, ALBUMIN, BILITOT, ALKPHOS, AST, ALT, ANIONGAP, ESTGFRAFRICA, EGFRNONAA in the last 48 hours., CBC   Recent Labs   Lab 07/19/22  2338   WBC 6.83   HGB 12.6*   HCT 38.2*       , INR   Lab Results   Component Value Date    INR 1.05 07/14/2022    INR 1.0 12/12/2020   , Lipid Panel   Lab Results   Component Value Date    CHOL 93 02/28/2022    HDL 31 (L) 02/28/2022    LDLCALC 32 02/28/2022    TRIG 152 (H) 02/28/2022    CHOLHDL 3.0 02/28/2022   , Troponin No results for input(s): TROPONINI in the last 168 hours. and A1C: No results for input(s): HGBA1C in the last 4320 hours.    Pending Diagnostic Studies:     None          Final Active Diagnoses:    Diagnosis Date Noted POA    PRINCIPAL PROBLEM:  Coronary artery disease [I25.10]  Yes      Problems Resolved During this Admission:     * Coronary artery disease  - S/P PCI of LM  - Increased Crestor from 5mg to 40mg daily; Will need liver enzymes evaluated at follow up.  - Consider adding Repatha at follow up  - Continue home meds including asa and plavix  - Follow up with Dr. Whitlock in 4 weeks        Discharged Condition: good    Disposition: Home or Self Care    Follow Up:   Follow-up Information     Venu Whitlock MD. Schedule an appointment as soon as possible for a visit.    Specialties: Interventional Cardiology, Cardiology  Why: F/U in 4-6 weeks; Increased statin at discharge, will need liver enzymes checked at follow up on August 29 at 1:15 with MELVIN Galicia  Contact information:  [address] [facility] [place] [address] [phone]                       Patient Instructions:      Diet Cardiac     Activity as tolerated     Shower on day dressing removed (No bath)     Medications:  Reconciled Home Medications:      Medication List      CHANGE how you take these medications    rosuvastatin 5 MG tablet  Commonly known as: CRESTOR  Take 8 tablets (40 mg total) by mouth every evening.  What changed:   · how much to take  · when to take this        CONTINUE taking these medications    amLODIPine 5 MG tablet  Commonly known as: NORVASC  Take 1 tablet (5 mg total) by mouth once daily.     aspirin 81 MG EC tablet  Commonly known as:  ECOTRIN  Take 81 mg by mouth once daily.     cholecalciferol (vitamin D3) 25 mcg (1,000 unit) capsule  Commonly known as: VITAMIN D3     cinnamon bark 500 mg capsule  Take 500 mg by mouth once daily.     clopidogreL 75 mg tablet  Commonly known as: PLAVIX  Take 1 tablet (75 mg total) by mouth once daily.     docosahexaenoic acid-epa 120-180 mg Cap  Take 1 capsule by mouth.     ezetimibe 10 mg tablet  Commonly known as: ZETIA  Take 1 tablet (10 mg total) by mouth once daily.     FARXIGA 10 mg tablet  Generic drug: dapagliflozin  Take 1 tablet by mouth once daily.     fenofibrate 145 MG tablet  Commonly known as: TRICOR  Take 1 tablet (145 mg total) by mouth once daily.     gabapentin 300 MG capsule  Commonly known as: NEURONTIN  Take 1 capsule (300 mg total) by mouth 3 (three) times daily.     hydrALAZINE 50 MG tablet  Commonly known as: APRESOLINE  Take 1 tablet (50 mg total) by mouth 4 (four) times daily.     * HYDROcodone-acetaminophen  mg per tablet  Commonly known as: NORCO  Take 1 tablet by mouth every 8 (eight) hours.     * HYDROcodone-acetaminophen  mg per tablet  Commonly known as: NORCO  Take 1 tablet by mouth every 8 (eight) hours.  Start taking on: August 15, 2022     * HYDROcodone-acetaminophen  mg per tablet  Commonly known as: NORCO  Take 1 tablet by mouth every 8 (eight) hours.  Start taking on: September 14, 2022     icosapent ethyL 1 gram Cap  Commonly known as: VASCEPA  Take 2 capsules (2,000 mg total) by mouth 2 (two) times a day.     insulin regular 100 unit/mL injection  Novolin R Regular U-100 Insuln   14-32 u bidwm     liraglutide 0.6 mg/0.1 mL (18 mg/3 mL) subq PNIJ 0.6 mg/0.1 mL (18 mg/3 mL) Pnij pen  Commonly known as: VICTOZA 2-CAITLIN  Inject 1.8 mg into the skin.     lisinopriL 20 MG tablet  Commonly known as: PRINIVIL,ZESTRIL  Take 1 tablet (20 mg total) by mouth once daily.     magnesium 250 mg Tab  Take 1 tablet by mouth once daily.     metFORMIN 500 MG tablet  Commonly  known as: GLUCOPHAGE  Take 500 mg by mouth 2 (two) times daily with meals.     metoprolol succinate 25 MG 24 hr tablet  Commonly known as: TOPROL-XL  Take 1 tablet (25 mg total) by mouth once daily.     nitroGLYCERIN 0.4 MG SL tablet  Commonly known as: NITROSTAT  Place 1 tablet (0.4 mg total) under the tongue every 5 (five) minutes as needed for Chest pain.     olopatadine 0.2 % Drop  Commonly known as: PATADAY  Place 1 drop into both eyes once daily.     omeprazole 20 MG capsule  Commonly known as: PRILOSEC  Take 1 capsule (20 mg total) by mouth once daily.     potassium chloride SA 20 MEQ tablet  Commonly known as: K-DUR,KLOR-CON  Take 1 tablet (20 mEq total) by mouth once daily.     tiZANidine 4 MG tablet  Commonly known as: ZANAFLEX  Take 1 tablet (4 mg total) by mouth every 8 (eight) hours.     TRESIBA FLEXTOUCH U-200 200 unit/mL (3 mL) insulin pen  Generic drug: insulin degludec  Inject 54 Units into the skin once daily.     vitamin A 8000 UNIT capsule  Take 8,000 Units by mouth.     vitamin E 400 UNIT capsule  Take 400 Units by mouth once daily.         * This list has 3 medication(s) that are the same as other medications prescribed for you. Read the directions carefully, and ask your doctor or other care provider to review them with you.                Time spent on the discharge of patient: 30 minutes    AMADO Johnson  Cardiology  [facility] - Orthopedic

## 2022-07-20 NOTE — PLAN OF CARE
Problem: Adult Inpatient Plan of Care  Goal: Plan of Care Review  Outcome: Ongoing, Progressing  Flowsheets (Taken 7/20/2022 0113)  Plan of Care Reviewed With: patient  Goal: Patient-Specific Goal (Individualized)  Outcome: Ongoing, Progressing  Flowsheets (Taken 7/20/2022 0113)  Anxieties, Fears or Concerns: post-op bleeding  Individualized Care Needs: post-op bleeding  Patient-Specific Goals (Include Timeframe): post-op bleeding  Goal: Absence of Hospital-Acquired Illness or Injury  Outcome: Ongoing, Progressing  Intervention: Identify and Manage Fall Risk  Flowsheets (Taken 7/20/2022 0113)  Safety Promotion/Fall Prevention: assistive device/personal item within reach  Intervention: Prevent Skin Injury  Flowsheets (Taken 7/20/2022 0113)  Body Position: position changed independently  Skin Protection: adhesive use limited  Intervention: Prevent and Manage VTE (Venous Thromboembolism) Risk  Flowsheets (Taken 7/20/2022 0113)  Activity Management: Arm raise - L1  VTE Prevention/Management: ROM (active) performed  Range of Motion: active ROM (range of motion) encouraged  Intervention: Prevent Infection  Flowsheets (Taken 7/20/2022 0113)  Infection Prevention:   hand hygiene promoted   personal protective equipment utilized   rest/sleep promoted   single patient room provided  Goal: Optimal Comfort and Wellbeing  Outcome: Ongoing, Progressing  Intervention: Monitor Pain and Promote Comfort  Flowsheets (Taken 7/20/2022 0113)  Pain Management Interventions:   care clustered   pain management plan reviewed with patient/caregiver  Intervention: Provide Person-Centered Care  Flowsheets (Taken 7/20/2022 0113)  Trust Relationship/Rapport: care explained  Goal: Readiness for Transition of Care  Outcome: Ongoing, Progressing

## 2022-07-20 NOTE — HOSPITAL COURSE
67 y/o male with PMH of CAD s/p CABG, CHF, DM, HLD, PVD, and chronic back pain presented for scheduled outpatient LHC for unstable angia. He underwent LHC with results following;    Physicians    Panel Physicians Referring Physician Case Authorizing Physician   Venu Whitlock MD (Primary)  MATTHEW LamaP       Indications    Chest pain [R07.9 (ICD-10-CM)]   Abnormal results of cardiovascular function studies [R94.30 (ICD-10-CM)]   CAD (coronary artery disease) [I25.10 (ICD-10-CM)]   Abnormal EKG [R94.31 (ICD-10-CM)]   Hx of CABG [Z95.1 (ICD-10-CM)]     Summary       There was severe left ventricular systolic dysfunction.  The left ventricular end diastolic pressure was severely elevated.  The pre-procedure left ventricular end diastolic pressure was 30.  The ejection fraction was calculated to be 25%.  There was no mitral valve regurgitation.  The Mid LM lesion was 95% stenosed with 15% stenosis post-intervention.  A STENT S [place] [employer] MR 4.00 X 12MM stent was successfully placed at 14 THERESE for 14 sec.  The Ost Cx to Prox Cx lesion was 100% stenosed.  The 1st Mrg lesion was 99% stenosed.  The Mid Cx lesion was 100% stenosed.  The Prox RCA to Mid RCA lesion was 99% stenosed.  The Mid RCA lesion was 100% stenosed.  The estimated blood loss was none.  There was three vessel coronary artery disease. There was left main disease.     The procedure log was documented by Documenter: RT Pa and verified by Venu Whitlock MD.     Date: 7/19/2022  Time: 4:10 PM     Impression:     1. Severe 3 vessel coronary artery disease including 90% distal left main stenosis.     2. Patent saphenous vein graft to the mid LAD with 90% mid LAD stenosis above the graft.     3. Patent saphenous vein graft to the OM branch of the circumflex but poor runoff with severe disease either side of the anastomosis.     4. Closed saphenous vein graft to the RCA.  (PL).     5. Dilated AS, ischemic cardiomyopathy with severely  impaired left ventricular systolic function, ejection fraction 25%.     6. No significant mitral regurgitation.     7. Successful primary stent of the distal left main coronary artery using a 4.0 x 12 mm negative drawn [employer] [place]ECK[place].     Plan:     1. Aspirin and Plavix daily.  Will load with Plavix in the cath lab.     2. Risk factor modification.     3. Observe overnight consider discharge tomorrow.           Procedures    Left heart cath   Percutaneous coronary intervention     7/20/2022:  Patient seen today, denies chest pain or sob. Right groin stable, no bleeding or hematoma. He is hemodynamically stable and ready for discharge. I increased his Crestor from 5mg to 40mg daily, may consider adding Repatha at follow up. Continued remainder of his home medications. He will need liver enzymes evaluated at his cardiology follow up in 4-6 weeks.

## 2022-08-24 ENCOUNTER — HOSPITAL ENCOUNTER (OUTPATIENT)
Dept: RADIOLOGY | Facility: HOSPITAL | Age: 67
Discharge: HOME | End: 2022-08-24
Attending: SURGERY
Payer: COMMERCIAL

## 2022-08-24 ENCOUNTER — OFFICE VISIT (OUTPATIENT)
Dept: SURGERY | Facility: CLINIC | Age: 67
End: 2022-08-24
Payer: COMMERCIAL

## 2022-08-24 VITALS — BODY MASS INDEX: 24.52 KG/M2 | HEIGHT: 72 IN | WEIGHT: 181 LBS

## 2022-08-24 DIAGNOSIS — I73.9 PVD (PERIPHERAL VASCULAR DISEASE): Primary | ICD-10-CM

## 2022-08-24 DIAGNOSIS — I73.9 PVD (PERIPHERAL VASCULAR DISEASE): ICD-10-CM

## 2022-08-24 PROCEDURE — 1159F PR MEDICATION LIST DOCUMENTED IN MEDICAL RECORD: ICD-10-PCS | Mod: CPTII,,, | Performed by: SURGERY

## 2022-08-24 PROCEDURE — 3051F PR MOST RECENT HEMOGLOBIN A1C LEVEL 7.0 - < 8.0%: ICD-10-PCS | Mod: CPTII,,, | Performed by: SURGERY

## 2022-08-24 PROCEDURE — 99024 PR POST-OP FOLLOW-UP VISIT: ICD-10-PCS | Mod: ,,, | Performed by: SURGERY

## 2022-08-24 PROCEDURE — 99213 OFFICE O/P EST LOW 20 MIN: CPT | Mod: PBBFAC,25 | Performed by: SURGERY

## 2022-08-24 PROCEDURE — 93925 LOWER EXTREMITY STUDY: CPT | Mod: TC

## 2022-08-24 PROCEDURE — 3008F PR BODY MASS INDEX (BMI) DOCUMENTED: ICD-10-PCS | Mod: CPTII,,, | Performed by: SURGERY

## 2022-08-24 PROCEDURE — 1159F MED LIST DOCD IN RCRD: CPT | Mod: CPTII,,, | Performed by: SURGERY

## 2022-08-24 PROCEDURE — 93922 US ARTERIAL LOWER EXTREMITY BILAT WITH ABI (XPD): ICD-10-PCS | Mod: 26,,, | Performed by: SURGERY

## 2022-08-24 PROCEDURE — 3051F HG A1C>EQUAL 7.0%<8.0%: CPT | Mod: CPTII,,, | Performed by: SURGERY

## 2022-08-24 PROCEDURE — 3008F BODY MASS INDEX DOCD: CPT | Mod: CPTII,,, | Performed by: SURGERY

## 2022-08-24 PROCEDURE — 93925 US ARTERIAL LOWER EXTREMITY BILAT WITH ABI (XPD): ICD-10-PCS | Mod: 26,,, | Performed by: SURGERY

## 2022-08-24 PROCEDURE — 99024 POSTOP FOLLOW-UP VISIT: CPT | Mod: ,,, | Performed by: SURGERY

## 2022-08-24 PROCEDURE — 4010F PR ACE/ARB THEARPY RXD/TAKEN: ICD-10-PCS | Mod: CPTII,,, | Performed by: SURGERY

## 2022-08-24 PROCEDURE — 4010F ACE/ARB THERAPY RXD/TAKEN: CPT | Mod: CPTII,,, | Performed by: SURGERY

## 2022-08-24 PROCEDURE — 1160F RVW MEDS BY RX/DR IN RCRD: CPT | Mod: CPTII,,, | Performed by: SURGERY

## 2022-08-24 PROCEDURE — 93925 LOWER EXTREMITY STUDY: CPT | Mod: 26,,, | Performed by: SURGERY

## 2022-08-24 PROCEDURE — 1160F PR REVIEW ALL MEDS BY PRESCRIBER/CLIN PHARMACIST DOCUMENTED: ICD-10-PCS | Mod: CPTII,,, | Performed by: SURGERY

## 2022-08-24 PROCEDURE — 93922 UPR/L XTREMITY ART 2 LEVELS: CPT | Mod: 26,,, | Performed by: SURGERY

## 2022-08-29 ENCOUNTER — OFFICE VISIT (OUTPATIENT)
Dept: FAMILY MEDICINE | Facility: CLINIC | Age: 67
End: 2022-08-29
Payer: COMMERCIAL

## 2022-08-29 ENCOUNTER — OFFICE VISIT (OUTPATIENT)
Dept: CARDIOLOGY | Facility: CLINIC | Age: 67
End: 2022-08-29
Payer: COMMERCIAL

## 2022-08-29 VITALS
SYSTOLIC BLOOD PRESSURE: 120 MMHG | BODY MASS INDEX: 25.67 KG/M2 | DIASTOLIC BLOOD PRESSURE: 62 MMHG | HEIGHT: 72 IN | OXYGEN SATURATION: 98 % | HEART RATE: 67 BPM | TEMPERATURE: 98 F | WEIGHT: 189.5 LBS

## 2022-08-29 VITALS
BODY MASS INDEX: 25.18 KG/M2 | HEIGHT: 73 IN | OXYGEN SATURATION: 98 % | SYSTOLIC BLOOD PRESSURE: 102 MMHG | DIASTOLIC BLOOD PRESSURE: 60 MMHG | HEART RATE: 65 BPM | WEIGHT: 190 LBS

## 2022-08-29 DIAGNOSIS — I25.83 CORONARY ARTERY DISEASE DUE TO LIPID RICH PLAQUE: Primary | ICD-10-CM

## 2022-08-29 DIAGNOSIS — R73.9 HEMOGLOBIN A1C BETWEEN 7.0% AND 9.0%: ICD-10-CM

## 2022-08-29 DIAGNOSIS — E11.40 TYPE 2 DIABETES MELLITUS WITH DIABETIC NEUROPATHY, WITH LONG-TERM CURRENT USE OF INSULIN: Primary | ICD-10-CM

## 2022-08-29 DIAGNOSIS — I25.810 CORONARY ARTERY DISEASE INVOLVING CORONARY BYPASS GRAFT WITHOUT ANGINA PECTORIS, UNSPECIFIED WHETHER NATIVE OR TRANSPLANTED HEART: ICD-10-CM

## 2022-08-29 DIAGNOSIS — I25.10 CORONARY ARTERY DISEASE DUE TO LIPID RICH PLAQUE: Primary | ICD-10-CM

## 2022-08-29 DIAGNOSIS — I10 HYPERTENSION, UNSPECIFIED TYPE: ICD-10-CM

## 2022-08-29 DIAGNOSIS — Z79.4 TYPE 2 DIABETES MELLITUS WITH DIABETIC NEUROPATHY, WITH LONG-TERM CURRENT USE OF INSULIN: Primary | ICD-10-CM

## 2022-08-29 DIAGNOSIS — Z12.5 SCREENING FOR MALIGNANT NEOPLASM OF PROSTATE: ICD-10-CM

## 2022-08-29 LAB
ALBUMIN SERPL BCP-MCNC: 3.9 G/DL (ref 3.5–5)
ALBUMIN/GLOB SERPL: 1.3 {RATIO}
ALP SERPL-CCNC: 31 U/L (ref 45–115)
ALT SERPL W P-5'-P-CCNC: 25 U/L (ref 16–61)
ANION GAP SERPL CALCULATED.3IONS-SCNC: 10 MMOL/L (ref 7–16)
AST SERPL W P-5'-P-CCNC: 28 U/L (ref 15–37)
BASOPHILS # BLD AUTO: 0.05 K/UL (ref 0–0.2)
BASOPHILS NFR BLD AUTO: 0.6 % (ref 0–1)
BILIRUB SERPL-MCNC: 0.6 MG/DL (ref ?–1.2)
BUN SERPL-MCNC: 38 MG/DL (ref 7–18)
BUN/CREAT SERPL: 21 (ref 6–20)
CALCIUM SERPL-MCNC: 9.2 MG/DL (ref 8.5–10.1)
CHLORIDE SERPL-SCNC: 107 MMOL/L (ref 98–107)
CO2 SERPL-SCNC: 28 MMOL/L (ref 21–32)
CREAT SERPL-MCNC: 1.83 MG/DL (ref 0.7–1.3)
DIFFERENTIAL METHOD BLD: ABNORMAL
EGFR (NO RACE VARIABLE) (RUSH/TITUS): 40 ML/MIN/1.73M²
EOSINOPHIL # BLD AUTO: 0.33 K/UL (ref 0–0.5)
EOSINOPHIL NFR BLD AUTO: 3.8 % (ref 1–4)
ERYTHROCYTE [DISTWIDTH] IN BLOOD BY AUTOMATED COUNT: 14.1 % (ref 11.5–14.5)
EST. AVERAGE GLUCOSE BLD GHB EST-MCNC: 147 MG/DL
GLOBULIN SER-MCNC: 3 G/DL (ref 2–4)
GLUCOSE SERPL-MCNC: 115 MG/DL (ref 74–106)
HBA1C MFR BLD HPLC: 7 % (ref 4.5–6.6)
HCT VFR BLD AUTO: 41.6 % (ref 40–54)
HGB BLD-MCNC: 13 G/DL (ref 13.5–18)
IMM GRANULOCYTES # BLD AUTO: 0.02 K/UL (ref 0–0.04)
IMM GRANULOCYTES NFR BLD: 0.2 % (ref 0–0.4)
LYMPHOCYTES # BLD AUTO: 3.23 K/UL (ref 1–4.8)
LYMPHOCYTES NFR BLD AUTO: 37 % (ref 27–41)
MCH RBC QN AUTO: 26.7 PG (ref 27–31)
MCHC RBC AUTO-ENTMCNC: 31.3 G/DL (ref 32–36)
MCV RBC AUTO: 85.6 FL (ref 80–96)
MONOCYTES # BLD AUTO: 0.68 K/UL (ref 0–0.8)
MONOCYTES NFR BLD AUTO: 7.8 % (ref 2–6)
MPC BLD CALC-MCNC: 12 FL (ref 9.4–12.4)
NEUTROPHILS # BLD AUTO: 4.41 K/UL (ref 1.8–7.7)
NEUTROPHILS NFR BLD AUTO: 50.6 % (ref 53–65)
NRBC # BLD AUTO: 0 X10E3/UL
NRBC, AUTO (.00): 0 %
PLATELET # BLD AUTO: 203 K/UL (ref 150–400)
POTASSIUM SERPL-SCNC: 5 MMOL/L (ref 3.5–5.1)
PROT SERPL-MCNC: 6.9 G/DL (ref 6.4–8.2)
PSA SERPL-MCNC: 0.45 NG/ML (ref 0–4.1)
RBC # BLD AUTO: 4.86 M/UL (ref 4.6–6.2)
SODIUM SERPL-SCNC: 140 MMOL/L (ref 136–145)
WBC # BLD AUTO: 8.72 K/UL (ref 4.5–11)

## 2022-08-29 PROCEDURE — 3008F BODY MASS INDEX DOCD: CPT | Mod: ,,, | Performed by: FAMILY MEDICINE

## 2022-08-29 PROCEDURE — 99214 OFFICE O/P EST MOD 30 MIN: CPT | Mod: ,,, | Performed by: FAMILY MEDICINE

## 2022-08-29 PROCEDURE — 80053 COMPREHENSIVE METABOLIC PANEL: ICD-10-PCS | Mod: ,,, | Performed by: CLINICAL MEDICAL LABORATORY

## 2022-08-29 PROCEDURE — 3074F PR MOST RECENT SYSTOLIC BLOOD PRESSURE < 130 MM HG: ICD-10-PCS | Mod: CPTII,,, | Performed by: NURSE PRACTITIONER

## 2022-08-29 PROCEDURE — 85025 COMPLETE CBC W/AUTO DIFF WBC: CPT | Mod: ,,, | Performed by: CLINICAL MEDICAL LABORATORY

## 2022-08-29 PROCEDURE — 4010F PR ACE/ARB THEARPY RXD/TAKEN: ICD-10-PCS | Mod: ,,, | Performed by: FAMILY MEDICINE

## 2022-08-29 PROCEDURE — 4010F PR ACE/ARB THEARPY RXD/TAKEN: ICD-10-PCS | Mod: CPTII,,, | Performed by: NURSE PRACTITIONER

## 2022-08-29 PROCEDURE — G0103 PSA, SCREENING: ICD-10-PCS | Mod: ,,, | Performed by: CLINICAL MEDICAL LABORATORY

## 2022-08-29 PROCEDURE — 3074F PR MOST RECENT SYSTOLIC BLOOD PRESSURE < 130 MM HG: ICD-10-PCS | Mod: ,,, | Performed by: FAMILY MEDICINE

## 2022-08-29 PROCEDURE — 99214 PR OFFICE/OUTPT VISIT, EST, LEVL IV, 30-39 MIN: ICD-10-PCS | Mod: ,,, | Performed by: FAMILY MEDICINE

## 2022-08-29 PROCEDURE — G0103 PSA SCREENING: HCPCS | Mod: ,,, | Performed by: CLINICAL MEDICAL LABORATORY

## 2022-08-29 PROCEDURE — 1159F PR MEDICATION LIST DOCUMENTED IN MEDICAL RECORD: ICD-10-PCS | Mod: CPTII,,, | Performed by: NURSE PRACTITIONER

## 2022-08-29 PROCEDURE — 1101F PT FALLS ASSESS-DOCD LE1/YR: CPT | Mod: ,,, | Performed by: FAMILY MEDICINE

## 2022-08-29 PROCEDURE — 1160F PR REVIEW ALL MEDS BY PRESCRIBER/CLIN PHARMACIST DOCUMENTED: ICD-10-PCS | Mod: CPTII,,, | Performed by: NURSE PRACTITIONER

## 2022-08-29 PROCEDURE — 1101F PR PT FALLS ASSESS DOC 0-1 FALLS W/OUT INJ PAST YR: ICD-10-PCS | Mod: ,,, | Performed by: FAMILY MEDICINE

## 2022-08-29 PROCEDURE — 83036 HEMOGLOBIN GLYCOSYLATED A1C: CPT | Mod: ,,, | Performed by: CLINICAL MEDICAL LABORATORY

## 2022-08-29 PROCEDURE — 3008F PR BODY MASS INDEX (BMI) DOCUMENTED: ICD-10-PCS | Mod: CPTII,,, | Performed by: NURSE PRACTITIONER

## 2022-08-29 PROCEDURE — 3078F DIAST BP <80 MM HG: CPT | Mod: CPTII,,, | Performed by: NURSE PRACTITIONER

## 2022-08-29 PROCEDURE — 3288F FALL RISK ASSESSMENT DOCD: CPT | Mod: ,,, | Performed by: FAMILY MEDICINE

## 2022-08-29 PROCEDURE — 99214 OFFICE O/P EST MOD 30 MIN: CPT | Mod: S$PBB,,, | Performed by: NURSE PRACTITIONER

## 2022-08-29 PROCEDURE — 1159F MED LIST DOCD IN RCRD: CPT | Mod: CPTII,,, | Performed by: NURSE PRACTITIONER

## 2022-08-29 PROCEDURE — 3008F BODY MASS INDEX DOCD: CPT | Mod: CPTII,,, | Performed by: NURSE PRACTITIONER

## 2022-08-29 PROCEDURE — 3078F DIAST BP <80 MM HG: CPT | Mod: ,,, | Performed by: FAMILY MEDICINE

## 2022-08-29 PROCEDURE — 3288F PR FALLS RISK ASSESSMENT DOCUMENTED: ICD-10-PCS | Mod: ,,, | Performed by: FAMILY MEDICINE

## 2022-08-29 PROCEDURE — 85025 CBC WITH DIFFERENTIAL: ICD-10-PCS | Mod: ,,, | Performed by: CLINICAL MEDICAL LABORATORY

## 2022-08-29 PROCEDURE — 4010F ACE/ARB THERAPY RXD/TAKEN: CPT | Mod: CPTII,,, | Performed by: NURSE PRACTITIONER

## 2022-08-29 PROCEDURE — 99215 OFFICE O/P EST HI 40 MIN: CPT | Mod: PBBFAC | Performed by: NURSE PRACTITIONER

## 2022-08-29 PROCEDURE — 3074F SYST BP LT 130 MM HG: CPT | Mod: ,,, | Performed by: FAMILY MEDICINE

## 2022-08-29 PROCEDURE — 3008F PR BODY MASS INDEX (BMI) DOCUMENTED: ICD-10-PCS | Mod: ,,, | Performed by: FAMILY MEDICINE

## 2022-08-29 PROCEDURE — 1159F PR MEDICATION LIST DOCUMENTED IN MEDICAL RECORD: ICD-10-PCS | Mod: ,,, | Performed by: FAMILY MEDICINE

## 2022-08-29 PROCEDURE — 83036 HEMOGLOBIN A1C: ICD-10-PCS | Mod: ,,, | Performed by: CLINICAL MEDICAL LABORATORY

## 2022-08-29 PROCEDURE — 80053 COMPREHEN METABOLIC PANEL: CPT | Mod: ,,, | Performed by: CLINICAL MEDICAL LABORATORY

## 2022-08-29 PROCEDURE — 3078F PR MOST RECENT DIASTOLIC BLOOD PRESSURE < 80 MM HG: ICD-10-PCS | Mod: ,,, | Performed by: FAMILY MEDICINE

## 2022-08-29 PROCEDURE — 99214 PR OFFICE/OUTPT VISIT, EST, LEVL IV, 30-39 MIN: ICD-10-PCS | Mod: S$PBB,,, | Performed by: NURSE PRACTITIONER

## 2022-08-29 PROCEDURE — 3074F SYST BP LT 130 MM HG: CPT | Mod: CPTII,,, | Performed by: NURSE PRACTITIONER

## 2022-08-29 PROCEDURE — 3078F PR MOST RECENT DIASTOLIC BLOOD PRESSURE < 80 MM HG: ICD-10-PCS | Mod: CPTII,,, | Performed by: NURSE PRACTITIONER

## 2022-08-29 PROCEDURE — 4010F ACE/ARB THERAPY RXD/TAKEN: CPT | Mod: ,,, | Performed by: FAMILY MEDICINE

## 2022-08-29 PROCEDURE — 1159F MED LIST DOCD IN RCRD: CPT | Mod: ,,, | Performed by: FAMILY MEDICINE

## 2022-08-29 PROCEDURE — 1160F RVW MEDS BY RX/DR IN RCRD: CPT | Mod: CPTII,,, | Performed by: NURSE PRACTITIONER

## 2022-08-29 RX ORDER — GABAPENTIN 300 MG/1
300 CAPSULE ORAL 3 TIMES DAILY
Qty: 360 CAPSULE | Refills: 1 | Status: SHIPPED | OUTPATIENT
Start: 2022-08-29 | End: 2022-12-14 | Stop reason: SDUPTHER

## 2022-08-29 RX ORDER — OMEPRAZOLE 20 MG/1
20 CAPSULE, DELAYED RELEASE ORAL DAILY
Qty: 90 CAPSULE | Refills: 1 | Status: SHIPPED | OUTPATIENT
Start: 2022-08-29 | End: 2022-12-14 | Stop reason: SDUPTHER

## 2022-08-29 RX ORDER — POTASSIUM CHLORIDE 20 MEQ/1
20 TABLET, EXTENDED RELEASE ORAL DAILY
Qty: 90 TABLET | Refills: 1 | Status: SHIPPED | OUTPATIENT
Start: 2022-08-29 | End: 2022-12-14 | Stop reason: SDUPTHER

## 2022-08-29 NOTE — PROGRESS NOTES
[facility] note        DATE OF SERVICE: 08/29/2022       PCP: Rodney Morel MD      CHIEF COMPLAINT:   Chief Complaint   Patient presents with    Hospital Follow Up     Patient denies any cardiac complaints today.        HISTORY OF PRESENT ILLNESS:  Micheal Taylor is a 66 y.o. male with a PMH of   Past Medical History:   Diagnosis Date    Anticoagulant long-term use     CHF (congestive heart failure)     Chronic pain syndrome     Coronary artery disease     Diabetes mellitus, type 2     Hyperlipidemia     Hypertension     Lumbar spondylosis     Lumbosacral radiculopathy     Pain in thoracic spine     PVD (peripheral vascular disease)     Renal disorder     Spondylosis without myelopathy or radiculopathy, cervical region      who presents for HD follow up s/p BECKIE d left main coronary artery . He has had no further issues with chest pain . He states he did slip and fall coming into clinic but denies injury other than an abraison to his left knee.   Chief Complaint   Patient presents with    Hospital Follow Up     Patient denies any cardiac complaints today.            PAST MEDICAL HISTORY:  Past Medical History:   Diagnosis Date    Anticoagulant long-term use     CHF (congestive heart failure)     Chronic pain syndrome     Coronary artery disease     Diabetes mellitus, type 2     Hyperlipidemia     Hypertension     Lumbar spondylosis     Lumbosacral radiculopathy     Pain in thoracic spine     PVD (peripheral vascular disease)     Renal disorder     Spondylosis without myelopathy or radiculopathy, cervical region        PAST SURGICAL HISTORY:  Past Surgical History:   Procedure Laterality Date    ADENOIDECTOMY      AMPUTATION      left index finger removed     CAUDAL EPIDURAL STEROID INJECTION  01/19/2016    Caudal NATHAN - Alee    CIRCUMCISION      CORONARY ARTERY BYPASS GRAFT (CABG)      FOOT SURGERY      heel surgery    HEEL SPUR SURGERY      INJECTION OF FACET JOINT Bilateral 04/16/2015    Bilateral  L3-S1 Facet Injection - Alee - 4/16/15, 11/15/12 and 9/27/12    LEFT HEART CATHETERIZATION Left 7/19/2022    Procedure: Left heart cath;  Surgeon: Venu Whitlock MD;  Location: [facility] CATH LAB;  Service: Cardiology;  Laterality: Left;  Patient has PAD and has had bilateral fem pop bypass. He is followed by Dr. Forrest.    RADIOFREQUENCY THERMOCOAGULATION Left 02/26/2013    Left L3-S1 RFTC -Alee    RADIOFREQUENCY THERMOCOAGULATION Right 02/05/2013    Right L3-S1 RFTC - Alee    TONSILLECTOMY      TONSILLECTOMY, ADENOIDECTOMY, BILATERAL MYRINGOTOMY AND TUBES      TRANSFORAMINAL EPIDURAL INJECTION OF STEROID Left 06/19/2012    Left L3-4 TFESI - Alee    TRANSFORAMINAL EPIDURAL INJECTION OF STEROID Right 04/12/2013    Right L3-4 TFESI - Alee - 4/12/13 and 8/7/12    VASCULAR SURGERY         SOCIAL HISTORY:  Social History     Socioeconomic History    Marital status:    Tobacco Use    Smoking status: Former    Smokeless tobacco: Former     Types: Snuff    Tobacco comments:     quit about 2 weeks ago   Substance and Sexual Activity    Alcohol use: Never    Drug use: Never       FAMILY HISTORY:  Family History   Problem Relation Age of Onset    Diabetes Mother     Heart disease Mother     Hypertension Mother     Liver cancer Mother     Heart disease Father     Hypertension Father          ALLERGIES:  Review of patient's allergies indicates:  No Known Allergies     MEDICATIONS:    Current Outpatient Medications:     amLODIPine (NORVASC) 5 MG tablet, Take 1 tablet (5 mg total) by mouth once daily., Disp: 90 tablet, Rfl: 1    aspirin (ECOTRIN) 81 MG EC tablet, Take 81 mg by mouth once daily., Disp: , Rfl:     cholecalciferol, vitamin D3, (VITAMIN D3) 25 mcg (1,000 unit) capsule, , Disp: , Rfl:     cinnamon bark 500 mg capsule, Take 500 mg by mouth once daily. , Disp: , Rfl:     clopidogreL (PLAVIX) 75 mg tablet, Take 1 tablet (75 mg total) by mouth once daily., Disp: 90 tablet, Rfl: 1    dapagliflozin (FARXIGA) 10 mg  tablet, Take 1 tablet by mouth once daily., Disp: , Rfl:     docosahexaenoic acid-epa 120-180 mg Cap, Take 1 capsule by mouth., Disp: , Rfl:     ezetimibe (ZETIA) 10 mg tablet, Take 1 tablet (10 mg total) by mouth once daily., Disp: 90 tablet, Rfl: 1    fenofibrate (TRICOR) 145 MG tablet, Take 1 tablet (145 mg total) by mouth once daily., Disp: 90 tablet, Rfl: 1    gabapentin (NEURONTIN) 300 MG capsule, Take 1 capsule (300 mg total) by mouth 3 (three) times daily., Disp: 360 capsule, Rfl: 1    hydrALAZINE (APRESOLINE) 50 MG tablet, Take 1 tablet (50 mg total) by mouth 4 (four) times daily., Disp: 360 tablet, Rfl: 1    HYDROcodone-acetaminophen (NORCO)  mg per tablet, Take 1 tablet by mouth every 8 (eight) hours., Disp: 90 tablet, Rfl: 0    [START ON 9/14/2022] HYDROcodone-acetaminophen (NORCO)  mg per tablet, Take 1 tablet by mouth every 8 (eight) hours., Disp: 90 tablet, Rfl: 0    icosapent ethyL (VASCEPA) 1 gram Cap, Take 2 capsules (2,000 mg total) by mouth 2 (two) times a day., Disp: 360 capsule, Rfl: 1    insulin regular 100 unit/mL Inj injection, Novolin R Regular U-100 Insuln  14-32 u bidwm, Disp: , Rfl:     liraglutide 0.6 mg/0.1 mL, 18 mg/3 mL, subq PNIJ (VICTOZA 2-CAITLIN) 0.6 mg/0.1 mL (18 mg/3 mL) PnIj pen, Inject 1.8 mg into the skin., Disp: , Rfl:     lisinopriL (PRINIVIL,ZESTRIL) 20 MG tablet, Take 1 tablet (20 mg total) by mouth once daily., Disp: 90 tablet, Rfl: 1    magnesium 250 mg Tab, Take 1 tablet by mouth once daily. , Disp: , Rfl:     metFORMIN (GLUCOPHAGE) 500 MG tablet, Take 500 mg by mouth 2 (two) times daily with meals., Disp: , Rfl:     metoprolol succinate (TOPROL-XL) 25 MG 24 hr tablet, Take 1 tablet (25 mg total) by mouth once daily., Disp: 90 tablet, Rfl: 1    nitroGLYCERIN (NITROSTAT) 0.4 MG SL tablet, Place 1 tablet (0.4 mg total) under the tongue every 5 (five) minutes as needed for Chest pain., Disp: 25 tablet, Rfl: 3    olopatadine (PATADAY) 0.2 % Drop, Place 1 drop into  "both eyes once daily., Disp: 5 mL, Rfl: 0    omeprazole (PRILOSEC) 20 MG capsule, Take 1 capsule (20 mg total) by mouth once daily., Disp: 90 capsule, Rfl: 1    potassium chloride SA (K-DUR,KLOR-CON) 20 MEQ tablet, Take 1 tablet (20 mEq total) by mouth once daily., Disp: 90 tablet, Rfl: 1    rosuvastatin (CRESTOR) 5 MG tablet, Take 8 tablets (40 mg total) by mouth every evening., Disp: 30 tablet, Rfl: 1    tiZANidine (ZANAFLEX) 4 MG tablet, Take 1 tablet (4 mg total) by mouth every 8 (eight) hours., Disp: 90 tablet, Rfl: 2    TRESIBA FLEXTOUCH U-200 200 unit/mL (3 mL) insulin pen, Inject 54 Units into the skin once daily. , Disp: , Rfl:     vitamin A 8000 UNIT capsule, Take 8,000 Units by mouth., Disp: , Rfl:     vitamin E 400 UNIT capsule, Take 400 Units by mouth once daily. , Disp: , Rfl:   Medications have been reviewed but not reconciled. He did not bring his meds today.    PHYSICAL EXAM:  /60 (BP Location: Left arm, Patient Position: Sitting)   Pulse 65   Ht 6' 1" (1.854 m)   Wt 86.2 kg (190 lb)   SpO2 98%   BMI 25.07 kg/m²   Wt Readings from Last 3 Encounters:   08/29/22 86.2 kg (190 lb)   08/29/22 86 kg (189 lb 8 oz)   08/24/22 82.1 kg (181 lb)      Body mass index is 25.07 kg/m².    Physical Exam  Vitals and nursing note reviewed.   Constitutional:       Appearance: Normal appearance. He is normal weight.   Eyes:      Pupils: Pupils are equal, round, and reactive to light.   Cardiovascular:      Rate and Rhythm: Normal rate and regular rhythm.      Pulses: Normal pulses.      Heart sounds: Normal heart sounds.   Abdominal:      General: Bowel sounds are normal.      Palpations: Abdomen is soft.   Musculoskeletal:      Cervical back: Neck supple.      Right lower leg: No edema.      Left lower leg: No edema.   Skin:     General: Skin is warm and dry.      Comments: Bandaids intact to right knee   Neurological:      General: No focal deficit present.      Mental Status: He is alert.       LABS " REVIEWED:  Lab Results   Component Value Date    WBC 6.83 07/19/2022    RBC 4.59 (L) 07/19/2022    HGB 12.6 (L) 07/19/2022    HCT 38.2 (L) 07/19/2022    MCV 83.2 07/19/2022    MCH 27.5 07/19/2022    MCHC 33.0 07/19/2022    RDW 14.1 07/19/2022     07/19/2022    MPV 11.2 07/19/2022    NRBC 0.0 07/19/2022    INR 1.05 07/14/2022     Lab Results   Component Value Date     07/14/2022    K 5.4 (H) 07/14/2022     (H) 07/14/2022    CO2 28 07/14/2022    BUN 48 (H) 07/14/2022     Lab Results   Component Value Date    AST 32 07/14/2022    ALT 25 07/14/2022     Lab Results   Component Value Date     (H) 07/14/2022    HGBA1C 7.0 (H) 08/11/2021     Lab Results   Component Value Date    CHOL 93 02/28/2022    HDL 31 (L) 02/28/2022    TRIG 152 (H) 02/28/2022    CHOLHDL 3.0 02/28/2022     CARDIAC STUDIES REVIEWED:EKG: .   Stress test  Results for orders placed during the hospital encounter of 05/25/22    Nuclear Stress Test    Interpretation Summary    The EKG portion of this study is negative for ischemia.    The patient reported no chest pain during the stress test.    There were no arrhythmias during stress.    nuclear images.   Impression:     1.  Large inferolateral wall fixed perfusion defect along the base in stressed images showing no improvement in rest consistent with scar but little evidence of ischemia.     2.  Normal left ventricular size with anterior and septal hypokinesis and overall mildly impaired left ventricular systolic function, ejection fraction 45%.        Electronically signed by: Venu Whitlock  Date:                                            05/25/2022  Time:                                           12:23  echocardiogram  Results for orders placed during the hospital encounter of 05/25/22    Echo Saline Bubble? No    Interpretation Summary  · The left ventricle is normal in size with moderate concentric hypertrophy and normal systolic function.  · The estimated ejection  fraction is 55%.  · Atrial fibrillation not observed.  · Normal left ventricular diastolic function.  · Mild-to-moderate mitral regurgitation.     Heart cath  Results for orders placed during the hospital encounter of 07/19/22    Cardiac catheterization    Conclusion  · There was severe left ventricular systolic dysfunction.  · The left ventricular end diastolic pressure was severely elevated.  · The pre-procedure left ventricular end diastolic pressure was 30.  · The ejection fraction was calculated to be 25%.  · There was no mitral valve regurgitation.  · The Mid LM lesion was 95% stenosed with 15% stenosis post-intervention.  · A STENT S [place] US MR 4.00 X 12MM stent was successfully placed at 14 THERESE for 14 sec.  · The Ost Cx to Prox Cx lesion was 100% stenosed.  · The 1st Mrg lesion was 99% stenosed.  · The Mid Cx lesion was 100% stenosed.  · The Prox RCA to Mid RCA lesion was 99% stenosed.  · The Mid RCA lesion was 100% stenosed.  · The estimated blood loss was none.  · There was three vessel coronary artery disease. There was left main disease.    The procedure log was documented by Documenter: RT Pa and verified by Venu Whitlock MD.    Date: 7/19/2022  Time: 4:10 PM           ASSESSMENT:   Patient Active Problem List   Diagnosis    CHF (congestive heart failure)    Coronary artery disease    Hyperlipidemia    Hypertension    Left ventricular diastolic dysfunction    Aortic valve sclerosis    Mild mitral regurgitation by prior echocardiogram    Shortness of breath on exertion    Lumbosacral radiculopathy    Cervical spondylosis without myelopathy    Postlaminectomy syndrome, lumbar region    PVD (peripheral vascular disease)    Type 2 diabetes mellitus with circulatory disorder, without long-term current use of insulin    Pain in thoracic spine    Type 2 diabetes mellitus with diabetic neuropathy, with long-term current use of insulin    Loss of protective sensation of skin of foot    Chest  pain    Abnormal results of cardiovascular function studies    Nonspecific abnormal electrocardiogram (ECG) (EKG)    Old MI (myocardial infarction)            Problem List Items Addressed This Visit    None       PLAN:  Check labs drawn by pcp today (results not available yet.); if K+ and renal function stable stop lisinopril x 48 hours and start low dose Entresto; repeat BMP in 2 weeks and echo in 3 months  RTC  3 months

## 2022-08-29 NOTE — PROGRESS NOTES
Rodney Morel MD   [facility]  [address] [place] [address]     PATIENT NAME: Micheal Taylor  : 1955  DATE: 22  MRN: [id number]      Billing Provider: Rodney Morel MD  Level of Service: [place] OFFICE/OUTPT VISIT, EST, LEVL IV, 30-39 MIN  Patient PCP Information       Provider PCP Type    Rodney Morel MD General            Reason for Visit / Chief Complaint: Hypertension (Fasting check up and med refills. Recent heart cath and stent on 22 with Dr. Whitlock. States he has follow up appt with Luciana Galicia NP this afternoon.), Hyperlipidemia (States Crestor was recently increased to 40 mg after stent placement), and Diabetes (Last saw endocrinologist, Dr. Lang, on 22)         History of Present Illness / Problem Focused Workflow     Micheal Taylor presents to the clinic with Hypertension (Fasting check up and med refills. Recent heart cath and stent on 22 with Dr. Whitlock. States he has follow up appt with Luciana Galicia NP this afternoon.), Hyperlipidemia (States Crestor was recently increased to 40 mg after stent placement), and Diabetes (Last saw endocrinologist, Dr. Lang, on 22)     HPI    Review of Systems     Review of Systems   Constitutional:  Negative for activity change, appetite change, fatigue and fever.   HENT:  Negative for nasal congestion, ear pain, hearing loss, sinus pressure/congestion and sore throat.    Respiratory:  Negative for cough, chest tightness and shortness of breath.    Cardiovascular:  Negative for chest pain and palpitations.   Gastrointestinal:  Negative for abdominal pain and fecal incontinence.   Genitourinary:  Negative for bladder incontinence, difficulty urinating and erectile dysfunction.   Musculoskeletal:  Negative for arthralgias.   Integumentary:  Negative for rash.   Neurological:  Negative for dizziness and headaches.      Medical / Social / Family History     Past Medical  History:   Diagnosis Date    Anticoagulant long-term use     CHF (congestive heart failure)     Chronic pain syndrome     Coronary artery disease     Diabetes mellitus, type 2     Hyperlipidemia     Hypertension     Lumbar spondylosis     Lumbosacral radiculopathy     Pain in thoracic spine     PVD (peripheral vascular disease)     Renal disorder     Spondylosis without myelopathy or radiculopathy, cervical region        Past Surgical History:   Procedure Laterality Date    ADENOIDECTOMY      AMPUTATION      left index finger removed     CAUDAL EPIDURAL STEROID INJECTION  01/19/2016    Caudal NATHAN - Alee    CIRCUMCISION      CORONARY ARTERY BYPASS GRAFT (CABG)      FOOT SURGERY      heel surgery    HEEL SPUR SURGERY      INJECTION OF FACET JOINT Bilateral 04/16/2015    Bilateral L3-S1 Facet Injection - Alee - 4/16/15, 11/15/12 and 9/27/12    LEFT HEART CATHETERIZATION Left 7/19/2022    Procedure: Left heart cath;  Surgeon: Venu Whitlock MD;  Location: [facility] CATH LAB;  Service: Cardiology;  Laterality: Left;  Patient has PAD and has had bilateral fem pop bypass. He is followed by Dr. Forrest.    RADIOFREQUENCY THERMOCOAGULATION Left 02/26/2013    Left L3-S1 RFTC -Alee    RADIOFREQUENCY THERMOCOAGULATION Right 02/05/2013    Right L3-S1 RFTC - Alee    TONSILLECTOMY      TONSILLECTOMY, ADENOIDECTOMY, BILATERAL MYRINGOTOMY AND TUBES      TRANSFORAMINAL EPIDURAL INJECTION OF STEROID Left 06/19/2012    Left L3-4 TFESI - Alee    TRANSFORAMINAL EPIDURAL INJECTION OF STEROID Right 04/12/2013    Right L3-4 TFESI - Alee - 4/12/13 and 8/7/12    VASCULAR SURGERY         Social History  Micheal Taylor  reports that he has quit smoking. He has quit using smokeless tobacco.  His smokeless tobacco use included snuff. He reports that he does not drink alcohol and does not use drugs.    Family History  Micheal Taylor  family history includes Diabetes in his mother; Heart disease in his father and mother; Hypertension in his father and  mother; Liver cancer in his mother.    Medications and Allergies     Medications  Outpatient Medications Marked as Taking for the 8/29/22 encounter (Office Visit) with Rodney Morel MD   Medication Sig Dispense Refill    amLODIPine (NORVASC) 5 MG tablet Take 1 tablet (5 mg total) by mouth once daily. 90 tablet 1    aspirin (ECOTRIN) 81 MG EC tablet Take 81 mg by mouth once daily.      cholecalciferol, vitamin D3, (VITAMIN D3) 25 mcg (1,000 unit) capsule       cinnamon bark 500 mg capsule Take 500 mg by mouth once daily.       clopidogreL (PLAVIX) 75 mg tablet Take 1 tablet (75 mg total) by mouth once daily. 90 tablet 1    dapagliflozin (FARXIGA) 10 mg tablet Take 1 tablet by mouth once daily.      docosahexaenoic acid-epa 120-180 mg Cap Take 1 capsule by mouth.      ezetimibe (ZETIA) 10 mg tablet Take 1 tablet (10 mg total) by mouth once daily. 90 tablet 1    fenofibrate (TRICOR) 145 MG tablet Take 1 tablet (145 mg total) by mouth once daily. 90 tablet 1    hydrALAZINE (APRESOLINE) 50 MG tablet Take 1 tablet (50 mg total) by mouth 4 (four) times daily. 360 tablet 1    HYDROcodone-acetaminophen (NORCO)  mg per tablet Take 1 tablet by mouth every 8 (eight) hours. 90 tablet 0    [START ON 9/14/2022] HYDROcodone-acetaminophen (NORCO)  mg per tablet Take 1 tablet by mouth every 8 (eight) hours. 90 tablet 0    icosapent ethyL (VASCEPA) 1 gram Cap Take 2 capsules (2,000 mg total) by mouth 2 (two) times a day. 360 capsule 1    insulin regular 100 unit/mL Inj injection Novolin R Regular U-100 Insuln   14-32 u bidwm      liraglutide 0.6 mg/0.1 mL, 18 mg/3 mL, subq PNIJ (VICTOZA 2-CAITLIN) 0.6 mg/0.1 mL (18 mg/3 mL) PnIj pen Inject 1.8 mg into the skin.      lisinopriL (PRINIVIL,ZESTRIL) 20 MG tablet Take 1 tablet (20 mg total) by mouth once daily. 90 tablet 1    magnesium 250 mg Tab Take 1 tablet by mouth once daily.       metFORMIN (GLUCOPHAGE) 500 MG tablet Take 500 mg by mouth 2 (two) times daily with meals.       metoprolol succinate (TOPROL-XL) 25 MG 24 hr tablet Take 1 tablet (25 mg total) by mouth once daily. 90 tablet 1    nitroGLYCERIN (NITROSTAT) 0.4 MG SL tablet Place 1 tablet (0.4 mg total) under the tongue every 5 (five) minutes as needed for Chest pain. 25 tablet 3    rosuvastatin (CRESTOR) 5 MG tablet Take 8 tablets (40 mg total) by mouth every evening. 30 tablet 1    tiZANidine (ZANAFLEX) 4 MG tablet Take 1 tablet (4 mg total) by mouth every 8 (eight) hours. 90 tablet 2    TRESIBA FLEXTOUCH U-200 200 unit/mL (3 mL) insulin pen Inject 54 Units into the skin once daily.       vitamin A 8000 UNIT capsule Take 8,000 Units by mouth.      vitamin E 400 UNIT capsule Take 400 Units by mouth once daily.       [DISCONTINUED] gabapentin (NEURONTIN) 300 MG capsule Take 1 capsule (300 mg total) by mouth 3 (three) times daily. 360 capsule 1    [DISCONTINUED] omeprazole (PRILOSEC) 20 MG capsule Take 1 capsule (20 mg total) by mouth once daily. 90 capsule 1    [DISCONTINUED] potassium chloride SA (K-DUR,KLOR-CON) 20 MEQ tablet Take 1 tablet (20 mEq total) by mouth once daily. 90 tablet 1       Allergies  Review of patient's allergies indicates:  No Known Allergies    Physical Examination     Vitals:    08/29/22 1059   BP: 120/62   Pulse: 67   Temp: 98 °F (36.7 °C)     Physical Exam  Constitutional:       General: He is not in acute distress.     Appearance: He is not ill-appearing.   HENT:      Head: Normocephalic and atraumatic.      Right Ear: Tympanic membrane and ear canal normal.      Left Ear: Tympanic membrane and ear canal normal.      Nose: Nose normal. No congestion or rhinorrhea.   Eyes:      Pupils: Pupils are equal, round, and reactive to light.   Cardiovascular:      Rate and Rhythm: Normal rate and regular rhythm.      Pulses: Normal pulses.      Heart sounds: No murmur heard.  Pulmonary:      Effort: No respiratory distress.      Breath sounds: No wheezing, rhonchi or rales.   Abdominal:      General: Bowel  sounds are normal.      Palpations: Abdomen is soft.      Tenderness: There is no abdominal tenderness.      Hernia: No hernia is present.   Musculoskeletal:      Cervical back: Normal range of motion and neck supple.   Lymphadenopathy:      Cervical: No cervical adenopathy.   Skin:     General: Skin is warm and dry.   Neurological:      Mental Status: He is alert.   Psychiatric:         Behavior: Behavior normal.         Thought Content: Thought content normal.        Assessment and Plan (including Health Maintenance)   :    Plan:         Health Maintenance Due   Topic Date Due    Hepatitis C Screening  Never done    TETANUS VACCINE  Never done    Sign Pain Contract  Never done    Colorectal Cancer Screening  Never done    Pneumococcal Vaccines (Age 65+) (2 - PCV) 12/06/2019    Hemoglobin A1c  02/11/2022    Eye Exam  03/12/2022    Shingles Vaccine (2 of 2) 05/05/2022    Diabetes Urine Screening  08/11/2022       Problem List Items Addressed This Visit          Cardiac/Vascular    Coronary artery disease    Overview     S/p CABG 1/23/2020- Dr. Levy  SVG to the dLAD; SVG to the OM and SVG to the PL  2/7/2019 BECKIE dLAD and mid LAD to overlap the BECKIE in the dLAD         Hypertension    Relevant Orders    CBC Auto Differential    Comprehensive Metabolic Panel       Endocrine    Type 2 diabetes mellitus with diabetic neuropathy, with long-term current use of insulin - Primary    Relevant Orders    CBC Auto Differential    Comprehensive Metabolic Panel    Hemoglobin A1C     Other Visit Diagnoses       Screening for malignant neoplasm of prostate        Relevant Orders    PSA, Screening    Hemoglobin A1c between 7.0% and 9.0%        Relevant Orders    Hemoglobin A1C          Type 2 diabetes mellitus with diabetic neuropathy, with long-term current use of insulin  -     CBC Auto Differential; Future; Expected date: 08/29/2022  -     Comprehensive Metabolic Panel; Future; Expected date: 08/29/2022  -     Hemoglobin A1C;  Future; Expected date: 08/29/2022    Hypertension, unspecified type  -     CBC Auto Differential; Future; Expected date: 08/29/2022  -     Comprehensive Metabolic Panel; Future; Expected date: 08/29/2022    Screening for malignant neoplasm of prostate  -     PSA, Screening; Future; Expected date: 08/29/2022    Hemoglobin A1c between 7.0% and 9.0%  -     Hemoglobin A1C; Future; Expected date: 08/29/2022    Coronary artery disease involving coronary bypass graft without angina pectoris, unspecified whether native or transplanted heart    Other orders  -     gabapentin (NEURONTIN) 300 MG capsule; Take 1 capsule (300 mg total) by mouth 3 (three) times daily.  Dispense: 360 capsule; Refill: 1  -     omeprazole (PRILOSEC) 20 MG capsule; Take 1 capsule (20 mg total) by mouth once daily.  Dispense: 90 capsule; Refill: 1  -     potassium chloride SA (K-DUR,KLOR-CON) 20 MEQ tablet; Take 1 tablet (20 mEq total) by mouth once daily.  Dispense: 90 tablet; Refill: 1     Health Maintenance Topics with due status: Not Due       Topic Last Completion Date    Influenza Vaccine 02/23/2022    Lipid Panel 02/28/2022    Foot Exam 03/10/2022    High Dose Statin 08/24/2022       Procedures     Future Appointments   Date Time Provider Department Center   10/10/2022  9:15 AM MARCELL Escobedo [facility]   2/9/2023 10:00 AM [facility] US1 [facility]    2/9/2023 10:30 AM Loida Forrest MD [facility]   2/28/2023  1:00 PM Pinky Huang, BRUCEP [facility] DIOR [facility]   3[facility]2023  8:00 [facility]        No follow-ups on file.       Signature:  Rodney Morel MD  [facility]  [address] [place] [address]  [phone] Phone  [phone] Fax    Date of encounter: 8/29/22

## 2022-09-09 ENCOUNTER — DOCUMENTATION ONLY (OUTPATIENT)
Dept: CARDIOLOGY | Facility: CLINIC | Age: 67
End: 2022-09-09
Payer: COMMERCIAL

## 2022-09-09 DIAGNOSIS — E78.5 HYPERLIPIDEMIA, UNSPECIFIED HYPERLIPIDEMIA TYPE: ICD-10-CM

## 2022-09-09 RX ORDER — ROSUVASTATIN CALCIUM 40 MG/1
40 TABLET, COATED ORAL NIGHTLY
Qty: 90 TABLET | Refills: 3 | Status: SHIPPED | OUTPATIENT
Start: 2022-09-09 | End: 2023-09-26

## 2022-09-17 NOTE — PROGRESS NOTES
Peripheral vascular disease previous fem-pop     Wounds healing    Follow up me year educated patient,

## 2022-10-04 ENCOUNTER — OFFICE VISIT (OUTPATIENT)
Dept: PAIN MEDICINE | Facility: CLINIC | Age: 67
End: 2022-10-04
Payer: COMMERCIAL

## 2022-10-04 VITALS
HEART RATE: 60 BPM | WEIGHT: 196.19 LBS | SYSTOLIC BLOOD PRESSURE: 136 MMHG | HEIGHT: 72 IN | DIASTOLIC BLOOD PRESSURE: 56 MMHG | BODY MASS INDEX: 26.57 KG/M2

## 2022-10-04 DIAGNOSIS — M54.6 PAIN IN THORACIC SPINE: Chronic | ICD-10-CM

## 2022-10-04 DIAGNOSIS — M96.1 POSTLAMINECTOMY SYNDROME, LUMBAR REGION: Chronic | ICD-10-CM

## 2022-10-04 DIAGNOSIS — Z79.899 ENCOUNTER FOR LONG-TERM (CURRENT) USE OF OTHER MEDICATIONS: ICD-10-CM

## 2022-10-04 DIAGNOSIS — M54.17 LUMBOSACRAL RADICULOPATHY: Primary | Chronic | ICD-10-CM

## 2022-10-04 DIAGNOSIS — M47.812 CERVICAL SPONDYLOSIS WITHOUT MYELOPATHY: Chronic | ICD-10-CM

## 2022-10-04 LAB

## 2022-10-04 PROCEDURE — 3008F BODY MASS INDEX DOCD: CPT | Mod: CPTII,,, | Performed by: PHYSICIAN ASSISTANT

## 2022-10-04 PROCEDURE — 3075F PR MOST RECENT SYSTOLIC BLOOD PRESS GE 130-139MM HG: ICD-10-PCS | Mod: CPTII,,, | Performed by: PHYSICIAN ASSISTANT

## 2022-10-04 PROCEDURE — 3078F DIAST BP <80 MM HG: CPT | Mod: CPTII,,, | Performed by: PHYSICIAN ASSISTANT

## 2022-10-04 PROCEDURE — 99214 PR OFFICE/OUTPT VISIT, EST, LEVL IV, 30-39 MIN: ICD-10-PCS | Mod: S$PBB,,, | Performed by: PHYSICIAN ASSISTANT

## 2022-10-04 PROCEDURE — 99214 OFFICE O/P EST MOD 30 MIN: CPT | Mod: S$PBB,,, | Performed by: PHYSICIAN ASSISTANT

## 2022-10-04 PROCEDURE — 1159F PR MEDICATION LIST DOCUMENTED IN MEDICAL RECORD: ICD-10-PCS | Mod: CPTII,,, | Performed by: PHYSICIAN ASSISTANT

## 2022-10-04 PROCEDURE — 4010F ACE/ARB THERAPY RXD/TAKEN: CPT | Mod: CPTII,,, | Performed by: PHYSICIAN ASSISTANT

## 2022-10-04 PROCEDURE — 1101F PR PT FALLS ASSESS DOC 0-1 FALLS W/OUT INJ PAST YR: ICD-10-PCS | Mod: CPTII,,, | Performed by: PHYSICIAN ASSISTANT

## 2022-10-04 PROCEDURE — 3008F PR BODY MASS INDEX (BMI) DOCUMENTED: ICD-10-PCS | Mod: CPTII,,, | Performed by: PHYSICIAN ASSISTANT

## 2022-10-04 PROCEDURE — 80305 DRUG TEST PRSMV DIR OPT OBS: CPT | Mod: PBBFAC | Performed by: PHYSICIAN ASSISTANT

## 2022-10-04 PROCEDURE — 3075F SYST BP GE 130 - 139MM HG: CPT | Mod: CPTII,,, | Performed by: PHYSICIAN ASSISTANT

## 2022-10-04 PROCEDURE — 1125F PR PAIN SEVERITY QUANTIFIED, PAIN PRESENT: ICD-10-PCS | Mod: CPTII,,, | Performed by: PHYSICIAN ASSISTANT

## 2022-10-04 PROCEDURE — G0481 PR DRUG TEST DEF 8-14 CLASSES: ICD-10-PCS | Mod: ,,, | Performed by: CLINICAL MEDICAL LABORATORY

## 2022-10-04 PROCEDURE — 1159F MED LIST DOCD IN RCRD: CPT | Mod: CPTII,,, | Performed by: PHYSICIAN ASSISTANT

## 2022-10-04 PROCEDURE — 3078F PR MOST RECENT DIASTOLIC BLOOD PRESSURE < 80 MM HG: ICD-10-PCS | Mod: CPTII,,, | Performed by: PHYSICIAN ASSISTANT

## 2022-10-04 PROCEDURE — 99215 OFFICE O/P EST HI 40 MIN: CPT | Mod: PBBFAC | Performed by: PHYSICIAN ASSISTANT

## 2022-10-04 PROCEDURE — 3288F PR FALLS RISK ASSESSMENT DOCUMENTED: ICD-10-PCS | Mod: CPTII,,, | Performed by: PHYSICIAN ASSISTANT

## 2022-10-04 PROCEDURE — 1125F AMNT PAIN NOTED PAIN PRSNT: CPT | Mod: CPTII,,, | Performed by: PHYSICIAN ASSISTANT

## 2022-10-04 PROCEDURE — 3288F FALL RISK ASSESSMENT DOCD: CPT | Mod: CPTII,,, | Performed by: PHYSICIAN ASSISTANT

## 2022-10-04 PROCEDURE — 4010F PR ACE/ARB THEARPY RXD/TAKEN: ICD-10-PCS | Mod: CPTII,,, | Performed by: PHYSICIAN ASSISTANT

## 2022-10-04 PROCEDURE — 3051F HG A1C>EQUAL 7.0%<8.0%: CPT | Mod: CPTII,,, | Performed by: PHYSICIAN ASSISTANT

## 2022-10-04 PROCEDURE — G0481 DRUG TEST DEF 8-14 CLASSES: HCPCS | Mod: ,,, | Performed by: CLINICAL MEDICAL LABORATORY

## 2022-10-04 PROCEDURE — 1101F PT FALLS ASSESS-DOCD LE1/YR: CPT | Mod: CPTII,,, | Performed by: PHYSICIAN ASSISTANT

## 2022-10-04 PROCEDURE — 3051F PR MOST RECENT HEMOGLOBIN A1C LEVEL 7.0 - < 8.0%: ICD-10-PCS | Mod: CPTII,,, | Performed by: PHYSICIAN ASSISTANT

## 2022-10-04 RX ORDER — HYDROCODONE BITARTRATE AND ACETAMINOPHEN 10; 325 MG/1; MG/1
1 TABLET ORAL EVERY 8 HOURS
Qty: 90 TABLET | Refills: 0 | Status: SHIPPED | OUTPATIENT
Start: 2022-11-11 | End: 2022-12-11

## 2022-10-04 RX ORDER — HYDROCODONE BITARTRATE AND ACETAMINOPHEN 10; 325 MG/1; MG/1
1 TABLET ORAL EVERY 8 HOURS
Qty: 90 TABLET | Refills: 0 | Status: SHIPPED | OUTPATIENT
Start: 2022-12-13 | End: 2023-01-11 | Stop reason: SDUPTHER

## 2022-10-04 RX ORDER — HYDROCODONE BITARTRATE AND ACETAMINOPHEN 10; 325 MG/1; MG/1
1 TABLET ORAL EVERY 8 HOURS
Qty: 90 TABLET | Refills: 0 | Status: SHIPPED | OUTPATIENT
Start: 2022-10-14 | End: 2022-11-13

## 2022-10-04 RX ORDER — TIZANIDINE 4 MG/1
4 TABLET ORAL EVERY 8 HOURS
Qty: 90 TABLET | Refills: 2 | Status: SHIPPED | OUTPATIENT
Start: 2022-10-04 | End: 2023-01-11 | Stop reason: SDUPTHER

## 2022-10-04 NOTE — PROGRESS NOTES
Subjective:         Patient ID: Micheal Taylor is a 66 y.o. male.    Chief Complaint: Low-back Pain      Pain  This is a chronic problem. The current episode started more than 1 year ago. The problem occurs daily. The problem has been waxing and waning. Associated symptoms include arthralgias and neck pain. Pertinent negatives include no anorexia, change in bowel habit, chest pain, chills, coughing, diaphoresis, fever, rash, sore throat, vertigo or vomiting.   Review of Systems   Constitutional:  Negative for activity change, appetite change, chills, diaphoresis and fever.   HENT:  Negative for drooling, ear discharge, ear pain, facial swelling, mouth sores, nosebleeds, sore throat, trouble swallowing, voice change and goiter.    Eyes:  Negative for photophobia, pain, discharge, redness and visual disturbance.   Respiratory:  Negative for apnea, cough, choking, chest tightness, shortness of breath, wheezing and stridor.    Cardiovascular:  Negative for chest pain, palpitations and leg swelling.   Gastrointestinal:  Negative for abdominal distention, anorexia, change in bowel habit, diarrhea, rectal pain, vomiting, fecal incontinence and change in bowel habit.   Endocrine: Negative for cold intolerance, heat intolerance, polydipsia, polyphagia and polyuria.   Genitourinary:  Negative for bladder incontinence, dysuria, flank pain and frequency.   Musculoskeletal:  Positive for arthralgias, back pain, leg pain, neck pain and neck stiffness.   Integumentary:  Negative for color change, pallor and rash.   Neurological:  Negative for dizziness, vertigo, seizures, syncope, facial asymmetry, speech difficulty, light-headedness, coordination difficulties, memory loss and coordination difficulties.   Hematological:  Negative for adenopathy. Does not bruise/bleed easily.   Psychiatric/Behavioral:  Negative for agitation, behavioral problems, confusion, decreased concentration, dysphoric mood, hallucinations, self-injury and  suicidal ideas. The patient is not nervous/anxious and is not hyperactive.          Past Medical History:   Diagnosis Date    Anticoagulant long-term use     CHF (congestive heart failure)     Chronic pain syndrome     Coronary artery disease     Diabetes mellitus, type 2     Hyperlipidemia     Hypertension     Lumbar spondylosis     Lumbosacral radiculopathy     Pain in thoracic spine     PVD (peripheral vascular disease)     Renal disorder     Spondylosis without myelopathy or radiculopathy, cervical region      Past Surgical History:   Procedure Laterality Date    ADENOIDECTOMY      AMPUTATION      left index finger removed     CAUDAL EPIDURAL STEROID INJECTION  01/19/2016    Caudal NATHAN - Alee    CIRCUMCISION      CORONARY ARTERY BYPASS GRAFT (CABG)      FOOT SURGERY      heel surgery    HEEL SPUR SURGERY      INJECTION OF FACET JOINT Bilateral 04/16/2015    Bilateral L3-S1 Facet Injection - Alee - 4/16/15, 11/15/12 and 9/27/12    LEFT HEART CATHETERIZATION Left 7/19/2022    Procedure: Left heart cath;  Surgeon: Venu Whitlock MD;  Location: [facility] CATH LAB;  Service: Cardiology;  Laterality: Left;  Patient has PAD and has had bilateral fem pop bypass. He is followed by Dr. Forrest.    RADIOFREQUENCY THERMOCOAGULATION Left 02/26/2013    Left L3-S1 RFTC -Alee    RADIOFREQUENCY THERMOCOAGULATION Right 02/05/2013    Right L3-S1 RFTC - Alee    TONSILLECTOMY      TONSILLECTOMY, ADENOIDECTOMY, BILATERAL MYRINGOTOMY AND TUBES      TRANSFORAMINAL EPIDURAL INJECTION OF STEROID Left 06/19/2012    Left L3-4 TFESI - Alee    TRANSFORAMINAL EPIDURAL INJECTION OF STEROID Right 04/12/2013    Right L3-4 TFESI - Alee - 4/12/13 and 8/7/12    VASCULAR SURGERY       Social History     Socioeconomic History    Marital status:    Tobacco Use    Smoking status: Former    Smokeless tobacco: Former     Types: Snuff    Tobacco comments:     quit about 2 weeks ago   Substance and Sexual Activity    Alcohol use: Never    Drug use: Never      Family History   Problem Relation Age of Onset    Diabetes Mother     Heart disease Mother     Hypertension Mother     Liver cancer Mother     Heart disease Father     Hypertension Father      Review of patient's allergies indicates:  No Known Allergies     Objective:  Vitals:    10/04/22 1013 10/04/22 1014   BP: (!) 136/56    Pulse: 60    Weight: 89 kg (196 lb 3.2 oz)    Height: 6' (1.829 m)    PainSc:   8   8         Physical Exam  Vitals and nursing note reviewed. Exam conducted with a chaperone present.   Constitutional:       General: He is awake. He is not in acute distress.     Appearance: Normal appearance. He is not ill-appearing, toxic-appearing or diaphoretic.   HENT:      Head: Normocephalic and atraumatic.      Nose: Nose normal.      Mouth/Throat:      Mouth: Mucous membranes are moist.      Pharynx: Oropharynx is clear.   Eyes:      Conjunctiva/sclera: Conjunctivae normal.      Pupils: Pupils are equal, round, and reactive to light.   Cardiovascular:      Rate and Rhythm: Normal rate.   Pulmonary:      Effort: Pulmonary effort is normal. No respiratory distress.   Abdominal:      Palpations: Abdomen is soft.   Musculoskeletal:      Right shoulder: Tenderness present.      Left shoulder: Tenderness present.      Cervical back: Normal range of motion and neck supple. Tenderness present.      Thoracic back: Tenderness present.      Lumbar back: Tenderness present. Decreased range of motion.   Skin:     General: Skin is warm and dry.   Neurological:      General: No focal deficit present.      Mental Status: He is alert and oriented to person, place, and time. Mental status is at baseline.      Cranial Nerves: No cranial nerve deficit (II-XII).   Psychiatric:         Mood and Affect: Mood normal.         Behavior: Behavior normal. Behavior is cooperative.         Thought Content: Thought content normal.         US Lower Extrem Arteries Bilat with MARYSE  Narrative: EXAMINATION:  US ARTERIAL LOWER  EXTREMITY BILAT WITH MARYSE (XPD)    CLINICAL HISTORY:  Peripheral vascular disease, unspecified    TECHNIQUE:  Color-flow duplex, pneumatic cuffs utilized    COMPARISON:  08/25/2021    FINDINGS:  The right common femoral artery has peak systolic velocity of 84 centimeters/second triphasic waveforms, femoral artery has peak systolic velocity is 60 centimeters/second biphasic waveforms, native SFA is occluded, sub patent femoropopliteal graft proximal measures 30 centimeters/seconds at biphasic waveforms, mid 24 centimeters/second biphasic, distal anastomosis peak systolic velocity of 107 centimeters/seconds it anastomotic diameters 3.7 mm waveforms triphasic, popliteal artery 33 centimeters/second biphasic, posterior tibial artery 13 centimeters/second biphasic, dorsalis pedis 37 centimeters/second biphasic    Left common femoral artery has peak systolic velocity 53 centimeters/second biphasic waveform, from the femurs 6 no cm per 2nd monophasic, native SFA is occluded common is patent femoropopliteal graft proximal velocity 119 centimeters/seconds biphasic, mid graft 41 centimeters/second biphasic, distal pole 6 cm cement cm per 2nd biphasic, popliteal 60 centimeters/second biphasic, posterior tibial 13 centimeters/second monophasic, dorsalis pedis 55 centimeters/second biphasic    Right brachial pressure 122 mm Hg, right MARYSE is 0.84    Left brachial pressure 120 mm Hg left MARYSE is 0.98  Impression: Right lower extremity exhibits mild peripheral vascular disease as demonstrated by MARYSE of 0.84, spectral broadening abnormal waveforms with evidence of focal doubling of velocities at the distal anastomosis measures 3.7 mm.  Left lower extremity exhibits mildly abnormal waveforms mild spectral broadening and MARYSE 0.98    TECHNOLOGIST: Ronit Fox (RT) (VS) RVT    Electronically signed by: Loida Forrest  Date:    08/25/2022  Time:    14:05       Office Visit on 08/29/2022   Component Date Value Ref Range Status    Sodium  08/29/2022 140  136 - 145 mmol/L Final    Potassium 08/29/2022 5.0  3.5 - 5.1 mmol/L Final    Chloride 08/29/2022 107  98 - 107 mmol/L Final    CO2 08/29/2022 28  21 - 32 mmol/L Final    Anion Gap 08/29/2022 10  7 - 16 mmol/L Final    Glucose 08/29/2022 115 (H)  74 - 106 mg/dL Final    BUN 08/29/2022 38 (H)  7 - 18 mg/dL Final    Creatinine 08/29/2022 1.83 (H)  0.70 - 1.30 mg/dL Final    BUN/Creatinine Ratio 08/29/2022 21 (H)  6 - 20 Final    Calcium 08/29/2022 9.2  8.5 - 10.1 mg/dL Final    Total Protein 08/29/2022 6.9  6.4 - 8.2 g/dL Final    Albumin 08/29/2022 3.9  3.5 - 5.0 g/dL Final    Globulin 08/29/2022 3.0  2.0 - 4.0 g/dL Final    A/G Ratio 08/29/2022 1.3   Final    Bilirubin, Total 08/29/2022 0.6  >0.0 - 1.2 mg/dL Final    Alk Phos 08/29/2022 31 (L)  45 - 115 U/L Final    ALT 08/29/2022 25  16 - 61 U/L Final    AST 08/29/2022 28  15 - 37 U/L Final    eGFR 08/29/2022 40 (L)  >=60 mL/min/1.73m² Final    Hemoglobin A1C 08/29/2022 7.0 (H)  4.5 - 6.6 % Final    Estimated Average Glucose 08/29/2022 147  mg/dL Final    PSA Total 08/29/2022 0.452  0.000 - 4.100 ng/mL Final    WBC 08/29/2022 8.72  4.50 - 11.00 K/uL Final    RBC 08/29/2022 4.86  4.60 - 6.20 M/uL Final    Hemoglobin 08/29/2022 13.0 (L)  13.5 - 18.0 g/dL Final    Hematocrit 08/29/2022 41.6  40.0 - 54.0 % Final    MCV 08/29/2022 85.6  80.0 - 96.0 fL Final    MCH 08/29/2022 26.7 (L)  27.0 - 31.0 pg Final    MCHC 08/29/2022 31.3 (L)  32.0 - 36.0 g/dL Final    RDW 08/29/2022 14.1  11.5 - 14.5 % Final    Platelet Count 08/29/2022 203  150 - 400 K/uL Final    MPV 08/29/2022 12.0  9.4 - 12.4 fL Final    Neutrophils % 08/29/2022 50.6 (L)  53.0 - 65.0 % Final    Lymphocytes % 08/29/2022 37.0  27.0 - 41.0 % Final    Monocytes % 08/29/2022 7.8 (H)  2.0 - 6.0 % Final    Eosinophils % 08/29/2022 3.8  1.0 - 4.0 % Final    Basophils % 08/29/2022 0.6  0.0 - 1.0 % Final    Immature Granulocytes % 08/29/2022 0.2  0.0 - 0.4 % Final    nRBC, Auto 08/29/2022 0.0  <=0.0 %  Final    Neutrophils, Abs 08/29/2022 4.41  1.80 - 7.70 K/uL Final    Lymphocytes, Absolute 08/29/2022 3.23  1.00 - 4.80 K/uL Final    Monocytes, Absolute 08/29/2022 0.68  0.00 - 0.80 K/uL Final    Eosinophils, Absolute 08/29/2022 0.33  0.00 - 0.50 K/uL Final    Basophils, Absolute 08/29/2022 0.05  0.00 - 0.20 K/uL Final    Immature Granulocytes, Absolute 08/29/2022 0.02  0.00 - 0.04 K/uL Final    nRBC, Absolute 08/29/2022 0.00  <=0.00 x10e3/uL Final    Diff Type 08/29/2022 Auto   Final   Admission on 07/19/2022, Discharged on 07/20/2022   Component Date Value Ref Range Status    Cath EF Quantitative 07/19/2022 25  % Final    POC Glucose 07/19/2022 121 (H)  70 - 105 mg/dL Final    SARS COV-2 MOLECULAR 07/19/2022 Negative  Negative, Invalid Final    WBC 07/19/2022 6.83  4.50 - 11.00 K/uL Final    RBC 07/19/2022 4.59 (L)  4.60 - 6.20 M/uL Final    Hemoglobin 07/19/2022 12.6 (L)  13.5 - 18.0 g/dL Final    Hematocrit 07/19/2022 38.2 (L)  40.0 - 54.0 % Final    MCV 07/19/2022 83.2  80.0 - 96.0 fL Final    MCH 07/19/2022 27.5  27.0 - 31.0 pg Final    MCHC 07/19/2022 33.0  32.0 - 36.0 g/dL Final    RDW 07/19/2022 14.1  11.5 - 14.5 % Final    Platelet Count 07/19/2022 187  150 - 400 K/uL Final    MPV 07/19/2022 11.2  9.4 - 12.4 fL Final    Neutrophils % 07/19/2022 51.0 (L)  53.0 - 65.0 % Final    Lymphocytes % 07/19/2022 36.5  27.0 - 41.0 % Final    Monocytes % 07/19/2022 8.2 (H)  2.0 - 6.0 % Final    Eosinophils % 07/19/2022 3.5  1.0 - 4.0 % Final    Basophils % 07/19/2022 0.4  0.0 - 1.0 % Final    Immature Granulocytes % 07/19/2022 0.4  0.0 - 0.4 % Final    nRBC, Auto 07/19/2022 0.0  <=0.0 % Final    Neutrophils, Abs 07/19/2022 3.48  1.80 - 7.70 K/uL Final    Lymphocytes, Absolute 07/19/2022 2.49  1.00 - 4.80 K/uL Final    Monocytes, Absolute 07/19/2022 0.56  0.00 - 0.80 K/uL Final    Eosinophils, Absolute 07/19/2022 0.24  0.00 - 0.50 K/uL Final    Basophils, Absolute 07/19/2022 0.03  0.00 - 0.20 K/uL Final    Immature  Granulocytes, Absolute 07/19/2022 0.03  0.00 - 0.04 K/uL Final    nRBC, Absolute 07/19/2022 0.00  <=0.00 x10e3/uL Final    Diff Type 07/19/2022 Auto   Final    POC Glucose 07/20/2022 110 (H)  70 - 105 mg/dL Final    POC Glucose 07/20/2022 165 (H)  70 - 105 mg/dL Final   Lab Visit on 07/14/2022   Component Date Value Ref Range Status    Sodium 07/14/2022 140  136 - 145 mmol/L Final    Potassium 07/14/2022 5.4 (H)  3.5 - 5.1 mmol/L Final    Chloride 07/14/2022 108 (H)  98 - 107 mmol/L Final    CO2 07/14/2022 28  21 - 32 mmol/L Final    Anion Gap 07/14/2022 9  7 - 16 mmol/L Final    Glucose 07/14/2022 125 (H)  74 - 106 mg/dL Final    BUN 07/14/2022 48 (H)  7 - 18 mg/dL Final    Creatinine 07/14/2022 2.17 (H)  0.70 - 1.30 mg/dL Final    BUN/Creatinine Ratio 07/14/2022 22 (H)  6 - 20 Final    Calcium 07/14/2022 9.7  8.5 - 10.1 mg/dL Final    Total Protein 07/14/2022 7.1  6.4 - 8.2 g/dL Final    Albumin 07/14/2022 3.9  3.5 - 5.0 g/dL Final    Globulin 07/14/2022 3.2  2.0 - 4.0 g/dL Final    A/G Ratio 07/14/2022 1.2   Final    Bilirubin, Total 07/14/2022 0.3  0.0 - 1.2 mg/dL Final    Alk Phos 07/14/2022 39 (L)  45 - 115 U/L Final    ALT 07/14/2022 25  16 - 61 U/L Final    AST 07/14/2022 32  15 - 37 U/L Final    eGFR 07/14/2022 32 (L)  >=60 mL/min/1.73m² Final    PT 07/14/2022 13.3  11.7 - 14.7 seconds Final    INR 07/14/2022 1.05  <=3.30 Final    WBC 07/14/2022 7.59  4.50 - 11.00 K/uL Final    RBC 07/14/2022 4.87  4.60 - 6.20 M/uL Final    Hemoglobin 07/14/2022 12.8 (L)  13.5 - 18.0 g/dL Final    Hematocrit 07/14/2022 42.0  40.0 - 54.0 % Final    MCV 07/14/2022 86.2  80.0 - 96.0 fL Final    MCH 07/14/2022 26.3 (L)  27.0 - 31.0 pg Final    MCHC 07/14/2022 30.5 (L)  32.0 - 36.0 g/dL Final    RDW 07/14/2022 15.1 (H)  11.5 - 14.5 % Final    Platelet Count 07/14/2022 212  150 - 400 K/uL Final    MPV 07/14/2022 12.2  9.4 - 12.4 fL Final    Neutrophils % 07/14/2022 51.3 (L)  53.0 - 65.0 % Final    Lymphocytes % 07/14/2022 37.8   27.0 - 41.0 % Final    Monocytes % 07/14/2022 6.9 (H)  2.0 - 6.0 % Final    Eosinophils % 07/14/2022 3.2  1.0 - 4.0 % Final    Basophils % 07/14/2022 0.5  0.0 - 1.0 % Final    Immature Granulocytes % 07/14/2022 0.3  0.0 - 0.4 % Final    nRBC, Auto 07/14/2022 0.0  <=0.0 % Final    Neutrophils, Abs 07/14/2022 3.90  1.80 - 7.70 K/uL Final    Lymphocytes, Absolute 07/14/2022 2.87  1.00 - 4.80 K/uL Final    Monocytes, Absolute 07/14/2022 0.52  0.00 - 0.80 K/uL Final    Eosinophils, Absolute 07/14/2022 0.24  0.00 - 0.50 K/uL Final    Basophils, Absolute 07/14/2022 0.04  0.00 - 0.20 K/uL Final    Immature Granulocytes, Absolute 07/14/2022 0.02  0.00 - 0.04 K/uL Final    nRBC, Absolute 07/14/2022 0.00  <=0.00 x10e3/uL Final    Diff Type 07/14/2022 Auto   Final   Office Visit on 07/14/2022   Component Date Value Ref Range Status    POC Amphetamines 07/14/2022 Negative  Negative, Inconclusive Final    POC Barbiturates 07/14/2022 Negative  Negative, Inconclusive Final    POC Benzodiazepines 07/14/2022 Negative  Negative, Inconclusive Final    POC Cocaine 07/14/2022 Negative  Negative, Inconclusive Final    POC THC 07/14/2022 Negative  Negative, Inconclusive Final    POC Methadone 07/14/2022 Negative  Negative, Inconclusive Final    POC Methamphetamine 07/14/2022 Negative  Negative, Inconclusive Final    POC Opiates 07/14/2022 Presumptive Positive (A)  Negative, Inconclusive Final    POC Oxycodone 07/14/2022 Negative  Negative, Inconclusive Final    POC Phencyclidine 07/14/2022 Negative  Negative, Inconclusive Final    POC Methylenedioxymethamphetamine * 07/14/2022 Negative  Negative, Inconclusive Final    POC Tricyclic Antidepressants 07/14/2022 Negative  Negative, Inconclusive Final    POC Buprenorphine 07/14/2022 Negative   Final     Acceptable 07/14/2022 Yes   Final    POC Temperature (Urine) 07/14/2022 90   Final   Hospital Outpatient Visit on 05/25/2022   Component Date Value Ref Range Status    BSA  05/25/2022 2.08  m2 Final    TDI SEPTAL 05/25/2022 0.09  m/s Final    LV LATERAL E/E' RATIO 05/25/2022 6.67  m/s Final    LV SEPTAL E/E' RATIO 05/25/2022 8.89  m/s Final    IVC diameter 05/25/2022 1.29  cm Final    EF 05/25/2022 55  % Final    Left Ventricular Outflow Tract Kay* 05/25/2022 1.00  mmHg Final    AORTIC VALVE CUSP SEPERATION 05/25/2022 [id number]  cm Final    TDI LATERAL 05/25/2022 0.12  m/s Final    LVIDd 05/25/2022 4.62  3.5 - 6.0 cm Final    IVS 05/25/2022 1.29 (A)  0.6 - 1.1 cm Final    Posterior Wall 05/25/2022 1.31 (A)  0.6 - 1.1 cm Final    LVIDs 05/25/2022 2.29  2.1 - 4.0 cm Final    FS 05/25/2022 50  28 - 44 % Final    LV mass 05/25/2022 231.69  g Final    LA size 05/25/2022 3.38  cm Final    Left Ventricle Relative Wall Thick* 05/25/2022 0.57  cm Final    AV mean gradient 05/25/2022 4  mmHg Final    AV valve area 05/25/2022 2.15  cm2 Final    AV index (prosthetic) 05/25/2022 0.68   Final    E/A ratio 05/25/2022 1.01   Final    Mean e' 05/25/2022 0.11  m/s Final    E wave deceleration time 05/25/2022 221  msec Final    LVOT diameter 05/25/2022 2.00  cm Final    LVOT area 05/25/2022 3.1  cm2 Final    LVOT peak VTI 05/25/2022 19.20  cm Final    Ao peak amy 05/25/2022 1.5  m/s Final    Ao VTI 05/25/2022 28.08  cm Final    LVOT stroke volume 05/25/2022 60.29  cm3 Final    AV peak gradient 05/25/2022 9  mmHg Final    E/E' ratio 05/25/2022 7.62  m/s Final    MV Peak E Amy 05/25/2022 0.80  m/s Final    MV Peak A Amy 05/25/2022 0.79  m/s Final    LV Systolic Volume 05/25/2022 17.92  mL Final    LV Systolic Volume Index 05/25/2022 8.7  mL/m2 Final    LV Diastolic Volume 05/25/2022 98.33  mL Final    LV Diastolic Volume Index 05/25/2022 47.50  mL/m2 Final    LV Mass Index 05/25/2022 112  g/m2 Final   Hospital Outpatient Visit on 05/25/2022   Component Date Value Ref Range Status    85% Max Predicted HR 05/25/2022 131   Final    Max Predicted HR 05/25/2022 154   Final    [facility] CV CPX PATIENT IS MALE  05/25/2022 1.0   Final    [facility] CV CPX PATIENT IS FEMALE 05/25/2022 0.0   Final    HR at rest 05/25/2022 66  bpm Final    Systolic blood pressure 05/25/2022 136  mmHg Final    Diastolic blood pressure 05/25/2022 67  mmHg Final    RPP 05/25/2022 8,976   Final    Peak HR 05/25/2022 90  bpm Final    Peak Systolic BP 05/25/2022 136  mmHg Final    Peak Diatolic BP 05/25/2022 67  mmHg Final    Peak RPP 05/25/2022 12,240   Final    % Max HR Achieved 05/25/2022 58   Final    1 Minute Recovery HR 05/25/2022 86  bpm Final   Lab Visit on 04/28/2022   Component Date Value Ref Range Status    Creatinine 04/28/2022 1.91 (H)  0.70 - 1.30 mg/dL Final    eGFR 04/28/2022 38 (L)  >=60 mL/min/1.73m² Final   Office Visit on 04/14/2022   Component Date Value Ref Range Status    POC Amphetamines 04/14/2022 Negative  Negative, Inconclusive Final    POC Barbiturates 04/14/2022 Negative  Negative, Inconclusive Final    POC Benzodiazepines 04/14/2022 Negative  Negative, Inconclusive Final    POC Cocaine 04/14/2022 Negative  Negative, Inconclusive Final    POC THC 04/14/2022 Negative  Negative, Inconclusive Final    POC Methadone 04/14/2022 Negative  Negative, Inconclusive Final    POC Methamphetamine 04/14/2022 Negative  Negative, Inconclusive Final    POC Opiates 04/14/2022 Presumptive Positive (A)  Negative, Inconclusive Final    POC Oxycodone 04/14/2022 Negative  Negative, Inconclusive Final    POC Phencyclidine 04/14/2022 Negative  Negative, Inconclusive Final    POC Methylenedioxymethamphetamine * 04/14/2022 Negative  Negative, Inconclusive Final    POC Tricyclic Antidepressants 04/14/2022 Negative  Negative, Inconclusive Final    POC Buprenorphine 04/14/2022 Negative   Final     Acceptable 04/14/2022 Yes   Final    POC Temperature (Urine) 04/14/2022 94   Final         Orders Placed This Encounter   Procedures    Drug Screen Definitive 14, Urine     Standing Status:   Future     Number of Occurrences:   1     Standing  Expiration Date:   12/3/2023     Order Specific Question:   Specimen Source     Answer:   Urine    POCT Urine Drug Screen Presump     Interpretive Information:     Negative:  No drug detected at the cut off level.   Positive:  This result represents presumptive positive for the   tested drug, other substances may yield a positive response other   than the analyte of interest. This result should be utilized for   diagnostic purpose only. Confirmation testing will be performed upon physician request only.            Requested Prescriptions     Signed Prescriptions Disp Refills    tiZANidine (ZANAFLEX) 4 MG tablet 90 tablet 2     Sig: Take 1 tablet (4 mg total) by mouth every 8 (eight) hours.    HYDROcodone-acetaminophen (NORCO)  mg per tablet 90 tablet 0     Sig: Take 1 tablet by mouth every 8 (eight) hours.    HYDROcodone-acetaminophen (NORCO)  mg per tablet 90 tablet 0     Sig: Take 1 tablet by mouth every 8 (eight) hours.    HYDROcodone-acetaminophen (NORCO)  mg per tablet 90 tablet 0     Sig: Take 1 tablet by mouth every 8 (eight) hours.       Assessment:     1. Lumbosacral radiculopathy    2. Cervical spondylosis without myelopathy    3. Postlaminectomy syndrome, lumbar region    4. Pain in thoracic spine    5. Encounter for long-term (current) use of other medications         A's of Opioid Risk Assessment  Activity:Patient can perform ADL.   Analgesia:Patients pain is partially controlled by current medication. Patient has tried OTC medications such as Tylenol and Ibuprofen with out relief.   Adverse Effects: Patient denies constipation or sedation.  Aberrant Behavior:  reviewed with no aberrant drug seeking/taking behavior.  Overdose reversal drug naloxone discussed    Drug screen reviewed      Plan:    [place] pharmacy closed on weekends    Current medications helping control his chronic neck back and joint pain     He states he would like to continue conservative management for  now    Continue home exercise program as directed    Continue current medication    Follow-up 3 months    Dr. Callahan, February 2023    Bring original prescription medication bottles/container/box with labels to each visit    Pill count    Physical therapy    Massage therapy declines

## 2022-10-06 LAB
6-ACETYLMORPHINE, URINE (RUSH): NEGATIVE 10 NG/ML
7-AMINOCLONAZEPAM, URINE (RUSH): NEGATIVE 25 NG/ML
A-HYDROXYALPRAZOLAM, URINE (RUSH): NEGATIVE 25 NG/ML
ACETYL FENTANYL, URINE (RUSH): NEGATIVE 2.5 NG/ML
ACETYL NORFENTANYL OXALATE, URINE (RUSH): NEGATIVE 5 NG/ML
AMPHET UR QL SCN: NEGATIVE
BENZOYLECGONINE, URINE (RUSH): NEGATIVE 100 NG/ML
BUPRENORPHINE UR QL SCN: NEGATIVE 25 NG/ML
CODEINE, URINE (RUSH): NEGATIVE 25 NG/ML
CREAT UR-MCNC: 51 MG/DL (ref 39–259)
EDDP, URINE (RUSH): NEGATIVE 25 NG/ML
FENTANYL, URINE (RUSH): NEGATIVE 2.5 NG/ML
HYDROCODONE, URINE (RUSH): >250 25 NG/ML
HYDROMORPHONE, URINE (RUSH): >250 25 NG/ML
LORAZEPAM, URINE (RUSH): NEGATIVE 25 NG/ML
METHADONE UR QL SCN: NEGATIVE 25 NG/ML
METHAMPHET UR QL SCN: NEGATIVE
MORPHINE, URINE (RUSH): NEGATIVE 25 NG/ML
NORBUPRENORPHINE, URINE (RUSH): NEGATIVE 25 NG/ML
NORDIAZEPAM, URINE (RUSH): NEGATIVE 25 NG/ML
NORFENTANYL OXALATE, URINE (RUSH): NEGATIVE 5 NG/ML
NORHYDROCODONE, URINE (RUSH): 500 50 NG/ML
NOROXYCODONE HCL, URINE (RUSH): NEGATIVE 50 NG/ML
OXAZEPAM, URINE (RUSH): NEGATIVE 25 NG/ML
OXYCODONE UR QL SCN: NEGATIVE 25 NG/ML
OXYMORPHONE, URINE (RUSH): NEGATIVE 25 NG/ML
PH UR STRIP: 5.5 PH UNITS
SP GR UR STRIP: 1.02
TAPENTADOL, URINE (RUSH): NEGATIVE 25 NG/ML
TEMAZEPAM, URINE (RUSH): NEGATIVE 25 NG/ML
THC-COOH, URINE (RUSH): NEGATIVE 25 NG/ML
TRAMADOL, URINE (RUSH): NEGATIVE 100 NG/ML

## 2022-11-01 ENCOUNTER — OFFICE VISIT (OUTPATIENT)
Dept: FAMILY MEDICINE | Facility: CLINIC | Age: 67
End: 2022-11-01
Payer: COMMERCIAL

## 2022-11-01 VITALS
TEMPERATURE: 98 F | HEART RATE: 72 BPM | HEIGHT: 72 IN | SYSTOLIC BLOOD PRESSURE: 130 MMHG | BODY MASS INDEX: 26.16 KG/M2 | WEIGHT: 193.13 LBS | OXYGEN SATURATION: 99 % | DIASTOLIC BLOOD PRESSURE: 68 MMHG

## 2022-11-01 DIAGNOSIS — J01.00 ACUTE NON-RECURRENT MAXILLARY SINUSITIS: Primary | ICD-10-CM

## 2022-11-01 PROCEDURE — 3078F PR MOST RECENT DIASTOLIC BLOOD PRESSURE < 80 MM HG: ICD-10-PCS | Mod: ,,, | Performed by: FAMILY MEDICINE

## 2022-11-01 PROCEDURE — 3075F SYST BP GE 130 - 139MM HG: CPT | Mod: ,,, | Performed by: FAMILY MEDICINE

## 2022-11-01 PROCEDURE — 1159F PR MEDICATION LIST DOCUMENTED IN MEDICAL RECORD: ICD-10-PCS | Mod: ,,, | Performed by: FAMILY MEDICINE

## 2022-11-01 PROCEDURE — 3288F PR FALLS RISK ASSESSMENT DOCUMENTED: ICD-10-PCS | Mod: ,,, | Performed by: FAMILY MEDICINE

## 2022-11-01 PROCEDURE — 3051F PR MOST RECENT HEMOGLOBIN A1C LEVEL 7.0 - < 8.0%: ICD-10-PCS | Mod: ,,, | Performed by: FAMILY MEDICINE

## 2022-11-01 PROCEDURE — 3051F HG A1C>EQUAL 7.0%<8.0%: CPT | Mod: ,,, | Performed by: FAMILY MEDICINE

## 2022-11-01 PROCEDURE — 3075F PR MOST RECENT SYSTOLIC BLOOD PRESS GE 130-139MM HG: ICD-10-PCS | Mod: ,,, | Performed by: FAMILY MEDICINE

## 2022-11-01 PROCEDURE — 1101F PR PT FALLS ASSESS DOC 0-1 FALLS W/OUT INJ PAST YR: ICD-10-PCS | Mod: ,,, | Performed by: FAMILY MEDICINE

## 2022-11-01 PROCEDURE — 96372 PR INJECTION,THERAP/PROPH/DIAG2ST, IM OR SUBCUT: ICD-10-PCS | Mod: ,,, | Performed by: FAMILY MEDICINE

## 2022-11-01 PROCEDURE — 3288F FALL RISK ASSESSMENT DOCD: CPT | Mod: ,,, | Performed by: FAMILY MEDICINE

## 2022-11-01 PROCEDURE — 3078F DIAST BP <80 MM HG: CPT | Mod: ,,, | Performed by: FAMILY MEDICINE

## 2022-11-01 PROCEDURE — 99213 PR OFFICE/OUTPT VISIT, EST, LEVL III, 20-29 MIN: ICD-10-PCS | Mod: 25,,, | Performed by: FAMILY MEDICINE

## 2022-11-01 PROCEDURE — 4010F ACE/ARB THERAPY RXD/TAKEN: CPT | Mod: ,,, | Performed by: FAMILY MEDICINE

## 2022-11-01 PROCEDURE — 1101F PT FALLS ASSESS-DOCD LE1/YR: CPT | Mod: ,,, | Performed by: FAMILY MEDICINE

## 2022-11-01 PROCEDURE — 1159F MED LIST DOCD IN RCRD: CPT | Mod: ,,, | Performed by: FAMILY MEDICINE

## 2022-11-01 PROCEDURE — 99213 OFFICE O/P EST LOW 20 MIN: CPT | Mod: 25,,, | Performed by: FAMILY MEDICINE

## 2022-11-01 PROCEDURE — 3008F BODY MASS INDEX DOCD: CPT | Mod: ,,, | Performed by: FAMILY MEDICINE

## 2022-11-01 PROCEDURE — 4010F PR ACE/ARB THEARPY RXD/TAKEN: ICD-10-PCS | Mod: ,,, | Performed by: FAMILY MEDICINE

## 2022-11-01 PROCEDURE — 3008F PR BODY MASS INDEX (BMI) DOCUMENTED: ICD-10-PCS | Mod: ,,, | Performed by: FAMILY MEDICINE

## 2022-11-01 PROCEDURE — 96372 THER/PROPH/DIAG INJ SC/IM: CPT | Mod: ,,, | Performed by: FAMILY MEDICINE

## 2022-11-01 RX ORDER — CEFUROXIME AXETIL 250 MG/1
250 TABLET ORAL 2 TIMES DAILY
Qty: 20 TABLET | Refills: 0 | Status: SHIPPED | OUTPATIENT
Start: 2022-11-01 | End: 2023-06-07 | Stop reason: ALTCHOICE

## 2022-11-01 RX ORDER — DEXAMETHASONE SODIUM PHOSPHATE 4 MG/ML
2 INJECTION, SOLUTION INTRA-ARTICULAR; INTRALESIONAL; INTRAMUSCULAR; INTRAVENOUS; SOFT TISSUE
Status: COMPLETED | OUTPATIENT
Start: 2022-11-01 | End: 2022-11-01

## 2022-11-01 RX ORDER — METHYLPREDNISOLONE ACETATE 80 MG/ML
40 INJECTION, SUSPENSION INTRA-ARTICULAR; INTRALESIONAL; INTRAMUSCULAR; SOFT TISSUE
Status: COMPLETED | OUTPATIENT
Start: 2022-11-01 | End: 2022-11-01

## 2022-11-01 RX ORDER — CEFTRIAXONE 1 G/1
1 INJECTION, POWDER, FOR SOLUTION INTRAMUSCULAR; INTRAVENOUS
Status: COMPLETED | OUTPATIENT
Start: 2022-11-01 | End: 2022-11-01

## 2022-11-01 RX ORDER — ESCITALOPRAM OXALATE 10 MG/1
10 TABLET ORAL DAILY
Qty: 30 TABLET | Refills: 11 | Status: SHIPPED | OUTPATIENT
Start: 2022-11-01 | End: 2022-12-14 | Stop reason: SDUPTHER

## 2022-11-01 RX ADMIN — CEFTRIAXONE 1 G: 1 INJECTION, POWDER, FOR SOLUTION INTRAMUSCULAR; INTRAVENOUS at 02:11

## 2022-11-01 RX ADMIN — DEXAMETHASONE SODIUM PHOSPHATE 2 MG: 4 INJECTION, SOLUTION INTRA-ARTICULAR; INTRALESIONAL; INTRAMUSCULAR; INTRAVENOUS; SOFT TISSUE at 02:11

## 2022-11-01 RX ADMIN — METHYLPREDNISOLONE ACETATE 40 MG: 80 INJECTION, SUSPENSION INTRA-ARTICULAR; INTRALESIONAL; INTRAMUSCULAR; SOFT TISSUE at 02:11

## 2022-11-01 NOTE — PROGRESS NOTES
Rodney Morel MD   [facility]  [address] [place] [address]     PATIENT NAME: Micheal Taylor  : 1955  DATE: 22  MRN: [id number]      Billing Provider: Rodney Morel MD  Level of Service:   Patient PCP Information       Provider PCP Type    Rodney Mroel MD General            Reason for Visit / Chief Complaint: Sinus Problem (Headache, fever, chills, body aches, fatigue, diarrhea and runny nose - started Friday evening. States he had a temp of 102.6 last night. Taking ibuprofen as needed.) and Depression (Discuss depression. States it seemed to start after his heart surgery.)         History of Present Illness / Problem Focused Workflow     Micheal Taylor presents to the clinic with Sinus Problem (Headache, fever, chills, body aches, fatigue, diarrhea and runny nose - started Friday evening. States he had a temp of 102.6 last night. Taking ibuprofen as needed.) and Depression (Discuss depression. States it seemed to start after his heart surgery.)     HPI    Review of Systems     Review of Systems   Constitutional:  Negative for activity change, appetite change, fatigue and fever.   HENT:  Positive for nasal congestion, rhinorrhea, sinus pressure/congestion and sore throat. Negative for ear pain and hearing loss.    Respiratory:  Positive for cough. Negative for chest tightness and shortness of breath.    Cardiovascular:  Negative for chest pain and palpitations.   Gastrointestinal:  Negative for abdominal pain and fecal incontinence.   Genitourinary:  Negative for bladder incontinence, difficulty urinating and erectile dysfunction.   Musculoskeletal:  Negative for arthralgias.   Integumentary:  Negative for rash.   Neurological:  Negative for dizziness and headaches.      Medical / Social / Family History     Past Medical History:   Diagnosis Date    Anticoagulant long-term use     CHF (congestive heart failure)     Chronic pain syndrome     Coronary  artery disease     Diabetes mellitus, type 2     Hyperlipidemia     Hypertension     Lumbar spondylosis     Lumbosacral radiculopathy     Pain in thoracic spine     PVD (peripheral vascular disease)     Renal disorder     Spondylosis without myelopathy or radiculopathy, cervical region        Past Surgical History:   Procedure Laterality Date    ADENOIDECTOMY      AMPUTATION      left index finger removed     CAUDAL EPIDURAL STEROID INJECTION  01/19/2016    Caudal NATHAN - Alee    CIRCUMCISION      CORONARY ARTERY BYPASS GRAFT (CABG)      FOOT SURGERY      heel surgery    HEEL SPUR SURGERY      INJECTION OF FACET JOINT Bilateral 04/16/2015    Bilateral L3-S1 Facet Injection - Alee - 4/16/15, 11/15/12 and 9/27/12    LEFT HEART CATHETERIZATION Left 7/19/2022    Procedure: Left heart cath;  Surgeon: Venu Whitlock MD;  Location: [facility] CATH LAB;  Service: Cardiology;  Laterality: Left;  Patient has PAD and has had bilateral fem pop bypass. He is followed by Dr. Forrest.    RADIOFREQUENCY THERMOCOAGULATION Left 02/26/2013    Left L3-S1 RFTC -Alee    RADIOFREQUENCY THERMOCOAGULATION Right 02/05/2013    Right L3-S1 RFTC - Alee    TONSILLECTOMY      TONSILLECTOMY, ADENOIDECTOMY, BILATERAL MYRINGOTOMY AND TUBES      TRANSFORAMINAL EPIDURAL INJECTION OF STEROID Left 06/19/2012    Left L3-4 TFESI - Alee    TRANSFORAMINAL EPIDURAL INJECTION OF STEROID Right 04/12/2013    Right L3-4 TFESI - Alee - 4/12/13 and 8/7/12    VASCULAR SURGERY         Social History  Micheal Taylor  reports that he has quit smoking. He has quit using smokeless tobacco.  His smokeless tobacco use included snuff. He reports that he does not drink alcohol and does not use drugs.    Family History  Micheal Taylor  family history includes Diabetes in his mother; Heart disease in his father and mother; Hypertension in his father and mother; Liver cancer in his mother.    Medications and Allergies     Medications  Outpatient Medications Marked  as Taking for the 11/1/22 encounter (Office Visit) with Rodney Morel MD   Medication Sig Dispense Refill    amLODIPine (NORVASC) 5 MG tablet Take 1 tablet (5 mg total) by mouth once daily. 90 tablet 1    aspirin (ECOTRIN) 81 MG EC tablet Take 81 mg by mouth once daily.      cholecalciferol, vitamin D3, (VITAMIN D3) 25 mcg (1,000 unit) capsule       cinnamon bark 500 mg capsule Take 500 mg by mouth once daily.       clopidogreL (PLAVIX) 75 mg tablet Take 1 tablet (75 mg total) by mouth once daily. 90 tablet 1    dapagliflozin (FARXIGA) 10 mg tablet Take 1 tablet by mouth once daily.      docosahexaenoic acid-epa 120-180 mg Cap Take 1 capsule by mouth.      ezetimibe (ZETIA) 10 mg tablet Take 1 tablet (10 mg total) by mouth once daily. 90 tablet 1    fenofibrate (TRICOR) 145 MG tablet Take 1 tablet (145 mg total) by mouth once daily. 90 tablet 1    gabapentin (NEURONTIN) 300 MG capsule Take 1 capsule (300 mg total) by mouth 3 (three) times daily. 360 capsule 1    hydrALAZINE (APRESOLINE) 50 MG tablet Take 1 tablet (50 mg total) by mouth 4 (four) times daily. 360 tablet 1    HYDROcodone-acetaminophen (NORCO)  mg per tablet Take 1 tablet by mouth every 8 (eight) hours. 90 tablet 0    [START ON 11/11/2022] HYDROcodone-acetaminophen (NORCO)  mg per tablet Take 1 tablet by mouth every 8 (eight) hours. 90 tablet 0    [START ON 12/13/2022] HYDROcodone-acetaminophen (NORCO)  mg per tablet Take 1 tablet by mouth every 8 (eight) hours. 90 tablet 0    icosapent ethyL (VASCEPA) 1 gram Cap Take 2 capsules (2,000 mg total) by mouth 2 (two) times a day. 360 capsule 1    insulin regular 100 unit/mL Inj injection Novolin R Regular U-100 Insuln   14-32 u bidwm      liraglutide 0.6 mg/0.1 mL, 18 mg/3 mL, subq PNIJ (VICTOZA 2-CAITLIN) 0.6 mg/0.1 mL (18 mg/3 mL) PnIj pen Inject 1.8 mg into the skin.      lisinopriL (PRINIVIL,ZESTRIL) 20 MG tablet Take 1 tablet (20 mg total) by mouth once daily. 90  tablet 1    magnesium 250 mg Tab Take 1 tablet by mouth once daily.       metFORMIN (GLUCOPHAGE) 500 MG tablet Take 500 mg by mouth 2 (two) times daily with meals.      metoprolol succinate (TOPROL-XL) 25 MG 24 hr tablet Take 1 tablet (25 mg total) by mouth once daily. 90 tablet 1    nitroGLYCERIN (NITROSTAT) 0.4 MG SL tablet Place 1 tablet (0.4 mg total) under the tongue every 5 (five) minutes as needed for Chest pain. 25 tablet 3    omeprazole (PRILOSEC) 20 MG capsule Take 1 capsule (20 mg total) by mouth once daily. 90 capsule 1    potassium chloride SA (K-DUR,KLOR-CON) 20 MEQ tablet Take 1 tablet (20 mEq total) by mouth once daily. 90 tablet 1    rosuvastatin (CRESTOR) 40 MG Tab Take 1 tablet (40 mg total) by mouth every evening. 90 tablet 3    tiZANidine (ZANAFLEX) 4 MG tablet Take 1 tablet (4 mg total) by mouth every 8 (eight) hours. 90 tablet 2    TRESIBA FLEXTOUCH U-200 200 unit/mL (3 mL) insulin pen Inject 54 Units into the skin once daily.       vitamin A 8000 UNIT capsule Take 8,000 Units by mouth.      vitamin E 400 UNIT capsule Take 400 Units by mouth once daily.          Allergies  Review of patient's allergies indicates:  No Known Allergies    Physical Examination     Vitals:    11/01/22 1335   BP: 130/68   Pulse: 72   Temp: 97.9 °F (36.6 °C)     Physical Exam  Constitutional:       General: He is not in acute distress.     Appearance: He is not ill-appearing.   HENT:      Head: Normocephalic and atraumatic.      Right Ear: Tympanic membrane and ear canal normal.      Left Ear: Tympanic membrane and ear canal normal.      Nose: Congestion and rhinorrhea present.   Eyes:      Pupils: Pupils are equal, round, and reactive to light.   Cardiovascular:      Rate and Rhythm: Normal rate and regular rhythm.      Pulses: Normal pulses.      Heart sounds: No murmur heard.  Pulmonary:      Effort: No respiratory distress.      Breath sounds: No wheezing, rhonchi or rales.   Abdominal:      General:  Bowel sounds are normal.      Palpations: Abdomen is soft.      Tenderness: There is no abdominal tenderness.      Hernia: No hernia is present.   Musculoskeletal:      Cervical back: Normal range of motion and neck supple.   Lymphadenopathy:      Cervical: No cervical adenopathy.   Skin:     General: Skin is warm and dry.   Neurological:      Mental Status: He is alert.   Psychiatric:         Behavior: Behavior normal.         Thought Content: Thought content normal.        Assessment and Plan (including Health Maintenance)   :    Plan:         Health Maintenance Due   Topic Date Due    Hepatitis C Screening  Never done    TETANUS VACCINE  Never done    Sign Pain Contract  Never done    Colorectal Cancer Screening  Never done    Pneumococcal Vaccines (Age 65+) (2 - PCV) 12/06/2019    Eye Exam  03/12/2022    Shingles Vaccine (2 of 2) 05/05/2022    Diabetes Urine Screening  08/11/2022    Influenza Vaccine (1) 09/01/2022       Problem List Items Addressed This Visit    None    There are no diagnoses linked to this encounter.   Health Maintenance Topics with due status: Not Due       Topic Last Completion Date    Lipid Panel 02/28/2022    Foot Exam 03/10/2022    Hemoglobin A1c 08/29/2022    High Dose Statin 11/01/2022       Procedures     Future Appointments   Date Time Provider Department Center   11/30/2022  9:30 AM IVY Lama [facility]   1/11/2023  9:30 AM MARCELL Escobedo [facility]   2/9/2023 10:00 AM [facility] VAS US1 [facility]   2/9/2023 10:30 AM Loida Forrest MD [facility]   2/28/2023  1:00 PM AMADO Scott [facility]   3/1/2023  8:00 AM Rodney Morel MD [facility]        No follow-ups on file.       Signature:  Rodney Morel MD  [facility]  [address] [place] [address]  [phone] Phone  [phone] Fax    Date of encounter: 11/1/22

## 2022-11-09 DIAGNOSIS — Z71.89 COMPLEX CARE COORDINATION: ICD-10-CM

## 2022-11-30 ENCOUNTER — OFFICE VISIT (OUTPATIENT)
Dept: CARDIOLOGY | Facility: CLINIC | Age: 67
End: 2022-11-30
Payer: COMMERCIAL

## 2022-11-30 VITALS
BODY MASS INDEX: 24.2 KG/M2 | OXYGEN SATURATION: 96 % | HEIGHT: 73 IN | WEIGHT: 182.63 LBS | SYSTOLIC BLOOD PRESSURE: 112 MMHG | DIASTOLIC BLOOD PRESSURE: 54 MMHG | HEART RATE: 67 BPM

## 2022-11-30 DIAGNOSIS — I25.83 CORONARY ARTERY DISEASE DUE TO LIPID RICH PLAQUE: Primary | ICD-10-CM

## 2022-11-30 DIAGNOSIS — I25.10 CORONARY ARTERY DISEASE DUE TO LIPID RICH PLAQUE: Primary | ICD-10-CM

## 2022-11-30 PROCEDURE — 3074F PR MOST RECENT SYSTOLIC BLOOD PRESSURE < 130 MM HG: ICD-10-PCS | Mod: CPTII,,, | Performed by: NURSE PRACTITIONER

## 2022-11-30 PROCEDURE — 1159F PR MEDICATION LIST DOCUMENTED IN MEDICAL RECORD: ICD-10-PCS | Mod: CPTII,,, | Performed by: NURSE PRACTITIONER

## 2022-11-30 PROCEDURE — 99214 PR OFFICE/OUTPT VISIT, EST, LEVL IV, 30-39 MIN: ICD-10-PCS | Mod: S$PBB,,, | Performed by: NURSE PRACTITIONER

## 2022-11-30 PROCEDURE — 99215 OFFICE O/P EST HI 40 MIN: CPT | Mod: PBBFAC | Performed by: NURSE PRACTITIONER

## 2022-11-30 PROCEDURE — 3078F PR MOST RECENT DIASTOLIC BLOOD PRESSURE < 80 MM HG: ICD-10-PCS | Mod: CPTII,,, | Performed by: NURSE PRACTITIONER

## 2022-11-30 PROCEDURE — 3051F HG A1C>EQUAL 7.0%<8.0%: CPT | Mod: CPTII,,, | Performed by: NURSE PRACTITIONER

## 2022-11-30 PROCEDURE — 3008F BODY MASS INDEX DOCD: CPT | Mod: CPTII,,, | Performed by: NURSE PRACTITIONER

## 2022-11-30 PROCEDURE — 3051F PR MOST RECENT HEMOGLOBIN A1C LEVEL 7.0 - < 8.0%: ICD-10-PCS | Mod: CPTII,,, | Performed by: NURSE PRACTITIONER

## 2022-11-30 PROCEDURE — 99214 OFFICE O/P EST MOD 30 MIN: CPT | Mod: S$PBB,,, | Performed by: NURSE PRACTITIONER

## 2022-11-30 PROCEDURE — 4010F PR ACE/ARB THEARPY RXD/TAKEN: ICD-10-PCS | Mod: CPTII,,, | Performed by: NURSE PRACTITIONER

## 2022-11-30 PROCEDURE — 3074F SYST BP LT 130 MM HG: CPT | Mod: CPTII,,, | Performed by: NURSE PRACTITIONER

## 2022-11-30 PROCEDURE — 3078F DIAST BP <80 MM HG: CPT | Mod: CPTII,,, | Performed by: NURSE PRACTITIONER

## 2022-11-30 PROCEDURE — 1159F MED LIST DOCD IN RCRD: CPT | Mod: CPTII,,, | Performed by: NURSE PRACTITIONER

## 2022-11-30 PROCEDURE — 3008F PR BODY MASS INDEX (BMI) DOCUMENTED: ICD-10-PCS | Mod: CPTII,,, | Performed by: NURSE PRACTITIONER

## 2022-11-30 PROCEDURE — 4010F ACE/ARB THERAPY RXD/TAKEN: CPT | Mod: CPTII,,, | Performed by: NURSE PRACTITIONER

## 2022-11-30 NOTE — PROGRESS NOTES
[facility] note        DATE OF SERVICE: 11/30/2022       PCP: Rodney Morel MD      CHIEF COMPLAINT:   Chief Complaint   Patient presents with    Edema     Does not go down at night, not new        HISTORY OF PRESENT ILLNESS:  Micheal Taylor is a 67 y.o. male with a PMH of   Past Medical History:   Diagnosis Date    Anticoagulant long-term use     CHF (congestive heart failure)     Chronic pain syndrome     Coronary artery disease     Diabetes mellitus, type 2     Hyperlipidemia     Hypertension     Lumbar spondylosis     Lumbosacral radiculopathy     Pain in thoracic spine     PVD (peripheral vascular disease)     Renal disorder     Spondylosis without myelopathy or radiculopathy, cervical region      who presents for routine follow up. He denies issues with chest pain, pressure, heaviness, or tightness.   8/29/2022 HD follow up s/p BECKIE d left main coronary artery . He has had no further issues with chest pain . He states he did slip and fall coming into clinic but denies injury other than an abraison to his left knee.   Chief Complaint   Patient presents with    Edema     Does not go down at night, not new            PAST MEDICAL HISTORY:  Past Medical History:   Diagnosis Date    Anticoagulant long-term use     CHF (congestive heart failure)     Chronic pain syndrome     Coronary artery disease     Diabetes mellitus, type 2     Hyperlipidemia     Hypertension     Lumbar spondylosis     Lumbosacral radiculopathy     Pain in thoracic spine     PVD (peripheral vascular disease)     Renal disorder     Spondylosis without myelopathy or radiculopathy, cervical region        PAST SURGICAL HISTORY:  Past Surgical History:   Procedure Laterality Date    ADENOIDECTOMY      AMPUTATION      left index finger removed     CAUDAL EPIDURAL STEROID INJECTION  01/19/2016    Caudal NATHAN - Alee    CIRCUMCISION      CORONARY ARTERY BYPASS GRAFT (CABG)      FOOT SURGERY      heel surgery    HEEL SPUR SURGERY       INJECTION OF FACET JOINT Bilateral 04/16/2015    Bilateral L3-S1 Facet Injection - Alee - 4/16/15, 11/15/12 and 9/27/12    LEFT HEART CATHETERIZATION Left 7/19/2022    Procedure: Left heart cath;  Surgeon: Venu Whitlock MD;  Location: [facility] CATH LAB;  Service: Cardiology;  Laterality: Left;  Patient has PAD and has had bilateral fem pop bypass. He is followed by Dr. Forrest.    RADIOFREQUENCY THERMOCOAGULATION Left 02/26/2013    Left L3-S1 RFTC -Alee    RADIOFREQUENCY THERMOCOAGULATION Right 02/05/2013    Right L3-S1 RFTC - Alee    TONSILLECTOMY      TONSILLECTOMY, ADENOIDECTOMY, BILATERAL MYRINGOTOMY AND TUBES      TRANSFORAMINAL EPIDURAL INJECTION OF STEROID Left 06/19/2012    Left L3-4 TFESI - Alee    TRANSFORAMINAL EPIDURAL INJECTION OF STEROID Right 04/12/2013    Right L3-4 TFESI - Alee - 4/12/13 and 8/7/12    VASCULAR SURGERY         SOCIAL HISTORY:  Social History     Socioeconomic History    Marital status:    Tobacco Use    Smoking status: Former    Smokeless tobacco: Former     Types: Snuff    Tobacco comments:     quit about 2 weeks ago   Substance and Sexual Activity    Alcohol use: Never    Drug use: Never       FAMILY HISTORY:  Family History   Problem Relation Age of Onset    Diabetes Mother     Heart disease Mother     Hypertension Mother     Liver cancer Mother     Heart disease Father     Hypertension Father          ALLERGIES:  Review of patient's allergies indicates:  No Known Allergies     MEDICATIONS:    Current Outpatient Medications:     amLODIPine (NORVASC) 5 MG tablet, Take 1 tablet (5 mg total) by mouth once daily., Disp: 90 tablet, Rfl: 1    aspirin (ECOTRIN) 81 MG EC tablet, Take 81 mg by mouth once daily., Disp: , Rfl:     cefUROXime (CEFTIN) 250 MG tablet, Take 1 tablet (250 mg total) by mouth 2 (two) times daily., Disp: 20 tablet, Rfl: 0    cholecalciferol, vitamin D3, (VITAMIN D3) 25 mcg (1,000 unit) capsule, , Disp: , Rfl:     cinnamon bark 500 mg capsule, Take 500 mg by  mouth once daily. , Disp: , Rfl:     clopidogreL (PLAVIX) 75 mg tablet, Take 1 tablet (75 mg total) by mouth once daily., Disp: 90 tablet, Rfl: 1    dapagliflozin (FARXIGA) 10 mg tablet, Take 1 tablet by mouth once daily., Disp: , Rfl:     docosahexaenoic acid-epa 120-180 mg Cap, Take 1 capsule by mouth., Disp: , Rfl:     EScitalopram oxalate (LEXAPRO) 10 MG tablet, Take 1 tablet (10 mg total) by mouth once daily., Disp: 30 tablet, Rfl: 11    ezetimibe (ZETIA) 10 mg tablet, Take 1 tablet (10 mg total) by mouth once daily., Disp: 90 tablet, Rfl: 1    fenofibrate (TRICOR) 145 MG tablet, Take 1 tablet (145 mg total) by mouth once daily., Disp: 90 tablet, Rfl: 1    gabapentin (NEURONTIN) 300 MG capsule, Take 1 capsule (300 mg total) by mouth 3 (three) times daily., Disp: 360 capsule, Rfl: 1    hydrALAZINE (APRESOLINE) 50 MG tablet, Take 1 tablet (50 mg total) by mouth 4 (four) times daily., Disp: 360 tablet, Rfl: 1    HYDROcodone-acetaminophen (NORCO)  mg per tablet, Take 1 tablet by mouth every 8 (eight) hours., Disp: 90 tablet, Rfl: 0    [START ON 12/13/2022] HYDROcodone-acetaminophen (NORCO)  mg per tablet, Take 1 tablet by mouth every 8 (eight) hours., Disp: 90 tablet, Rfl: 0    insulin regular 100 unit/mL Inj injection, Novolin R Regular U-100 Insuln  14-32 u bidwm, Disp: , Rfl:     liraglutide 0.6 mg/0.1 mL, 18 mg/3 mL, subq PNIJ (VICTOZA 2-CAITLIN) 0.6 mg/0.1 mL (18 mg/3 mL) PnIj pen, Inject 1.8 mg into the skin., Disp: , Rfl:     lisinopriL (PRINIVIL,ZESTRIL) 20 MG tablet, Take 1 tablet (20 mg total) by mouth once daily., Disp: 90 tablet, Rfl: 1    magnesium 250 mg Tab, Take 1 tablet by mouth once daily. , Disp: , Rfl:     metFORMIN (GLUCOPHAGE) 500 MG tablet, Take 500 mg by mouth 2 (two) times daily with meals., Disp: , Rfl:     metoprolol succinate (TOPROL-XL) 25 MG 24 hr tablet, Take 1 tablet (25 mg total) by mouth once daily., Disp: 90 tablet, Rfl: 1    nitroGLYCERIN (NITROSTAT) 0.4 MG SL tablet,  "Place 1 tablet (0.4 mg total) under the tongue every 5 (five) minutes as needed for Chest pain., Disp: 25 tablet, Rfl: 3    olopatadine (PATADAY) 0.2 % Drop, Place 1 drop into both eyes once daily., Disp: 5 mL, Rfl: 0    omeprazole (PRILOSEC) 20 MG capsule, Take 1 capsule (20 mg total) by mouth once daily., Disp: 90 capsule, Rfl: 1    potassium chloride SA (K-DUR,KLOR-CON) 20 MEQ tablet, Take 1 tablet (20 mEq total) by mouth once daily., Disp: 90 tablet, Rfl: 1    rosuvastatin (CRESTOR) 40 MG Tab, Take 1 tablet (40 mg total) by mouth every evening., Disp: 90 tablet, Rfl: 3    tiZANidine (ZANAFLEX) 4 MG tablet, Take 1 tablet (4 mg total) by mouth every 8 (eight) hours., Disp: 90 tablet, Rfl: 2    TRESIBA FLEXTOUCH U-200 200 unit/mL (3 mL) insulin pen, Inject 54 Units into the skin once daily. , Disp: , Rfl:     vitamin A 8000 UNIT capsule, Take 8,000 Units by mouth., Disp: , Rfl:     vitamin E 400 UNIT capsule, Take 400 Units by mouth once daily. , Disp: , Rfl:   Medications have been reviewed but not reconciled. He did not bring his meds today.    PHYSICAL EXAM:  BP (!) 112/54 (BP Location: Left arm, Patient Position: Sitting)   Pulse 67   Ht 6' 1" (1.854 m)   Wt 82.8 kg (182 lb 9.6 oz)   SpO2 96%   BMI 24.09 kg/m²   Wt Readings from Last 3 Encounters:   11/30/22 82.8 kg (182 lb 9.6 oz)   11/01/22 87.6 kg (193 lb 2 oz)   10/04/22 89 kg (196 lb 3.2 oz)      Body mass index is 24.09 kg/m².    Physical Exam  Vitals and nursing note reviewed.   Constitutional:       Appearance: Normal appearance. He is normal weight.   Eyes:      Pupils: Pupils are equal, round, and reactive to light.   Cardiovascular:      Rate and Rhythm: Normal rate and regular rhythm.      Pulses: Normal pulses.      Heart sounds: Normal heart sounds.   Abdominal:      General: Bowel sounds are normal.      Palpations: Abdomen is soft.   Musculoskeletal:      Cervical back: Neck supple.      Right lower leg: Edema present.      Left lower leg: " Edema present.      Comments: 1+ NP edema BLE   Skin:     General: Skin is warm and dry.      Comments: Bandaids intact to right knee   Neurological:      General: No focal deficit present.      Mental Status: He is alert.       LABS REVIEWED:  Lab Results   Component Value Date    WBC 8.72 08/29/2022    RBC 4.86 08/29/2022    HGB 13.0 (L) 08/29/2022    HCT 41.6 08/29/2022    MCV 85.6 08/29/2022    MCH 26.7 (L) 08/29/2022    MCHC 31.3 (L) 08/29/2022    RDW 14.1 08/29/2022     08/29/2022    MPV 12.0 08/29/2022    NRBC 0.0 08/29/2022    INR 1.05 07/14/2022     Lab Results   Component Value Date     08/29/2022    K 5.0 08/29/2022     08/29/2022    CO2 28 08/29/2022    BUN 38 (H) 08/29/2022     Lab Results   Component Value Date    AST 28 08/29/2022    ALT 25 08/29/2022     Lab Results   Component Value Date     (H) 08/29/2022    HGBA1C 7.0 (H) 08/29/2022     Lab Results   Component Value Date    CHOL 93 02/28/2022    HDL 31 (L) 02/28/2022    TRIG 152 (H) 02/28/2022    CHOLHDL 3.0 02/28/2022     CARDIAC STUDIES REVIEWED:EKG: .   Stress test  Results for orders placed during the hospital encounter of 05/25/22    Nuclear Stress Test    Interpretation Summary    The EKG portion of this study is negative for ischemia.    The patient reported no chest pain during the stress test.    There were no arrhythmias during stress.    nuclear images.   Impression:     1.  Large inferolateral wall fixed perfusion defect along the base in stressed images showing no improvement in rest consistent with scar but little evidence of ischemia.     2.  Normal left ventricular size with anterior and septal hypokinesis and overall mildly impaired left ventricular systolic function, ejection fraction 45%.        Electronically signed by: Venu Whitlock  Date:                                            05/25/2022  Time:                                           12:23  echocardiogram  Results for orders placed during  the hospital encounter of 05/25/22    Echo Saline Bubble? No    Interpretation Summary  · The left ventricle is normal in size with moderate concentric hypertrophy and normal systolic function.  · The estimated ejection fraction is 55%.  · Atrial fibrillation not observed.  · Normal left ventricular diastolic function.  · Mild-to-moderate mitral regurgitation.     Heart cath  Results for orders placed during the hospital encounter of 07/19/22    Cardiac catheterization    Conclusion  · There was severe left ventricular systolic dysfunction.  · The left ventricular end diastolic pressure was severely elevated.  · The pre-procedure left ventricular end diastolic pressure was 30.  · The ejection fraction was calculated to be 25%.  · There was no mitral valve regurgitation.  · The Mid LM lesion was 95% stenosed with 15% stenosis post-intervention.  · A STENT S SMATOOS MR 4.00 X 12MM stent was successfully placed at 14 THERESE for 14 sec.  · The Ost Cx to Prox Cx lesion was 100% stenosed.  · The 1st Mrg lesion was 99% stenosed.  · The Mid Cx lesion was 100% stenosed.  · The Prox RCA to Mid RCA lesion was 99% stenosed.  · The Mid RCA lesion was 100% stenosed.  · The estimated blood loss was none.  · There was three vessel coronary artery disease. There was left main disease.    The procedure log was documented by Documenter: RT Pa and verified by Venu Whitlock MD.    Date: 7/19/2022  Time: 4:10 PM           ASSESSMENT:   Patient Active Problem List   Diagnosis    CHF (congestive heart failure)    Coronary artery disease    Hyperlipidemia    Hypertension    Left ventricular diastolic dysfunction    Aortic valve sclerosis    Mild mitral regurgitation by prior echocardiogram    Shortness of breath on exertion    Lumbosacral radiculopathy    Cervical spondylosis without myelopathy    Postlaminectomy syndrome, lumbar region    PVD (peripheral vascular disease)    Type 2 diabetes mellitus with circulatory  disorder, without long-term current use of insulin    Pain in thoracic spine    Type 2 diabetes mellitus with diabetic neuropathy, with long-term current use of insulin    Loss of protective sensation of skin of foot    Chest pain    Abnormal results of cardiovascular function studies    Nonspecific abnormal electrocardiogram (ECG) (EKG)    Old MI (myocardial infarction)            Problem List Items Addressed This Visit    None       PLAN:  RTC  6 months

## 2022-12-09 DIAGNOSIS — I10 HYPERTENSION, UNSPECIFIED TYPE: ICD-10-CM

## 2022-12-09 RX ORDER — LISINOPRIL 20 MG/1
20 TABLET ORAL DAILY
Qty: 90 TABLET | Refills: 1 | Status: SHIPPED | OUTPATIENT
Start: 2022-12-09 | End: 2023-06-07 | Stop reason: SDUPTHER

## 2022-12-14 RX ORDER — POTASSIUM CHLORIDE 20 MEQ/1
20 TABLET, EXTENDED RELEASE ORAL DAILY
Qty: 90 TABLET | Refills: 0 | Status: SHIPPED | OUTPATIENT
Start: 2022-12-14 | End: 2023-03-01 | Stop reason: SDUPTHER

## 2022-12-14 RX ORDER — GABAPENTIN 300 MG/1
300 CAPSULE ORAL 3 TIMES DAILY
Qty: 270 CAPSULE | Refills: 0 | Status: SHIPPED | OUTPATIENT
Start: 2022-12-14 | End: 2022-12-21 | Stop reason: SDUPTHER

## 2022-12-14 RX ORDER — DAPAGLIFLOZIN 10 MG/1
10 TABLET, FILM COATED ORAL DAILY
Qty: 90 TABLET | Refills: 0 | Status: SHIPPED | OUTPATIENT
Start: 2022-12-14 | End: 2023-03-01 | Stop reason: SDUPTHER

## 2022-12-14 RX ORDER — ESCITALOPRAM OXALATE 10 MG/1
10 TABLET ORAL DAILY
Qty: 90 TABLET | Refills: 0 | Status: SHIPPED | OUTPATIENT
Start: 2022-12-14 | End: 2023-01-11

## 2022-12-14 RX ORDER — METFORMIN HYDROCHLORIDE 500 MG/1
500 TABLET ORAL 2 TIMES DAILY WITH MEALS
Qty: 180 TABLET | Refills: 0 | Status: SHIPPED | OUTPATIENT
Start: 2022-12-14 | End: 2023-03-01 | Stop reason: SDUPTHER

## 2022-12-14 RX ORDER — OMEPRAZOLE 20 MG/1
20 CAPSULE, DELAYED RELEASE ORAL DAILY
Qty: 90 CAPSULE | Refills: 0 | Status: SHIPPED | OUTPATIENT
Start: 2022-12-14 | End: 2023-03-01 | Stop reason: SDUPTHER

## 2022-12-21 RX ORDER — GABAPENTIN 300 MG/1
300 CAPSULE ORAL 3 TIMES DAILY
Qty: 90 CAPSULE | Refills: 0 | Status: SHIPPED | OUTPATIENT
Start: 2022-12-21 | End: 2023-01-25

## 2023-01-04 ENCOUNTER — OFFICE VISIT (OUTPATIENT)
Dept: FAMILY MEDICINE | Facility: CLINIC | Age: 68
End: 2023-01-04
Payer: COMMERCIAL

## 2023-01-04 VITALS
DIASTOLIC BLOOD PRESSURE: 68 MMHG | TEMPERATURE: 98 F | OXYGEN SATURATION: 96 % | HEART RATE: 88 BPM | HEIGHT: 73 IN | BODY MASS INDEX: 25.38 KG/M2 | WEIGHT: 191.5 LBS | SYSTOLIC BLOOD PRESSURE: 122 MMHG

## 2023-01-04 DIAGNOSIS — M25.572 ACUTE LEFT ANKLE PAIN: Primary | ICD-10-CM

## 2023-01-04 PROCEDURE — 1159F MED LIST DOCD IN RCRD: CPT | Mod: ,,, | Performed by: FAMILY MEDICINE

## 2023-01-04 PROCEDURE — 99213 PR OFFICE/OUTPT VISIT, EST, LEVL III, 20-29 MIN: ICD-10-PCS | Mod: ,,, | Performed by: FAMILY MEDICINE

## 2023-01-04 PROCEDURE — 1101F PT FALLS ASSESS-DOCD LE1/YR: CPT | Mod: ,,, | Performed by: FAMILY MEDICINE

## 2023-01-04 PROCEDURE — 1101F PR PT FALLS ASSESS DOC 0-1 FALLS W/OUT INJ PAST YR: ICD-10-PCS | Mod: ,,, | Performed by: FAMILY MEDICINE

## 2023-01-04 PROCEDURE — 3078F DIAST BP <80 MM HG: CPT | Mod: ,,, | Performed by: FAMILY MEDICINE

## 2023-01-04 PROCEDURE — 3078F PR MOST RECENT DIASTOLIC BLOOD PRESSURE < 80 MM HG: ICD-10-PCS | Mod: ,,, | Performed by: FAMILY MEDICINE

## 2023-01-04 PROCEDURE — 1159F PR MEDICATION LIST DOCUMENTED IN MEDICAL RECORD: ICD-10-PCS | Mod: ,,, | Performed by: FAMILY MEDICINE

## 2023-01-04 PROCEDURE — 3288F FALL RISK ASSESSMENT DOCD: CPT | Mod: ,,, | Performed by: FAMILY MEDICINE

## 2023-01-04 PROCEDURE — 3008F PR BODY MASS INDEX (BMI) DOCUMENTED: ICD-10-PCS | Mod: ,,, | Performed by: FAMILY MEDICINE

## 2023-01-04 PROCEDURE — 3074F PR MOST RECENT SYSTOLIC BLOOD PRESSURE < 130 MM HG: ICD-10-PCS | Mod: ,,, | Performed by: FAMILY MEDICINE

## 2023-01-04 PROCEDURE — 99213 OFFICE O/P EST LOW 20 MIN: CPT | Mod: ,,, | Performed by: FAMILY MEDICINE

## 2023-01-04 PROCEDURE — 3288F PR FALLS RISK ASSESSMENT DOCUMENTED: ICD-10-PCS | Mod: ,,, | Performed by: FAMILY MEDICINE

## 2023-01-04 PROCEDURE — 3074F SYST BP LT 130 MM HG: CPT | Mod: ,,, | Performed by: FAMILY MEDICINE

## 2023-01-04 PROCEDURE — 3008F BODY MASS INDEX DOCD: CPT | Mod: ,,, | Performed by: FAMILY MEDICINE
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
## 2023-01-11 ENCOUNTER — OFFICE VISIT (OUTPATIENT)
Dept: PAIN MEDICINE | Facility: CLINIC | Age: 68
End: 2023-01-11
Payer: COMMERCIAL

## 2023-01-11 VITALS — WEIGHT: 187 LBS | RESPIRATION RATE: 18 BRPM | HEIGHT: 73 IN | BODY MASS INDEX: 24.78 KG/M2

## 2023-01-11 DIAGNOSIS — M96.1 POSTLAMINECTOMY SYNDROME, LUMBAR REGION: Chronic | ICD-10-CM

## 2023-01-11 DIAGNOSIS — M54.6 PAIN IN THORACIC SPINE: Chronic | ICD-10-CM

## 2023-01-11 DIAGNOSIS — Z79.899 ENCOUNTER FOR LONG-TERM (CURRENT) USE OF OTHER MEDICATIONS: ICD-10-CM

## 2023-01-11 DIAGNOSIS — M47.812 CERVICAL SPONDYLOSIS WITHOUT MYELOPATHY: Chronic | ICD-10-CM

## 2023-01-11 DIAGNOSIS — M54.17 LUMBOSACRAL RADICULOPATHY: Primary | Chronic | ICD-10-CM

## 2023-01-11 PROCEDURE — 99214 PR OFFICE/OUTPT VISIT, EST, LEVL IV, 30-39 MIN: ICD-10-PCS | Mod: S$PBB,,, | Performed by: PHYSICIAN ASSISTANT

## 2023-01-11 PROCEDURE — 3288F PR FALLS RISK ASSESSMENT DOCUMENTED: ICD-10-PCS | Mod: CPTII,,, | Performed by: PHYSICIAN ASSISTANT

## 2023-01-11 PROCEDURE — 80305 DRUG TEST PRSMV DIR OPT OBS: CPT | Mod: PBBFAC | Performed by: PHYSICIAN ASSISTANT

## 2023-01-11 PROCEDURE — 1159F MED LIST DOCD IN RCRD: CPT | Mod: CPTII,,, | Performed by: PHYSICIAN ASSISTANT

## 2023-01-11 PROCEDURE — 3288F FALL RISK ASSESSMENT DOCD: CPT | Mod: CPTII,,, | Performed by: PHYSICIAN ASSISTANT

## 2023-01-11 PROCEDURE — 3008F PR BODY MASS INDEX (BMI) DOCUMENTED: ICD-10-PCS | Mod: CPTII,,, | Performed by: PHYSICIAN ASSISTANT

## 2023-01-11 PROCEDURE — 1101F PR PT FALLS ASSESS DOC 0-1 FALLS W/OUT INJ PAST YR: ICD-10-PCS | Mod: CPTII,,, | Performed by: PHYSICIAN ASSISTANT

## 2023-01-11 PROCEDURE — 3008F BODY MASS INDEX DOCD: CPT | Mod: CPTII,,, | Performed by: PHYSICIAN ASSISTANT

## 2023-01-11 PROCEDURE — 1125F PR PAIN SEVERITY QUANTIFIED, PAIN PRESENT: ICD-10-PCS | Mod: CPTII,,, | Performed by: PHYSICIAN ASSISTANT

## 2023-01-11 PROCEDURE — 99214 OFFICE O/P EST MOD 30 MIN: CPT | Mod: S$PBB,,, | Performed by: PHYSICIAN ASSISTANT

## 2023-01-11 PROCEDURE — 1159F PR MEDICATION LIST DOCUMENTED IN MEDICAL RECORD: ICD-10-PCS | Mod: CPTII,,, | Performed by: PHYSICIAN ASSISTANT

## 2023-01-11 PROCEDURE — 1101F PT FALLS ASSESS-DOCD LE1/YR: CPT | Mod: CPTII,,, | Performed by: PHYSICIAN ASSISTANT

## 2023-01-11 PROCEDURE — 1125F AMNT PAIN NOTED PAIN PRSNT: CPT | Mod: CPTII,,, | Performed by: PHYSICIAN ASSISTANT

## 2023-01-11 PROCEDURE — 99215 OFFICE O/P EST HI 40 MIN: CPT | Mod: PBBFAC | Performed by: PHYSICIAN ASSISTANT

## 2023-01-11 RX ORDER — TIZANIDINE 4 MG/1
4 TABLET ORAL EVERY 8 HOURS
Qty: 90 TABLET | Refills: 2 | Status: SHIPPED | OUTPATIENT
Start: 2023-01-11 | End: 2023-04-10 | Stop reason: SDUPTHER

## 2023-01-11 RX ORDER — HYDROCODONE BITARTRATE AND ACETAMINOPHEN 10; 325 MG/1; MG/1
1 TABLET ORAL EVERY 8 HOURS
Qty: 90 TABLET | Refills: 0 | Status: SHIPPED | OUTPATIENT
Start: 2023-02-10 | End: 2023-03-12

## 2023-01-11 RX ORDER — NALOXONE HYDROCHLORIDE 4 MG/.1ML
2 SPRAY NASAL ONCE
Qty: 2 EACH | Refills: 0 | Status: SHIPPED | OUTPATIENT
Start: 2023-01-11 | End: 2023-01-11

## 2023-01-11 RX ORDER — HYDROCODONE BITARTRATE AND ACETAMINOPHEN 10; 325 MG/1; MG/1
1 TABLET ORAL EVERY 8 HOURS
Qty: 90 TABLET | Refills: 0 | Status: SHIPPED | OUTPATIENT
Start: 2023-01-13 | End: 2023-02-12

## 2023-01-11 RX ORDER — HYDROCODONE BITARTRATE AND ACETAMINOPHEN 10; 325 MG/1; MG/1
1 TABLET ORAL EVERY 8 HOURS
Qty: 90 TABLET | Refills: 0 | Status: SHIPPED | OUTPATIENT
Start: 2023-03-14 | End: 2023-04-10 | Stop reason: SDUPTHER

## 2023-01-11 NOTE — PROGRESS NOTES
Subjective:         Patient ID: Micheal Taylor is a 67 y.o. male.    Chief Complaint: Low-back Pain      Pain  This is a chronic problem. The current episode started more than 1 year ago. The problem occurs daily. The problem has been unchanged. Associated symptoms include arthralgias and neck pain. Pertinent negatives include no anorexia, change in bowel habit, chest pain, chills, coughing, diaphoresis, fatigue, fever, sore throat, vertigo or vomiting.   Review of Systems   Constitutional:  Negative for activity change, appetite change, chills, diaphoresis, fatigue and fever.   HENT:  Negative for drooling, ear discharge, ear pain, facial swelling, mouth sores, nosebleeds, sore throat, trouble swallowing, voice change and goiter.    Eyes:  Negative for photophobia, pain, discharge, redness and visual disturbance.   Respiratory:  Negative for apnea, cough, choking, chest tightness, shortness of breath, wheezing and stridor.    Cardiovascular:  Negative for chest pain, palpitations and leg swelling.   Gastrointestinal:  Negative for abdominal distention, anorexia, change in bowel habit, diarrhea, rectal pain, vomiting, fecal incontinence and change in bowel habit.   Endocrine: Negative for cold intolerance, heat intolerance, polydipsia, polyphagia and polyuria.   Genitourinary:  Negative for bladder incontinence, dysuria, flank pain and frequency.   Musculoskeletal:  Positive for arthralgias, back pain, leg pain, neck pain and neck stiffness.   Integumentary:  Negative for color change and pallor.   Neurological:  Negative for dizziness, vertigo, seizures, syncope, facial asymmetry, speech difficulty, light-headedness, coordination difficulties, memory loss and coordination difficulties.   Hematological:  Negative for adenopathy. Does not bruise/bleed easily.   Psychiatric/Behavioral:  Negative for agitation, behavioral problems, confusion, decreased concentration, dysphoric mood, hallucinations, self-injury and  suicidal ideas. The patient is not nervous/anxious and is not hyperactive.          Past Medical History:   Diagnosis Date    Anticoagulant long-term use     CHF (congestive heart failure)     Chronic pain syndrome     Coronary artery disease     Diabetes mellitus, type 2     Hyperlipidemia     Hypertension     Lumbar spondylosis     Lumbosacral radiculopathy     Pain in thoracic spine     PVD (peripheral vascular disease)     Renal disorder     Spondylosis without myelopathy or radiculopathy, cervical region      Past Surgical History:   Procedure Laterality Date    ADENOIDECTOMY      AMPUTATION      left index finger removed     CAUDAL EPIDURAL STEROID INJECTION  01/19/2016    Caudal NATHAN - [facility]    CIRCUMCISION      CORONARY ARTERY BYPASS GRAFT (CABG)      FOOT SURGERY      heel surgery    HEEL SPUR SURGERY      INJECTION OF FACET JOINT Bilateral 04/16/2015    Bilateral L3-S1 Facet Injection - [facility] - 4/16/15, 11/15/12 and 9/27/12    LEFT HEART CATHETERIZATION Left 7/19/2022    Procedure: Left heart cath;  Surgeon: Venu Whitlock MD;  Location: [facility] CATH LAB;  Service: Cardiology;  Laterality: Left;  Patient has PAD and has had bilateral fem pop bypass. He is followed by Dr. Forrest.    RADIOFREQUENCY THERMOCOAGULATION Left 02/26/2013    Left L3-S1 RFTC -[facility]    RADIOFREQUENCY THERMOCOAGULATION Right 02/05/2013    Right L3-S1 RFTC - [facility]    TONSILLECTOMY      TONSILLECTOMY, ADENOIDECTOMY, BILATERAL MYRINGOTOMY AND TUBES      TRANSFORAMINAL EPIDURAL INJECTION OF STEROID Left 06/19/2012    Left L3-4 TFESI - [facility]    TRANSFORAMINAL EPIDURAL INJECTION OF STEROID Right 04/12/2013    Right L3-4 TFESI - [facility] - 4/12/13 and 8/7/12    VASCULAR SURGERY       Social History     Socioeconomic History    Marital status:    Tobacco Use    Smoking status: Former    Smokeless tobacco: Former     Types: Snuff    Tobacco comments:     quit about 2 weeks ago   Substance and Sexual Activity    Alcohol use: Never    Drug use: Never  "    Family History   Problem Relation Age of Onset    Diabetes Mother     Heart disease Mother     Hypertension Mother     Liver cancer Mother     Heart disease Father     Hypertension Father      Review of patient's allergies indicates:  No Known Allergies     Objective:  Vitals:    01/11/23 1016   Resp: 18   Weight: 84.8 kg (187 lb)   Height: 6' 1" (1.854 m)   PainSc:   6         Physical Exam  Vitals and nursing note reviewed. Exam conducted with a chaperone present.   Constitutional:       General: He is awake. He is not in acute distress.     Appearance: Normal appearance. He is not ill-appearing, toxic-appearing or diaphoretic.   HENT:      Head: Normocephalic and atraumatic.      Nose: Nose normal.      Mouth/Throat:      Mouth: Mucous membranes are moist.      Pharynx: Oropharynx is clear.   Eyes:      Conjunctiva/sclera: Conjunctivae normal.      Pupils: Pupils are equal, round, and reactive to light.   Cardiovascular:      Rate and Rhythm: Normal rate.   Pulmonary:      Effort: Pulmonary effort is normal. No respiratory distress.   Abdominal:      Palpations: Abdomen is soft.   Musculoskeletal:      Right shoulder: Tenderness present.      Left shoulder: Tenderness present.      Cervical back: Normal range of motion and neck supple. Tenderness present.      Thoracic back: Tenderness present.      Lumbar back: Tenderness present. Decreased range of motion.   Skin:     General: Skin is warm and dry.   Neurological:      General: No focal deficit present.      Mental Status: He is alert and oriented to person, place, and time. Mental status is at baseline.      Cranial Nerves: No cranial nerve deficit (II-XII).   Psychiatric:         Mood and Affect: Mood normal.         Behavior: Behavior normal. Behavior is cooperative.         Thought Content: Thought content normal.         X-Ray Ankle Complete 3 View Left  Narrative: EXAMINATION:  XR ANKLE COMPLETE 3 VIEW LEFT    CLINICAL HISTORY:  Pain in left ankle and " joints of left foot    COMPARISON:  None.    FINDINGS:  Left ankle, three views:    There is osteoarthritis at the talotibial joint.  An orthopedic screw bridges the talus and calcaneus and there is deformity likely secondary to an old calcaneal fracture.  There is also osteoarthritis at the talar navicular joint.    Soft tissue swelling is present but no acute fractures are seen.  There are surgical clips in the soft tissues of the medial lower leg.  Impression: Postsurgical and posttraumatic changes are present in the hindfoot and there is osteoarthritis at the talonavicular joint and talotibial joint.    [facility] of [facility]    Electronically signed by: Marichuy Dickerson  Date:    01/04/2023  Time:    16:01       Office Visit on 10/04/2022   Component Date Value Ref Range Status    POC Amphetamines 10/04/2022 Negative  Negative, Inconclusive Final    POC Barbiturates 10/04/2022 Negative  Negative, Inconclusive Final    POC Benzodiazepines 10/04/2022 Negative  Negative, Inconclusive Final    POC Cocaine 10/04/2022 Negative  Negative, Inconclusive Final    POC THC 10/04/2022 Negative  Negative, Inconclusive Final    POC Methadone 10/04/2022 Negative  Negative, Inconclusive Final    POC Methamphetamine 10/04/2022 Negative  Negative, Inconclusive Final    POC Opiates 10/04/2022 Negative  Negative, Inconclusive Final    POC Oxycodone 10/04/2022 Negative  Negative, Inconclusive Final    POC Phencyclidine 10/04/2022 Negative  Negative, Inconclusive Final    POC Methylenedioxymethamphetamine * 10/04/2022 Negative  Negative, Inconclusive Final    POC Tricyclic Antidepressants 10/04/2022 Negative  Negative, Inconclusive Final    POC Buprenorphine 10/04/2022 Negative   Final     Acceptable 10/04/2022 Yes   Final    POC Temperature (Urine) 10/04/2022 92   Final    pH, UA 10/04/2022 5.5  5.0 to 8.0 pH Units Final    Creatinine, Urine 10/04/2022 51  39 - 259 mg/dL Final    6-Acetylmorphine  10/04/2022 Negative  10 ng/mL Final    7-Aminoclonazepam 10/04/2022 Negative  Negative 25 ng/mL Final    a-Hydroxyalprazolam 10/04/2022 Negative  Negative 25 ng/mL Final    Acetyl Fentanyl 10/04/2022 Negative  2.5 ng/mL Final    Acetyl Norfentanyl Oxalate 10/04/2022 Negative  5 ng/mL Final    Benzoylecgonine 10/04/2022 Negative  100 ng/mL Final    Buprenorphine 10/04/2022 Negative  25 ng/mL Final    Codeine 10/04/2022 Negative  25 ng/mL Final    EDDP 10/04/2022 Negative  25 ng/mL Final    Fentanyl 10/04/2022 Negative  2.5 ng/mL Final    Hydrocodone 10/04/2022 >250.0 (H)  <25.0 25 ng/mL Final    Hydromorphone 10/04/2022 >250.0 (H)  <25.0 25 ng/mL Final    Lorazepam 10/04/2022 Negative  25 ng/mL Final    Morphine 10/04/2022 Negative  25 ng/mL Final    Norbuprenorphine 10/04/2022 Negative  25 ng/mL Final    Nordiazepam 10/04/2022 Negative  25 ng/mL Final    Norfentanyl Oxalate 10/04/2022 Negative  5 ng/mL Final    Norhydrocodone 10/04/2022 500.0 (H)  <50.0 50 ng/mL Final    Noroxycodone HCL 10/04/2022 Negative  50 ng/mL Final    Oxazepam 10/04/2022 Negative  25 ng/mL Final    Oxymorphone 10/04/2022 Negative  25 ng/mL Final    Tapentadol 10/04/2022 Negative  25 ng/mL Final    Temazepam 10/04/2022 Negative  25 ng/mL Final    THC-COOH 10/04/2022 Negative  25 ng/mL Final    Tramadol 10/04/2022 Negative  100 ng/mL Final    Amphetamine, Urine 10/04/2022 Negative  Negative Final    Methamphetamines, Urine 10/04/2022 Negative  Negative Final    Methadone, Urine 10/04/2022 Negative  Negative 25 ng/mL Final    Oxycodone, Urine 10/04/2022 Negative  Negative 25 ng/mL Final    Specific Gravity, UA 10/04/2022 1.020  <=1.030 Final   Office Visit on 08/29/2022   Component Date Value Ref Range Status    Sodium 08/29/2022 140  136 - 145 mmol/L Final    Potassium 08/29/2022 5.0  3.5 - 5.1 mmol/L Final    Chloride 08/29/2022 107  98 - 107 mmol/L Final    CO2 08/29/2022 28  21 - 32 mmol/L Final    Anion Gap 08/29/2022 10  7 - 16 mmol/L  Final    Glucose 08/29/2022 115 (H)  74 - 106 mg/dL Final    BUN 08/29/2022 38 (H)  7 - 18 mg/dL Final    Creatinine 08/29/2022 1.83 (H)  0.70 - 1.30 mg/dL Final    BUN/Creatinine Ratio 08/29/2022 21 (H)  6 - 20 Final    Calcium 08/29/2022 9.2  8.5 - 10.1 mg/dL Final    Total Protein 08/29/2022 6.9  6.4 - 8.2 g/dL Final    Albumin 08/29/2022 3.9  3.5 - 5.0 g/dL Final    Globulin 08/29/2022 3.0  2.0 - 4.0 g/dL Final    A/G Ratio 08/29/2022 1.3   Final    Bilirubin, Total 08/29/2022 0.6  >0.0 - 1.2 mg/dL Final    Alk Phos 08/29/2022 31 (L)  45 - 115 U/L Final    ALT 08/29/2022 25  16 - 61 U/L Final    AST 08/29/2022 28  15 - 37 U/L Final    eGFR 08/29/2022 40 (L)  >=60 mL/min/1.73m² Final    Hemoglobin A1C 08/29/2022 7.0 (H)  4.5 - 6.6 % Final    Estimated Average Glucose 08/29/2022 147  mg/dL Final    PSA Total 08/29/2022 0.452  0.000 - 4.100 ng/mL Final    WBC 08/29/2022 8.72  4.50 - 11.00 K/uL Final    RBC 08/29/2022 4.86  4.60 - 6.20 M/uL Final    Hemoglobin 08/29/2022 13.0 (L)  13.5 - 18.0 g/dL Final    Hematocrit 08/29/2022 41.6  40.0 - 54.0 % Final    MCV 08/29/2022 85.6  80.0 - 96.0 fL Final    MCH 08/29/2022 26.7 (L)  27.0 - 31.0 pg Final    MCHC 08/29/2022 31.3 (L)  32.0 - 36.0 g/dL Final    RDW 08/29/2022 14.1  11.5 - 14.5 % Final    Platelet Count 08/29/2022 203  150 - 400 K/uL Final    MPV 08/29/2022 12.0  9.4 - 12.4 fL Final    Neutrophils % 08/29/2022 50.6 (L)  53.0 - 65.0 % Final    Lymphocytes % 08/29/2022 37.0  27.0 - 41.0 % Final    Monocytes % 08/29/2022 7.8 (H)  2.0 - 6.0 % Final    Eosinophils % 08/29/2022 3.8  1.0 - 4.0 % Final    Basophils % 08/29/2022 0.6  0.0 - 1.0 % Final    Immature Granulocytes % 08/29/2022 0.2  0.0 - 0.4 % Final    nRBC, Auto 08/29/2022 0.0  <=0.0 % Final    Neutrophils, Abs 08/29/2022 4.41  1.80 - 7.70 K/uL Final    Lymphocytes, Absolute 08/29/2022 3.23  1.00 - 4.80 K/uL Final    Monocytes, Absolute 08/29/2022 0.68  0.00 - 0.80 K/uL Final    Eosinophils, Absolute  08/29/2022 0.33  0.00 - 0.50 K/uL Final    Basophils, Absolute 08/29/2022 0.05  0.00 - 0.20 K/uL Final    Immature Granulocytes, Absolute 08/29/2022 0.02  0.00 - 0.04 K/uL Final    nRBC, Absolute 08/29/2022 0.00  <=0.00 x10e3/uL Final    Diff Type 08/29/2022 Auto   Final   Admission on 07/19/2022, Discharged on 07/20/2022   Component Date Value Ref Range Status    Cath EF Quantitative 07/19/2022 25  % Final    POC Glucose 07/19/2022 121 (H)  70 - 105 mg/dL Final    SARS COV-2 MOLECULAR 07/19/2022 Negative  Negative, Invalid Final    WBC 07/19/2022 6.83  4.50 - 11.00 K/uL Final    RBC 07/19/2022 4.59 (L)  4.60 - 6.20 M/uL Final    Hemoglobin 07/19/2022 12.6 (L)  13.5 - 18.0 g/dL Final    Hematocrit 07/19/2022 38.2 (L)  40.0 - 54.0 % Final    MCV 07/19/2022 83.2  80.0 - 96.0 fL Final    MCH 07/19/2022 27.5  27.0 - 31.0 pg Final    MCHC 07/19/2022 33.0  32.0 - 36.0 g/dL Final    RDW 07/19/2022 14.1  11.5 - 14.5 % Final    Platelet Count 07/19/2022 187  150 - 400 K/uL Final    MPV 07/19/2022 11.2  9.4 - 12.4 fL Final    Neutrophils % 07/19/2022 51.0 (L)  53.0 - 65.0 % Final    Lymphocytes % 07/19/2022 36.5  27.0 - 41.0 % Final    Monocytes % 07/19/2022 8.2 (H)  2.0 - 6.0 % Final    Eosinophils % 07/19/2022 3.5  1.0 - 4.0 % Final    Basophils % 07/19/2022 0.4  0.0 - 1.0 % Final    Immature Granulocytes % 07/19/2022 0.4  0.0 - 0.4 % Final    nRBC, Auto 07/19/2022 0.0  <=0.0 % Final    Neutrophils, Abs 07/19/2022 3.48  1.80 - 7.70 K/uL Final    Lymphocytes, Absolute 07/19/2022 2.49  1.00 - 4.80 K/uL Final    Monocytes, Absolute 07/19/2022 0.56  0.00 - 0.80 K/uL Final    Eosinophils, Absolute 07/19/2022 0.24  0.00 - 0.50 K/uL Final    Basophils, Absolute 07/19/2022 0.03  0.00 - 0.20 K/uL Final    Immature Granulocytes, Absolute 07/19/2022 0.03  0.00 - 0.04 K/uL Final    nRBC, Absolute 07/19/2022 0.00  <=0.00 x10e3/uL Final    Diff Type 07/19/2022 Auto   Final    POC Glucose 07/20/2022 110 (H)  70 - 105 mg/dL Final    POC  Glucose 07/20/2022 165 (H)  70 - 105 mg/dL Final   Lab Visit on 07/14/2022   Component Date Value Ref Range Status    Sodium 07/14/2022 140  136 - 145 mmol/L Final    Potassium 07/14/2022 5.4 (H)  3.5 - 5.1 mmol/L Final    Chloride 07/14/2022 108 (H)  98 - 107 mmol/L Final    CO2 07/14/2022 28  21 - 32 mmol/L Final    Anion Gap 07/14/2022 9  7 - 16 mmol/L Final    Glucose 07/14/2022 125 (H)  74 - 106 mg/dL Final    BUN 07/14/2022 48 (H)  7 - 18 mg/dL Final    Creatinine 07/14/2022 2.17 (H)  0.70 - 1.30 mg/dL Final    BUN/Creatinine Ratio 07/14/2022 22 (H)  6 - 20 Final    Calcium 07/14/2022 9.7  8.5 - 10.1 mg/dL Final    Total Protein 07/14/2022 7.1  6.4 - 8.2 g/dL Final    Albumin 07/14/2022 3.9  3.5 - 5.0 g/dL Final    Globulin 07/14/2022 3.2  2.0 - 4.0 g/dL Final    A/G Ratio 07/14/2022 1.2   Final    Bilirubin, Total 07/14/2022 0.3  0.0 - 1.2 mg/dL Final    Alk Phos 07/14/2022 39 (L)  45 - 115 U/L Final    ALT 07/14/2022 25  16 - 61 U/L Final    AST 07/14/2022 32  15 - 37 U/L Final    eGFR 07/14/2022 32 (L)  >=60 mL/min/1.73m² Final    PT 07/14/2022 13.3  11.7 - 14.7 seconds Final    INR 07/14/2022 1.05  <=3.30 Final    WBC 07/14/2022 7.59  4.50 - 11.00 K/uL Final    RBC 07/14/2022 4.87  4.60 - 6.20 M/uL Final    Hemoglobin 07/14/2022 12.8 (L)  13.5 - 18.0 g/dL Final    Hematocrit 07/14/2022 42.0  40.0 - 54.0 % Final    MCV 07/14/2022 86.2  80.0 - 96.0 fL Final    MCH 07/14/2022 26.3 (L)  27.0 - 31.0 pg Final    MCHC 07/14/2022 30.5 (L)  32.0 - 36.0 g/dL Final    RDW 07/14/2022 15.1 (H)  11.5 - 14.5 % Final    Platelet Count 07/14/2022 212  150 - 400 K/uL Final    MPV 07/14/2022 12.2  9.4 - 12.4 fL Final    Neutrophils % 07/14/2022 51.3 (L)  53.0 - 65.0 % Final    Lymphocytes % 07/14/2022 37.8  27.0 - 41.0 % Final    Monocytes % 07/14/2022 6.9 (H)  2.0 - 6.0 % Final    Eosinophils % 07/14/2022 3.2  1.0 - 4.0 % Final    Basophils % 07/14/2022 0.5  0.0 - 1.0 % Final    Immature Granulocytes % 07/14/2022 0.3  0.0 -  0.4 % Final    nRBC, Auto 07/14/2022 0.0  <=0.0 % Final    Neutrophils, Abs 07/14/2022 3.90  1.80 - 7.70 K/uL Final    Lymphocytes, Absolute 07/14/2022 2.87  1.00 - 4.80 K/uL Final    Monocytes, Absolute 07/14/2022 0.52  0.00 - 0.80 K/uL Final    Eosinophils, Absolute 07/14/2022 0.24  0.00 - 0.50 K/uL Final    Basophils, Absolute 07/14/2022 0.04  0.00 - 0.20 K/uL Final    Immature Granulocytes, Absolute 07/14/2022 0.02  0.00 - 0.04 K/uL Final    nRBC, Absolute 07/14/2022 0.00  <=0.00 x10e3/uL Final    Diff Type 07/14/2022 Auto   Final   Office Visit on 07/14/2022   Component Date Value Ref Range Status    POC Amphetamines 07/14/2022 Negative  Negative, Inconclusive Final    POC Barbiturates 07/14/2022 Negative  Negative, Inconclusive Final    POC Benzodiazepines 07/14/2022 Negative  Negative, Inconclusive Final    POC Cocaine 07/14/2022 Negative  Negative, Inconclusive Final    POC THC 07/14/2022 Negative  Negative, Inconclusive Final    POC Methadone 07/14/2022 Negative  Negative, Inconclusive Final    POC Methamphetamine 07/14/2022 Negative  Negative, Inconclusive Final    POC Opiates 07/14/2022 Presumptive Positive (A)  Negative, Inconclusive Final    POC Oxycodone 07/14/2022 Negative  Negative, Inconclusive Final    POC Phencyclidine 07/14/2022 Negative  Negative, Inconclusive Final    POC Methylenedioxymethamphetamine * 07/14/2022 Negative  Negative, Inconclusive Final    POC Tricyclic Antidepressants 07/14/2022 Negative  Negative, Inconclusive Final    POC Buprenorphine 07/14/2022 Negative   Final     Acceptable 07/14/2022 Yes   Final    POC Temperature (Urine) 07/14/2022 90   Final         Orders Placed This Encounter   Procedures    POCT Urine Drug Screen Presump     Interpretive Information:     Negative:  No drug detected at the cut off level.   Positive:  This result represents presumptive positive for the   tested drug, other substances may yield a positive response other   than the  analyte of interest. This result should be utilized for   diagnostic purpose only. Confirmation testing will be performed upon physician request only.            Requested Prescriptions      No prescriptions requested or ordered in this encounter       Assessment:     1. Encounter for long-term (current) use of other medications         A's of Opioid Risk Assessment  Activity:Patient can perform ADL.   Analgesia:Patients pain is partially controlled by current medication. Patient has tried OTC medications such as Tylenol and Ibuprofen with out relief.   Adverse Effects: Patient denies constipation or sedation.  Aberrant Behavior:  reviewed with no aberrant drug seeking/taking behavior.  Overdose reversal drug naloxone discussed    Drug screen reviewed      Plan:    Narcan January 2023    [facility] closed on weekends    No new complaints he is doing well current medication    He states he would like to continue conservative management for now    Continue home exercise program as directed    Continue current medication    Follow-up 3 months    Dr. Callahan, February 2023    Bring original prescription medication bottles/container/box with labels to each visit    Pill count    Physical therapy    Massage therapy declines

## 2023-02-01 ENCOUNTER — TELEPHONE (OUTPATIENT)
Dept: FAMILY MEDICINE | Facility: CLINIC | Age: 68
End: 2023-02-01
Payer: COMMERCIAL

## 2023-02-01 NOTE — TELEPHONE ENCOUNTER
----- Message from [place] sent at 1/31/2023  8:38 AM CST -----  Regarding: refills  Contact: Betzy - [facility]  Betzy from [facility] called requesting refills on Novolin, Tinzadine, Vascepa, and Hydrocodone.    (P) [phone]. (F) [phone]

## 2023-02-01 NOTE — TELEPHONE ENCOUNTER
Spoke with pharmacist. Notified we are not ordering providers for the requested medications. [facility] verbalized understanding. Megan Dempsey RN

## 2023-02-09 ENCOUNTER — OFFICE VISIT (OUTPATIENT)
Dept: SURGERY | Facility: CLINIC | Age: 68
End: 2023-02-09
Payer: COMMERCIAL

## 2023-02-09 ENCOUNTER — HOSPITAL ENCOUNTER (OUTPATIENT)
Dept: RADIOLOGY | Facility: HOSPITAL | Age: 68
Discharge: HOME | End: 2023-02-09
Attending: SURGERY
Payer: COMMERCIAL

## 2023-02-09 DIAGNOSIS — I73.9 PVD (PERIPHERAL VASCULAR DISEASE): Primary | ICD-10-CM

## 2023-02-09 DIAGNOSIS — I73.9 PVD (PERIPHERAL VASCULAR DISEASE): ICD-10-CM

## 2023-02-09 PROCEDURE — 99212 PR OFFICE/OUTPT VISIT, EST, LEVL II, 10-19 MIN: ICD-10-PCS | Mod: S$PBB,,, | Performed by: SURGERY

## 2023-02-09 PROCEDURE — 99214 OFFICE O/P EST MOD 30 MIN: CPT | Mod: PBBFAC,25 | Performed by: SURGERY

## 2023-02-09 PROCEDURE — 1159F MED LIST DOCD IN RCRD: CPT | Mod: CPTII,,, | Performed by: SURGERY

## 2023-02-09 PROCEDURE — 93926 LOWER EXTREMITY STUDY: CPT | Mod: 26,RT,, | Performed by: SURGERY

## 2023-02-09 PROCEDURE — 93926 US LOWER EXTREMITY ARTERIES RIGHT: ICD-10-PCS | Mod: 26,RT,, | Performed by: SURGERY

## 2023-02-09 PROCEDURE — 93926 LOWER EXTREMITY STUDY: CPT | Mod: TC,RT

## 2023-02-09 PROCEDURE — 1159F PR MEDICATION LIST DOCUMENTED IN MEDICAL RECORD: ICD-10-PCS | Mod: CPTII,,, | Performed by: SURGERY

## 2023-02-09 PROCEDURE — 99212 OFFICE O/P EST SF 10 MIN: CPT | Mod: S$PBB,,, | Performed by: SURGERY

## 2023-02-09 PROCEDURE — 1160F RVW MEDS BY RX/DR IN RCRD: CPT | Mod: CPTII,,, | Performed by: SURGERY

## 2023-02-09 PROCEDURE — 1160F PR REVIEW ALL MEDS BY PRESCRIBER/CLIN PHARMACIST DOCUMENTED: ICD-10-PCS | Mod: CPTII,,, | Performed by: SURGERY

## 2023-02-09 NOTE — PROGRESS NOTES
Vascular clinic     Previous bilateral fem-pop greater than 2 years out     No claudication symptoms    We have been following the right fem-pop duplex studies.  The velocities are not doubled this time the anastomosis has not narrowed    Patient not smoking    Follow-up in a year

## 2023-03-01 ENCOUNTER — OFFICE VISIT (OUTPATIENT)
Dept: FAMILY MEDICINE | Facility: CLINIC | Age: 68
End: 2023-03-01
Payer: COMMERCIAL

## 2023-03-01 VITALS
BODY MASS INDEX: 24.45 KG/M2 | SYSTOLIC BLOOD PRESSURE: 108 MMHG | OXYGEN SATURATION: 96 % | WEIGHT: 184.5 LBS | DIASTOLIC BLOOD PRESSURE: 60 MMHG | HEIGHT: 73 IN | TEMPERATURE: 98 F | HEART RATE: 72 BPM

## 2023-03-01 DIAGNOSIS — E11.40 TYPE 2 DIABETES MELLITUS WITH DIABETIC NEUROPATHY, WITH LONG-TERM CURRENT USE OF INSULIN: ICD-10-CM

## 2023-03-01 DIAGNOSIS — R53.83 FATIGUE, UNSPECIFIED TYPE: ICD-10-CM

## 2023-03-01 DIAGNOSIS — Z79.4 TYPE 2 DIABETES MELLITUS WITH DIABETIC NEUROPATHY, WITH LONG-TERM CURRENT USE OF INSULIN: ICD-10-CM

## 2023-03-01 DIAGNOSIS — Z12.11 SCREEN FOR COLON CANCER: ICD-10-CM

## 2023-03-01 DIAGNOSIS — I10 HYPERTENSION, UNSPECIFIED TYPE: ICD-10-CM

## 2023-03-01 DIAGNOSIS — E78.5 HYPERLIPIDEMIA, UNSPECIFIED HYPERLIPIDEMIA TYPE: Primary | ICD-10-CM

## 2023-03-01 DIAGNOSIS — R73.9 HEMOGLOBIN A1C BETWEEN 7.0% AND 9.0%: ICD-10-CM

## 2023-03-01 DIAGNOSIS — E34.9 TESTOSTERONE DEFICIENCY: ICD-10-CM

## 2023-03-01 DIAGNOSIS — Z11.59 NEED FOR HEPATITIS C SCREENING TEST: ICD-10-CM

## 2023-03-01 LAB
ALBUMIN SERPL BCP-MCNC: 3.7 G/DL (ref 3.5–5)
ALBUMIN/GLOB SERPL: 1.2 {RATIO}
ALP SERPL-CCNC: 35 U/L (ref 45–115)
ALT SERPL W P-5'-P-CCNC: 26 U/L (ref 16–61)
ANION GAP SERPL CALCULATED.3IONS-SCNC: 9 MMOL/L (ref 7–16)
AST SERPL W P-5'-P-CCNC: 35 U/L (ref 15–37)
BASOPHILS # BLD AUTO: 0.06 K/UL (ref 0–0.2)
BASOPHILS NFR BLD AUTO: 0.8 % (ref 0–1)
BILIRUB SERPL-MCNC: 0.4 MG/DL (ref ?–1.2)
BUN SERPL-MCNC: 34 MG/DL (ref 7–18)
BUN/CREAT SERPL: 17 (ref 6–20)
CALCIUM SERPL-MCNC: 8.9 MG/DL (ref 8.5–10.1)
CHLORIDE SERPL-SCNC: 106 MMOL/L (ref 98–107)
CHOLEST SERPL-MCNC: 85 MG/DL (ref 0–200)
CHOLEST/HDLC SERPL: 3.3 {RATIO}
CO2 SERPL-SCNC: 28 MMOL/L (ref 21–32)
CREAT SERPL-MCNC: 1.97 MG/DL (ref 0.7–1.3)
CREAT UR-MCNC: 76 MG/DL (ref 39–259)
DIFFERENTIAL METHOD BLD: ABNORMAL
EGFR (NO RACE VARIABLE) (RUSH/TITUS): 37 ML/MIN/1.73M²
EOSINOPHIL # BLD AUTO: 0.25 K/UL (ref 0–0.5)
EOSINOPHIL NFR BLD AUTO: 3.5 % (ref 1–4)
ERYTHROCYTE [DISTWIDTH] IN BLOOD BY AUTOMATED COUNT: 14.9 % (ref 11.5–14.5)
EST. AVERAGE GLUCOSE BLD GHB EST-MCNC: 144 MG/DL
GLOBULIN SER-MCNC: 3.1 G/DL (ref 2–4)
GLUCOSE SERPL-MCNC: 254 MG/DL (ref 74–106)
HBA1C MFR BLD HPLC: 6.9 % (ref 4.5–6.6)
HCT VFR BLD AUTO: 41.1 % (ref 40–54)
HCV AB SER QL: NORMAL
HDLC SERPL-MCNC: 26 MG/DL (ref 40–60)
HGB BLD-MCNC: 12.2 G/DL (ref 13.5–18)
IMM GRANULOCYTES # BLD AUTO: 0.02 K/UL (ref 0–0.04)
IMM GRANULOCYTES NFR BLD: 0.3 % (ref 0–0.4)
LDLC SERPL CALC-MCNC: 30 MG/DL
LDLC/HDLC SERPL: 1.2 {RATIO}
LYMPHOCYTES # BLD AUTO: 2.41 K/UL (ref 1–4.8)
LYMPHOCYTES NFR BLD AUTO: 33.9 % (ref 27–41)
MCH RBC QN AUTO: 25.7 PG (ref 27–31)
MCHC RBC AUTO-ENTMCNC: 29.7 G/DL (ref 32–36)
MCV RBC AUTO: 86.5 FL (ref 80–96)
MICROALBUMIN UR-MCNC: 1.6 MG/DL (ref 0–2.8)
MICROALBUMIN/CREAT RATIO PNL UR: 21.1 MG/G (ref 0–30)
MONOCYTES # BLD AUTO: 0.51 K/UL (ref 0–0.8)
MONOCYTES NFR BLD AUTO: 7.2 % (ref 2–6)
MPC BLD CALC-MCNC: 12 FL (ref 9.4–12.4)
NEUTROPHILS # BLD AUTO: 3.85 K/UL (ref 1.8–7.7)
NEUTROPHILS NFR BLD AUTO: 54.3 % (ref 53–65)
NONHDLC SERPL-MCNC: 59 MG/DL
NRBC # BLD AUTO: 0 X10E3/UL
NRBC, AUTO (.00): 0 %
PLATELET # BLD AUTO: 196 K/UL (ref 150–400)
POTASSIUM SERPL-SCNC: 5 MMOL/L (ref 3.5–5.1)
PROT SERPL-MCNC: 6.8 G/DL (ref 6.4–8.2)
RBC # BLD AUTO: 4.75 M/UL (ref 4.6–6.2)
SODIUM SERPL-SCNC: 138 MMOL/L (ref 136–145)
TRIGL SERPL-MCNC: 144 MG/DL (ref 35–150)
VLDLC SERPL-MCNC: 29 MG/DL
WBC # BLD AUTO: 7.1 K/UL (ref 4.5–11)

## 2023-03-01 PROCEDURE — 3008F BODY MASS INDEX DOCD: CPT | Mod: ,,, | Performed by: FAMILY MEDICINE

## 2023-03-01 PROCEDURE — 84403 ASSAY OF TOTAL TESTOSTERONE: CPT | Mod: 90,,, | Performed by: CLINICAL MEDICAL LABORATORY

## 2023-03-01 PROCEDURE — 3078F DIAST BP <80 MM HG: CPT | Mod: ,,, | Performed by: FAMILY MEDICINE

## 2023-03-01 PROCEDURE — 83036 HEMOGLOBIN GLYCOSYLATED A1C: CPT | Mod: ,,, | Performed by: CLINICAL MEDICAL LABORATORY

## 2023-03-01 PROCEDURE — 86803 HEPATITIS C ANTIBODY: ICD-10-PCS | Mod: ,,, | Performed by: CLINICAL MEDICAL LABORATORY

## 2023-03-01 PROCEDURE — 82043 UR ALBUMIN QUANTITATIVE: CPT | Mod: ,,, | Performed by: CLINICAL MEDICAL LABORATORY

## 2023-03-01 PROCEDURE — 84402 ASSAY OF FREE TESTOSTERONE: CPT | Mod: 90,,, | Performed by: CLINICAL MEDICAL LABORATORY

## 2023-03-01 PROCEDURE — 99214 PR OFFICE/OUTPT VISIT, EST, LEVL IV, 30-39 MIN: ICD-10-PCS | Mod: ,,, | Performed by: FAMILY MEDICINE

## 2023-03-01 PROCEDURE — 80061 LIPID PANEL: CPT | Mod: ,,, | Performed by: CLINICAL MEDICAL LABORATORY

## 2023-03-01 PROCEDURE — 99214 OFFICE O/P EST MOD 30 MIN: CPT | Mod: ,,, | Performed by: FAMILY MEDICINE

## 2023-03-01 PROCEDURE — 3288F FALL RISK ASSESSMENT DOCD: CPT | Mod: ,,, | Performed by: FAMILY MEDICINE

## 2023-03-01 PROCEDURE — 1159F MED LIST DOCD IN RCRD: CPT | Mod: ,,, | Performed by: FAMILY MEDICINE

## 2023-03-01 PROCEDURE — 83036 HEMOGLOBIN A1C: ICD-10-PCS | Mod: ,,, | Performed by: CLINICAL MEDICAL LABORATORY

## 2023-03-01 PROCEDURE — 82570 MICROALBUMIN / CREATININE RATIO URINE: ICD-10-PCS | Mod: ,,, | Performed by: CLINICAL MEDICAL LABORATORY

## 2023-03-01 PROCEDURE — 1101F PT FALLS ASSESS-DOCD LE1/YR: CPT | Mod: ,,, | Performed by: FAMILY MEDICINE

## 2023-03-01 PROCEDURE — 84402 TESTOSTERONE, FREE (DIALYSIS) AND TOTAL, LC/MS/MS: ICD-10-PCS | Mod: 90,,, | Performed by: CLINICAL MEDICAL LABORATORY

## 2023-03-01 PROCEDURE — 80061 LIPID PANEL: ICD-10-PCS | Mod: ,,, | Performed by: CLINICAL MEDICAL LABORATORY

## 2023-03-01 PROCEDURE — 82043 MICROALBUMIN / CREATININE RATIO URINE: ICD-10-PCS | Mod: ,,, | Performed by: CLINICAL MEDICAL LABORATORY

## 2023-03-01 PROCEDURE — 84403 TESTOSTERONE, FREE (DIALYSIS) AND TOTAL, LC/MS/MS: ICD-10-PCS | Mod: 90,,, | Performed by: CLINICAL MEDICAL LABORATORY

## 2023-03-01 PROCEDURE — 1101F PR PT FALLS ASSESS DOC 0-1 FALLS W/OUT INJ PAST YR: ICD-10-PCS | Mod: ,,, | Performed by: FAMILY MEDICINE

## 2023-03-01 PROCEDURE — 3074F PR MOST RECENT SYSTOLIC BLOOD PRESSURE < 130 MM HG: ICD-10-PCS | Mod: ,,, | Performed by: FAMILY MEDICINE

## 2023-03-01 PROCEDURE — 3288F PR FALLS RISK ASSESSMENT DOCUMENTED: ICD-10-PCS | Mod: ,,, | Performed by: FAMILY MEDICINE

## 2023-03-01 PROCEDURE — 85025 COMPLETE CBC W/AUTO DIFF WBC: CPT | Mod: ,,, | Performed by: CLINICAL MEDICAL LABORATORY

## 2023-03-01 PROCEDURE — 82570 ASSAY OF URINE CREATININE: CPT | Mod: ,,, | Performed by: CLINICAL MEDICAL LABORATORY

## 2023-03-01 PROCEDURE — 85025 CBC WITH DIFFERENTIAL: ICD-10-PCS | Mod: ,,, | Performed by: CLINICAL MEDICAL LABORATORY

## 2023-03-01 PROCEDURE — 3008F PR BODY MASS INDEX (BMI) DOCUMENTED: ICD-10-PCS | Mod: ,,, | Performed by: FAMILY MEDICINE

## 2023-03-01 PROCEDURE — 80053 COMPREHEN METABOLIC PANEL: CPT | Mod: ,,, | Performed by: CLINICAL MEDICAL LABORATORY

## 2023-03-01 PROCEDURE — 3074F SYST BP LT 130 MM HG: CPT | Mod: ,,, | Performed by: FAMILY MEDICINE

## 2023-03-01 PROCEDURE — 3078F PR MOST RECENT DIASTOLIC BLOOD PRESSURE < 80 MM HG: ICD-10-PCS | Mod: ,,, | Performed by: FAMILY MEDICINE

## 2023-03-01 PROCEDURE — 86803 HEPATITIS C AB TEST: CPT | Mod: ,,, | Performed by: CLINICAL MEDICAL LABORATORY

## 2023-03-01 PROCEDURE — 80053 COMPREHENSIVE METABOLIC PANEL: ICD-10-PCS | Mod: ,,, | Performed by: CLINICAL MEDICAL LABORATORY

## 2023-03-01 PROCEDURE — 1159F PR MEDICATION LIST DOCUMENTED IN MEDICAL RECORD: ICD-10-PCS | Mod: ,,, | Performed by: FAMILY MEDICINE

## 2023-03-01 RX ORDER — OMEPRAZOLE 20 MG/1
20 CAPSULE, DELAYED RELEASE ORAL DAILY
Qty: 90 CAPSULE | Refills: 0 | Status: SHIPPED | OUTPATIENT
Start: 2023-03-01 | End: 2023-06-26

## 2023-03-01 RX ORDER — POTASSIUM CHLORIDE 20 MEQ/1
20 TABLET, EXTENDED RELEASE ORAL DAILY
Qty: 90 TABLET | Refills: 0 | Status: SHIPPED | OUTPATIENT
Start: 2023-03-01 | End: 2023-10-10 | Stop reason: SDUPTHER

## 2023-03-01 RX ORDER — ESCITALOPRAM OXALATE 10 MG/1
10 TABLET ORAL DAILY
Qty: 90 TABLET | Refills: 0 | Status: SHIPPED | OUTPATIENT
Start: 2023-03-01 | End: 2023-06-07 | Stop reason: SDUPTHER

## 2023-03-01 RX ORDER — SPIRONOLACTONE 25 MG/1
25 TABLET ORAL 2 TIMES DAILY
COMMUNITY
Start: 2023-01-05

## 2023-03-01 RX ORDER — GABAPENTIN 300 MG/1
300 CAPSULE ORAL 3 TIMES DAILY
Qty: 270 CAPSULE | Refills: 0 | Status: SHIPPED | OUTPATIENT
Start: 2023-03-01 | End: 2023-06-07 | Stop reason: SDUPTHER

## 2023-03-01 RX ORDER — PRAVASTATIN SODIUM 80 MG/1
80 TABLET ORAL NIGHTLY
COMMUNITY
Start: 2023-01-05 | End: 2023-06-07

## 2023-03-01 RX ORDER — ROPINIROLE 0.5 MG/1
0.5 TABLET, FILM COATED ORAL NIGHTLY
COMMUNITY
Start: 2023-01-05

## 2023-03-01 RX ORDER — METFORMIN HYDROCHLORIDE 500 MG/1
500 TABLET ORAL 2 TIMES DAILY WITH MEALS
Qty: 180 TABLET | Refills: 0 | Status: SHIPPED | OUTPATIENT
Start: 2023-03-01 | End: 2023-10-10 | Stop reason: SDUPTHER

## 2023-03-01 RX ORDER — ICOSAPENT ETHYL 1000 MG/1
2 CAPSULE ORAL 2 TIMES DAILY
COMMUNITY
Start: 2023-02-10

## 2023-03-01 RX ORDER — DAPAGLIFLOZIN 10 MG/1
10 TABLET, FILM COATED ORAL DAILY
Qty: 90 TABLET | Refills: 0 | Status: SHIPPED | OUTPATIENT
Start: 2023-03-01 | End: 2023-10-10 | Stop reason: SDUPTHER

## 2023-03-01 NOTE — PROGRESS NOTES
Rodney Morel MD   [facility]  [address] [place] [address]     PATIENT NAME: Micheal Taylor  : 1955  DATE: 3/1/23  MRN: [id number]      Billing Provider: Rodney Morel MD  Level of Service: RI OFFICE/OUTPT VISIT, EST, LEVL IV, 30-39 MIN  Patient PCP Information       Provider PCP Type    Rodney Morel MD General            Reason for Visit / Chief Complaint: Diabetes (3 month check up and med refills), Hyperlipidemia, and Hypertension         History of Present Illness / Problem Focused Workflow     Micheal Taylor presents to the clinic with Diabetes (3 month check up and med refills), Hyperlipidemia, and Hypertension     HPI    Review of Systems     Review of Systems   Constitutional:  Negative for activity change, appetite change, fatigue and fever.   HENT:  Negative for nasal congestion, ear pain, hearing loss, sinus pressure/congestion and sore throat.    Respiratory:  Negative for cough, chest tightness and shortness of breath.    Cardiovascular:  Negative for chest pain and palpitations.   Gastrointestinal:  Negative for abdominal pain and fecal incontinence.   Genitourinary:  Negative for bladder incontinence, difficulty urinating and erectile dysfunction.   Musculoskeletal:  Negative for arthralgias.   Integumentary:  Negative for rash.   Neurological:  Negative for dizziness and headaches.      Medical / Social / Family History     Past Medical History:   Diagnosis Date    Anticoagulant long-term use     CHF (congestive heart failure)     Chronic pain syndrome     Coronary artery disease     Diabetes mellitus, type 2     Hyperlipidemia     Hypertension     Lumbar spondylosis     Lumbosacral radiculopathy     Pain in thoracic spine     PVD (peripheral vascular disease)     Renal disorder     Spondylosis without myelopathy or radiculopathy, cervical region        Past Surgical History:   Procedure Laterality Date    ADENOIDECTOMY      AMPUTATION       left index finger removed     CAUDAL EPIDURAL STEROID INJECTION  01/19/2016    Caudal NATHAN - Alee    CIRCUMCISION      CORONARY ARTERY BYPASS GRAFT (CABG)      FOOT SURGERY      heel surgery    HEEL SPUR SURGERY      INJECTION OF FACET JOINT Bilateral 04/16/2015    Bilateral L3-S1 Facet Injection - Alee - 4/16/15, 11/15/12 and 9/27/12    LEFT HEART CATHETERIZATION Left 7/19/2022    Procedure: Left heart cath;  Surgeon: Venu Whitlock MD;  Location: [facility] CATH LAB;  Service: Cardiology;  Laterality: Left;  Patient has PAD and has had bilateral fem pop bypass. He is followed by Dr. Forrest.    RADIOFREQUENCY THERMOCOAGULATION Left 02/26/2013    Left L3-S1 RFTC -Alee    RADIOFREQUENCY THERMOCOAGULATION Right 02/05/2013    Right L3-S1 RFTC - Alee    TONSILLECTOMY      TONSILLECTOMY, ADENOIDECTOMY, BILATERAL MYRINGOTOMY AND TUBES      TRANSFORAMINAL EPIDURAL INJECTION OF STEROID Left 06/19/2012    Left L3-4 TFESI - Alee    TRANSFORAMINAL EPIDURAL INJECTION OF STEROID Right 04/12/2013    Right L3-4 TFESI - Alee - 4/12/13 and 8/7/12    VASCULAR SURGERY         Social History  Micheal Taylor  reports that he has quit smoking. He has quit using smokeless tobacco.  His smokeless tobacco use included snuff. He reports that he does not drink alcohol and does not use drugs.    Family History  Micheal Taylor  family history includes Diabetes in his mother; Heart disease in his father and mother; Hypertension in his father and mother; Liver cancer in his mother.    Medications and Allergies     Medications  Outpatient Medications Marked as Taking for the 3/1/23 encounter (Office Visit) with Rodney Morel MD   Medication Sig Dispense Refill    amLODIPine (NORVASC) 5 MG tablet Take 1 tablet (5 mg total) by mouth once daily. 90 tablet 1    aspirin (ECOTRIN) 81 MG EC tablet Take 81 mg by mouth once daily.      cholecalciferol, vitamin D3, (VITAMIN D3) 25 mcg (1,000 unit) capsule       cinnamon bark 500 mg capsule Take 500 mg by mouth  once daily.       clopidogreL (PLAVIX) 75 mg tablet TAKE 1 TABLET (75 MG TOTAL) BY MOUTH ONCE DAILY. 90 tablet 1    docosahexaenoic acid-epa 120-180 mg Cap Take 1 capsule by mouth.      ezetimibe (ZETIA) 10 mg tablet Take 1 tablet (10 mg total) by mouth once daily. 90 tablet 1    fenofibrate (TRICOR) 145 MG tablet Take 1 tablet (145 mg total) by mouth once daily. 90 tablet 1    hydrALAZINE (APRESOLINE) 50 MG tablet Take 1 tablet (50 mg total) by mouth 4 (four) times daily. 360 tablet 1    HYDROcodone-acetaminophen (NORCO)  mg per tablet Take 1 tablet by mouth every 8 (eight) hours. 90 tablet 0    [START ON 3/14/2023] HYDROcodone-acetaminophen (NORCO)  mg per tablet Take 1 tablet by mouth every 8 (eight) hours. 90 tablet 0    insulin regular 100 unit/mL Inj injection Novolin R Regular U-100 Insuln   14-32 u bidwm      lisinopriL (PRINIVIL,ZESTRIL) 20 MG tablet Take 1 tablet (20 mg total) by mouth once daily. 90 tablet 1    magnesium 250 mg Tab Take 1 tablet by mouth once daily.       metoprolol succinate (TOPROL-XL) 25 MG 24 hr tablet Take 1 tablet (25 mg total) by mouth once daily. 90 tablet 1    nitroGLYCERIN (NITROSTAT) 0.4 MG SL tablet Place 1 tablet (0.4 mg total) under the tongue every 5 (five) minutes as needed for Chest pain. 25 tablet 3    pravastatin (PRAVACHOL) 80 MG tablet Take 80 mg by mouth every evening.      rOPINIRole (REQUIP) 0.5 MG tablet Take 0.5 mg by mouth every evening.      rosuvastatin (CRESTOR) 40 MG Tab Take 1 tablet (40 mg total) by mouth every evening. 90 tablet 3    spironolactone (ALDACTONE) 25 MG tablet Take 25 mg by mouth 2 (two) times daily.      tiZANidine (ZANAFLEX) 4 MG tablet Take 1 tablet (4 mg total) by mouth every 8 (eight) hours. 90 tablet 2    TRESIBA FLEXTOUCH U-200 200 unit/mL (3 mL) insulin pen Inject 54 Units into the skin once daily.       VASCEPA 1 gram Cap Take 1 capsule by mouth 2 (two) times a day.      vitamin A 8000 UNIT capsule Take 8,000 Units by  mouth.      vitamin E 400 UNIT capsule Take 400 Units by mouth once daily.       [DISCONTINUED] dapagliflozin (FARXIGA) 10 mg tablet Take 1 tablet (10 mg total) by mouth once daily. 90 tablet 0    [DISCONTINUED] EScitalopram oxalate (LEXAPRO) 10 MG tablet TAKE 1 TABLET (10 MG TOTAL) BY MOUTH ONCE DAILY. 30 tablet 0    [DISCONTINUED] gabapentin (NEURONTIN) 300 MG capsule TAKE 1 CAPSULE (300 MG TOTAL) BY MOUTH 3 (THREE) TIMES DAILY. 270 capsule 0    [DISCONTINUED] liraglutide 0.6 mg/0.1 mL, 18 mg/3 mL, subq PNIJ (VICTOZA 2-CAITLIN) 0.6 mg/0.1 mL (18 mg/3 mL) PnIj pen Inject 1.8 mg into the skin once daily. 27 mL 0    [DISCONTINUED] metFORMIN (GLUCOPHAGE) 500 MG tablet Take 1 tablet (500 mg total) by mouth 2 (two) times daily with meals. 180 tablet 0    [DISCONTINUED] omeprazole (PRILOSEC) 20 MG capsule Take 1 capsule (20 mg total) by mouth once daily. 90 capsule 0    [DISCONTINUED] potassium chloride SA (K-DUR,KLOR-CON) 20 MEQ tablet Take 1 tablet (20 mEq total) by mouth once daily. 90 tablet 0       Allergies  Review of patient's allergies indicates:  No Known Allergies    Physical Examination     Vitals:    03/01/23 0951   BP: 108/60   Pulse: 72   Temp: 97.9 °F (36.6 °C)     Physical Exam  Constitutional:       General: He is not in acute distress.     Appearance: He is not ill-appearing.   HENT:      Head: Normocephalic and atraumatic.      Right Ear: Tympanic membrane and ear canal normal.      Left Ear: Tympanic membrane and ear canal normal.      Nose: Nose normal. No congestion or rhinorrhea.   Eyes:      Pupils: Pupils are equal, round, and reactive to light.   Cardiovascular:      Rate and Rhythm: Normal rate and regular rhythm.      Pulses: Normal pulses.      Heart sounds: No murmur heard.  Pulmonary:      Effort: No respiratory distress.      Breath sounds: No wheezing, rhonchi or rales.   Abdominal:      General: Bowel sounds are normal.      Palpations: Abdomen is soft.      Tenderness: There is no abdominal  tenderness.      Hernia: No hernia is present.   Musculoskeletal:      Cervical back: Normal range of motion and neck supple.   Lymphadenopathy:      Cervical: No cervical adenopathy.   Skin:     General: Skin is warm and dry.   Neurological:      Mental Status: He is alert.   Psychiatric:         Behavior: Behavior normal.         Thought Content: Thought content normal.        Assessment and Plan (including Health Maintenance)   :    Plan:         Health Maintenance Due   Topic Date Due    Hepatitis C Screening  Never done    TETANUS VACCINE  Never done    Sign Pain Contract  Never done    Colorectal Cancer Screening  Never done    Pneumococcal Vaccines (Age 65+) (2 - PCV) 12/06/2019    Eye Exam  03/12/2022    Diabetes Urine Screening  08/11/2022    Influenza Vaccine (1) 09/01/2022    Lipid Panel  02/28/2023    Hemoglobin A1c  02/28/2023    Foot Exam  03/10/2023       Problem List Items Addressed This Visit          Cardiac/Vascular    Hyperlipidemia - Primary    Relevant Orders    Lipid Panel    Hypertension    Relevant Orders    Comprehensive Metabolic Panel    CBC Auto Differential    Lipid Panel       Endocrine    Type 2 diabetes mellitus with diabetic neuropathy, with long-term current use of insulin    Relevant Medications    liraglutide 0.6 mg/0.1 mL, 18 mg/3 mL, subq PNIJ (VICTOZA 2-CAITLIN) 0.6 mg/0.1 mL (18 mg/3 mL) PnIj pen    metFORMIN (GLUCOPHAGE) 500 MG tablet    Other Relevant Orders    Hemoglobin A1C    Microalbumin/Creatinine Ratio, Urine     Other Visit Diagnoses       Hemoglobin A1c between 7.0% and 9.0%        Relevant Orders    Hemoglobin A1C    Fatigue, unspecified type        Relevant Orders    Testosterone, Total and Free, S    Testosterone deficiency        Relevant Orders    Testosterone, Total and Free, S    Need for hepatitis C screening test        Screen for colon cancer        Relevant Orders    Cologuard Screening (Multitarget Stool DNA)          Hyperlipidemia, unspecified  hyperlipidemia type  -     Lipid Panel; Future; Expected date: 03/01/2023    Hypertension, unspecified type  -     Comprehensive Metabolic Panel; Future; Expected date: 03/01/2023  -     CBC Auto Differential; Future; Expected date: 03/01/2023  -     Lipid Panel; Future; Expected date: 03/01/2023    Type 2 diabetes mellitus with diabetic neuropathy, with long-term current use of insulin  -     Hemoglobin A1C; Future; Expected date: 03/01/2023  -     Microalbumin/Creatinine Ratio, Urine; Future; Expected date: 03/01/2023    Hemoglobin A1c between 7.0% and 9.0%  -     Hemoglobin A1C; Future; Expected date: 03/01/2023    Fatigue, unspecified type  -     Testosterone, Total and Free, S; Future; Expected date: 03/01/2023    Testosterone deficiency  -     Testosterone, Total and Free, S; Future; Expected date: 03/01/2023    Need for hepatitis C screening test  -     Hepatitis C Antibody    Screen for colon cancer  -     Cologuard Screening (Multitarget Stool DNA); Future; Expected date: 03/01/2023    Other orders  -     dapagliflozin (FARXIGA) 10 mg tablet; Take 1 tablet (10 mg total) by mouth once daily.  Dispense: 90 tablet; Refill: 0  -     EScitalopram oxalate (LEXAPRO) 10 MG tablet; Take 1 tablet (10 mg total) by mouth once daily.  Dispense: 90 tablet; Refill: 0  -     gabapentin (NEURONTIN) 300 MG capsule; Take 1 capsule (300 mg total) by mouth 3 (three) times daily.  Dispense: 270 capsule; Refill: 0  -     liraglutide 0.6 mg/0.1 mL, 18 mg/3 mL, subq PNIJ (VICTOZA 2-CAITLIN) 0.6 mg/0.1 mL (18 mg/3 mL) PnIj pen; Inject 1.8 mg into the skin once daily.  Dispense: 27 mL; Refill: 0  -     metFORMIN (GLUCOPHAGE) 500 MG tablet; Take 1 tablet (500 mg total) by mouth 2 (two) times daily with meals.  Dispense: 180 tablet; Refill: 0  -     omeprazole (PRILOSEC) 20 MG capsule; Take 1 capsule (20 mg total) by mouth once daily.  Dispense: 90 capsule; Refill: 0  -     potassium chloride SA (K-DUR,KLOR-CON) 20 MEQ tablet; Take 1  tablet (20 mEq total) by mouth once daily.  Dispense: 90 tablet; Refill: 0       Health Maintenance Topics with due status: Not Due       Topic Last Completion Date    PROSTATE-SPECIFIC ANTIGEN 08/29/2022    High Dose Statin 03/01/2023       Procedures     Future Appointments   Date Time Provider Department Center   3/14/2023  1:45 PM AMADO Scott [facility]   4/11/2023  9:30 AM MARCELL Escobedo [facility]   5/30/2023  9:15 AM IVY Lama [facility]   6/7/2023  8:20 AM Rodney Morel MD [facility]   2/5/2024  1:00 PM [facility]    2/5/2024  1:45 PM Nette Jurado [facility]        No follow-ups on file.       Signature:  Rodney Morel MD  [facility]  [address] [facility] [place] [address]  [phone] Phone  [phone] Fax    Date of encounter: 3/1/23

## 2023-03-07 LAB
TESTOST FREE SERPL-MCNC: 7.67 NG/DL (ref 3.47–13)
TESTOST SERPL-MCNC: 482 NG/DL (ref 240–950)

## 2023-03-14 ENCOUNTER — OFFICE VISIT (OUTPATIENT)
Dept: FAMILY MEDICINE | Facility: CLINIC | Age: 68
End: 2023-03-14
Payer: COMMERCIAL

## 2023-03-14 ENCOUNTER — PATIENT OUTREACH (OUTPATIENT)
Dept: ADMINISTRATIVE | Facility: HOSPITAL | Age: 68
End: 2023-03-14

## 2023-03-14 VITALS
HEIGHT: 73 IN | WEIGHT: 196 LBS | RESPIRATION RATE: 20 BRPM | BODY MASS INDEX: 25.98 KG/M2 | HEART RATE: 81 BPM | OXYGEN SATURATION: 96 % | DIASTOLIC BLOOD PRESSURE: 68 MMHG | TEMPERATURE: 98 F | SYSTOLIC BLOOD PRESSURE: 118 MMHG

## 2023-03-14 DIAGNOSIS — E11.40 TYPE 2 DIABETES MELLITUS WITH DIABETIC NEUROPATHY, WITH LONG-TERM CURRENT USE OF INSULIN: ICD-10-CM

## 2023-03-14 DIAGNOSIS — I25.810 CORONARY ARTERY DISEASE INVOLVING CORONARY BYPASS GRAFT WITHOUT ANGINA PECTORIS, UNSPECIFIED WHETHER NATIVE OR TRANSPLANTED HEART: ICD-10-CM

## 2023-03-14 DIAGNOSIS — Z00.00 ENCOUNTER FOR SUBSEQUENT ANNUAL WELLNESS VISIT (AWV) IN MEDICARE PATIENT: Primary | ICD-10-CM

## 2023-03-14 DIAGNOSIS — M54.17 LUMBOSACRAL RADICULOPATHY: Chronic | ICD-10-CM

## 2023-03-14 DIAGNOSIS — I73.9 PVD (PERIPHERAL VASCULAR DISEASE): ICD-10-CM

## 2023-03-14 DIAGNOSIS — I50.22 CHRONIC SYSTOLIC CONGESTIVE HEART FAILURE: ICD-10-CM

## 2023-03-14 DIAGNOSIS — I10 HYPERTENSION, UNSPECIFIED TYPE: ICD-10-CM

## 2023-03-14 DIAGNOSIS — Z23 ENCOUNTER FOR IMMUNIZATION: ICD-10-CM

## 2023-03-14 DIAGNOSIS — Z00.00 ENCOUNTER FOR PREVENTIVE HEALTH EXAMINATION: ICD-10-CM

## 2023-03-14 DIAGNOSIS — Z79.4 TYPE 2 DIABETES MELLITUS WITH DIABETIC NEUROPATHY, WITH LONG-TERM CURRENT USE OF INSULIN: ICD-10-CM

## 2023-03-14 DIAGNOSIS — I35.8 AORTIC VALVE SCLEROSIS: ICD-10-CM

## 2023-03-14 LAB
LEFT EYE DM RETINOPATHY: POSITIVE
RIGHT EYE DM RETINOPATHY: POSITIVE

## 2023-03-14 PROCEDURE — 3074F PR MOST RECENT SYSTOLIC BLOOD PRESSURE < 130 MM HG: ICD-10-PCS | Mod: ,,, | Performed by: NURSE PRACTITIONER

## 2023-03-14 PROCEDURE — 3078F PR MOST RECENT DIASTOLIC BLOOD PRESSURE < 80 MM HG: ICD-10-PCS | Mod: ,,, | Performed by: NURSE PRACTITIONER

## 2023-03-14 PROCEDURE — 90677 PCV20 VACCINE IM: CPT | Mod: ,,, | Performed by: NURSE PRACTITIONER

## 2023-03-14 PROCEDURE — 3008F PR BODY MASS INDEX (BMI) DOCUMENTED: ICD-10-PCS | Mod: ,,, | Performed by: NURSE PRACTITIONER

## 2023-03-14 PROCEDURE — 1160F RVW MEDS BY RX/DR IN RCRD: CPT | Mod: ,,, | Performed by: NURSE PRACTITIONER

## 2023-03-14 PROCEDURE — G0008 ADMIN INFLUENZA VIRUS VAC: HCPCS | Mod: ,,, | Performed by: NURSE PRACTITIONER

## 2023-03-14 PROCEDURE — 3066F NEPHROPATHY DOC TX: CPT | Mod: ,,, | Performed by: NURSE PRACTITIONER

## 2023-03-14 PROCEDURE — 3044F HG A1C LEVEL LT 7.0%: CPT | Mod: ,,, | Performed by: NURSE PRACTITIONER

## 2023-03-14 PROCEDURE — 1159F PR MEDICATION LIST DOCUMENTED IN MEDICAL RECORD: ICD-10-PCS | Mod: ,,, | Performed by: NURSE PRACTITIONER

## 2023-03-14 PROCEDURE — G0439 PR MEDICARE ANNUAL WELLNESS SUBSEQUENT VISIT: ICD-10-PCS | Mod: ,,, | Performed by: NURSE PRACTITIONER

## 2023-03-14 PROCEDURE — 90694 VACC AIIV4 NO PRSRV 0.5ML IM: CPT | Mod: ,,, | Performed by: NURSE PRACTITIONER

## 2023-03-14 PROCEDURE — G0439 PPPS, SUBSEQ VISIT: HCPCS | Mod: ,,, | Performed by: NURSE PRACTITIONER

## 2023-03-14 PROCEDURE — 3288F FALL RISK ASSESSMENT DOCD: CPT | Mod: ,,, | Performed by: NURSE PRACTITIONER

## 2023-03-14 PROCEDURE — 3074F SYST BP LT 130 MM HG: CPT | Mod: ,,, | Performed by: NURSE PRACTITIONER

## 2023-03-14 PROCEDURE — 3061F NEG MICROALBUMINURIA REV: CPT | Mod: ,,, | Performed by: NURSE PRACTITIONER

## 2023-03-14 PROCEDURE — 4010F PR ACE/ARB THEARPY RXD/TAKEN: ICD-10-PCS | Mod: ,,, | Performed by: NURSE PRACTITIONER

## 2023-03-14 PROCEDURE — G0008 FLU VACCINE - QUADRIVALENT - ADJUVANTED: ICD-10-PCS | Mod: ,,, | Performed by: NURSE PRACTITIONER

## 2023-03-14 PROCEDURE — 90694 FLU VACCINE - QUADRIVALENT - ADJUVANTED: ICD-10-PCS | Mod: ,,, | Performed by: NURSE PRACTITIONER

## 2023-03-14 PROCEDURE — 90677 PNEUMOCOCCAL CONJUGATE VACCINE 20-VALENT: ICD-10-PCS | Mod: ,,, | Performed by: NURSE PRACTITIONER

## 2023-03-14 PROCEDURE — 3044F PR MOST RECENT HEMOGLOBIN A1C LEVEL <7.0%: ICD-10-PCS | Mod: ,,, | Performed by: NURSE PRACTITIONER

## 2023-03-14 PROCEDURE — 1125F AMNT PAIN NOTED PAIN PRSNT: CPT | Mod: ,,, | Performed by: NURSE PRACTITIONER

## 2023-03-14 PROCEDURE — 4010F ACE/ARB THERAPY RXD/TAKEN: CPT | Mod: ,,, | Performed by: NURSE PRACTITIONER

## 2023-03-14 PROCEDURE — G0009 ADMIN PNEUMOCOCCAL VACCINE: HCPCS | Mod: ,,, | Performed by: NURSE PRACTITIONER

## 2023-03-14 PROCEDURE — 3008F BODY MASS INDEX DOCD: CPT | Mod: ,,, | Performed by: NURSE PRACTITIONER

## 2023-03-14 PROCEDURE — 3078F DIAST BP <80 MM HG: CPT | Mod: ,,, | Performed by: NURSE PRACTITIONER

## 2023-03-14 PROCEDURE — 1100F PTFALLS ASSESS-DOCD GE2>/YR: CPT | Mod: ,,, | Performed by: NURSE PRACTITIONER

## 2023-03-14 PROCEDURE — G0009 PNEUMOCOCCAL CONJUGATE VACCINE 20-VALENT: ICD-10-PCS | Mod: ,,, | Performed by: NURSE PRACTITIONER

## 2023-03-14 PROCEDURE — 1160F PR REVIEW ALL MEDS BY PRESCRIBER/CLIN PHARMACIST DOCUMENTED: ICD-10-PCS | Mod: ,,, | Performed by: NURSE PRACTITIONER

## 2023-03-14 PROCEDURE — 3288F PR FALLS RISK ASSESSMENT DOCUMENTED: ICD-10-PCS | Mod: ,,, | Performed by: NURSE PRACTITIONER

## 2023-03-14 PROCEDURE — 3061F PR NEG MICROALBUMINURIA RESULT DOCUMENTED/REVIEW: ICD-10-PCS | Mod: ,,, | Performed by: NURSE PRACTITIONER

## 2023-03-14 PROCEDURE — 1159F MED LIST DOCD IN RCRD: CPT | Mod: ,,, | Performed by: NURSE PRACTITIONER

## 2023-03-14 PROCEDURE — 1100F PR PT FALLS ASSESS DOC 2+ FALLS/FALL W/INJURY/YR: ICD-10-PCS | Mod: ,,, | Performed by: NURSE PRACTITIONER

## 2023-03-14 PROCEDURE — 3066F PR DOCUMENTATION OF TREATMENT FOR NEPHROPATHY: ICD-10-PCS | Mod: ,,, | Performed by: NURSE PRACTITIONER

## 2023-03-14 PROCEDURE — 1125F PR PAIN SEVERITY QUANTIFIED, PAIN PRESENT: ICD-10-PCS | Mod: ,,, | Performed by: NURSE PRACTITIONER

## 2023-03-14 NOTE — PROGRESS NOTES
.     [facility]     PATIENT NAME: Micheal Taylor   : 1955    AGE: 67 y.o. DATE: 2023   MRN: [id number]        Reason for Visit / Chief Complaint: Medicare AWV Follow Up ([employer] subsequent)        Micheal Taylor presents for an subsequent Medicare AWV today.     The following components were reviewed and updated:    Medical/Social/Family History:  Past Medical History:   Diagnosis Date    Anticoagulant long-term use     CHF (congestive heart failure)     Chronic pain syndrome     Coronary artery disease     Diabetes mellitus, type 2     Hyperlipidemia     Hypertension     Lumbar spondylosis     Lumbosacral radiculopathy     Pain in thoracic spine     PVD (peripheral vascular disease)     Renal disorder     Spondylosis without myelopathy or radiculopathy, cervical region         Family History   Problem Relation Age of Onset    Diabetes Mother     Heart disease Mother     Hypertension Mother     Liver cancer Mother     Heart disease Father     Hypertension Father         Social History     Tobacco Use   Smoking Status Former    Packs/day: 4.00    Years: 30.00    Pack years: 120.00    Types: Cigarettes    Passive exposure: Past (parent)   Smokeless Tobacco Former    Types: Snuff   Tobacco Comments    quit about 2 weeks ago      Social History     Substance and Sexual Activity   Alcohol Use Never       Family History   Problem Relation Age of Onset    Diabetes Mother     Heart disease Mother     Hypertension Mother     Liver cancer Mother     Heart disease Father     Hypertension Father        Past Surgical History:   Procedure Laterality Date    ADENOIDECTOMY      AMPUTATION      left index finger removed     CAUDAL EPIDURAL STEROID INJECTION  2016    Caudal NATHAN - Alee    CIRCUMCISION      CORONARY ARTERY BYPASS GRAFT (CABG)      EYE SURGERY      FOOT SURGERY      heel surgery    HEEL SPUR SURGERY      INJECTION OF FACET JOINT Bilateral 2015     Bilateral L3-S1 Facet Injection - Alee - 4/16/15, 11/15/12 and 9/27/12    LEFT HEART CATHETERIZATION Left 07/19/2022    Procedure: Left heart cath;  Surgeon: Venu Whitlock MD;  Location: [facility];  Service: Cardiology;  Laterality: Left;  Patient has PAD and has had bilateral fem pop bypass. He is followed by Dr. Forrest.    RADIOFREQUENCY THERMOCOAGULATION Left 02/26/2013    Left L3-S1 RFTC -Alee    RADIOFREQUENCY THERMOCOAGULATION Right 02/05/2013    Right L3-S1 RFTC - Alee    TONSILLECTOMY, ADENOIDECTOMY, BILATERAL MYRINGOTOMY AND TUBES      TRANSFORAMINAL EPIDURAL INJECTION OF STEROID Left 06/19/2012    Left L3-4 TFESI - Alee    TRANSFORAMINAL EPIDURAL INJECTION OF STEROID Right 04/12/2013    Right L3-4 TFESI - Alee - 4/12/13 and 8/7/12    VASCULAR SURGERY           Allergies and Current Medications   Review of patient's allergies indicates:  No Known Allergies    Current Outpatient Medications:     amLODIPine (NORVASC) 5 MG tablet, Take 1 tablet (5 mg total) by mouth once daily., Disp: 90 tablet, Rfl: 1    aspirin (ECOTRIN) 81 MG EC tablet, Take 81 mg by mouth once daily., Disp: , Rfl:     cholecalciferol, vitamin D3, (VITAMIN D3) 25 mcg (1,000 unit) capsule, Take 1,000 Units by mouth once daily., Disp: , Rfl:     cinnamon bark 500 mg capsule, Take 500 mg by mouth once daily. , Disp: , Rfl:     clopidogreL (PLAVIX) 75 mg tablet, TAKE 1 TABLET (75 MG TOTAL) BY MOUTH ONCE DAILY., Disp: 90 tablet, Rfl: 1    dapagliflozin (FARXIGA) 10 mg tablet, Take 1 tablet (10 mg total) by mouth once daily., Disp: 90 tablet, Rfl: 0    docosahexaenoic acid-epa 120-180 mg Cap, Take 1 capsule by mouth., Disp: , Rfl:     EScitalopram oxalate (LEXAPRO) 10 MG tablet, Take 1 tablet (10 mg total) by mouth once daily., Disp: 90 tablet, Rfl: 0    ezetimibe (ZETIA) 10 mg tablet, Take 1 tablet (10 mg total) by mouth once daily., Disp: 90 tablet, Rfl: 1    fenofibrate (TRICOR) 145 MG tablet, Take 1 tablet (145 mg total) by mouth once  daily., Disp: 90 tablet, Rfl: 1    gabapentin (NEURONTIN) 300 MG capsule, Take 1 capsule (300 mg total) by mouth 3 (three) times daily., Disp: 270 capsule, Rfl: 0    hydrALAZINE (APRESOLINE) 50 MG tablet, Take 1 tablet (50 mg total) by mouth 4 (four) times daily., Disp: 360 tablet, Rfl: 1    HYDROcodone-acetaminophen (NORCO)  mg per tablet, Take 1 tablet by mouth every 8 (eight) hours., Disp: 90 tablet, Rfl: 0    insulin regular 100 unit/mL Inj injection, Novolin R Regular U-100 Insuln  14-32 u bidwm, Disp: , Rfl:     liraglutide 0.6 mg/0.1 mL, 18 mg/3 mL, subq PNIJ (VICTOZA 2-CAITLIN) 0.6 mg/0.1 mL (18 mg/3 mL) PnIj pen, Inject 1.8 mg into the skin once daily., Disp: 27 mL, Rfl: 0    lisinopriL (PRINIVIL,ZESTRIL) 20 MG tablet, Take 1 tablet (20 mg total) by mouth once daily., Disp: 90 tablet, Rfl: 1    magnesium 250 mg Tab, Take 1 tablet by mouth once daily. , Disp: , Rfl:     metFORMIN (GLUCOPHAGE) 500 MG tablet, Take 1 tablet (500 mg total) by mouth 2 (two) times daily with meals., Disp: 180 tablet, Rfl: 0    metoprolol succinate (TOPROL-XL) 25 MG 24 hr tablet, Take 1 tablet (25 mg total) by mouth once daily., Disp: 90 tablet, Rfl: 1    nitroGLYCERIN (NITROSTAT) 0.4 MG SL tablet, Place 1 tablet (0.4 mg total) under the tongue every 5 (five) minutes as needed for Chest pain., Disp: 25 tablet, Rfl: 3    omeprazole (PRILOSEC) 20 MG capsule, Take 1 capsule (20 mg total) by mouth once daily., Disp: 90 capsule, Rfl: 0    potassium chloride SA (K-DUR,KLOR-CON) 20 MEQ tablet, Take 1 tablet (20 mEq total) by mouth once daily., Disp: 90 tablet, Rfl: 0    pravastatin (PRAVACHOL) 80 MG tablet, Take 80 mg by mouth every evening., Disp: , Rfl:     rOPINIRole (REQUIP) 0.5 MG tablet, Take 0.5 mg by mouth every evening., Disp: , Rfl:     rosuvastatin (CRESTOR) 40 MG Tab, Take 1 tablet (40 mg total) by mouth every evening., Disp: 90 tablet, Rfl: 3    spironolactone (ALDACTONE) 25 MG tablet, Take 25 mg by mouth 2 (two) times  daily., Disp: , Rfl:     tiZANidine (ZANAFLEX) 4 MG tablet, Take 1 tablet (4 mg total) by mouth every 8 (eight) hours., Disp: 90 tablet, Rfl: 2    TRESIBA FLEXTOUCH U-200 200 unit/mL (3 mL) insulin pen, Inject 54 Units into the skin once daily. , Disp: , Rfl:     VASCEPA 1 gram Cap, Take 1 capsule by mouth 2 (two) times a day., Disp: , Rfl:     vitamin A 8000 UNIT capsule, Take 8,000 Units by mouth., Disp: , Rfl:     vitamin E 400 UNIT capsule, Take 400 Units by mouth once daily. , Disp: , Rfl:     cefUROXime (CEFTIN) 250 MG tablet, Take 1 tablet (250 mg total) by mouth 2 (two) times daily. (Patient not taking: Reported on 1/4/2023), Disp: 20 tablet, Rfl: 0    olopatadine (PATADAY) 0.2 % Drop, Place 1 drop into both eyes once daily., Disp: 5 mL, Rfl: 0    Health Risk Assessment   Fall Risk: yes   Obesity: BMI 25.86     Advance Directive:  Does not have an advanced directive. Verbal education and written education included in today's AVS.   Depression: phq9 = 2    HTN: yes  T2DM: no  Tobacco use: former  STI: denies    Alcohol misuse: former   Statin Use: yes    Health Maintenance   Last eye exam: in    Last CV screen with lipids: 3/1/2023   Diabetes screening with fasting glucose or A1c: 3/1/2023   Colonoscopy: has cologuard at home just hasn't done it yet   Flu Vaccine: today   Pneumonia vaccines: prevnar 20 today; pneumovax 12/6/2018   COVID vaccine: declines   Last PSA screen: 8/29/22   AAA screening: 3/3/2022 (once in lifetime for males 65-75 who have smoked > 100 cigarettes in lifetime)  Hepatitis C Screen: 3/1/2023    Incontinence  Bowel: no  Bladder: no    Lab results available in Epic or see dates from [facility] above:   Lab Results   Component Value Date    CHOL 85 03/01/2023    CHOL 93 02/28/2022    CHOL 114 08/11/2021     Lab Results   Component Value Date    HDL 26 (L) 03/01/2023    HDL 31 (L) 02/28/2022    HDL 32 (L) 08/11/2021     Lab Results   Component Value Date    LDLCALC 30 03/01/2023    LDLCALC 32  02/28/2022    LDLCALC 42 08/11/2021     Lab Results   Component Value Date    TRIG 144 03/01/2023    TRIG 152 (H) 02/28/2022    TRIG 201 (H) 08/11/2021     Lab Results   Component Value Date    CHOLHDL 3.3 03/01/2023    CHOLHDL 3.0 02/28/2022    CHOLHDL 3.6 08/11/2021       Lab Results   Component Value Date    HGBA1C 6.9 (H) 03/01/2023       Sodium   Date Value Ref Range Status   03/01/2023 138 136 - 145 mmol/L Final     Potassium   Date Value Ref Range Status   03/01/2023 5.0 3.5 - 5.1 mmol/L Final     Chloride   Date Value Ref Range Status   03/01/2023 106 98 - 107 mmol/L Final     CO2   Date Value Ref Range Status   03/01/2023 28 21 - 32 mmol/L Final     Glucose   Date Value Ref Range Status   03/01/2023 254 (H) 74 - 106 mg/dL Final     BUN   Date Value Ref Range Status   03/01/2023 34 (H) 7 - 18 mg/dL Final     Creatinine   Date Value Ref Range Status   03/01/2023 1.97 (H) 0.70 - 1.30 mg/dL Final     Calcium   Date Value Ref Range Status   03/01/2023 8.9 8.5 - 10.1 mg/dL Final     Total Protein   Date Value Ref Range Status   03/01/2023 6.8 6.4 - 8.2 g/dL Final     Albumin   Date Value Ref Range Status   03/01/2023 3.7 3.5 - 5.0 g/dL Final     Bilirubin, Total   Date Value Ref Range Status   03/01/2023 0.4 >0.0 - 1.2 mg/dL Final     Alk Phos   Date Value Ref Range Status   03/01/2023 35 (L) 45 - 115 U/L Final     AST   Date Value Ref Range Status   03/01/2023 35 15 - 37 U/L Final     ALT   Date Value Ref Range Status   03/01/2023 26 16 - 61 U/L Final     Anion Gap   Date Value Ref Range Status   03/01/2023 9 7 - 16 mmol/L Final     eGFR    Date Value Ref Range Status   12/12/2020 47       eGFR   Date Value Ref Range Status   07/14/2022 32 (L) >=60 mL/min/1.73m² Final         Lab Results   Component Value Date    PSA 0.452 08/29/2022    PSA 0.543 08/11/2021    (Delete this line if female pt)        Care Team   PCP: Dr. Morel    Eye specialist: Dr. Adebayo Larson   Cardiologist: Luciana Galicia,  "JAKOB/ Dr. Whitlock   Pain clinic: [facility]     **See Completed Assessments for Annual Wellness visit within the encounter summary    The following assessments were completed & reviewed:  Depression Screening  Cognitive function Screening  Timed Get Up Test  Whisper Test  Vision Screen  Health Risk Assessment  Checklist of ADLs and IADLs      Objective  /68 (BP Location: Left arm, Patient Position: Sitting, BP Method: Large (Manual))   Pulse 81   Temp 98 °F (36.7 °C) (Oral)   Resp 20   Ht 6' 1" (1.854 m)   Wt 88.9 kg (196 lb)   SpO2 96%   BMI 25.86 kg/m²        Physical Exam  Constitutional:       Appearance: Normal appearance.   HENT:      Head: Normocephalic.      Right Ear: Tympanic membrane normal.      Left Ear: Tympanic membrane normal.      Mouth/Throat:      Mouth: Mucous membranes are moist.   Cardiovascular:      Rate and Rhythm: Normal rate and regular rhythm.      Heart sounds: Normal heart sounds. No murmur heard.  Pulmonary:      Effort: Pulmonary effort is normal. No respiratory distress.      Breath sounds: Normal breath sounds.   Skin:     General: Skin is warm and dry.   Neurological:      Mental Status: He is alert.   Psychiatric:         Mood and Affect: Mood normal.       Assessment:     1. Encounter for subsequent annual wellness visit (AWV) in Medicare patient    2. Encounter for immunization  - Pneumococcal Conjugate Vaccine (20 Valent) (IM)  - Influenza - Quadrivalent (Adjuvanted)    3. Encounter for preventive health examination    4. Hypertension, unspecified type    5. Type 2 diabetes mellitus with diabetic neuropathy, with long-term current use of insulin    6. Coronary artery disease involving coronary bypass graft without angina pectoris, unspecified whether native or transplanted heart    7. PVD (peripheral vascular disease)    8. BMI 25.0-25.9,adult    9. Aortic valve sclerosis    10. Lumbosacral radiculopathy    11. Chronic systolic congestive heart failure   "       Plan:    Referrals:   None at this time     Discussed and provided with a screening schedule and personal prevention plan in accordance with USPSTF age appropriate recommendations and Medicare screening guidelines.   Education, counseling, and referrals were provided as needed.  After Visit Summary printed and given to patient which includes written education and a list of any referrals if indicated.     F/u plan for yearly AWV.     I offered to discuss advanced care planning, including how to pick a person who would make decisions for you if you were unable to make them for yourself, called a health care power of , and what kind of decisions you might make such as use of life sustaining treatments such as ventilators and tube feeding when faced with a life limiting illness recorded on a living will that they will need to know. (How you want to be cared for as you near the end of your natural life)     X Patient is interested in learning more about how to make advanced directives.  I provided them paperwork and offered to discuss this with them.    Signature: JAKOB Valenzuela

## 2023-03-14 NOTE — PATIENT INSTRUCTIONS
Counseling and Referral of Other Preventative  (Italic type indicates deductible and co-insurance are waived)    Patient Name: Micheal Taylor  Today's Date: 3/14/2023    Health Maintenance       Date Due Completion Date    TETANUS VACCINE Never done ---    Sign Pain Contract Never done ---    Colorectal Cancer Screening Never done ---    Pneumococcal Vaccines (Age 65+) (2 - PCV) 12/06/2019 12/6/2018    Eye Exam 03/12/2022 3/12/2021    Override on 1/12/2017: Done (Eye Site)    Influenza Vaccine (1) 09/01/2022 2/23/2022    Foot Exam 03/10/2023 3/10/2022    COVID-19 Vaccine (1) 08/29/2023 (Originally 5/16/1956) ---    PROSTATE-SPECIFIC ANTIGEN 08/29/2023 8/29/2022    Hemoglobin A1c 09/01/2023 3/1/2023    Diabetes Urine Screening 03/01/2024 3/1/2023    Lipid Panel 03/01/2024 3/1/2023    High Dose Statin 03/14/2024 3/14/2023        Orders Placed This Encounter   Procedures    Pneumococcal Conjugate Vaccine (20 Valent) (IM)    Influenza - Quadrivalent (Adjuvanted)       The following information is provided to all patients.  This information is to help you find resources for any of the problems found today that may be affecting your health:                Living healthy guide: www.[facility].[place].gov      Understanding Diabetes: www.diabetes.org      Eating healthy: www.cdc.gov/healthyweight      CDC home safety checklist: www.cdc.gov/steadi/patient.html      Agency on Aging: www.goea.[place].[facility]      Alcoholics anonymous (AA): www.aa.org      Physical Activity: www.joann.nih.gov/qj0upri      Tobacco use: www.quitwithusla.org

## 2023-03-29 DIAGNOSIS — E78.5 HYPERLIPIDEMIA, UNSPECIFIED HYPERLIPIDEMIA TYPE: ICD-10-CM

## 2023-03-29 DIAGNOSIS — I10 HYPERTENSION, UNSPECIFIED TYPE: ICD-10-CM

## 2023-03-29 RX ORDER — METOPROLOL SUCCINATE 25 MG/1
25 TABLET, EXTENDED RELEASE ORAL DAILY
Qty: 90 TABLET | Refills: 1 | Status: SHIPPED | OUTPATIENT
Start: 2023-03-29 | End: 2023-11-30 | Stop reason: SDUPTHER

## 2023-03-29 RX ORDER — FENOFIBRATE 145 MG/1
145 TABLET, FILM COATED ORAL DAILY
Qty: 90 TABLET | Refills: 1 | Status: SHIPPED | OUTPATIENT
Start: 2023-03-29 | End: 2023-10-10 | Stop reason: SDUPTHER

## 2023-04-10 NOTE — PROGRESS NOTES
Subjective:         Patient ID: Micheal Taylor is a 67 y.o. male.    Chief Complaint: Low-back Pain      Pain  This is a chronic problem. The current episode started more than 1 year ago. The problem occurs daily. The problem has been waxing and waning. Associated symptoms include arthralgias and neck pain. Pertinent negatives include no anorexia, chest pain, chills, coughing, diaphoresis, fatigue, fever, sore throat, vertigo or vomiting.   Review of Systems   Constitutional:  Negative for activity change, appetite change, chills, diaphoresis, fatigue, fever and unexpected weight change.   HENT:  Negative for drooling, ear discharge, ear pain, facial swelling, mouth sores, nosebleeds, sore throat, trouble swallowing, voice change and goiter.    Eyes:  Negative for photophobia, pain, discharge, redness and visual disturbance.   Respiratory:  Negative for apnea, cough, choking, chest tightness, shortness of breath, wheezing and stridor.    Cardiovascular:  Negative for chest pain, palpitations and leg swelling.   Gastrointestinal:  Negative for abdominal distention, anorexia, diarrhea, rectal pain, vomiting and fecal incontinence.   Endocrine: Negative for cold intolerance, heat intolerance, polydipsia, polyphagia and polyuria.   Genitourinary:  Negative for bladder incontinence, dysuria, flank pain and frequency.   Musculoskeletal:  Positive for arthralgias, back pain, leg pain, neck pain and neck stiffness.   Integumentary:  Negative for color change and pallor.   Neurological:  Negative for dizziness, vertigo, seizures, syncope, facial asymmetry, speech difficulty, light-headedness, coordination difficulties, memory loss and coordination difficulties.   Hematological:  Negative for adenopathy. Does not bruise/bleed easily.   Psychiatric/Behavioral:  Negative for agitation, behavioral problems, confusion, decreased concentration, dysphoric mood, hallucinations, self-injury and suicidal ideas. The patient is not  nervous/anxious and is not hyperactive.          Past Medical History:   Diagnosis Date    Anticoagulant long-term use     CHF (congestive heart failure)     Chronic pain syndrome     Coronary artery disease     Diabetes mellitus, type 2     Hyperlipidemia     Hypertension     Lumbar spondylosis     Lumbosacral radiculopathy     Mild nonproliferative diabetic retinopathy of both eyes without macular edema 09/09/2022    Pain in thoracic spine     PVD (peripheral vascular disease)     Renal disorder     Spondylosis without myelopathy or radiculopathy, cervical region      Past Surgical History:   Procedure Laterality Date    ADENOIDECTOMY      AMPUTATION      left index finger removed     CAUDAL EPIDURAL STEROID INJECTION  01/19/2016    Caudal NATHAN - Alee    CIRCUMCISION      CORONARY ARTERY BYPASS GRAFT (CABG)      EYE SURGERY      FOOT SURGERY      heel surgery    HEEL SPUR SURGERY      INJECTION OF FACET JOINT Bilateral 04/16/2015    Bilateral L3-S1 Facet Injection - Alee - 4/16/15, 11/15/12 and 9/27/12    LEFT HEART CATHETERIZATION Left 07/19/2022    Procedure: Left heart cath;  Surgeon: Venu Whitlock MD;  Location: [facility] CATH LAB;  Service: Cardiology;  Laterality: Left;  Patient has PAD and has had bilateral fem pop bypass. He is followed by Dr. Forrest.    RADIOFREQUENCY THERMOCOAGULATION Left 02/26/2013    Left L3-S1 RFTC -Alee    RADIOFREQUENCY THERMOCOAGULATION Right 02/05/2013    Right L3-S1 RFTC - Alee    TONSILLECTOMY, ADENOIDECTOMY, BILATERAL MYRINGOTOMY AND TUBES      TRANSFORAMINAL EPIDURAL INJECTION OF STEROID Left 06/19/2012    Left L3-4 TFESI - Alee    TRANSFORAMINAL EPIDURAL INJECTION OF STEROID Right 04/12/2013    Right L3-4 TFESI - Alee - 4/12/13 and 8/7/12    VASCULAR SURGERY       Social History     Socioeconomic History    Marital status:     Number of children: 5   Occupational History    Occupation: retired   Tobacco Use    Smoking status: Former     Packs/day: 4.00     Years: 30.00     " Pack years: 120.00     Types: Cigarettes     Passive exposure: Past (parent)    Smokeless tobacco: Former     Types: Snuff    Tobacco comments:     quit about 2 weeks ago   Substance and Sexual Activity    Alcohol use: Never    Drug use: Never    Sexual activity: Not Currently     Family History   Problem Relation Age of Onset    Diabetes Mother     Heart disease Mother     Hypertension Mother     Liver cancer Mother     Heart disease Father     Hypertension Father      Review of patient's allergies indicates:  No Known Allergies     Objective:  Vitals:    04/11/23 0926   BP: (!) 145/51   Pulse: (!) 49   Resp: 20   Weight: 85.3 kg (188 lb)   Height: 5' 11" (1.803 m)   PainSc:   7         Physical Exam  Vitals and nursing note reviewed. Exam conducted with a chaperone present.   Constitutional:       General: He is awake.      Appearance: Normal appearance. He is not ill-appearing, toxic-appearing or diaphoretic.   HENT:      Head: Normocephalic and atraumatic.      Nose: Nose normal.      Mouth/Throat:      Mouth: Mucous membranes are moist.      Pharynx: Oropharynx is clear.   Eyes:      Conjunctiva/sclera: Conjunctivae normal.      Pupils: Pupils are equal, round, and reactive to light.   Cardiovascular:      Rate and Rhythm: Normal rate.   Pulmonary:      Effort: Pulmonary effort is normal. No respiratory distress.   Abdominal:      Palpations: Abdomen is soft.   Musculoskeletal:      Right shoulder: Tenderness present.      Left shoulder: Tenderness present.      Cervical back: Normal range of motion and neck supple. Tenderness present.      Thoracic back: Tenderness present.      Lumbar back: Tenderness present. Decreased range of motion.   Skin:     General: Skin is warm and dry.   Neurological:      General: No focal deficit present.      Mental Status: He is alert and oriented to person, place, and time. Mental status is at baseline.      Cranial Nerves: No cranial nerve deficit (II-XII).   Psychiatric:    "      Mood and Affect: Mood normal.         Behavior: Behavior normal. Behavior is cooperative.         Thought Content: Thought content normal.         US Lower Extremity Arteries Right  Narrative: EXAMINATION:  US LOWER EXTREMITY ARTERIES RIGHT    CLINICAL HISTORY:  Peripheral vascular disease, unspecified    COMPARISON:  08/24/2022    FINDINGS:  Right common femoral artery peak systolic velocity 112 centimeters/second triphasic waveforms, profundus femorals 107 centimeters/second monophasic waveforms, patient's native SFA proximal 95 centimeters/second monophasic, native SFA mid in distal is occluded common is a femoropopliteal graft which is patent, proximal anastomosis 6.5 mm at 88 centimeters/second, mid graph 35 centimeters/second distal anastomosis is 3.8 mm at 94 centimeters/second    Popliteal artery 58 centimeters/second biphasic waveforms, posterior tibial 22 centimeters/second biphasic, dorsalis pedis 32 centimeters/seconds biphasic  Impression: Patent femoropopliteal graph no progression of velocities distal anastomosis    TECHNOLOGIST: Ronit Fox (RT) (VS) RVT    Electronically signed by: Loida Forrest  Date:    02/10/2023  Time:    07:25       Patient Outreach on 03/14/2023   Component Date Value Ref Range Status    Left Eye DM Retinopathy 09/09/2022 Positive   Final    Right Eye DM Retinopathy 09/09/2022 Positive   Final   Office Visit on 03/01/2023   Component Date Value Ref Range Status    Hepatitis C Ab 03/01/2023 Non-Reactive  Non-Reactive Final    Hemoglobin A1C 03/01/2023 6.9 (H)  4.5 - 6.6 % Final    Estimated Average Glucose 03/01/2023 144  mg/dL Final    Sodium 03/01/2023 138  136 - 145 mmol/L Final    Potassium 03/01/2023 5.0  3.5 - 5.1 mmol/L Final    Chloride 03/01/2023 106  98 - 107 mmol/L Final    CO2 03/01/2023 28  21 - 32 mmol/L Final    Anion Gap 03/01/2023 9  7 - 16 mmol/L Final    Glucose 03/01/2023 254 (H)  74 - 106 mg/dL Final    BUN 03/01/2023 34 (H)  7 - 18 mg/dL Final     Creatinine 03/01/2023 1.97 (H)  0.70 - 1.30 mg/dL Final    BUN/Creatinine Ratio 03/01/2023 17  6 - 20 Final    Calcium 03/01/2023 8.9  8.5 - 10.1 mg/dL Final    Total Protein 03/01/2023 6.8  6.4 - 8.2 g/dL Final    Albumin 03/01/2023 3.7  3.5 - 5.0 g/dL Final    Globulin 03/01/2023 3.1  2.0 - 4.0 g/dL Final    A/G Ratio 03/01/2023 1.2   Final    Bilirubin, Total 03/01/2023 0.4  >0.0 - 1.2 mg/dL Final    Alk Phos 03/01/2023 35 (L)  45 - 115 U/L Final    ALT 03/01/2023 26  16 - 61 U/L Final    AST 03/01/2023 35  15 - 37 U/L Final    eGFR 03/01/2023 37 (L)  >=60 mL/min/1.73m² Final    Triglycerides 03/01/2023 144  35 - 150 mg/dL Final    Cholesterol 03/01/2023 85  0 - 200 mg/dL Final    HDL Cholesterol 03/01/2023 26 (L)  40 - 60 mg/dL Final    Cholesterol/HDL Ratio (Risk Factor) 03/01/2023 3.3   Final    Non-HDL 03/01/2023 59  mg/dL Final    LDL Calculated 03/01/2023 30  mg/dL Final    LDL/HDL 03/01/2023 1.2   Final    VLDL 03/01/2023 29  mg/dL Final    Creatinine, Urine 03/01/2023 76  39 - 259 mg/dL Final    Microalbumin 03/01/2023 1.6  0.0 - 2.8 mg/dL Final    Microalbumin/Creatinine Ratio 03/01/2023 21.1  0.0 - 30.0 mg/g Final    Testosterone, Free 03/01/2023 7.67  3.47 - 13.0 ng/dL Final    Testosterone, Total 03/01/2023 482  240 - 950 ng/dL Final    WBC 03/01/2023 7.10  4.50 - 11.00 K/uL Final    RBC 03/01/2023 4.75  4.60 - 6.20 M/uL Final    Hemoglobin 03/01/2023 12.2 (L)  13.5 - 18.0 g/dL Final    Hematocrit 03/01/2023 41.1  40.0 - 54.0 % Final    MCV 03/01/2023 86.5  80.0 - 96.0 fL Final    MCH 03/01/2023 25.7 (L)  27.0 - 31.0 pg Final    MCHC 03/01/2023 29.7 (L)  32.0 - 36.0 g/dL Final    RDW 03/01/2023 14.9 (H)  11.5 - 14.5 % Final    Platelet Count 03/01/2023 196  150 - 400 K/uL Final    MPV 03/01/2023 12.0  9.4 - 12.4 fL Final    Neutrophils % 03/01/2023 54.3  53.0 - 65.0 % Final    Lymphocytes % 03/01/2023 33.9  27.0 - 41.0 % Final    Monocytes % 03/01/2023 7.2 (H)  2.0 - 6.0 % Final    Eosinophils %  03/01/2023 3.5  1.0 - 4.0 % Final    Basophils % 03/01/2023 0.8  0.0 - 1.0 % Final    Immature Granulocytes % 03/01/2023 0.3  0.0 - 0.4 % Final    nRBC, Auto 03/01/2023 0.0  <=0.0 % Final    Neutrophils, Abs 03/01/2023 3.85  1.80 - 7.70 K/uL Final    Lymphocytes, Absolute 03/01/2023 2.41  1.00 - 4.80 K/uL Final    Monocytes, Absolute 03/01/2023 0.51  0.00 - 0.80 K/uL Final    Eosinophils, Absolute 03/01/2023 0.25  0.00 - 0.50 K/uL Final    Basophils, Absolute 03/01/2023 0.06  0.00 - 0.20 K/uL Final    Immature Granulocytes, Absolute 03/01/2023 0.02  0.00 - 0.04 K/uL Final    nRBC, Absolute 03/01/2023 0.00  <=0.00 x10e3/uL Final    Diff Type 03/01/2023 Auto   Final   Office Visit on 01/11/2023   Component Date Value Ref Range Status    POC Amphetamines 01/11/2023 Negative  Negative, Inconclusive Final    POC Barbiturates 01/11/2023 Negative  Negative, Inconclusive Final    POC Benzodiazepines 01/11/2023 Negative  Negative, Inconclusive Final    POC Cocaine 01/11/2023 Negative  Negative, Inconclusive Final    POC THC 01/11/2023 Negative  Negative, Inconclusive Final    POC Methadone 01/11/2023 Negative  Negative, Inconclusive Final    POC Methamphetamine 01/11/2023 Negative  Negative, Inconclusive Final    POC Opiates 01/11/2023 Presumptive Positive (A)  Negative, Inconclusive Final    POC Oxycodone 01/11/2023 Negative  Negative, Inconclusive Final    POC Phencyclidine 01/11/2023 Negative  Negative, Inconclusive Final    POC Methylenedioxymethamphetamine * 01/11/2023 Negative  Negative, Inconclusive Final    POC Tricyclic Antidepressants 01/11/2023 Negative  Negative, Inconclusive Final    POC Buprenorphine 01/11/2023 Negative   Final     Acceptable 01/11/2023 Yes   Final    POC Temperature (Urine) 01/11/2023 94   Final         Orders Placed This Encounter   Procedures    POCT Urine Drug Screen Presump     Interpretive Information:     Negative:  No drug detected at the cut off level.   Positive:  This  result represents presumptive positive for the   tested drug, other substances may yield a positive response other   than the analyte of interest. This result should be utilized for   diagnostic purpose only. Confirmation testing will be performed upon physician request only.            Requested Prescriptions     Signed Prescriptions Disp Refills    tiZANidine (ZANAFLEX) 4 MG tablet 90 tablet 2     Sig: Take 1 tablet (4 mg total) by mouth every 8 (eight) hours.    HYDROcodone-acetaminophen (NORCO)  mg per tablet 90 tablet 0     Sig: Take 1 tablet by mouth every 8 (eight) hours.    HYDROcodone-acetaminophen (NORCO)  mg per tablet 90 tablet 0     Sig: Take 1 tablet by mouth every 8 (eight) hours.    HYDROcodone-acetaminophen (NORCO)  mg per tablet 90 tablet 0     Sig: Take 1 tablet by mouth every 8 (eight) hours.       Assessment:     1. Lumbosacral radiculopathy    2. Cervical spondylosis without myelopathy    3. Postlaminectomy syndrome, lumbar region    4. Pain in thoracic spine    5. Encounter for long-term (current) use of other medications         A's of Opioid Risk Assessment  Activity:Patient can perform ADL.   Analgesia:Patients pain is partially controlled by current medication. Patient has tried OTC medications such as Tylenol and Ibuprofen with out relief.   Adverse Effects: Patient denies constipation or sedation.  Aberrant Behavior:  reviewed with no aberrant drug seeking/taking behavior.  Overdose reversal drug naloxone discussed    Drug screen reviewed      Plan:    Narcan January 2023    Anticoagulated Plavix    [place] pharmacy closed on weekends    He states he would like to continue conservative management current medication is helping control his chronic back and joint pain    Continue home exercise program as directed    Continue current medication    Follow-up 3 months    Dr. Callahan, February 2023    Bring original prescription medication bottles/container/box with labels  to each visit

## 2023-04-11 ENCOUNTER — OFFICE VISIT (OUTPATIENT)
Dept: PAIN MEDICINE | Facility: CLINIC | Age: 68
End: 2023-04-11
Payer: COMMERCIAL

## 2023-04-11 VITALS
DIASTOLIC BLOOD PRESSURE: 51 MMHG | HEART RATE: 49 BPM | WEIGHT: 188 LBS | RESPIRATION RATE: 20 BRPM | SYSTOLIC BLOOD PRESSURE: 145 MMHG | HEIGHT: 71 IN | BODY MASS INDEX: 26.32 KG/M2

## 2023-04-11 DIAGNOSIS — M54.17 LUMBOSACRAL RADICULOPATHY: Primary | Chronic | ICD-10-CM

## 2023-04-11 DIAGNOSIS — M54.6 PAIN IN THORACIC SPINE: Chronic | ICD-10-CM

## 2023-04-11 DIAGNOSIS — Z79.899 ENCOUNTER FOR LONG-TERM (CURRENT) USE OF OTHER MEDICATIONS: ICD-10-CM

## 2023-04-11 DIAGNOSIS — M96.1 POSTLAMINECTOMY SYNDROME, LUMBAR REGION: Chronic | ICD-10-CM

## 2023-04-11 DIAGNOSIS — M47.812 CERVICAL SPONDYLOSIS WITHOUT MYELOPATHY: Chronic | ICD-10-CM

## 2023-04-11 LAB
CTP QC/QA: YES
POC (AMP) AMPHETAMINE: NEGATIVE
POC (BAR) BARBITURATES: NEGATIVE
POC (BUP) BUPRENORPHINE: NEGATIVE
POC (BZO) BENZODIAZEPINES: NEGATIVE
POC (COC) COCAINE: NEGATIVE
POC (MDMA) METHYLENEDIOXYMETHAMPHETAMINE 3,4: NEGATIVE
POC (MET) METHAMPHETAMINE: NEGATIVE
POC (MOP) OPIATES: ABNORMAL
POC (MTD) METHADONE: NEGATIVE
POC (OXY) OXYCODONE: NEGATIVE
POC (PCP) PHENCYCLIDINE: NEGATIVE
POC (TCA) TRICYCLIC ANTIDEPRESSANTS: NEGATIVE
POC TEMPERATURE (URINE): 92
POC THC: NEGATIVE

## 2023-04-11 PROCEDURE — 4010F ACE/ARB THERAPY RXD/TAKEN: CPT | Mod: CPTII,,, | Performed by: PHYSICIAN ASSISTANT

## 2023-04-11 PROCEDURE — 1159F MED LIST DOCD IN RCRD: CPT | Mod: CPTII,,, | Performed by: PHYSICIAN ASSISTANT

## 2023-04-11 PROCEDURE — 3061F PR NEG MICROALBUMINURIA RESULT DOCUMENTED/REVIEW: ICD-10-PCS | Mod: CPTII,,, | Performed by: PHYSICIAN ASSISTANT

## 2023-04-11 PROCEDURE — 1101F PT FALLS ASSESS-DOCD LE1/YR: CPT | Mod: CPTII,,, | Performed by: PHYSICIAN ASSISTANT

## 2023-04-11 PROCEDURE — 80305 DRUG TEST PRSMV DIR OPT OBS: CPT | Mod: PBBFAC | Performed by: PHYSICIAN ASSISTANT

## 2023-04-11 PROCEDURE — 99214 OFFICE O/P EST MOD 30 MIN: CPT | Mod: S$PBB,,, | Performed by: PHYSICIAN ASSISTANT

## 2023-04-11 PROCEDURE — 1125F PR PAIN SEVERITY QUANTIFIED, PAIN PRESENT: ICD-10-PCS | Mod: CPTII,,, | Performed by: PHYSICIAN ASSISTANT

## 2023-04-11 PROCEDURE — 3066F NEPHROPATHY DOC TX: CPT | Mod: CPTII,,, | Performed by: PHYSICIAN ASSISTANT

## 2023-04-11 PROCEDURE — 3066F PR DOCUMENTATION OF TREATMENT FOR NEPHROPATHY: ICD-10-PCS | Mod: CPTII,,, | Performed by: PHYSICIAN ASSISTANT

## 2023-04-11 PROCEDURE — 1125F AMNT PAIN NOTED PAIN PRSNT: CPT | Mod: CPTII,,, | Performed by: PHYSICIAN ASSISTANT

## 2023-04-11 PROCEDURE — 3078F DIAST BP <80 MM HG: CPT | Mod: CPTII,,, | Performed by: PHYSICIAN ASSISTANT

## 2023-04-11 PROCEDURE — 1101F PR PT FALLS ASSESS DOC 0-1 FALLS W/OUT INJ PAST YR: ICD-10-PCS | Mod: CPTII,,, | Performed by: PHYSICIAN ASSISTANT

## 2023-04-11 PROCEDURE — 99214 PR OFFICE/OUTPT VISIT, EST, LEVL IV, 30-39 MIN: ICD-10-PCS | Mod: S$PBB,,, | Performed by: PHYSICIAN ASSISTANT

## 2023-04-11 PROCEDURE — 99215 OFFICE O/P EST HI 40 MIN: CPT | Mod: PBBFAC | Performed by: PHYSICIAN ASSISTANT

## 2023-04-11 PROCEDURE — 4010F PR ACE/ARB THEARPY RXD/TAKEN: ICD-10-PCS | Mod: CPTII,,, | Performed by: PHYSICIAN ASSISTANT

## 2023-04-11 PROCEDURE — 3044F HG A1C LEVEL LT 7.0%: CPT | Mod: CPTII,,, | Performed by: PHYSICIAN ASSISTANT

## 2023-04-11 PROCEDURE — 3288F FALL RISK ASSESSMENT DOCD: CPT | Mod: CPTII,,, | Performed by: PHYSICIAN ASSISTANT

## 2023-04-11 PROCEDURE — 3288F PR FALLS RISK ASSESSMENT DOCUMENTED: ICD-10-PCS | Mod: CPTII,,, | Performed by: PHYSICIAN ASSISTANT

## 2023-04-11 PROCEDURE — 3044F PR MOST RECENT HEMOGLOBIN A1C LEVEL <7.0%: ICD-10-PCS | Mod: CPTII,,, | Performed by: PHYSICIAN ASSISTANT

## 2023-04-11 PROCEDURE — 3077F SYST BP >= 140 MM HG: CPT | Mod: CPTII,,, | Performed by: PHYSICIAN ASSISTANT

## 2023-04-11 PROCEDURE — 3008F BODY MASS INDEX DOCD: CPT | Mod: CPTII,,, | Performed by: PHYSICIAN ASSISTANT

## 2023-04-11 PROCEDURE — 3077F PR MOST RECENT SYSTOLIC BLOOD PRESSURE >= 140 MM HG: ICD-10-PCS | Mod: CPTII,,, | Performed by: PHYSICIAN ASSISTANT

## 2023-04-11 PROCEDURE — 1159F PR MEDICATION LIST DOCUMENTED IN MEDICAL RECORD: ICD-10-PCS | Mod: CPTII,,, | Performed by: PHYSICIAN ASSISTANT

## 2023-04-11 PROCEDURE — 3078F PR MOST RECENT DIASTOLIC BLOOD PRESSURE < 80 MM HG: ICD-10-PCS | Mod: CPTII,,, | Performed by: PHYSICIAN ASSISTANT

## 2023-04-11 PROCEDURE — 3008F PR BODY MASS INDEX (BMI) DOCUMENTED: ICD-10-PCS | Mod: CPTII,,, | Performed by: PHYSICIAN ASSISTANT

## 2023-04-11 PROCEDURE — 3061F NEG MICROALBUMINURIA REV: CPT | Mod: CPTII,,, | Performed by: PHYSICIAN ASSISTANT

## 2023-04-11 RX ORDER — TIZANIDINE 4 MG/1
4 TABLET ORAL EVERY 8 HOURS
Qty: 90 TABLET | Refills: 2 | Status: SHIPPED | OUTPATIENT
Start: 2023-04-11 | End: 2023-07-10 | Stop reason: SDUPTHER

## 2023-04-11 RX ORDER — HYDROCODONE BITARTRATE AND ACETAMINOPHEN 10; 325 MG/1; MG/1
1 TABLET ORAL EVERY 8 HOURS
Qty: 90 TABLET | Refills: 0 | Status: SHIPPED | OUTPATIENT
Start: 2023-05-12 | End: 2023-06-11

## 2023-04-11 RX ORDER — HYDROCODONE BITARTRATE AND ACETAMINOPHEN 10; 325 MG/1; MG/1
1 TABLET ORAL EVERY 8 HOURS
Qty: 90 TABLET | Refills: 0 | Status: SHIPPED | OUTPATIENT
Start: 2023-04-13 | End: 2023-05-13

## 2023-04-11 RX ORDER — HYDROCODONE BITARTRATE AND ACETAMINOPHEN 10; 325 MG/1; MG/1
1 TABLET ORAL EVERY 8 HOURS
Qty: 90 TABLET | Refills: 0 | Status: SHIPPED | OUTPATIENT
Start: 2023-06-12 | End: 2023-07-12

## 2023-05-31 ENCOUNTER — OFFICE VISIT (OUTPATIENT)
Dept: CARDIOLOGY | Facility: CLINIC | Age: 68
End: 2023-05-31
Payer: COMMERCIAL

## 2023-05-31 VITALS
SYSTOLIC BLOOD PRESSURE: 112 MMHG | HEART RATE: 56 BPM | BODY MASS INDEX: 26.13 KG/M2 | DIASTOLIC BLOOD PRESSURE: 60 MMHG | WEIGHT: 186.63 LBS | HEIGHT: 71 IN | OXYGEN SATURATION: 97 %

## 2023-05-31 DIAGNOSIS — I25.10 CORONARY ARTERY DISEASE, UNSPECIFIED VESSEL OR LESION TYPE, UNSPECIFIED WHETHER ANGINA PRESENT, UNSPECIFIED WHETHER NATIVE OR TRANSPLANTED HEART: Primary | ICD-10-CM

## 2023-05-31 DIAGNOSIS — E78.5 HYPERLIPIDEMIA, UNSPECIFIED HYPERLIPIDEMIA TYPE: ICD-10-CM

## 2023-05-31 DIAGNOSIS — I10 HYPERTENSION, UNSPECIFIED TYPE: ICD-10-CM

## 2023-05-31 DIAGNOSIS — I50.22 CHRONIC SYSTOLIC CONGESTIVE HEART FAILURE: ICD-10-CM

## 2023-05-31 PROCEDURE — 3066F NEPHROPATHY DOC TX: CPT | Mod: CPTII,,, | Performed by: NURSE PRACTITIONER

## 2023-05-31 PROCEDURE — 4010F ACE/ARB THERAPY RXD/TAKEN: CPT | Mod: CPTII,,, | Performed by: NURSE PRACTITIONER

## 2023-05-31 PROCEDURE — 3008F PR BODY MASS INDEX (BMI) DOCUMENTED: ICD-10-PCS | Mod: CPTII,,, | Performed by: NURSE PRACTITIONER

## 2023-05-31 PROCEDURE — 93005 ELECTROCARDIOGRAM TRACING: CPT | Mod: PBBFAC | Performed by: INTERNAL MEDICINE

## 2023-05-31 PROCEDURE — 3044F HG A1C LEVEL LT 7.0%: CPT | Mod: CPTII,,, | Performed by: NURSE PRACTITIONER

## 2023-05-31 PROCEDURE — 1160F RVW MEDS BY RX/DR IN RCRD: CPT | Mod: CPTII,,, | Performed by: NURSE PRACTITIONER

## 2023-05-31 PROCEDURE — 4010F PR ACE/ARB THEARPY RXD/TAKEN: ICD-10-PCS | Mod: CPTII,,, | Performed by: NURSE PRACTITIONER

## 2023-05-31 PROCEDURE — 1159F PR MEDICATION LIST DOCUMENTED IN MEDICAL RECORD: ICD-10-PCS | Mod: CPTII,,, | Performed by: NURSE PRACTITIONER

## 2023-05-31 PROCEDURE — 93010 EKG 12-LEAD: ICD-10-PCS | Mod: S$PBB,,, | Performed by: INTERNAL MEDICINE

## 2023-05-31 PROCEDURE — 93010 ELECTROCARDIOGRAM REPORT: CPT | Mod: S$PBB,,, | Performed by: INTERNAL MEDICINE

## 2023-05-31 PROCEDURE — 3061F PR NEG MICROALBUMINURIA RESULT DOCUMENTED/REVIEW: ICD-10-PCS | Mod: CPTII,,, | Performed by: NURSE PRACTITIONER

## 2023-05-31 PROCEDURE — 3074F PR MOST RECENT SYSTOLIC BLOOD PRESSURE < 130 MM HG: ICD-10-PCS | Mod: CPTII,,, | Performed by: NURSE PRACTITIONER

## 2023-05-31 PROCEDURE — 1159F MED LIST DOCD IN RCRD: CPT | Mod: CPTII,,, | Performed by: NURSE PRACTITIONER

## 2023-05-31 PROCEDURE — 99214 PR OFFICE/OUTPT VISIT, EST, LEVL IV, 30-39 MIN: ICD-10-PCS | Mod: S$PBB,,, | Performed by: NURSE PRACTITIONER

## 2023-05-31 PROCEDURE — 1160F PR REVIEW ALL MEDS BY PRESCRIBER/CLIN PHARMACIST DOCUMENTED: ICD-10-PCS | Mod: CPTII,,, | Performed by: NURSE PRACTITIONER

## 2023-05-31 PROCEDURE — 3044F PR MOST RECENT HEMOGLOBIN A1C LEVEL <7.0%: ICD-10-PCS | Mod: CPTII,,, | Performed by: NURSE PRACTITIONER

## 2023-05-31 PROCEDURE — 99215 OFFICE O/P EST HI 40 MIN: CPT | Mod: PBBFAC | Performed by: NURSE PRACTITIONER

## 2023-05-31 PROCEDURE — 99214 OFFICE O/P EST MOD 30 MIN: CPT | Mod: S$PBB,,, | Performed by: NURSE PRACTITIONER

## 2023-05-31 PROCEDURE — 3008F BODY MASS INDEX DOCD: CPT | Mod: CPTII,,, | Performed by: NURSE PRACTITIONER

## 2023-05-31 PROCEDURE — 3078F DIAST BP <80 MM HG: CPT | Mod: CPTII,,, | Performed by: NURSE PRACTITIONER

## 2023-05-31 PROCEDURE — 3061F NEG MICROALBUMINURIA REV: CPT | Mod: CPTII,,, | Performed by: NURSE PRACTITIONER

## 2023-05-31 PROCEDURE — 3066F PR DOCUMENTATION OF TREATMENT FOR NEPHROPATHY: ICD-10-PCS | Mod: CPTII,,, | Performed by: NURSE PRACTITIONER

## 2023-05-31 PROCEDURE — 3074F SYST BP LT 130 MM HG: CPT | Mod: CPTII,,, | Performed by: NURSE PRACTITIONER

## 2023-05-31 PROCEDURE — 3078F PR MOST RECENT DIASTOLIC BLOOD PRESSURE < 80 MM HG: ICD-10-PCS | Mod: CPTII,,, | Performed by: NURSE PRACTITIONER

## 2023-05-31 RX ORDER — HYDRALAZINE HYDROCHLORIDE 50 MG/1
50 TABLET, FILM COATED ORAL 4 TIMES DAILY
Qty: 360 TABLET | Refills: 1 | Status: SHIPPED | OUTPATIENT
Start: 2023-05-31 | End: 2023-11-30 | Stop reason: SDUPTHER

## 2023-05-31 RX ORDER — EZETIMIBE 10 MG/1
10 TABLET ORAL DAILY
Qty: 90 TABLET | Refills: 1 | Status: SHIPPED | OUTPATIENT
Start: 2023-05-31

## 2023-06-02 NOTE — ASSESSMENT & PLAN NOTE
S/p CABG 1/23/2020- Dr. Levy  SVG to the dLAD; SVG to the OM and SVG to the PL  2/7/2019 BECKIE dLAD and mid LAD to overlap the BECKIE in the dLAD  Asymptomatic  Continue ASA/Plavix/Zetia/tricor/Crestor

## 2023-06-02 NOTE — ASSESSMENT & PLAN NOTE
Patient is identified as having HF with normalized LVEF. Echocardiogram performed and demonstrates- Results for orders placed during the hospital encounter of 05/25/22    Echo Saline Bubble? No    Interpretation Summary  · The left ventricle is normal in size with moderate concentric hypertrophy and normal systolic function.  · The estimated ejection fraction is 55%.  · Atrial fibrillation not observed.  · Normal left ventricular diastolic function.  · Mild-to-moderate mitral regurgitation.  . Continue Lisinopril, Farxiga, aldactone and metoprolol and monitor clinical status closely.

## 2023-06-02 NOTE — ASSESSMENT & PLAN NOTE
Lipid panel results from 03/01/2023 reviewed  Triglycerides 144 mg/dL  LDL 30 mg/dL  HDL 26 mg/dL  Crestor 40 mg p.o. daily  Zetia 10 mg p.o. daily  Tricor 145 mg p.o. daily  Lipids followed by PCP

## 2023-06-02 NOTE — PROGRESS NOTES
PCP: Rodney Morel MD    Referring Provider:     Subjective:   Micheal Taylor is a 67 y.o. male with hx of CAD, HTN, DM, CHF, HLD, and PVD,  who presents for routine follow up. He states that he is doing well and has had no cardiac issues.         Fhx:  Family History   Problem Relation Age of Onset    Diabetes Mother     Heart disease Mother     Hypertension Mother     Liver cancer Mother     Heart disease Father     Hypertension Father      Shx:   Social History     Socioeconomic History    Marital status:     Number of children: 5   Occupational History    Occupation: retired   Tobacco Use    Smoking status: Former     Packs/day: 4.00     Years: 30.00     Pack years: 120.00     Types: Cigarettes     Passive exposure: Past (parent)    Smokeless tobacco: Former     Types: Snuff    Tobacco comments:     quit about 2 weeks ago   Substance and Sexual Activity    Alcohol use: Never    Drug use: Never    Sexual activity: Not Currently       EKG 5/31/2023 sinus bradycardia; HR 50 bpm; o/w normal EKG  7/20/2022 sinus bradycardia; HR 54 bpm; o/w normal EKG    ECHO Results for orders placed during the hospital encounter of 05/25/22    Echo Saline Bubble? No    Interpretation Summary  · The left ventricle is normal in size with moderate concentric hypertrophy and normal systolic function.  · The estimated ejection fraction is 55%.  · Atrial fibrillation not observed.  · Normal left ventricular diastolic function.  · Mild-to-moderate mitral regurgitation.    [facility] Results for orders placed during the hospital encounter of 07/19/22    Cardiac catheterization    Conclusion  · There was severe left ventricular systolic dysfunction.  · The left ventricular end diastolic pressure was severely elevated.  · The pre-procedure left ventricular end diastolic pressure was 30.  · The ejection fraction was calculated to be 25%.  · There was no mitral valve regurgitation.  · The Mid LM lesion was 95% stenosed with 15% stenosis  post-intervention.  · A STENT S [employer] US MR 4.00 X 12MM stent was successfully placed at 14 THERESE for 14 sec.  · The Ost Cx to Prox Cx lesion was 100% stenosed.  · The 1st Mrg lesion was 99% stenosed.  · The Mid Cx lesion was 100% stenosed.  · The Prox RCA to Mid RCA lesion was 99% stenosed.  · The Mid RCA lesion was 100% stenosed.  · The estimated blood loss was none.  · There was three vessel coronary artery disease. There was left main disease.    The procedure log was documented by Documenter: RT Pa and verified by Venu Whitlock MD.    Date: 7/19/2022  Time: 4:10 PM        Lab Results   Component Value Date     03/01/2023    K 5.0 03/01/2023     03/01/2023    CO2 28 03/01/2023    BUN 34 (H) 03/01/2023    CREATININE 1.97 (H) 03/01/2023    CALCIUM 8.9 03/01/2023    ANIONGAP 9 03/01/2023    ESTGFRAFRICA 47 12/12/2020    EGFRNONAA 32 (L) 07/14/2022       Lab Results   Component Value Date    CHOL 85 03/01/2023    CHOL 93 02/28/2022    CHOL 114 08/11/2021     Lab Results   Component Value Date    HDL 26 (L) 03/01/2023    HDL 31 (L) 02/28/2022    HDL 32 (L) 08/11/2021     Lab Results   Component Value Date    LDLCALC 30 03/01/2023    LDLCALC 32 02/28/2022    LDLCALC 42 08/11/2021     Lab Results   Component Value Date    TRIG 144 03/01/2023    TRIG 152 (H) 02/28/2022    TRIG 201 (H) 08/11/2021     Lab Results   Component Value Date    CHOLHDL 3.3 03/01/2023    CHOLHDL 3.0 02/28/2022    CHOLHDL 3.6 08/11/2021       Lab Results   Component Value Date    WBC 7.10 03/01/2023    HGB 12.2 (L) 03/01/2023    HCT 41.1 03/01/2023    MCV 86.5 03/01/2023     03/01/2023           Current Outpatient Medications:     amLODIPine (NORVASC) 5 MG tablet, Take 1 tablet (5 mg total) by mouth once daily., Disp: 90 tablet, Rfl: 1    aspirin (ECOTRIN) 81 MG EC tablet, Take 81 mg by mouth once daily., Disp: , Rfl:     cefUROXime (CEFTIN) 250 MG tablet, Take 1 tablet (250 mg total) by mouth 2 (two) times  daily. (Patient not taking: Reported on 1/4/2023), Disp: 20 tablet, Rfl: 0    cholecalciferol, vitamin D3, (VITAMIN D3) 25 mcg (1,000 unit) capsule, Take 1,000 Units by mouth once daily., Disp: , Rfl:     cinnamon bark 500 mg capsule, Take 500 mg by mouth once daily. , Disp: , Rfl:     clopidogreL (PLAVIX) 75 mg tablet, TAKE 1 TABLET (75 MG TOTAL) BY MOUTH ONCE DAILY., Disp: 90 tablet, Rfl: 1    dapagliflozin (FARXIGA) 10 mg tablet, Take 1 tablet (10 mg total) by mouth once daily., Disp: 90 tablet, Rfl: 0    docosahexaenoic acid-epa 120-180 mg Cap, Take 1 capsule by mouth., Disp: , Rfl:     EScitalopram oxalate (LEXAPRO) 10 MG tablet, Take 1 tablet (10 mg total) by mouth once daily., Disp: 90 tablet, Rfl: 0    ezetimibe (ZETIA) 10 mg tablet, Take 1 tablet (10 mg total) by mouth once daily., Disp: 90 tablet, Rfl: 1    fenofibrate (TRICOR) 145 MG tablet, Take 1 tablet (145 mg total) by mouth once daily., Disp: 90 tablet, Rfl: 1    gabapentin (NEURONTIN) 300 MG capsule, Take 1 capsule (300 mg total) by mouth 3 (three) times daily., Disp: 270 capsule, Rfl: 0    hydrALAZINE (APRESOLINE) 50 MG tablet, Take 1 tablet (50 mg total) by mouth 4 (four) times daily., Disp: 360 tablet, Rfl: 1    HYDROcodone-acetaminophen (NORCO)  mg per tablet, Take 1 tablet by mouth every 8 (eight) hours., Disp: 90 tablet, Rfl: 0    [START ON 6/12/2023] HYDROcodone-acetaminophen (NORCO)  mg per tablet, Take 1 tablet by mouth every 8 (eight) hours., Disp: 90 tablet, Rfl: 0    insulin regular 100 unit/mL Inj injection, Novolin R Regular U-100 Insuln  14-32 u bidwm, Disp: , Rfl:     liraglutide 0.6 mg/0.1 mL, 18 mg/3 mL, subq PNIJ (VICTOZA 2-CAITLIN) 0.6 mg/0.1 mL (18 mg/3 mL) PnIj pen, Inject 1.8 mg into the skin once daily., Disp: 27 mL, Rfl: 0    lisinopriL (PRINIVIL,ZESTRIL) 20 MG tablet, Take 1 tablet (20 mg total) by mouth once daily., Disp: 90 tablet, Rfl: 1    magnesium 250 mg Tab, Take 1 tablet by mouth once daily. , Disp: , Rfl:      metFORMIN (GLUCOPHAGE) 500 MG tablet, Take 1 tablet (500 mg total) by mouth 2 (two) times daily with meals., Disp: 180 tablet, Rfl: 0    metoprolol succinate (TOPROL-XL) 25 MG 24 hr tablet, Take 1 tablet (25 mg total) by mouth once daily., Disp: 90 tablet, Rfl: 1    nitroGLYCERIN (NITROSTAT) 0.4 MG SL tablet, Place 1 tablet (0.4 mg total) under the tongue every 5 (five) minutes as needed for Chest pain., Disp: 25 tablet, Rfl: 3    olopatadine (PATADAY) 0.2 % Drop, Place 1 drop into both eyes once daily., Disp: 5 mL, Rfl: 0    omeprazole (PRILOSEC) 20 MG capsule, Take 1 capsule (20 mg total) by mouth once daily., Disp: 90 capsule, Rfl: 0    potassium chloride SA (K-DUR,KLOR-CON) 20 MEQ tablet, Take 1 tablet (20 mEq total) by mouth once daily., Disp: 90 tablet, Rfl: 0    pravastatin (PRAVACHOL) 80 MG tablet, Take 80 mg by mouth every evening., Disp: , Rfl:     rOPINIRole (REQUIP) 0.5 MG tablet, Take 0.5 mg by mouth every evening., Disp: , Rfl:     rosuvastatin (CRESTOR) 40 MG Tab, Take 1 tablet (40 mg total) by mouth every evening., Disp: 90 tablet, Rfl: 3    spironolactone (ALDACTONE) 25 MG tablet, Take 25 mg by mouth 2 (two) times daily., Disp: , Rfl:     tiZANidine (ZANAFLEX) 4 MG tablet, Take 1 tablet (4 mg total) by mouth every 8 (eight) hours., Disp: 90 tablet, Rfl: 2    TRESIBA FLEXTOUCH U-200 200 unit/mL (3 mL) insulin pen, Inject 54 Units into the skin once daily. , Disp: , Rfl:     VASCEPA 1 gram Cap, Take 1 capsule by mouth 2 (two) times a day., Disp: , Rfl:     vitamin A 8000 UNIT capsule, Take 8,000 Units by mouth., Disp: , Rfl:     vitamin E 400 UNIT capsule, Take 400 Units by mouth once daily. , Disp: , Rfl:   Meds reviewed but not reconciled. He did not bring his meds today  and has not brought them in a very long time.     Review of Systems   Respiratory:  Negative for shortness of breath.    Cardiovascular:  Positive for leg swelling. Negative for chest pain, palpitations, orthopnea, claudication  "and PND.        Edema resolves at HS   Neurological:  Negative for dizziness, loss of consciousness and weakness.         Objective:   /60 (BP Location: Right arm, Patient Position: Sitting)   Pulse (!) 56   Ht 5' 11" (1.803 m)   Wt 84.6 kg (186 lb 9.6 oz)   SpO2 97%   BMI 26.03 kg/m²     Physical Exam  Vitals and nursing note reviewed.   Constitutional:       Appearance: Normal appearance. He is normal weight.   HENT:      Head: Normocephalic and atraumatic.   Neck:      Vascular: No carotid bruit.   Cardiovascular:      Rate and Rhythm: Normal rate and regular rhythm.      Pulses: Normal pulses.      Heart sounds: Normal heart sounds.   Pulmonary:      Effort: Pulmonary effort is normal.      Breath sounds: Normal breath sounds.   Abdominal:      Palpations: Abdomen is soft.   Musculoskeletal:      Cervical back: Neck supple.      Right lower leg: No edema.      Left lower leg: No edema.   Skin:     General: Skin is warm and dry.      Capillary Refill: Capillary refill takes less than 2 seconds.   Neurological:      Mental Status: He is alert.         Assessment:     1. Coronary artery disease, unspecified vessel or lesion type, unspecified whether angina present, unspecified whether native or transplanted heart  EKG 12-lead    EKG 12-lead      2. Hyperlipidemia, unspecified hyperlipidemia type  ezetimibe (ZETIA) 10 mg tablet      3. Hypertension, unspecified type  hydrALAZINE (APRESOLINE) 50 MG tablet      4. Chronic systolic congestive heart failure              Plan:   CHF (congestive heart failure)  Patient is identified as having HF with normalized LVEF. Echocardiogram performed and demonstrates- Results for orders placed during the hospital encounter of 05/25/22    Echo Saline Bubble? No    Interpretation Summary  · The left ventricle is normal in size with moderate concentric hypertrophy and normal systolic function.  · The estimated ejection fraction is 55%.  · Atrial fibrillation not " observed.  · Normal left ventricular diastolic function.  · Mild-to-moderate mitral regurgitation.  . Continue Lisinopril, Farxiga, aldactone and metoprolol and monitor clinical status closely.    Coronary artery disease  S/p CABG 1/23/2020- Dr. Levy  SVG to the dLAD; SVG to the OM and SVG to the PL  2/7/2019 BECKIE dLAD and mid LAD to overlap the BECKIE in the dLAD  Asymptomatic  Continue ASA/Plavix/Zetia/tricor/Crestor    Hyperlipidemia  Lipid panel results from 03/01/2023 reviewed  Triglycerides 144 mg/dL  LDL 30 mg/dL  HDL 26 mg/dL  Crestor 40 mg p.o. daily  Zetia 10 mg p.o. daily  Tricor 145 mg p.o. daily  Lipids followed by PCP    Hypertension  112/60 mmHg    RTC: 6m onths

## 2023-06-07 ENCOUNTER — OFFICE VISIT (OUTPATIENT)
Dept: FAMILY MEDICINE | Facility: CLINIC | Age: 68
End: 2023-06-07
Payer: COMMERCIAL

## 2023-06-07 VITALS
TEMPERATURE: 98 F | HEART RATE: 57 BPM | BODY MASS INDEX: 26.15 KG/M2 | DIASTOLIC BLOOD PRESSURE: 58 MMHG | HEIGHT: 71 IN | OXYGEN SATURATION: 97 % | SYSTOLIC BLOOD PRESSURE: 122 MMHG | WEIGHT: 186.81 LBS

## 2023-06-07 DIAGNOSIS — I10 HYPERTENSION, UNSPECIFIED TYPE: Primary | ICD-10-CM

## 2023-06-07 DIAGNOSIS — Z79.4 TYPE 2 DIABETES MELLITUS WITH DIABETIC NEUROPATHY, WITH LONG-TERM CURRENT USE OF INSULIN: ICD-10-CM

## 2023-06-07 DIAGNOSIS — R73.09 HEMOGLOBIN A1C LESS THAN 7.0%: ICD-10-CM

## 2023-06-07 DIAGNOSIS — E78.5 HYPERLIPIDEMIA, UNSPECIFIED HYPERLIPIDEMIA TYPE: ICD-10-CM

## 2023-06-07 DIAGNOSIS — E11.40 TYPE 2 DIABETES MELLITUS WITH DIABETIC NEUROPATHY, WITH LONG-TERM CURRENT USE OF INSULIN: ICD-10-CM

## 2023-06-07 LAB
ANION GAP SERPL CALCULATED.3IONS-SCNC: 10 MMOL/L (ref 7–16)
BASOPHILS # BLD AUTO: 0.04 K/UL (ref 0–0.2)
BASOPHILS NFR BLD AUTO: 0.5 % (ref 0–1)
BUN SERPL-MCNC: 31 MG/DL (ref 7–18)
BUN/CREAT SERPL: 17 (ref 6–20)
CALCIUM SERPL-MCNC: 9.3 MG/DL (ref 8.5–10.1)
CHLORIDE SERPL-SCNC: 106 MMOL/L (ref 98–107)
CO2 SERPL-SCNC: 28 MMOL/L (ref 21–32)
CREAT SERPL-MCNC: 1.84 MG/DL (ref 0.7–1.3)
DIFFERENTIAL METHOD BLD: ABNORMAL
EGFR (NO RACE VARIABLE) (RUSH/TITUS): 40 ML/MIN/1.73M2
EOSINOPHIL # BLD AUTO: 0.18 K/UL (ref 0–0.5)
EOSINOPHIL NFR BLD AUTO: 2.1 % (ref 1–4)
ERYTHROCYTE [DISTWIDTH] IN BLOOD BY AUTOMATED COUNT: 13.5 % (ref 11.5–14.5)
EST. AVERAGE GLUCOSE BLD GHB EST-MCNC: 177 MG/DL
GLUCOSE SERPL-MCNC: 184 MG/DL (ref 74–106)
HBA1C MFR BLD HPLC: 7.9 % (ref 4.5–6.6)
HCT VFR BLD AUTO: 39.4 % (ref 40–54)
HGB BLD-MCNC: 11.8 G/DL (ref 13.5–18)
IMM GRANULOCYTES # BLD AUTO: 0.08 K/UL (ref 0–0.04)
IMM GRANULOCYTES NFR BLD: 0.9 % (ref 0–0.4)
LYMPHOCYTES # BLD AUTO: 2.78 K/UL (ref 1–4.8)
LYMPHOCYTES NFR BLD AUTO: 31.9 % (ref 27–41)
MCH RBC QN AUTO: 25.5 PG (ref 27–31)
MCHC RBC AUTO-ENTMCNC: 29.9 G/DL (ref 32–36)
MCV RBC AUTO: 85.1 FL (ref 80–96)
MONOCYTES # BLD AUTO: 0.74 K/UL (ref 0–0.8)
MONOCYTES NFR BLD AUTO: 8.5 % (ref 2–6)
MPC BLD CALC-MCNC: 12.2 FL (ref 9.4–12.4)
NEUTROPHILS # BLD AUTO: 4.89 K/UL (ref 1.8–7.7)
NEUTROPHILS NFR BLD AUTO: 56.1 % (ref 53–65)
NRBC # BLD AUTO: 0 X10E3/UL
NRBC, AUTO (.00): 0 %
PLATELET # BLD AUTO: 242 K/UL (ref 150–400)
POTASSIUM SERPL-SCNC: 5 MMOL/L (ref 3.5–5.1)
RBC # BLD AUTO: 4.63 M/UL (ref 4.6–6.2)
SODIUM SERPL-SCNC: 139 MMOL/L (ref 136–145)
WBC # BLD AUTO: 8.71 K/UL (ref 4.5–11)

## 2023-06-07 PROCEDURE — 3044F PR MOST RECENT HEMOGLOBIN A1C LEVEL <7.0%: ICD-10-PCS | Mod: ,,, | Performed by: FAMILY MEDICINE

## 2023-06-07 PROCEDURE — 3288F PR FALLS RISK ASSESSMENT DOCUMENTED: ICD-10-PCS | Mod: ,,, | Performed by: FAMILY MEDICINE

## 2023-06-07 PROCEDURE — 1100F PTFALLS ASSESS-DOCD GE2>/YR: CPT | Mod: ,,, | Performed by: FAMILY MEDICINE

## 2023-06-07 PROCEDURE — 83036 HEMOGLOBIN GLYCOSYLATED A1C: CPT | Mod: ,,, | Performed by: CLINICAL MEDICAL LABORATORY

## 2023-06-07 PROCEDURE — 3078F DIAST BP <80 MM HG: CPT | Mod: ,,, | Performed by: FAMILY MEDICINE

## 2023-06-07 PROCEDURE — 3074F PR MOST RECENT SYSTOLIC BLOOD PRESSURE < 130 MM HG: ICD-10-PCS | Mod: ,,, | Performed by: FAMILY MEDICINE

## 2023-06-07 PROCEDURE — 80048 BASIC METABOLIC PANEL: ICD-10-PCS | Mod: ,,, | Performed by: CLINICAL MEDICAL LABORATORY

## 2023-06-07 PROCEDURE — 3044F HG A1C LEVEL LT 7.0%: CPT | Mod: ,,, | Performed by: FAMILY MEDICINE

## 2023-06-07 PROCEDURE — 3061F PR NEG MICROALBUMINURIA RESULT DOCUMENTED/REVIEW: ICD-10-PCS | Mod: ,,, | Performed by: FAMILY MEDICINE

## 2023-06-07 PROCEDURE — 4010F PR ACE/ARB THEARPY RXD/TAKEN: ICD-10-PCS | Mod: ,,, | Performed by: FAMILY MEDICINE

## 2023-06-07 PROCEDURE — 80048 BASIC METABOLIC PNL TOTAL CA: CPT | Mod: ,,, | Performed by: CLINICAL MEDICAL LABORATORY

## 2023-06-07 PROCEDURE — 3008F BODY MASS INDEX DOCD: CPT | Mod: ,,, | Performed by: FAMILY MEDICINE

## 2023-06-07 PROCEDURE — 3061F NEG MICROALBUMINURIA REV: CPT | Mod: ,,, | Performed by: FAMILY MEDICINE

## 2023-06-07 PROCEDURE — 99214 PR OFFICE/OUTPT VISIT, EST, LEVL IV, 30-39 MIN: ICD-10-PCS | Mod: ,,, | Performed by: FAMILY MEDICINE

## 2023-06-07 PROCEDURE — 1159F PR MEDICATION LIST DOCUMENTED IN MEDICAL RECORD: ICD-10-PCS | Mod: ,,, | Performed by: FAMILY MEDICINE

## 2023-06-07 PROCEDURE — 3066F PR DOCUMENTATION OF TREATMENT FOR NEPHROPATHY: ICD-10-PCS | Mod: ,,, | Performed by: FAMILY MEDICINE

## 2023-06-07 PROCEDURE — 1159F MED LIST DOCD IN RCRD: CPT | Mod: ,,, | Performed by: FAMILY MEDICINE

## 2023-06-07 PROCEDURE — 85025 CBC WITH DIFFERENTIAL: ICD-10-PCS | Mod: ,,, | Performed by: CLINICAL MEDICAL LABORATORY

## 2023-06-07 PROCEDURE — 3008F PR BODY MASS INDEX (BMI) DOCUMENTED: ICD-10-PCS | Mod: ,,, | Performed by: FAMILY MEDICINE

## 2023-06-07 PROCEDURE — 99214 OFFICE O/P EST MOD 30 MIN: CPT | Mod: ,,, | Performed by: FAMILY MEDICINE

## 2023-06-07 PROCEDURE — 1100F PR PT FALLS ASSESS DOC 2+ FALLS/FALL W/INJURY/YR: ICD-10-PCS | Mod: ,,, | Performed by: FAMILY MEDICINE

## 2023-06-07 PROCEDURE — 3288F FALL RISK ASSESSMENT DOCD: CPT | Mod: ,,, | Performed by: FAMILY MEDICINE

## 2023-06-07 PROCEDURE — 4010F ACE/ARB THERAPY RXD/TAKEN: CPT | Mod: ,,, | Performed by: FAMILY MEDICINE

## 2023-06-07 PROCEDURE — 85025 COMPLETE CBC W/AUTO DIFF WBC: CPT | Mod: ,,, | Performed by: CLINICAL MEDICAL LABORATORY

## 2023-06-07 PROCEDURE — 83036 HEMOGLOBIN A1C: ICD-10-PCS | Mod: ,,, | Performed by: CLINICAL MEDICAL LABORATORY

## 2023-06-07 PROCEDURE — 3078F PR MOST RECENT DIASTOLIC BLOOD PRESSURE < 80 MM HG: ICD-10-PCS | Mod: ,,, | Performed by: FAMILY MEDICINE

## 2023-06-07 PROCEDURE — 3074F SYST BP LT 130 MM HG: CPT | Mod: ,,, | Performed by: FAMILY MEDICINE

## 2023-06-07 PROCEDURE — 3066F NEPHROPATHY DOC TX: CPT | Mod: ,,, | Performed by: FAMILY MEDICINE

## 2023-06-07 RX ORDER — GABAPENTIN 300 MG/1
300 CAPSULE ORAL 3 TIMES DAILY
Qty: 270 CAPSULE | Refills: 0 | Status: SHIPPED | OUTPATIENT
Start: 2023-06-07 | End: 2023-09-12

## 2023-06-07 RX ORDER — FLUCONAZOLE 50 MG/1
50 TABLET ORAL DAILY
Qty: 7 TABLET | Refills: 0 | Status: SHIPPED | OUTPATIENT
Start: 2023-06-07 | End: 2023-07-07

## 2023-06-07 RX ORDER — LISINOPRIL 20 MG/1
20 TABLET ORAL DAILY
Qty: 90 TABLET | Refills: 1 | Status: SHIPPED | OUTPATIENT
Start: 2023-06-07 | End: 2023-11-30 | Stop reason: SDUPTHER

## 2023-06-07 RX ORDER — ESCITALOPRAM OXALATE 10 MG/1
10 TABLET ORAL DAILY
Qty: 90 TABLET | Refills: 0 | Status: SHIPPED | OUTPATIENT
Start: 2023-06-07 | End: 2023-10-10

## 2023-06-07 NOTE — PROGRESS NOTES
Rodney Morel MD   [facility]  [address] [place] [address]     PATIENT NAME: Micheal Taylor  : 1955  DATE: 23  MRN: [id number]      Billing Provider: Rodney Morel MD  Level of Service: VT OFFICE/OUTPT VISIT, EST, LEVL IV, 30-39 MIN  Patient PCP Information       Provider PCP Type    Rodney Morel MD General            Reason for Visit / Chief Complaint: Hypertension (Check up; Labs; Refills. Patient reports wanting to use [place] for pharmacy-not mail order. ), Diabetes (Patient goes to Endocrinology at [facility] in [place] in July.  ), and Mouth Lesions (Lesions to left side of tongue and top of tongue for the past few days. Patient reports having old rx of swish and swallow and has been using, but continues to be sore. Unable to eat much. )         History of Present Illness / Problem Focused Workflow     Micheal Taylor presents to the clinic with Hypertension (Check up; Labs; Refills. Patient reports wanting to use [place] [facility] for pharmacy-not mail order. ), Diabetes (Patient goes to Endocrinology at [facility] in [place] in July.  ), and Mouth Lesions (Lesions to left side of tongue and top of tongue for the past few days. Patient reports having old rx of swish and swallow and has been using, but continues to be sore. Unable to eat much. )     HPI    Review of Systems     Review of Systems   Constitutional:  Negative for activity change, appetite change, fatigue and fever.   HENT:  Negative for nasal congestion, ear pain, hearing loss, sinus pressure/congestion and sore throat.    Respiratory:  Negative for cough, chest tightness and shortness of breath.    Cardiovascular:  Negative for chest pain and palpitations.   Gastrointestinal:  Negative for abdominal pain and fecal incontinence.   Genitourinary:  Negative for bladder incontinence, difficulty urinating and erectile dysfunction.   Musculoskeletal:  Negative for arthralgias.   Integumentary:   Negative for rash.   Neurological:  Negative for dizziness and headaches.      Medical / Social / Family History     Past Medical History:   Diagnosis Date    Anticoagulant long-term use     CHF (congestive heart failure)     Chronic pain syndrome     Coronary artery disease     Diabetes mellitus, type 2     Hyperlipidemia     Hypertension     Lumbar spondylosis     Lumbosacral radiculopathy     Mild nonproliferative diabetic retinopathy of both eyes without macular edema 09/09/2022    Pain in thoracic spine     PVD (peripheral vascular disease)     Renal disorder     Spondylosis without myelopathy or radiculopathy, cervical region        Past Surgical History:   Procedure Laterality Date    ADENOIDECTOMY      AMPUTATION      left index finger removed     CAUDAL EPIDURAL STEROID INJECTION  01/19/2016    Caudal NATHAN - [facility]    CIRCUMCISION      CORONARY ARTERY BYPASS GRAFT (CABG)      EYE SURGERY      FOOT SURGERY      heel surgery    HEEL SPUR SURGERY      INJECTION OF FACET JOINT Bilateral 04/16/2015    Bilateral L3-S1 Facet Injection - [facility] - 4/16/15, 11/15/12 and 9/27/12    LEFT HEART CATHETERIZATION Left 07/19/2022    Procedure: Left heart cath;  Surgeon: Venu Whitlock MD;  Location: [facility] CATH LAB;  Service: Cardiology;  Laterality: Left;  Patient has PAD and has had bilateral fem pop bypass. He is followed by Dr. Forrest.    RADIOFREQUENCY THERMOCOAGULATION Left 02/26/2013    Left L3-S1 RFTC -[facility]    RADIOFREQUENCY THERMOCOAGULATION Right 02/05/2013    Right L3-S1 RFTC - [facility]    TONSILLECTOMY, ADENOIDECTOMY, BILATERAL MYRINGOTOMY AND TUBES      TRANSFORAMINAL EPIDURAL INJECTION OF STEROID Left 06/19/2012    Left L3-4 TFESI - [facility]    TRANSFORAMINAL EPIDURAL INJECTION OF STEROID Right 04/12/2013    Right L3-4 TFESI - Alee - 4/12/13 and 8/7/12    VASCULAR SURGERY         Social History  Micheal Taylor  reports that he has quit smoking. His smoking use included cigarettes. He has a 120.00 pack-year smoking history. He  has been exposed to tobacco smoke. He has quit using smokeless tobacco.  His smokeless tobacco use included chew. He reports that he does not drink alcohol and does not use drugs.    Family History  Micheal Taylor  family history includes Diabetes in his mother; Heart disease in his father and mother; Hypertension in his father and mother; Liver cancer in his mother.    Medications and Allergies     Medications  Outpatient Medications Marked as Taking for the 6/7/23 encounter (Office Visit) with Rodney Morel MD   Medication Sig Dispense Refill    amLODIPine (NORVASC) 5 MG tablet Take 1 tablet (5 mg total) by mouth once daily. 90 tablet 1    aspirin (ECOTRIN) 81 MG EC tablet Take 81 mg by mouth once daily.      cholecalciferol, vitamin D3, (VITAMIN D3) 25 mcg (1,000 unit) capsule Take 1,000 Units by mouth once daily.      cinnamon bark 500 mg capsule Take 500 mg by mouth once daily.       clopidogreL (PLAVIX) 75 mg tablet TAKE 1 TABLET (75 MG TOTAL) BY MOUTH ONCE DAILY. 90 tablet 1    dapagliflozin (FARXIGA) 10 mg tablet Take 1 tablet (10 mg total) by mouth once daily. 90 tablet 0    ezetimibe (ZETIA) 10 mg tablet Take 1 tablet (10 mg total) by mouth once daily. 90 tablet 1    fenofibrate (TRICOR) 145 MG tablet Take 1 tablet (145 mg total) by mouth once daily. 90 tablet 1    hydrALAZINE (APRESOLINE) 50 MG tablet Take 1 tablet (50 mg total) by mouth 4 (four) times daily. 360 tablet 1    HYDROcodone-acetaminophen (NORCO)  mg per tablet Take 1 tablet by mouth every 8 (eight) hours. 90 tablet 0    [START ON 6/12/2023] HYDROcodone-acetaminophen (NORCO)  mg per tablet Take 1 tablet by mouth every 8 (eight) hours. 90 tablet 0    insulin regular 100 unit/mL Inj injection Novolin R Regular U-100 Insuln   14-32 u bidwm      liraglutide 0.6 mg/0.1 mL, 18 mg/3 mL, subq PNIJ (VICTOZA 2-CAITLIN) 0.6 mg/0.1 mL (18 mg/3 mL) PnIj pen Inject 1.8 mg into the skin once daily. 27 mL 0    magnesium 250 mg Tab Take 1 tablet  by mouth once daily.       metFORMIN (GLUCOPHAGE) 500 MG tablet Take 1 tablet (500 mg total) by mouth 2 (two) times daily with meals. 180 tablet 0    metoprolol succinate (TOPROL-XL) 25 MG 24 hr tablet Take 1 tablet (25 mg total) by mouth once daily. 90 tablet 1    nitroGLYCERIN (NITROSTAT) 0.4 MG SL tablet Place 1 tablet (0.4 mg total) under the tongue every 5 (five) minutes as needed for Chest pain. 25 tablet 3    omeprazole (PRILOSEC) 20 MG capsule Take 1 capsule (20 mg total) by mouth once daily. 90 capsule 0    potassium chloride SA (K-DUR,KLOR-CON) 20 MEQ tablet Take 1 tablet (20 mEq total) by mouth once daily. 90 tablet 0    rOPINIRole (REQUIP) 0.5 MG tablet Take 0.5 mg by mouth every evening.      rosuvastatin (CRESTOR) 40 MG Tab Take 1 tablet (40 mg total) by mouth every evening. 90 tablet 3    spironolactone (ALDACTONE) 25 MG tablet Take 25 mg by mouth 2 (two) times daily.      tiZANidine (ZANAFLEX) 4 MG tablet Take 1 tablet (4 mg total) by mouth every 8 (eight) hours. 90 tablet 2    TRESIBA FLEXTOUCH U-200 200 unit/mL (3 mL) insulin pen Inject 54 Units into the skin once daily.       VASCEPA 1 gram Cap Take 1 capsule by mouth 2 (two) times a day.      vitamin A 8000 UNIT capsule Take 8,000 Units by mouth.      vitamin E 400 UNIT capsule Take 400 Units by mouth once daily.       [DISCONTINUED] EScitalopram oxalate (LEXAPRO) 10 MG tablet Take 1 tablet (10 mg total) by mouth once daily. 90 tablet 0    [DISCONTINUED] gabapentin (NEURONTIN) 300 MG capsule Take 1 capsule (300 mg total) by mouth 3 (three) times daily. 270 capsule 0    [DISCONTINUED] lisinopriL (PRINIVIL,ZESTRIL) 20 MG tablet Take 1 tablet (20 mg total) by mouth once daily. 90 tablet 1       Allergies  Review of patient's allergies indicates:  No Known Allergies    Physical Examination     Vitals:    06/07/23 0912   BP: (!) 122/58   Pulse: (!) 57   Temp: 97.8 °F (36.6 °C)     Physical Exam  Constitutional:       General: He is not in acute  distress.     Appearance: He is not ill-appearing.   HENT:      Head: Normocephalic and atraumatic.      Right Ear: Tympanic membrane and ear canal normal.      Left Ear: Tympanic membrane and ear canal normal.      Nose: Nose normal. No congestion or rhinorrhea.   Eyes:      Pupils: Pupils are equal, round, and reactive to light.   Cardiovascular:      Rate and Rhythm: Normal rate and regular rhythm.      Pulses: Normal pulses.      Heart sounds: No murmur heard.  Pulmonary:      Effort: No respiratory distress.      Breath sounds: No wheezing, rhonchi or rales.   Abdominal:      General: Bowel sounds are normal.      Palpations: Abdomen is soft.      Tenderness: There is no abdominal tenderness.      Hernia: No hernia is present.   Musculoskeletal:      Cervical back: Normal range of motion and neck supple.   Lymphadenopathy:      Cervical: No cervical adenopathy.   Skin:     General: Skin is warm and dry.   Neurological:      Mental Status: He is alert.   Psychiatric:         Behavior: Behavior normal.         Thought Content: Thought content normal.        Assessment and Plan (including Health Maintenance)   :    Plan:         Health Maintenance Due   Topic Date Due    TETANUS VACCINE  Never done    Sign Pain Contract  Never done    Colorectal Cancer Screening  Never done    Foot Exam  03/10/2023       Problem List Items Addressed This Visit          Cardiac/Vascular    Hyperlipidemia    Relevant Orders    CBC Auto Differential    Basic Metabolic Panel    Hypertension - Primary    Relevant Medications    lisinopriL (PRINIVIL,ZESTRIL) 20 MG tablet    Other Relevant Orders    CBC Auto Differential    Basic Metabolic Panel       Endocrine    Type 2 diabetes mellitus with diabetic neuropathy, with long-term current use of insulin    Relevant Orders    CBC Auto Differential    Basic Metabolic Panel    Hemoglobin A1C     Other Visit Diagnoses       Hemoglobin A1c less than 7.0%        Relevant Orders    Hemoglobin A1C           Hypertension, unspecified type  -     CBC Auto Differential; Future; Expected date: 06/07/2023  -     Basic Metabolic Panel; Future; Expected date: 06/07/2023  -     lisinopriL (PRINIVIL,ZESTRIL) 20 MG tablet; Take 1 tablet (20 mg total) by mouth once daily.  Dispense: 90 tablet; Refill: 1    Type 2 diabetes mellitus with diabetic neuropathy, with long-term current use of insulin  -     CBC Auto Differential; Future; Expected date: 06/07/2023  -     Basic Metabolic Panel; Future; Expected date: 06/07/2023  -     Hemoglobin A1C; Future; Expected date: 06/07/2023    Hyperlipidemia, unspecified hyperlipidemia type  -     CBC Auto Differential; Future; Expected date: 06/07/2023  -     Basic Metabolic Panel; Future; Expected date: 06/07/2023    Hemoglobin A1c less than 7.0%  -     Hemoglobin A1C; Future; Expected date: 06/07/2023    Other orders  -     EScitalopram oxalate (LEXAPRO) 10 MG tablet; Take 1 tablet (10 mg total) by mouth once daily.  Dispense: 90 tablet; Refill: 0  -     gabapentin (NEURONTIN) 300 MG capsule; Take 1 capsule (300 mg total) by mouth 3 (three) times daily.  Dispense: 270 capsule; Refill: 0  -     fluconazole (DIFLUCAN) 50 MG Tab; Take 1 tablet (50 mg total) by mouth once daily.  Dispense: 7 tablet; Refill: 0       Health Maintenance Topics with due status: Not Due       Topic Last Completion Date    PROSTATE-SPECIFIC ANTIGEN 08/29/2022    Eye Exam 09/09/2022    Diabetes Urine Screening 03/01/2023    Lipid Panel 03/01/2023    Hemoglobin A1c 03/01/2023    High Dose Statin 06/07/2023       Procedures     Future Appointments   Date Time Provider Department Center   7/10/2023  9:00 AM Juliana Callahan MD [facility] PNTRE [facility]   11/30/2023  9:30 AM IVY Lama [facility] CARD [facility]   12/11/2023  8:20 AM Rodney Morel MD [facility] DIOR [place]   2/5/2024  1:00 PM [facility] VAS US1 [facility] VASCUS [facility]    2/5/2024  1:45 PM IVY Parekh [facility]ENG [facility]    3/19/2024  9:30 AM [facility] [place]        No follow-ups on file.       Signature:  Rodney Morel MD  [facility]  [address] [place] [address]  [phone] Phone  [phone] Fax    Date of encounter: 6/7/23

## 2023-06-09 DIAGNOSIS — Z71.89 COMPLEX CARE COORDINATION: ICD-10-CM

## 2023-06-26 RX ORDER — OMEPRAZOLE 20 MG/1
CAPSULE, DELAYED RELEASE ORAL
Qty: 90 CAPSULE | Refills: 1 | Status: SHIPPED | OUTPATIENT
Start: 2023-06-26 | End: 2023-12-11 | Stop reason: SDUPTHER

## 2023-07-10 ENCOUNTER — OFFICE VISIT (OUTPATIENT)
Dept: PAIN MEDICINE | Facility: CLINIC | Age: 68
End: 2023-07-10
Payer: COMMERCIAL

## 2023-07-10 VITALS
SYSTOLIC BLOOD PRESSURE: 166 MMHG | WEIGHT: 189 LBS | HEIGHT: 71 IN | DIASTOLIC BLOOD PRESSURE: 109 MMHG | HEART RATE: 49 BPM | BODY MASS INDEX: 26.46 KG/M2

## 2023-07-10 DIAGNOSIS — M47.812 CERVICAL SPONDYLOSIS WITHOUT MYELOPATHY: Chronic | ICD-10-CM

## 2023-07-10 DIAGNOSIS — Z79.899 ENCOUNTER FOR LONG-TERM (CURRENT) USE OF OTHER MEDICATIONS: Primary | ICD-10-CM

## 2023-07-10 DIAGNOSIS — M54.17 LUMBOSACRAL RADICULOPATHY: Chronic | ICD-10-CM

## 2023-07-10 PROCEDURE — 3008F PR BODY MASS INDEX (BMI) DOCUMENTED: ICD-10-PCS | Mod: CPTII,,, | Performed by: PAIN MEDICINE

## 2023-07-10 PROCEDURE — 3077F SYST BP >= 140 MM HG: CPT | Mod: CPTII,,, | Performed by: PAIN MEDICINE

## 2023-07-10 PROCEDURE — 1101F PT FALLS ASSESS-DOCD LE1/YR: CPT | Mod: CPTII,,, | Performed by: PAIN MEDICINE

## 2023-07-10 PROCEDURE — 99214 OFFICE O/P EST MOD 30 MIN: CPT | Mod: S$PBB,,, | Performed by: PAIN MEDICINE

## 2023-07-10 PROCEDURE — 3080F DIAST BP >= 90 MM HG: CPT | Mod: CPTII,,, | Performed by: PAIN MEDICINE

## 2023-07-10 PROCEDURE — 3066F NEPHROPATHY DOC TX: CPT | Mod: CPTII,,, | Performed by: PAIN MEDICINE

## 2023-07-10 PROCEDURE — 3008F BODY MASS INDEX DOCD: CPT | Mod: CPTII,,, | Performed by: PAIN MEDICINE

## 2023-07-10 PROCEDURE — 1159F PR MEDICATION LIST DOCUMENTED IN MEDICAL RECORD: ICD-10-PCS | Mod: CPTII,,, | Performed by: PAIN MEDICINE

## 2023-07-10 PROCEDURE — 99214 PR OFFICE/OUTPT VISIT, EST, LEVL IV, 30-39 MIN: ICD-10-PCS | Mod: S$PBB,,, | Performed by: PAIN MEDICINE

## 2023-07-10 PROCEDURE — 3061F PR NEG MICROALBUMINURIA RESULT DOCUMENTED/REVIEW: ICD-10-PCS | Mod: CPTII,,, | Performed by: PAIN MEDICINE

## 2023-07-10 PROCEDURE — 3051F HG A1C>EQUAL 7.0%<8.0%: CPT | Mod: CPTII,,, | Performed by: PAIN MEDICINE

## 2023-07-10 PROCEDURE — 1125F AMNT PAIN NOTED PAIN PRSNT: CPT | Mod: CPTII,,, | Performed by: PAIN MEDICINE

## 2023-07-10 PROCEDURE — 3061F NEG MICROALBUMINURIA REV: CPT | Mod: CPTII,,, | Performed by: PAIN MEDICINE

## 2023-07-10 PROCEDURE — 1125F PR PAIN SEVERITY QUANTIFIED, PAIN PRESENT: ICD-10-PCS | Mod: CPTII,,, | Performed by: PAIN MEDICINE

## 2023-07-10 PROCEDURE — 3288F FALL RISK ASSESSMENT DOCD: CPT | Mod: CPTII,,, | Performed by: PAIN MEDICINE

## 2023-07-10 PROCEDURE — 99213 OFFICE O/P EST LOW 20 MIN: CPT | Mod: PBBFAC | Performed by: PAIN MEDICINE

## 2023-07-10 PROCEDURE — 1101F PR PT FALLS ASSESS DOC 0-1 FALLS W/OUT INJ PAST YR: ICD-10-PCS | Mod: CPTII,,, | Performed by: PAIN MEDICINE

## 2023-07-10 PROCEDURE — 1159F MED LIST DOCD IN RCRD: CPT | Mod: CPTII,,, | Performed by: PAIN MEDICINE

## 2023-07-10 PROCEDURE — 3051F PR MOST RECENT HEMOGLOBIN A1C LEVEL 7.0 - < 8.0%: ICD-10-PCS | Mod: CPTII,,, | Performed by: PAIN MEDICINE

## 2023-07-10 PROCEDURE — 3288F PR FALLS RISK ASSESSMENT DOCUMENTED: ICD-10-PCS | Mod: CPTII,,, | Performed by: PAIN MEDICINE

## 2023-07-10 PROCEDURE — 80305 DRUG TEST PRSMV DIR OPT OBS: CPT | Mod: PBBFAC | Performed by: PAIN MEDICINE

## 2023-07-10 PROCEDURE — 4010F ACE/ARB THERAPY RXD/TAKEN: CPT | Mod: CPTII,,, | Performed by: PAIN MEDICINE

## 2023-07-10 PROCEDURE — 3077F PR MOST RECENT SYSTOLIC BLOOD PRESSURE >= 140 MM HG: ICD-10-PCS | Mod: CPTII,,, | Performed by: PAIN MEDICINE

## 2023-07-10 PROCEDURE — 3066F PR DOCUMENTATION OF TREATMENT FOR NEPHROPATHY: ICD-10-PCS | Mod: CPTII,,, | Performed by: PAIN MEDICINE

## 2023-07-10 PROCEDURE — 3080F PR MOST RECENT DIASTOLIC BLOOD PRESSURE >= 90 MM HG: ICD-10-PCS | Mod: CPTII,,, | Performed by: PAIN MEDICINE

## 2023-07-10 PROCEDURE — 4010F PR ACE/ARB THEARPY RXD/TAKEN: ICD-10-PCS | Mod: CPTII,,, | Performed by: PAIN MEDICINE

## 2023-07-10 RX ORDER — HYDROCODONE BITARTRATE AND ACETAMINOPHEN 10; 325 MG/1; MG/1
1 TABLET ORAL EVERY 8 HOURS PRN
Qty: 90 TABLET | Refills: 0 | Status: SHIPPED | OUTPATIENT
Start: 2023-07-13 | End: 2023-08-12

## 2023-07-10 RX ORDER — HYDROCODONE BITARTRATE AND ACETAMINOPHEN 10; 325 MG/1; MG/1
1 TABLET ORAL EVERY 8 HOURS PRN
Qty: 90 TABLET | Refills: 0 | Status: SHIPPED | OUTPATIENT
Start: 2023-09-11 | End: 2023-10-09 | Stop reason: SDUPTHER

## 2023-07-10 RX ORDER — HYDROCODONE BITARTRATE AND ACETAMINOPHEN 10; 325 MG/1; MG/1
1 TABLET ORAL EVERY 8 HOURS PRN
Qty: 90 TABLET | Refills: 0 | Status: SHIPPED | OUTPATIENT
Start: 2023-08-11 | End: 2023-09-10

## 2023-07-10 RX ORDER — TIZANIDINE 4 MG/1
4 TABLET ORAL EVERY 8 HOURS
Qty: 90 TABLET | Refills: 2 | Status: SHIPPED | OUTPATIENT
Start: 2023-07-10 | End: 2023-10-09 | Stop reason: SDUPTHER

## 2023-07-10 NOTE — PROGRESS NOTES
She Disclaimer: This note has been generated using voice-recognition software. There may be typographical errors that have been missed during proof-reading        Patient ID: Micheal Taylor is a 67 y.o. male.      Chief Complaint: Low-back Pain      67-year-old male returns for re-evaluation of chronic lower back and post-laminectomy pain syndrome.  He is not a surgical candidate per Dr. Bray and at [facility] neurosurgery.  He is status post laminectomy by Dr. Cavazos several years ago.  He has remained on opiates for several years and a spinal cord stimulator was recommended.  He is unable to receive the stimulator due to severe peripheral vascular disease and inability to discontinue Plavix for least 1 year,.  per Dr. Forrest.  His lower back pain remains primarily axial and is often exacerbated with prolonged walking and standing.  He denies radicular symptoms to the lower extremities.  He denies paresthesia or weakness of the lower extremities.  He returns today for medication refill.  He was unable to tolerate physical therapy and discontinued treatment in [place], [place].  He returns today for medication refill.                  Pain Assessment  Pain Assessment: 0-10  Pain Score:   6  Pain Location: Back  Pain Orientation: Lower  Pain Descriptors: Aching, Dull, Constant  Pain Frequency: Continuous  Pain Onset: Awakened from sleep  Clinical Progression: Not changed  Aggravating Factors: Standing, Walking  Pain Intervention(s): Medication (See eMAR), Rest      A's of Opioid Risk Assessment  Activity:Patient can partially perform ADL.   Analgesia:Patients pain is  controlled by current medication.   Adverse Effects: Patient denies constipation or sedation.  Aberrant Behavior:  reviewed with no aberrant drug seeking/taking behavior.      Patient denies any suicidal or homicidal ideations    Physical Therapy/Home Exercise: yes          Review of Systems   Constitutional: Negative.    HENT: Negative.     Eyes: Negative.     Respiratory: Negative.     Cardiovascular: Negative.    Gastrointestinal: Negative.    Endocrine: Negative.    Genitourinary: Negative.    Musculoskeletal:  Positive for arthralgias (right shoulder) and back pain.   Integumentary:  Negative.   Allergic/Immunologic: Negative.    Neurological: Negative.    Hematological: Negative.    Psychiatric/Behavioral: Negative.             Past Medical History:   Diagnosis Date    Anticoagulant long-term use     CHF (congestive heart failure)     Chronic pain syndrome     Coronary artery disease     Diabetes mellitus, type 2     Hyperlipidemia     Hypertension     Lumbar spondylosis     Lumbosacral radiculopathy     Mild nonproliferative diabetic retinopathy of both eyes without macular edema 09/09/2022    Pain in thoracic spine     PVD (peripheral vascular disease)     Renal disorder     Spondylosis without myelopathy or radiculopathy, cervical region      Past Surgical History:   Procedure Laterality Date    ADENOIDECTOMY      AMPUTATION      left index finger removed     CAUDAL EPIDURAL STEROID INJECTION  01/19/2016    Caudal NATHAN - Alee    CIRCUMCISION      CORONARY ARTERY BYPASS GRAFT (CABG)      EYE SURGERY      FOOT SURGERY      heel surgery    HEEL SPUR SURGERY      INJECTION OF FACET JOINT Bilateral 04/16/2015    Bilateral L3-S1 Facet Injection - Alee - 4/16/15, 11/15/12 and 9/27/12    LEFT HEART CATHETERIZATION Left 07/19/2022    Procedure: Left heart cath;  Surgeon: Venu Whitlock MD;  Location: [facility] CATH LAB;  Service: Cardiology;  Laterality: Left;  Patient has PAD and has had bilateral fem pop bypass. He is followed by Dr. Forrest.    RADIOFREQUENCY THERMOCOAGULATION Left 02/26/2013    Left L3-S1 RFTC -Alee    RADIOFREQUENCY THERMOCOAGULATION Right 02/05/2013    Right L3-S1 RFTC - Alee    TONSILLECTOMY, ADENOIDECTOMY, BILATERAL MYRINGOTOMY AND TUBES      TRANSFORAMINAL EPIDURAL INJECTION OF STEROID Left 06/19/2012    Left L3-4 TFESI - Alee    TRANSFORAMINAL  EPIDURAL INJECTION OF STEROID Right 04/12/2013    Right L3-4 TFESI - Alee - 4/12/13 and 8/7/12    VASCULAR SURGERY       Social History     Socioeconomic History    Marital status:     Number of children: 5   Occupational History    Occupation: retired   Tobacco Use    Smoking status: Former     Packs/day: 4.00     Years: 30.00     Pack years: 120.00     Types: Cigarettes     Passive exposure: Past (parent)    Smokeless tobacco: Former     Types: Chew    Tobacco comments:     quit about 2 weeks ago   Substance and Sexual Activity    Alcohol use: Never    Drug use: Never    Sexual activity: Not Currently     Family History   Problem Relation Age of Onset    Diabetes Mother     Heart disease Mother     Hypertension Mother     Liver cancer Mother     Heart disease Father     Hypertension Father      Review of patient's allergies indicates:  No Known Allergies  has a current medication list which includes the following prescription(s): amlodipine, aspirin, cholecalciferol (vitamin d3), cinnamon bark, clopidogrel, farxiga, escitalopram oxalate, ezetimibe, fenofibrate, gabapentin, hydralazine, hydrocodone-acetaminophen, [START ON 7/13/2023] hydrocodone-acetaminophen, [START ON 8/11/2023] hydrocodone-acetaminophen, [START ON 9/11/2023] hydrocodone-acetaminophen, insulin regular, liraglutide 0.6 mg/0.1 ml (18 mg/3 ml) subq pnij, lisinopril, magnesium, metformin, metoprolol succinate, nitroglycerin, omeprazole, potassium chloride sa, ropinirole, rosuvastatin, spironolactone, tizanidine, tresiba flextouch u-200, vascepa, vitamin a, and vitamin e.      Objective:  Vitals:    07/10/23 0914   BP: (!) 166/109   Pulse: (!) 49        Physical Exam  Vitals and nursing note reviewed.   Constitutional:       General: He is not in acute distress.     Appearance: Normal appearance. He is not ill-appearing, toxic-appearing or diaphoretic.   HENT:      Head: Normocephalic and atraumatic.      Nose: Nose normal.      Mouth/Throat:       Mouth: Mucous membranes are moist.   Eyes:      Extraocular Movements: Extraocular movements intact.      Pupils: Pupils are equal, round, and reactive to light.   Cardiovascular:      Rate and Rhythm: Normal rate and regular rhythm.      Heart sounds: Normal heart sounds.   Pulmonary:      Effort: Pulmonary effort is normal. No respiratory distress.      Breath sounds: Normal breath sounds. No stridor. No wheezing or rhonchi.   Abdominal:      General: Bowel sounds are normal.      Palpations: Abdomen is soft.   Musculoskeletal:         General: No swelling or deformity.      Cervical back: Normal and normal range of motion. No spasms or tenderness. No pain with movement. Normal range of motion.      Thoracic back: Normal.      Lumbar back: Tenderness present. No spasms. Decreased range of motion. Negative right straight leg raise test and negative left straight leg raise test. No scoliosis.      Right lower leg: No edema.      Left lower leg: No edema.      Comments: Bilateral lumbar paraspinous and facet tenderness to palpation from L3-S1   Skin:     General: Skin is warm.   Neurological:      General: No focal deficit present.      Mental Status: He is alert and oriented to person, place, and time. Mental status is at baseline.      Cranial Nerves: No cranial nerve deficit.      Sensory: Sensation is intact. No sensory deficit.      Motor: No weakness.      Coordination: Coordination normal.      Gait: Gait normal.      Deep Tendon Reflexes: Reflexes are normal and symmetric.   Psychiatric:         Mood and Affect: Mood normal.         Behavior: Behavior normal.         Assessment:      1. Encounter for long-term (current) use of other medications    2. Lumbosacral radiculopathy    3. Cervical spondylosis without myelopathy          Plan:  1. reviewed  2.Addiction, Dependency, Tolerance, Opioid abuse-misuse, Death, Diversion Discussed. Overdose reversal drug Naloxone discussed.  3.Refill/Continue  medications for pain control and function.  Continue Norco and Zanaflex as previously prescribed for chronic lower back pain and muscle spasticity  4. Patient is unable to discontinue Plavix for spinal cord stimulation trial and permanent implantation.   5.Follow with MARCELL Nguyễn in 3 months for re-evaluation and medication refill       report:  Reviewed and consistent with medication use as prescribed.      The total time spent for evaluation and management on 07/10/2023 including reviewing separately obtained history, performing a medically appropriate exam and evaluation, documenting clinical information in the health record, independently interpreting results and communicating them to the patient/family/caregiver, and ordering medications/tests/procedures was between 15-29 minutes.    The above plan and management options were discussed at length with patient. Patient is in agreement with the above and verbalized understanding. It will be communicated with the referring physician via electronic record, fax, or mail.

## 2023-09-12 RX ORDER — GABAPENTIN 300 MG/1
CAPSULE ORAL
Qty: 270 CAPSULE | Refills: 0 | Status: SHIPPED | OUTPATIENT
Start: 2023-09-12 | End: 2023-12-11 | Stop reason: SDUPTHER

## 2023-09-26 RX ORDER — ROSUVASTATIN CALCIUM 40 MG/1
40 TABLET, COATED ORAL NIGHTLY
Qty: 90 TABLET | Refills: 3 | Status: SHIPPED | OUTPATIENT
Start: 2023-09-26 | End: 2024-09-25

## 2023-09-28 DIAGNOSIS — I25.10 CORONARY ARTERY DISEASE INVOLVING NATIVE CORONARY ARTERY OF NATIVE HEART, UNSPECIFIED WHETHER ANGINA PRESENT: ICD-10-CM

## 2023-09-28 RX ORDER — CLOPIDOGREL BISULFATE 75 MG/1
75 TABLET ORAL DAILY
Qty: 90 TABLET | Refills: 0 | Status: SHIPPED | OUTPATIENT
Start: 2023-09-28 | End: 2023-11-30 | Stop reason: SDUPTHER

## 2023-09-28 NOTE — TELEPHONE ENCOUNTER
----- Message from Marie Campos sent at 9/28/2023  2:48 PM CDT -----  PATIENT NEED A REFILL ON HIS PLAVIX WOULD LIKE TO GET A 90 DAY SUPPLY. PATIENT WOULD LIKE FOR YOU TO CALL HIM

## 2023-10-09 NOTE — PROGRESS NOTES
Subjective:         Patient ID: Micheal Taylor is a 67 y.o. male.    Chief Complaint: Low-back Pain      Pain  This is a chronic problem. The current episode started more than 1 year ago. The problem occurs daily. The problem has been unchanged. Associated symptoms include arthralgias and neck pain. Pertinent negatives include no anorexia, chest pain, chills, coughing, diaphoresis, fever, sore throat, vertigo or vomiting.     Review of Systems   Constitutional:  Negative for activity change, appetite change, chills, diaphoresis, fever and unexpected weight change.   HENT:  Negative for drooling, ear discharge, ear pain, facial swelling, mouth sores, nosebleeds, sore throat, trouble swallowing, voice change and goiter.    Eyes:  Negative for photophobia, pain, discharge, redness and visual disturbance.   Respiratory:  Negative for apnea, cough, choking, chest tightness, shortness of breath, wheezing and stridor.    Cardiovascular:  Negative for chest pain, palpitations and leg swelling.   Gastrointestinal:  Negative for abdominal distention, anorexia, diarrhea, rectal pain, vomiting and fecal incontinence.   Endocrine: Negative for cold intolerance, heat intolerance, polydipsia, polyphagia and polyuria.   Genitourinary:  Negative for bladder incontinence, dysuria, flank pain and frequency.   Musculoskeletal:  Positive for arthralgias, back pain, leg pain, neck pain and neck stiffness.   Integumentary:  Negative for color change and pallor.   Neurological:  Negative for dizziness, vertigo, seizures, syncope, facial asymmetry, speech difficulty, light-headedness, memory loss and coordination difficulties.   Hematological:  Negative for adenopathy. Does not bruise/bleed easily.   Psychiatric/Behavioral:  Negative for agitation, behavioral problems, confusion, decreased concentration, dysphoric mood, hallucinations, self-injury and suicidal ideas. The patient is not nervous/anxious and is not hyperactive.            Past  Medical History:   Diagnosis Date    Anticoagulant long-term use     CHF (congestive heart failure)     Chronic pain syndrome     Coronary artery disease     Diabetes mellitus, type 2     Hyperlipidemia     Hypertension     Lumbar spondylosis     Lumbosacral radiculopathy     Mild nonproliferative diabetic retinopathy of both eyes without macular edema 09/09/2022    Pain in thoracic spine     PVD (peripheral vascular disease)     Renal disorder     Spondylosis without myelopathy or radiculopathy, cervical region      Past Surgical History:   Procedure Laterality Date    ADENOIDECTOMY      AMPUTATION      left index finger removed     CAUDAL EPIDURAL STEROID INJECTION  01/19/2016    Caudal NATHAN - Alee    CIRCUMCISION      CORONARY ARTERY BYPASS GRAFT (CABG)      EYE SURGERY      FOOT SURGERY      heel surgery    HEEL SPUR SURGERY      INJECTION OF FACET JOINT Bilateral 04/16/2015    Bilateral L3-S1 Facet Injection - Alee - 4/16/15, 11/15/12 and 9/27/12    LEFT HEART CATHETERIZATION Left 07/19/2022    Procedure: Left heart cath;  Surgeon: Venu Whitlock MD;  Location: [facility] CATH LAB;  Service: Cardiology;  Laterality: Left;  Patient has PAD and has had bilateral fem pop bypass. He is followed by Dr. Forrest.    RADIOFREQUENCY THERMOCOAGULATION Left 02/26/2013    Left L3-S1 RFTC -Alee    RADIOFREQUENCY THERMOCOAGULATION Right 02/05/2013    Right L3-S1 RFTC - Alee    TONSILLECTOMY, ADENOIDECTOMY, BILATERAL MYRINGOTOMY AND TUBES      TRANSFORAMINAL EPIDURAL INJECTION OF STEROID Left 06/19/2012    Left L3-4 TFESI - Alee    TRANSFORAMINAL EPIDURAL INJECTION OF STEROID Right 04/12/2013    Right L3-4 TFESI - Alee - 4/12/13 and 8/7/12    VASCULAR SURGERY       Social History     Socioeconomic History    Marital status:     Number of children: 5   Occupational History    Occupation: retired   Tobacco Use    Smoking status: Former     Current packs/day: 4.00     Average packs/day: 4.0 packs/day for 30.0 years (120.0 ttl  pk-yrs)     Types: Cigarettes     Passive exposure: Past (parent)    Smokeless tobacco: Former     Types: Chew    Tobacco comments:     quit about 2 weeks ago   Substance and Sexual Activity    Alcohol use: Never    Drug use: Never    Sexual activity: Not Currently     Family History   Problem Relation Age of Onset    Diabetes Mother     Heart disease Mother     Hypertension Mother     Liver cancer Mother     Heart disease Father     Hypertension Father      Review of patient's allergies indicates:  No Known Allergies     Objective:  Vitals:    10/11/23 1016   BP: (!) 163/60   Pulse: (!) 57   Resp: 20   Weight: 85.3 kg (188 lb)   Height: 6' (1.829 m)   PainSc:   7         Physical Exam  Vitals and nursing note reviewed. Exam conducted with a chaperone present.   Constitutional:       General: He is awake.      Appearance: Normal appearance. He is not ill-appearing, toxic-appearing or diaphoretic.   HENT:      Head: Normocephalic and atraumatic.      Nose: Nose normal.      Mouth/Throat:      Mouth: Mucous membranes are moist.      Pharynx: Oropharynx is clear.   Eyes:      Conjunctiva/sclera: Conjunctivae normal.      Pupils: Pupils are equal, round, and reactive to light.   Cardiovascular:      Rate and Rhythm: Normal rate.   Pulmonary:      Effort: Pulmonary effort is normal. No respiratory distress.   Abdominal:      Palpations: Abdomen is soft.   Musculoskeletal:      Right shoulder: Tenderness present.      Left shoulder: Tenderness present.      Cervical back: Normal range of motion and neck supple. Tenderness present.      Thoracic back: Tenderness present.      Lumbar back: Tenderness present. Decreased range of motion.   Skin:     General: Skin is warm and dry.   Neurological:      General: No focal deficit present.      Mental Status: He is alert and oriented to person, place, and time. Mental status is at baseline.      Cranial Nerves: No cranial nerve deficit (II-XII).   Psychiatric:         Mood and  Affect: Mood normal.         Behavior: Behavior normal. Behavior is cooperative.         Thought Content: Thought content normal.           US Lower Extremity Arteries Right  Narrative: EXAMINATION:  US LOWER EXTREMITY ARTERIES RIGHT    CLINICAL HISTORY:  Peripheral vascular disease, unspecified    COMPARISON:  08/24/2022    FINDINGS:  Right common femoral artery peak systolic velocity 112 centimeters/second triphasic waveforms, profundus femorals 107 centimeters/second monophasic waveforms, patient's native SFA proximal 95 centimeters/second monophasic, native SFA mid in distal is occluded common is a femoropopliteal graft which is patent, proximal anastomosis 6.5 mm at 88 centimeters/second, mid graph 35 centimeters/second distal anastomosis is 3.8 mm at 94 centimeters/second    Popliteal artery 58 centimeters/second biphasic waveforms, posterior tibial 22 centimeters/second biphasic, dorsalis pedis 32 centimeters/seconds biphasic  Impression: Patent femoropopliteal graph no progression of velocities distal anastomosis    TECHNOLOGIST: Ronit Fox (RT) (VS) RVT    Electronically signed by: Loida Forrest  Date:    02/10/2023  Time:    07:25       Office Visit on 07/10/2023   Component Date Value Ref Range Status    POC Amphetamines 07/10/2023 Negative  Negative, Inconclusive Final    POC Barbiturates 07/10/2023 Negative  Negative, Inconclusive Final    POC Benzodiazepines 07/10/2023 Negative  Negative, Inconclusive Final    POC Cocaine 07/10/2023 Negative  Negative, Inconclusive Final    POC THC 07/10/2023 Negative  Negative, Inconclusive Final    POC Methadone 07/10/2023 Negative  Negative, Inconclusive Final    POC Methamphetamine 07/10/2023 Negative  Negative, Inconclusive Final    POC Opiates 07/10/2023 Presumptive Positive (A)  Negative, Inconclusive Final    POC Oxycodone 07/10/2023 Negative  Negative, Inconclusive Final    POC Phencyclidine 07/10/2023 Negative  Negative, Inconclusive Final    POC  Methylenedioxymethamphetamine * 07/10/2023 Negative  Negative, Inconclusive Final    POC Tricyclic Antidepressants 07/10/2023 Negative  Negative, Inconclusive Final    POC Buprenorphine 07/10/2023 Negative   Final     Acceptable 07/10/2023 Yes   Final    POC Temperature (Urine) 07/10/2023 94   Final   Office Visit on 06/07/2023   Component Date Value Ref Range Status    Sodium 06/07/2023 139  136 - 145 mmol/L Final    Potassium 06/07/2023 5.0  3.5 - 5.1 mmol/L Final    Chloride 06/07/2023 106  98 - 107 mmol/L Final    CO2 06/07/2023 28  21 - 32 mmol/L Final    Anion Gap 06/07/2023 10  7 - 16 mmol/L Final    Glucose 06/07/2023 184 (H)  74 - 106 mg/dL Final    BUN 06/07/2023 31 (H)  7 - 18 mg/dL Final    Creatinine 06/07/2023 1.84 (H)  0.70 - 1.30 mg/dL Final    BUN/Creatinine Ratio 06/07/2023 17  6 - 20 Final    Calcium 06/07/2023 9.3  8.5 - 10.1 mg/dL Final    eGFR 06/07/2023 40 (L)  >=60 mL/min/1.73m2 Final    Hemoglobin A1C 06/07/2023 7.9 (H)  4.5 - 6.6 % Final    Estimated Average Glucose 06/07/2023 177  mg/dL Final    WBC 06/07/2023 8.71  4.50 - 11.00 K/uL Final    RBC 06/07/2023 4.63  4.60 - 6.20 M/uL Final    Hemoglobin 06/07/2023 11.8 (L)  13.5 - 18.0 g/dL Final    Hematocrit 06/07/2023 39.4 (L)  40.0 - 54.0 % Final    MCV 06/07/2023 85.1  80.0 - 96.0 fL Final    MCH 06/07/2023 25.5 (L)  27.0 - 31.0 pg Final    MCHC 06/07/2023 29.9 (L)  32.0 - 36.0 g/dL Final    RDW 06/07/2023 13.5  11.5 - 14.5 % Final    Platelet Count 06/07/2023 242  150 - 400 K/uL Final    MPV 06/07/2023 12.2  9.4 - 12.4 fL Final    Neutrophils % 06/07/2023 56.1  53.0 - 65.0 % Final    Lymphocytes % 06/07/2023 31.9  27.0 - 41.0 % Final    Monocytes % 06/07/2023 8.5 (H)  2.0 - 6.0 % Final    Eosinophils % 06/07/2023 2.1  1.0 - 4.0 % Final    Basophils % 06/07/2023 0.5  0.0 - 1.0 % Final    Immature Granulocytes % 06/07/2023 0.9 (H)  0.0 - 0.4 % Final    nRBC, Auto 06/07/2023 0.0  <=0.0 % Final    Neutrophils, Abs 06/07/2023  4.89  1.80 - 7.70 K/uL Final    Lymphocytes, Absolute 06/07/2023 2.78  1.00 - 4.80 K/uL Final    Monocytes, Absolute 06/07/2023 0.74  0.00 - 0.80 K/uL Final    Eosinophils, Absolute 06/07/2023 0.18  0.00 - 0.50 K/uL Final    Basophils, Absolute 06/07/2023 0.04  0.00 - 0.20 K/uL Final    Immature Granulocytes, Absolute 06/07/2023 0.08 (H)  0.00 - 0.04 K/uL Final    nRBC, Absolute 06/07/2023 0.00  <=0.00 x10e3/uL Final    Diff Type 06/07/2023 Auto   Final         Orders Placed This Encounter   Procedures    POCT Urine Drug Screen Presump     Interpretive Information:     Negative:  No drug detected at the cut off level.   Positive:  This result represents presumptive positive for the   tested drug, other substances may yield a positive response other   than the analyte of interest. This result should be utilized for   diagnostic purpose only. Confirmation testing will be performed upon physician request only.            Requested Prescriptions     Signed Prescriptions Disp Refills    HYDROcodone-acetaminophen (NORCO)  mg per tablet 90 tablet 0     Sig: Take 1 tablet by mouth every 8 (eight) hours as needed for Pain (pain).    tiZANidine (ZANAFLEX) 4 MG tablet 90 tablet 2     Sig: Take 1 tablet (4 mg total) by mouth every 8 (eight) hours.    HYDROcodone-acetaminophen (NORCO)  mg per tablet 90 tablet 0     Sig: Take 1 tablet by mouth every 8 (eight) hours as needed for Pain (pain).    HYDROcodone-acetaminophen (NORCO)  mg per tablet 90 tablet 0     Sig: Take 1 tablet by mouth every 8 (eight) hours as needed for Pain (pain).       Assessment:     1. Lumbosacral radiculopathy    2. Cervical spondylosis without myelopathy    3. Encounter for long-term (current) use of other medications         A's of Opioid Risk Assessment  Activity:Patient can perform ADL.   Analgesia:Patients pain is partially controlled by current medication. Patient has tried OTC medications such as Tylenol and Ibuprofen with out  relief.   Adverse Effects: Patient denies constipation or sedation.  Aberrant Behavior:  reviewed with no aberrant drug seeking/taking behavior.  Overdose reversal drug naloxone discussed    Drug screen reviewed      Plan:    Narcan January 2023    Anticoagulated Plavix    [facility] closed on weekends    He states current medications helping control his discomfort     He would like to continue with conservative management    Continue home exercise program as directed    Continue current medication    Follow-up 3 months    Dr. Callahan, February 2023    Bring original prescription medication bottles/container/box with labels to each visit

## 2023-10-10 DIAGNOSIS — E78.5 HYPERLIPIDEMIA, UNSPECIFIED HYPERLIPIDEMIA TYPE: ICD-10-CM

## 2023-10-10 RX ORDER — METFORMIN HYDROCHLORIDE 500 MG/1
500 TABLET ORAL 2 TIMES DAILY WITH MEALS
Qty: 180 TABLET | Refills: 0 | Status: SHIPPED | OUTPATIENT
Start: 2023-10-10 | End: 2023-12-11 | Stop reason: SDUPTHER

## 2023-10-10 RX ORDER — DAPAGLIFLOZIN 10 MG/1
10 TABLET, FILM COATED ORAL DAILY
Qty: 90 TABLET | Refills: 0 | Status: SHIPPED | OUTPATIENT
Start: 2023-10-10 | End: 2023-12-11 | Stop reason: SDUPTHER

## 2023-10-10 RX ORDER — ESCITALOPRAM OXALATE 10 MG/1
10 TABLET ORAL DAILY
Qty: 90 TABLET | Refills: 0 | Status: SHIPPED | OUTPATIENT
Start: 2023-10-10 | End: 2023-12-11 | Stop reason: SDUPTHER

## 2023-10-10 RX ORDER — POTASSIUM CHLORIDE 20 MEQ/1
20 TABLET, EXTENDED RELEASE ORAL DAILY
Qty: 90 TABLET | Refills: 0 | Status: SHIPPED | OUTPATIENT
Start: 2023-10-10 | End: 2023-12-11 | Stop reason: SDUPTHER

## 2023-10-10 RX ORDER — FENOFIBRATE 145 MG/1
145 TABLET, FILM COATED ORAL DAILY
Qty: 90 TABLET | Refills: 0 | Status: SHIPPED | OUTPATIENT
Start: 2023-10-10 | End: 2023-12-11 | Stop reason: SDUPTHER

## 2023-10-10 NOTE — TELEPHONE ENCOUNTER
----- Message from Diane Henson sent at 10/10/2023  1:23 PM CDT -----  Medication that needs refills of escitalopram 10 and fenofibrate 145 to [facility]. He said if you see any more please refill them also.

## 2023-10-11 ENCOUNTER — OFFICE VISIT (OUTPATIENT)
Dept: PAIN MEDICINE | Facility: CLINIC | Age: 68
End: 2023-10-11
Payer: COMMERCIAL

## 2023-10-11 VITALS
DIASTOLIC BLOOD PRESSURE: 60 MMHG | WEIGHT: 188 LBS | HEIGHT: 72 IN | RESPIRATION RATE: 20 BRPM | BODY MASS INDEX: 25.47 KG/M2 | SYSTOLIC BLOOD PRESSURE: 163 MMHG | HEART RATE: 57 BPM

## 2023-10-11 DIAGNOSIS — M47.812 CERVICAL SPONDYLOSIS WITHOUT MYELOPATHY: Chronic | ICD-10-CM

## 2023-10-11 DIAGNOSIS — M54.17 LUMBOSACRAL RADICULOPATHY: Primary | Chronic | ICD-10-CM

## 2023-10-11 DIAGNOSIS — Z79.899 ENCOUNTER FOR LONG-TERM (CURRENT) USE OF OTHER MEDICATIONS: ICD-10-CM

## 2023-10-11 PROCEDURE — 3051F HG A1C>EQUAL 7.0%<8.0%: CPT | Mod: CPTII,,, | Performed by: PHYSICIAN ASSISTANT

## 2023-10-11 PROCEDURE — 3288F PR FALLS RISK ASSESSMENT DOCUMENTED: ICD-10-PCS | Mod: CPTII,,, | Performed by: PHYSICIAN ASSISTANT

## 2023-10-11 PROCEDURE — 1159F PR MEDICATION LIST DOCUMENTED IN MEDICAL RECORD: ICD-10-PCS | Mod: CPTII,,, | Performed by: PHYSICIAN ASSISTANT

## 2023-10-11 PROCEDURE — 99999PBSHW POCT URINE DRUG SCREEN PRESUMP: ICD-10-PCS | Mod: PBBFAC,,,

## 2023-10-11 PROCEDURE — 1101F PR PT FALLS ASSESS DOC 0-1 FALLS W/OUT INJ PAST YR: ICD-10-PCS | Mod: CPTII,,, | Performed by: PHYSICIAN ASSISTANT

## 2023-10-11 PROCEDURE — 3061F PR NEG MICROALBUMINURIA RESULT DOCUMENTED/REVIEW: ICD-10-PCS | Mod: CPTII,,, | Performed by: PHYSICIAN ASSISTANT

## 2023-10-11 PROCEDURE — 80305 DRUG TEST PRSMV DIR OPT OBS: CPT | Mod: PBBFAC | Performed by: PHYSICIAN ASSISTANT

## 2023-10-11 PROCEDURE — 4010F PR ACE/ARB THEARPY RXD/TAKEN: ICD-10-PCS | Mod: CPTII,,, | Performed by: PHYSICIAN ASSISTANT

## 2023-10-11 PROCEDURE — 3078F DIAST BP <80 MM HG: CPT | Mod: CPTII,,, | Performed by: PHYSICIAN ASSISTANT

## 2023-10-11 PROCEDURE — 1125F PR PAIN SEVERITY QUANTIFIED, PAIN PRESENT: ICD-10-PCS | Mod: CPTII,,, | Performed by: PHYSICIAN ASSISTANT

## 2023-10-11 PROCEDURE — 1125F AMNT PAIN NOTED PAIN PRSNT: CPT | Mod: CPTII,,, | Performed by: PHYSICIAN ASSISTANT

## 2023-10-11 PROCEDURE — 3288F FALL RISK ASSESSMENT DOCD: CPT | Mod: CPTII,,, | Performed by: PHYSICIAN ASSISTANT

## 2023-10-11 PROCEDURE — 3051F PR MOST RECENT HEMOGLOBIN A1C LEVEL 7.0 - < 8.0%: ICD-10-PCS | Mod: CPTII,,, | Performed by: PHYSICIAN ASSISTANT

## 2023-10-11 PROCEDURE — 3077F SYST BP >= 140 MM HG: CPT | Mod: CPTII,,, | Performed by: PHYSICIAN ASSISTANT

## 2023-10-11 PROCEDURE — 99214 OFFICE O/P EST MOD 30 MIN: CPT | Mod: S$PBB,,, | Performed by: PHYSICIAN ASSISTANT

## 2023-10-11 PROCEDURE — 3008F BODY MASS INDEX DOCD: CPT | Mod: CPTII,,, | Performed by: PHYSICIAN ASSISTANT

## 2023-10-11 PROCEDURE — 3078F PR MOST RECENT DIASTOLIC BLOOD PRESSURE < 80 MM HG: ICD-10-PCS | Mod: CPTII,,, | Performed by: PHYSICIAN ASSISTANT

## 2023-10-11 PROCEDURE — 3077F PR MOST RECENT SYSTOLIC BLOOD PRESSURE >= 140 MM HG: ICD-10-PCS | Mod: CPTII,,, | Performed by: PHYSICIAN ASSISTANT

## 2023-10-11 PROCEDURE — 3008F PR BODY MASS INDEX (BMI) DOCUMENTED: ICD-10-PCS | Mod: CPTII,,, | Performed by: PHYSICIAN ASSISTANT

## 2023-10-11 PROCEDURE — 4010F ACE/ARB THERAPY RXD/TAKEN: CPT | Mod: CPTII,,, | Performed by: PHYSICIAN ASSISTANT

## 2023-10-11 PROCEDURE — 3066F PR DOCUMENTATION OF TREATMENT FOR NEPHROPATHY: ICD-10-PCS | Mod: CPTII,,, | Performed by: PHYSICIAN ASSISTANT

## 2023-10-11 PROCEDURE — 3061F NEG MICROALBUMINURIA REV: CPT | Mod: CPTII,,, | Performed by: PHYSICIAN ASSISTANT

## 2023-10-11 PROCEDURE — 1159F MED LIST DOCD IN RCRD: CPT | Mod: CPTII,,, | Performed by: PHYSICIAN ASSISTANT

## 2023-10-11 PROCEDURE — 99215 OFFICE O/P EST HI 40 MIN: CPT | Mod: PBBFAC | Performed by: PHYSICIAN ASSISTANT

## 2023-10-11 PROCEDURE — 99214 PR OFFICE/OUTPT VISIT, EST, LEVL IV, 30-39 MIN: ICD-10-PCS | Mod: S$PBB,,, | Performed by: PHYSICIAN ASSISTANT

## 2023-10-11 PROCEDURE — 3066F NEPHROPATHY DOC TX: CPT | Mod: CPTII,,, | Performed by: PHYSICIAN ASSISTANT

## 2023-10-11 PROCEDURE — 1101F PT FALLS ASSESS-DOCD LE1/YR: CPT | Mod: CPTII,,, | Performed by: PHYSICIAN ASSISTANT

## 2023-10-11 PROCEDURE — 99999PBSHW POCT URINE DRUG SCREEN PRESUMP: Mod: PBBFAC,,,

## 2023-10-11 RX ORDER — TIZANIDINE 4 MG/1
4 TABLET ORAL EVERY 8 HOURS
Qty: 90 TABLET | Refills: 2 | Status: SHIPPED | OUTPATIENT
Start: 2023-10-11 | End: 2024-01-11 | Stop reason: SDUPTHER

## 2023-10-11 RX ORDER — HYDROCODONE BITARTRATE AND ACETAMINOPHEN 10; 325 MG/1; MG/1
1 TABLET ORAL EVERY 8 HOURS PRN
Qty: 90 TABLET | Refills: 0 | Status: SHIPPED | OUTPATIENT
Start: 2023-11-10 | End: 2023-12-10

## 2023-10-11 RX ORDER — HYDROCODONE BITARTRATE AND ACETAMINOPHEN 10; 325 MG/1; MG/1
1 TABLET ORAL EVERY 8 HOURS PRN
Qty: 90 TABLET | Refills: 0 | Status: SHIPPED | OUTPATIENT
Start: 2023-10-13 | End: 2023-11-12

## 2023-10-11 RX ORDER — HYDROCODONE BITARTRATE AND ACETAMINOPHEN 10; 325 MG/1; MG/1
1 TABLET ORAL EVERY 8 HOURS PRN
Qty: 90 TABLET | Refills: 0 | Status: SHIPPED | OUTPATIENT
Start: 2023-12-12 | End: 2024-01-10 | Stop reason: SDUPTHER

## 2023-11-30 ENCOUNTER — OFFICE VISIT (OUTPATIENT)
Dept: CARDIOLOGY | Facility: CLINIC | Age: 68
End: 2023-11-30
Payer: COMMERCIAL

## 2023-11-30 VITALS
DIASTOLIC BLOOD PRESSURE: 60 MMHG | HEART RATE: 67 BPM | OXYGEN SATURATION: 97 % | WEIGHT: 180.63 LBS | HEIGHT: 71 IN | BODY MASS INDEX: 25.29 KG/M2 | SYSTOLIC BLOOD PRESSURE: 118 MMHG

## 2023-11-30 DIAGNOSIS — I10 HYPERTENSION, UNSPECIFIED TYPE: ICD-10-CM

## 2023-11-30 DIAGNOSIS — I25.10 CORONARY ARTERY DISEASE, UNSPECIFIED VESSEL OR LESION TYPE, UNSPECIFIED WHETHER ANGINA PRESENT, UNSPECIFIED WHETHER NATIVE OR TRANSPLANTED HEART: Primary | ICD-10-CM

## 2023-11-30 DIAGNOSIS — I25.10 CORONARY ARTERY DISEASE INVOLVING NATIVE CORONARY ARTERY OF NATIVE HEART, UNSPECIFIED WHETHER ANGINA PRESENT: ICD-10-CM

## 2023-11-30 DIAGNOSIS — E78.5 HYPERLIPIDEMIA, UNSPECIFIED HYPERLIPIDEMIA TYPE: ICD-10-CM

## 2023-11-30 PROCEDURE — 1159F PR MEDICATION LIST DOCUMENTED IN MEDICAL RECORD: ICD-10-PCS | Mod: CPTII,,, | Performed by: NURSE PRACTITIONER

## 2023-11-30 PROCEDURE — 3078F DIAST BP <80 MM HG: CPT | Mod: CPTII,,, | Performed by: NURSE PRACTITIONER

## 2023-11-30 PROCEDURE — 3061F NEG MICROALBUMINURIA REV: CPT | Mod: CPTII,,, | Performed by: NURSE PRACTITIONER

## 2023-11-30 PROCEDURE — 3074F SYST BP LT 130 MM HG: CPT | Mod: CPTII,,, | Performed by: NURSE PRACTITIONER

## 2023-11-30 PROCEDURE — 3061F PR NEG MICROALBUMINURIA RESULT DOCUMENTED/REVIEW: ICD-10-PCS | Mod: CPTII,,, | Performed by: NURSE PRACTITIONER

## 2023-11-30 PROCEDURE — 3078F PR MOST RECENT DIASTOLIC BLOOD PRESSURE < 80 MM HG: ICD-10-PCS | Mod: CPTII,,, | Performed by: NURSE PRACTITIONER

## 2023-11-30 PROCEDURE — 1126F PR PAIN SEVERITY QUANTIFIED, NO PAIN PRESENT: ICD-10-PCS | Mod: CPTII,,, | Performed by: NURSE PRACTITIONER

## 2023-11-30 PROCEDURE — 99215 OFFICE O/P EST HI 40 MIN: CPT | Mod: PBBFAC | Performed by: NURSE PRACTITIONER

## 2023-11-30 PROCEDURE — 93010 ELECTROCARDIOGRAM REPORT: CPT | Mod: S$PBB,,, | Performed by: INTERNAL MEDICINE

## 2023-11-30 PROCEDURE — 1159F MED LIST DOCD IN RCRD: CPT | Mod: CPTII,,, | Performed by: NURSE PRACTITIONER

## 2023-11-30 PROCEDURE — 4010F ACE/ARB THERAPY RXD/TAKEN: CPT | Mod: CPTII,,, | Performed by: NURSE PRACTITIONER

## 2023-11-30 PROCEDURE — 93010 EKG 12-LEAD: ICD-10-PCS | Mod: S$PBB,,, | Performed by: INTERNAL MEDICINE

## 2023-11-30 PROCEDURE — 3051F PR MOST RECENT HEMOGLOBIN A1C LEVEL 7.0 - < 8.0%: ICD-10-PCS | Mod: CPTII,,, | Performed by: NURSE PRACTITIONER

## 2023-11-30 PROCEDURE — 1160F RVW MEDS BY RX/DR IN RCRD: CPT | Mod: CPTII,,, | Performed by: NURSE PRACTITIONER

## 2023-11-30 PROCEDURE — 3008F PR BODY MASS INDEX (BMI) DOCUMENTED: ICD-10-PCS | Mod: CPTII,,, | Performed by: NURSE PRACTITIONER

## 2023-11-30 PROCEDURE — 93005 ELECTROCARDIOGRAM TRACING: CPT | Mod: PBBFAC | Performed by: INTERNAL MEDICINE

## 2023-11-30 PROCEDURE — 99214 PR OFFICE/OUTPT VISIT, EST, LEVL IV, 30-39 MIN: ICD-10-PCS | Mod: S$PBB,,, | Performed by: NURSE PRACTITIONER

## 2023-11-30 PROCEDURE — 1160F PR REVIEW ALL MEDS BY PRESCRIBER/CLIN PHARMACIST DOCUMENTED: ICD-10-PCS | Mod: CPTII,,, | Performed by: NURSE PRACTITIONER

## 2023-11-30 PROCEDURE — 99214 OFFICE O/P EST MOD 30 MIN: CPT | Mod: S$PBB,,, | Performed by: NURSE PRACTITIONER

## 2023-11-30 PROCEDURE — 4010F PR ACE/ARB THEARPY RXD/TAKEN: ICD-10-PCS | Mod: CPTII,,, | Performed by: NURSE PRACTITIONER

## 2023-11-30 PROCEDURE — 3008F BODY MASS INDEX DOCD: CPT | Mod: CPTII,,, | Performed by: NURSE PRACTITIONER

## 2023-11-30 PROCEDURE — 3051F HG A1C>EQUAL 7.0%<8.0%: CPT | Mod: CPTII,,, | Performed by: NURSE PRACTITIONER

## 2023-11-30 PROCEDURE — 3066F PR DOCUMENTATION OF TREATMENT FOR NEPHROPATHY: ICD-10-PCS | Mod: CPTII,,, | Performed by: NURSE PRACTITIONER

## 2023-11-30 PROCEDURE — 3066F NEPHROPATHY DOC TX: CPT | Mod: CPTII,,, | Performed by: NURSE PRACTITIONER

## 2023-11-30 PROCEDURE — 3074F PR MOST RECENT SYSTOLIC BLOOD PRESSURE < 130 MM HG: ICD-10-PCS | Mod: CPTII,,, | Performed by: NURSE PRACTITIONER

## 2023-11-30 PROCEDURE — 1126F AMNT PAIN NOTED NONE PRSNT: CPT | Mod: CPTII,,, | Performed by: NURSE PRACTITIONER

## 2023-12-01 RX ORDER — LISINOPRIL 20 MG/1
20 TABLET ORAL DAILY
Qty: 90 TABLET | Refills: 1 | Status: SHIPPED | OUTPATIENT
Start: 2023-12-01 | End: 2024-05-29

## 2023-12-01 RX ORDER — HYDRALAZINE HYDROCHLORIDE 50 MG/1
50 TABLET, FILM COATED ORAL 4 TIMES DAILY
Qty: 360 TABLET | Refills: 1 | Status: SHIPPED | OUTPATIENT
Start: 2023-12-01

## 2023-12-01 RX ORDER — AMLODIPINE BESYLATE 5 MG/1
5 TABLET ORAL DAILY
Qty: 90 TABLET | Refills: 1 | Status: SHIPPED | OUTPATIENT
Start: 2023-12-01

## 2023-12-01 RX ORDER — METOPROLOL SUCCINATE 25 MG/1
25 TABLET, EXTENDED RELEASE ORAL DAILY
Qty: 90 TABLET | Refills: 1 | Status: SHIPPED | OUTPATIENT
Start: 2023-12-01

## 2023-12-01 RX ORDER — CLOPIDOGREL BISULFATE 75 MG/1
75 TABLET ORAL DAILY
Qty: 90 TABLET | Refills: 0 | Status: SHIPPED | OUTPATIENT
Start: 2023-12-01 | End: 2024-03-26 | Stop reason: SDUPTHER

## 2023-12-01 NOTE — ASSESSMENT & PLAN NOTE
Patient is identified as having Systolic (HFrEF) heart failure that is Chronic. CHF is currently controlled. Latest ECHO performed and demonstrates- Results for orders placed during the hospital encounter of 05/25/22    Echo Saline Bubble? No    Interpretation Summary  · The left ventricle is normal in size with moderate concentric hypertrophy and normal systolic function.  · The estimated ejection fraction is 55%.  · Atrial fibrillation not observed.  · Normal left ventricular diastolic function.  · Mild-to-moderate mitral regurgitation.  . Continue Beta Blocker, ACE/ARB, and Aldactone and monitor clinical status closely.

## 2023-12-01 NOTE — ASSESSMENT & PLAN NOTE
"Left shoulder pain; dull pan similar to MI pain; resolved with rest and at times one sublingual ntg; Discomfort does not occur with exertion but after "working all day" occurs later in the evening. Patient feels may be r/t muscle pain from over exertion. This has been ongoing for at least 6-8 months and has occurred only 2-3 times in the last 6 months.    We discussed options for stress test vs LHC. He assures me that his symptoms are not new and stable. We will  Continue medical management of CAD and stable angina unless his symptoms become unstable.   "

## 2023-12-01 NOTE — ASSESSMENT & PLAN NOTE
Lipid panel reviewed from 3/1/2023  TC 85  Trig 144  HDL 26  LDL30  Pravachol 80 mg po daily  Lipids followed by PCP

## 2023-12-01 NOTE — PROGRESS NOTES
"PCP: Rodney Morel MD    Referring Provider:     Subjective:   Micheal Taylor is a 68 y.o. male with hx of CAD, HTN, DM, CHF, HLD, and PVD,  who presents for routine follow up. He reports episodes of left shoulder pain; dull pan similar to MI pain; resolved with rest and at times one sublingual ntg; Discomfort does not occur with exertion but after "working all day" occurs later in the evening. Patient feels may be r/t muscle pain from over use. This has been ongoing for at least 6-8 months and has occurred only 2-3 times in the last 6 months.      5/31/2023 presents for routine follow up. He states that he is doing well and has had no cardiac issues.         Fhx:  Family History   Problem Relation Age of Onset    Diabetes Mother     Heart disease Mother     Hypertension Mother     Liver cancer Mother     Heart disease Father     Hypertension Father      Shx:   Social History     Socioeconomic History    Marital status:     Number of children: 5   Occupational History    Occupation: retired   Tobacco Use    Smoking status: Former     Current packs/day: 4.00     Average packs/day: 4.0 packs/day for 30.0 years (120.0 ttl pk-yrs)     Types: Cigarettes     Passive exposure: Past (parent)    Smokeless tobacco: Former     Types: Chew    Tobacco comments:     quit about 2 weeks ago   Substance and Sexual Activity    Alcohol use: Never    Drug use: Never    Sexual activity: Not Currently       EKG   11/30/2023 RSR with HR 60 bpm; q waves in leads III and aVF    5/31/2023 sinus bradycardia; HR 50 bpm; o/w normal EKG  7/20/2022 sinus bradycardia; HR 54 bpm; o/w normal EKG    ECHO Results for orders placed during the hospital encounter of 05/25/22    Echo Saline Bubble? No    Interpretation Summary  · The left ventricle is normal in size with moderate concentric hypertrophy and normal systolic function.  · The estimated ejection fraction is 55%.  · Atrial fibrillation not observed.  · Normal left ventricular " diastolic function.  · Mild-to-moderate mitral regurgitation.    [facility] Results for orders placed during the hospital encounter of 07/19/22    Cardiac catheterization    Conclusion  · There was severe left ventricular systolic dysfunction.  · The left ventricular end diastolic pressure was severely elevated.  · The pre-procedure left ventricular end diastolic pressure was 30.  · The ejection fraction was calculated to be 25%.  · There was no mitral valve regurgitation.  · The Mid LM lesion was 95% stenosed with 15% stenosis post-intervention.  · A STENT S BURLESQUICEOUS US MR 4.00 X 12MM stent was successfully placed at 14 THERESE for 14 sec.  · The Ost Cx to Prox Cx lesion was 100% stenosed.  · The 1st Mrg lesion was 99% stenosed.  · The Mid Cx lesion was 100% stenosed.  · The Prox RCA to Mid RCA lesion was 99% stenosed.  · The Mid RCA lesion was 100% stenosed.  · The estimated blood loss was none.  · There was three vessel coronary artery disease. There was left main disease.    The procedure log was documented by Documenter: RT Pa and verified by Venu Whitlock MD.    Date: 7/19/2022  Time: 4:10 PM        Lab Results   Component Value Date     06/07/2023    K 5.0 06/07/2023     06/07/2023    CO2 28 06/07/2023    BUN 31 (H) 06/07/2023    CREATININE 1.84 (H) 06/07/2023    CALCIUM 9.3 06/07/2023    ANIONGAP 10 06/07/2023    ESTGFRAFRICA 47 12/12/2020    EGFRNONAA 32 (L) 07/14/2022       Lab Results   Component Value Date    CHOL 85 03/01/2023    CHOL 93 02/28/2022    CHOL 114 08/11/2021     Lab Results   Component Value Date    HDL 26 (L) 03/01/2023    HDL 31 (L) 02/28/2022    HDL 32 (L) 08/11/2021     Lab Results   Component Value Date    LDLCALC 30 03/01/2023    LDLCALC 32 02/28/2022    LDLCALC 42 08/11/2021     Lab Results   Component Value Date    TRIG 144 03/01/2023    TRIG 152 (H) 02/28/2022    TRIG 201 (H) 08/11/2021     Lab Results   Component Value Date    CHOLHDL 3.3 03/01/2023    CHOLHDL  3.0 02/28/2022    CHOLHDL 3.6 08/11/2021       Lab Results   Component Value Date    WBC 8.71 06/07/2023    HGB 11.8 (L) 06/07/2023    HCT 39.4 (L) 06/07/2023    MCV 85.1 06/07/2023     06/07/2023           Current Outpatient Medications:     aspirin (ECOTRIN) 81 MG EC tablet, Take 81 mg by mouth once daily., Disp: , Rfl:     cholecalciferol, vitamin D3, (VITAMIN D3) 25 mcg (1,000 unit) capsule, Take 1,000 Units by mouth once daily., Disp: , Rfl:     cinnamon bark 500 mg capsule, Take 500 mg by mouth once daily. , Disp: , Rfl:     dapagliflozin propanediol (FARXIGA) 10 mg tablet, Take 1 tablet (10 mg total) by mouth once daily., Disp: 90 tablet, Rfl: 0    EScitalopram oxalate (LEXAPRO) 10 MG tablet, TAKE 1 TABLET (10 MG TOTAL) BY MOUTH ONCE DAILY., Disp: 90 tablet, Rfl: 0    ezetimibe (ZETIA) 10 mg tablet, Take 1 tablet (10 mg total) by mouth once daily., Disp: 90 tablet, Rfl: 1    fenofibrate (TRICOR) 145 MG tablet, Take 1 tablet (145 mg total) by mouth once daily., Disp: 90 tablet, Rfl: 0    gabapentin (NEURONTIN) 300 MG capsule, TAKE ONE CAPSULE BY MOUTH THREE TIMES DAILY @ 9AM-3PM-9PM (VIAL), Disp: 270 capsule, Rfl: 0    HYDROcodone-acetaminophen (NORCO)  mg per tablet, Take 1 tablet by mouth every 8 (eight) hours as needed for Pain (pain)., Disp: 90 tablet, Rfl: 0    [START ON 12/12/2023] HYDROcodone-acetaminophen (NORCO)  mg per tablet, Take 1 tablet by mouth every 8 (eight) hours as needed for Pain (pain)., Disp: 90 tablet, Rfl: 0    insulin regular 100 unit/mL Inj injection, Novolin R Regular U-100 Insuln  14-32 u bidwm, Disp: , Rfl:     liraglutide 0.6 mg/0.1 mL, 18 mg/3 mL, subq PNIJ (VICTOZA 2-CAITLIN) 0.6 mg/0.1 mL (18 mg/3 mL) PnIj pen, Inject 1.8 mg into the skin once daily., Disp: 27 mL, Rfl: 0    magnesium 250 mg Tab, Take 1 tablet by mouth once daily. , Disp: , Rfl:     metFORMIN (GLUCOPHAGE) 500 MG tablet, Take 1 tablet (500 mg total) by mouth 2 (two) times daily with meals., Disp:  180 tablet, Rfl: 0    nitroGLYCERIN (NITROSTAT) 0.4 MG SL tablet, Place 1 tablet (0.4 mg total) under the tongue every 5 (five) minutes as needed for Chest pain., Disp: 25 tablet, Rfl: 3    omeprazole (PRILOSEC) 20 MG capsule, TAKE 1 CAPSULE BY MOUTH EVERY DAY, Disp: 90 capsule, Rfl: 1    potassium chloride SA (K-DUR,KLOR-CON) 20 MEQ tablet, Take 1 tablet (20 mEq total) by mouth once daily., Disp: 90 tablet, Rfl: 0    rOPINIRole (REQUIP) 0.5 MG tablet, Take 0.5 mg by mouth every evening., Disp: , Rfl:     rosuvastatin (CRESTOR) 40 MG Tab, TAKE 1 TABLET (40 MG TOTAL) BY MOUTH EVERY EVENING., Disp: 90 tablet, Rfl: 3    spironolactone (ALDACTONE) 25 MG tablet, Take 25 mg by mouth 2 (two) times daily., Disp: , Rfl:     tiZANidine (ZANAFLEX) 4 MG tablet, Take 1 tablet (4 mg total) by mouth every 8 (eight) hours., Disp: 90 tablet, Rfl: 2    TRESIBA FLEXTOUCH U-200 200 unit/mL (3 mL) insulin pen, Inject 54 Units into the skin once daily. , Disp: , Rfl:     VASCEPA 1 gram Cap, Take 2 capsules by mouth 2 (two) times a day., Disp: , Rfl:     vitamin A 8000 UNIT capsule, Take 8,000 Units by mouth., Disp: , Rfl:     vitamin E 400 UNIT capsule, Take 400 Units by mouth once daily. , Disp: , Rfl:     amLODIPine (NORVASC) 5 MG tablet, Take 1 tablet (5 mg total) by mouth once daily., Disp: 90 tablet, Rfl: 1    clopidogreL (PLAVIX) 75 mg tablet, Take 1 tablet (75 mg total) by mouth once daily., Disp: 90 tablet, Rfl: 0    hydrALAZINE (APRESOLINE) 50 MG tablet, Take 1 tablet (50 mg total) by mouth 4 (four) times daily., Disp: 360 tablet, Rfl: 1    lisinopriL (PRINIVIL,ZESTRIL) 20 MG tablet, Take 1 tablet (20 mg total) by mouth once daily., Disp: 90 tablet, Rfl: 1    metoprolol succinate (TOPROL-XL) 25 MG 24 hr tablet, Take 1 tablet (25 mg total) by mouth once daily., Disp: 90 tablet, Rfl: 1  Meds reviewed but not reconciled. He did not bring his meds today  and has not brought them in a very long time.     Review of Systems  "  Respiratory:  Negative for shortness of breath.    Cardiovascular:  Positive for chest pain. Negative for palpitations, orthopnea, claudication, leg swelling and PND.   Neurological:  Negative for dizziness, loss of consciousness and weakness.           Objective:   /60 (BP Location: Left arm, Patient Position: Sitting)   Pulse 67   Ht 5' 11" (1.803 m)   Wt 81.9 kg (180 lb 9.6 oz)   SpO2 97%   BMI 25.19 kg/m²     Physical Exam  Vitals and nursing note reviewed.   Constitutional:       Appearance: Normal appearance. He is normal weight.   HENT:      Head: Normocephalic and atraumatic.   Neck:      Vascular: No carotid bruit.   Cardiovascular:      Rate and Rhythm: Normal rate and regular rhythm.      Pulses: Normal pulses.      Heart sounds: Normal heart sounds.   Pulmonary:      Effort: Pulmonary effort is normal.      Breath sounds: Normal breath sounds.   Abdominal:      Palpations: Abdomen is soft.   Musculoskeletal:      Cervical back: Neck supple.      Right lower leg: No edema.      Left lower leg: No edema.   Skin:     General: Skin is warm and dry.      Capillary Refill: Capillary refill takes less than 2 seconds.   Neurological:      Mental Status: He is alert.           Assessment:     1. Coronary artery disease, unspecified vessel or lesion type, unspecified whether angina present, unspecified whether native or transplanted heart  EKG 12-lead    EKG 12-lead      2. Hypertension, unspecified type  amLODIPine (NORVASC) 5 MG tablet    hydrALAZINE (APRESOLINE) 50 MG tablet    lisinopriL (PRINIVIL,ZESTRIL) 20 MG tablet    metoprolol succinate (TOPROL-XL) 25 MG 24 hr tablet      3. Coronary artery disease involving native coronary artery of native heart, unspecified whether angina present  clopidogreL (PLAVIX) 75 mg tablet      4. Hyperlipidemia, unspecified hyperlipidemia type              Plan:   Coronary artery disease  Continue ASA/Plavix/Zetia/Tricor/Crestor      CHF (congestive heart " "failure)  Patient is identified as having Systolic (HFrEF) heart failure that is Chronic. CHF is currently controlled. Latest ECHO performed and demonstrates- Results for orders placed during the hospital encounter of 05/25/22    Echo Saline Bubble? No    Interpretation Summary  · The left ventricle is normal in size with moderate concentric hypertrophy and normal systolic function.  · The estimated ejection fraction is 55%.  · Atrial fibrillation not observed.  · Normal left ventricular diastolic function.  · Mild-to-moderate mitral regurgitation.  . Continue Beta Blocker, ACE/ARB, and Aldactone and monitor clinical status closely.    Hyperlipidemia  Lipid panel reviewed from 3/1/2023  TC 85  Trig 144  HDL 26  LDL30  Pravachol 80 mg po daily  Lipids followed by PCP    Hypertension  118/60 mmHg    Chest pain  Left shoulder pain; dull pan similar to MI pain; resolved with rest and at times one sublingual ntg; Discomfort does not occur with exertion but after "working all day" occurs later in the evening. Patient feels may be r/t muscle pain from over exertion. This has been ongoing for at least 6-8 months and has occurred only 2-3 times in the last 6 months.    Continue medical management of CAD and stable angina    RTC: 6 months    "

## 2023-12-11 ENCOUNTER — OFFICE VISIT (OUTPATIENT)
Dept: FAMILY MEDICINE | Facility: CLINIC | Age: 68
End: 2023-12-11
Payer: COMMERCIAL

## 2023-12-11 VITALS
DIASTOLIC BLOOD PRESSURE: 62 MMHG | HEIGHT: 71 IN | WEIGHT: 187.81 LBS | SYSTOLIC BLOOD PRESSURE: 122 MMHG | OXYGEN SATURATION: 97 % | HEART RATE: 57 BPM | BODY MASS INDEX: 26.29 KG/M2 | TEMPERATURE: 98 F

## 2023-12-11 DIAGNOSIS — E11.40 TYPE 2 DIABETES MELLITUS WITH DIABETIC NEUROPATHY, WITH LONG-TERM CURRENT USE OF INSULIN: ICD-10-CM

## 2023-12-11 DIAGNOSIS — R73.9 HEMOGLOBIN A1C BETWEEN 7.0% AND 9.0%: ICD-10-CM

## 2023-12-11 DIAGNOSIS — I10 HYPERTENSION, UNSPECIFIED TYPE: ICD-10-CM

## 2023-12-11 DIAGNOSIS — E78.5 HYPERLIPIDEMIA, UNSPECIFIED HYPERLIPIDEMIA TYPE: Primary | ICD-10-CM

## 2023-12-11 DIAGNOSIS — Z79.4 TYPE 2 DIABETES MELLITUS WITH DIABETIC NEUROPATHY, WITH LONG-TERM CURRENT USE OF INSULIN: ICD-10-CM

## 2023-12-11 DIAGNOSIS — Z23 NEED FOR INFLUENZA VACCINATION: ICD-10-CM

## 2023-12-11 DIAGNOSIS — Z12.5 SCREENING FOR MALIGNANT NEOPLASM OF PROSTATE: ICD-10-CM

## 2023-12-11 LAB
ALBUMIN SERPL BCP-MCNC: 3.6 G/DL (ref 3.5–5)
ALBUMIN/GLOB SERPL: 1.2 {RATIO}
ALP SERPL-CCNC: 32 U/L (ref 45–115)
ALT SERPL W P-5'-P-CCNC: 27 U/L (ref 16–61)
ANION GAP SERPL CALCULATED.3IONS-SCNC: 9 MMOL/L (ref 7–16)
AST SERPL W P-5'-P-CCNC: 30 U/L (ref 15–37)
BASOPHILS # BLD AUTO: 0.05 K/UL (ref 0–0.2)
BASOPHILS NFR BLD AUTO: 0.5 % (ref 0–1)
BILIRUB SERPL-MCNC: 0.6 MG/DL (ref ?–1.2)
BUN SERPL-MCNC: 32 MG/DL (ref 7–18)
BUN/CREAT SERPL: 18 (ref 6–20)
CALCIUM SERPL-MCNC: 9.1 MG/DL (ref 8.5–10.1)
CHLORIDE SERPL-SCNC: 108 MMOL/L (ref 98–107)
CHOLEST SERPL-MCNC: 78 MG/DL (ref 0–200)
CHOLEST/HDLC SERPL: 3.3 {RATIO}
CO2 SERPL-SCNC: 29 MMOL/L (ref 21–32)
CREAT SERPL-MCNC: 1.73 MG/DL (ref 0.7–1.3)
DIFFERENTIAL METHOD BLD: ABNORMAL
EGFR (NO RACE VARIABLE) (RUSH/TITUS): 42 ML/MIN/1.73M2
EOSINOPHIL # BLD AUTO: 0.26 K/UL (ref 0–0.5)
EOSINOPHIL NFR BLD AUTO: 2.8 % (ref 1–4)
ERYTHROCYTE [DISTWIDTH] IN BLOOD BY AUTOMATED COUNT: 14.8 % (ref 11.5–14.5)
EST. AVERAGE GLUCOSE BLD GHB EST-MCNC: 209 MG/DL
GLOBULIN SER-MCNC: 3 G/DL (ref 2–4)
GLUCOSE SERPL-MCNC: 195 MG/DL (ref 74–106)
HBA1C MFR BLD HPLC: 8.9 % (ref 4.5–6.6)
HCT VFR BLD AUTO: 40.6 % (ref 40–54)
HDLC SERPL-MCNC: 24 MG/DL (ref 40–60)
HGB BLD-MCNC: 12.5 G/DL (ref 13.5–18)
IMM GRANULOCYTES # BLD AUTO: 0.04 K/UL (ref 0–0.04)
IMM GRANULOCYTES NFR BLD: 0.4 % (ref 0–0.4)
LDLC SERPL CALC-MCNC: 21 MG/DL
LYMPHOCYTES # BLD AUTO: 3.93 K/UL (ref 1–4.8)
LYMPHOCYTES NFR BLD AUTO: 42.4 % (ref 27–41)
MCH RBC QN AUTO: 26 PG (ref 27–31)
MCHC RBC AUTO-ENTMCNC: 30.8 G/DL (ref 32–36)
MCV RBC AUTO: 84.4 FL (ref 80–96)
MONOCYTES # BLD AUTO: 0.53 K/UL (ref 0–0.8)
MONOCYTES NFR BLD AUTO: 5.7 % (ref 2–6)
MPC BLD CALC-MCNC: 12.8 FL (ref 9.4–12.4)
NEUTROPHILS # BLD AUTO: 4.45 K/UL (ref 1.8–7.7)
NEUTROPHILS NFR BLD AUTO: 48.2 % (ref 53–65)
NONHDLC SERPL-MCNC: 54 MG/DL
NRBC # BLD AUTO: 0 X10E3/UL
NRBC, AUTO (.00): 0 %
PLATELET # BLD AUTO: 191 K/UL (ref 150–400)
POTASSIUM SERPL-SCNC: 4.8 MMOL/L (ref 3.5–5.1)
PROT SERPL-MCNC: 6.6 G/DL (ref 6.4–8.2)
PSA SERPL-MCNC: 0.43 NG/ML
RBC # BLD AUTO: 4.81 M/UL (ref 4.6–6.2)
SODIUM SERPL-SCNC: 141 MMOL/L (ref 136–145)
TRIGL SERPL-MCNC: 166 MG/DL (ref 35–150)
VLDLC SERPL-MCNC: 33 MG/DL
WBC # BLD AUTO: 9.26 K/UL (ref 4.5–11)

## 2023-12-11 PROCEDURE — 1159F PR MEDICATION LIST DOCUMENTED IN MEDICAL RECORD: ICD-10-PCS | Mod: ,,, | Performed by: FAMILY MEDICINE

## 2023-12-11 PROCEDURE — G0103 PSA, SCREENING: ICD-10-PCS | Mod: ,,, | Performed by: CLINICAL MEDICAL LABORATORY

## 2023-12-11 PROCEDURE — 3008F BODY MASS INDEX DOCD: CPT | Mod: ,,, | Performed by: FAMILY MEDICINE

## 2023-12-11 PROCEDURE — 4010F ACE/ARB THERAPY RXD/TAKEN: CPT | Mod: ,,, | Performed by: FAMILY MEDICINE

## 2023-12-11 PROCEDURE — 1101F PT FALLS ASSESS-DOCD LE1/YR: CPT | Mod: ,,, | Performed by: FAMILY MEDICINE

## 2023-12-11 PROCEDURE — 3078F DIAST BP <80 MM HG: CPT | Mod: ,,, | Performed by: FAMILY MEDICINE

## 2023-12-11 PROCEDURE — G0103 PSA SCREENING: HCPCS | Mod: ,,, | Performed by: CLINICAL MEDICAL LABORATORY

## 2023-12-11 PROCEDURE — 85025 COMPLETE CBC W/AUTO DIFF WBC: CPT | Mod: ,,, | Performed by: CLINICAL MEDICAL LABORATORY

## 2023-12-11 PROCEDURE — 83036 HEMOGLOBIN A1C: ICD-10-PCS | Mod: ,,, | Performed by: CLINICAL MEDICAL LABORATORY

## 2023-12-11 PROCEDURE — 80053 COMPREHENSIVE METABOLIC PANEL: ICD-10-PCS | Mod: ,,, | Performed by: CLINICAL MEDICAL LABORATORY

## 2023-12-11 PROCEDURE — 85025 CBC WITH DIFFERENTIAL: ICD-10-PCS | Mod: ,,, | Performed by: CLINICAL MEDICAL LABORATORY

## 2023-12-11 PROCEDURE — 90694 FLU VACCINE - QUADRIVALENT - ADJUVANTED: ICD-10-PCS | Mod: ,,, | Performed by: FAMILY MEDICINE

## 2023-12-11 PROCEDURE — 80061 LIPID PANEL: CPT | Mod: ,,, | Performed by: CLINICAL MEDICAL LABORATORY

## 2023-12-11 PROCEDURE — 1159F MED LIST DOCD IN RCRD: CPT | Mod: ,,, | Performed by: FAMILY MEDICINE

## 2023-12-11 PROCEDURE — 99214 PR OFFICE/OUTPT VISIT, EST, LEVL IV, 30-39 MIN: ICD-10-PCS | Mod: ,,, | Performed by: FAMILY MEDICINE

## 2023-12-11 PROCEDURE — 3008F PR BODY MASS INDEX (BMI) DOCUMENTED: ICD-10-PCS | Mod: ,,, | Performed by: FAMILY MEDICINE

## 2023-12-11 PROCEDURE — 3074F SYST BP LT 130 MM HG: CPT | Mod: ,,, | Performed by: FAMILY MEDICINE

## 2023-12-11 PROCEDURE — 90694 VACC AIIV4 NO PRSRV 0.5ML IM: CPT | Mod: ,,, | Performed by: FAMILY MEDICINE

## 2023-12-11 PROCEDURE — G0008 ADMIN INFLUENZA VIRUS VAC: HCPCS | Mod: ,,, | Performed by: FAMILY MEDICINE

## 2023-12-11 PROCEDURE — 3074F PR MOST RECENT SYSTOLIC BLOOD PRESSURE < 130 MM HG: ICD-10-PCS | Mod: ,,, | Performed by: FAMILY MEDICINE

## 2023-12-11 PROCEDURE — 3051F PR MOST RECENT HEMOGLOBIN A1C LEVEL 7.0 - < 8.0%: ICD-10-PCS | Mod: ,,, | Performed by: FAMILY MEDICINE

## 2023-12-11 PROCEDURE — 99214 OFFICE O/P EST MOD 30 MIN: CPT | Mod: ,,, | Performed by: FAMILY MEDICINE

## 2023-12-11 PROCEDURE — G0008 FLU VACCINE - QUADRIVALENT - ADJUVANTED: ICD-10-PCS | Mod: ,,, | Performed by: FAMILY MEDICINE

## 2023-12-11 PROCEDURE — 3061F PR NEG MICROALBUMINURIA RESULT DOCUMENTED/REVIEW: ICD-10-PCS | Mod: ,,, | Performed by: FAMILY MEDICINE

## 2023-12-11 PROCEDURE — 1101F PR PT FALLS ASSESS DOC 0-1 FALLS W/OUT INJ PAST YR: ICD-10-PCS | Mod: ,,, | Performed by: FAMILY MEDICINE

## 2023-12-11 PROCEDURE — 83036 HEMOGLOBIN GLYCOSYLATED A1C: CPT | Mod: ,,, | Performed by: CLINICAL MEDICAL LABORATORY

## 2023-12-11 PROCEDURE — 4010F PR ACE/ARB THEARPY RXD/TAKEN: ICD-10-PCS | Mod: ,,, | Performed by: FAMILY MEDICINE

## 2023-12-11 PROCEDURE — 3066F NEPHROPATHY DOC TX: CPT | Mod: ,,, | Performed by: FAMILY MEDICINE

## 2023-12-11 PROCEDURE — 3288F FALL RISK ASSESSMENT DOCD: CPT | Mod: ,,, | Performed by: FAMILY MEDICINE

## 2023-12-11 PROCEDURE — 3061F NEG MICROALBUMINURIA REV: CPT | Mod: ,,, | Performed by: FAMILY MEDICINE

## 2023-12-11 PROCEDURE — 3288F PR FALLS RISK ASSESSMENT DOCUMENTED: ICD-10-PCS | Mod: ,,, | Performed by: FAMILY MEDICINE

## 2023-12-11 PROCEDURE — 80061 LIPID PANEL: ICD-10-PCS | Mod: ,,, | Performed by: CLINICAL MEDICAL LABORATORY

## 2023-12-11 PROCEDURE — 80053 COMPREHEN METABOLIC PANEL: CPT | Mod: ,,, | Performed by: CLINICAL MEDICAL LABORATORY

## 2023-12-11 PROCEDURE — 3078F PR MOST RECENT DIASTOLIC BLOOD PRESSURE < 80 MM HG: ICD-10-PCS | Mod: ,,, | Performed by: FAMILY MEDICINE

## 2023-12-11 PROCEDURE — 3051F HG A1C>EQUAL 7.0%<8.0%: CPT | Mod: ,,, | Performed by: FAMILY MEDICINE

## 2023-12-11 PROCEDURE — 3066F PR DOCUMENTATION OF TREATMENT FOR NEPHROPATHY: ICD-10-PCS | Mod: ,,, | Performed by: FAMILY MEDICINE

## 2023-12-11 RX ORDER — ESCITALOPRAM OXALATE 10 MG/1
10 TABLET ORAL DAILY
Qty: 90 TABLET | Refills: 0 | Status: SHIPPED | OUTPATIENT
Start: 2023-12-11 | End: 2024-12-10

## 2023-12-11 RX ORDER — GABAPENTIN 300 MG/1
300 CAPSULE ORAL 3 TIMES DAILY
Qty: 270 CAPSULE | Refills: 0 | Status: SHIPPED | OUTPATIENT
Start: 2023-12-11

## 2023-12-11 RX ORDER — METFORMIN HYDROCHLORIDE 500 MG/1
500 TABLET ORAL 2 TIMES DAILY WITH MEALS
Qty: 180 TABLET | Refills: 0 | Status: SHIPPED | OUTPATIENT
Start: 2023-12-11

## 2023-12-11 RX ORDER — FENOFIBRATE 145 MG/1
145 TABLET, FILM COATED ORAL DAILY
Qty: 90 TABLET | Refills: 0 | Status: SHIPPED | OUTPATIENT
Start: 2023-12-11

## 2023-12-11 RX ORDER — DAPAGLIFLOZIN 10 MG/1
10 TABLET, FILM COATED ORAL DAILY
Qty: 90 TABLET | Refills: 0 | Status: SHIPPED | OUTPATIENT
Start: 2023-12-11

## 2023-12-11 RX ORDER — NALOXONE HYDROCHLORIDE 4 MG/.1ML
1 SPRAY NASAL ONCE
COMMUNITY

## 2023-12-11 RX ORDER — OMEPRAZOLE 20 MG/1
20 CAPSULE, DELAYED RELEASE ORAL DAILY
Qty: 90 CAPSULE | Refills: 0 | Status: SHIPPED | OUTPATIENT
Start: 2023-12-11 | End: 2024-03-04

## 2023-12-11 RX ORDER — POTASSIUM CHLORIDE 20 MEQ/1
20 TABLET, EXTENDED RELEASE ORAL DAILY
Qty: 90 TABLET | Refills: 0 | Status: SHIPPED | OUTPATIENT
Start: 2023-12-11

## 2023-12-11 NOTE — PROGRESS NOTES
Rodney Morel MD   [facility]  [address] [place] [address]     PATIENT NAME: Micheal Taylor  : 1955  DATE: 23  MRN: [id number]      Billing Provider: Rodney Morel MD  Level of Service: OR OFFICE/OUTPT VISIT, EST, LEVL IV, 30-39 MIN  Patient PCP Information       Provider PCP Type    Rodney Morel MD General            Reason for Visit / Chief Complaint: Diabetes (Check up, labs, refills. Requesting flu shot. )         History of Present Illness / Problem Focused Workflow     Micheal Taylor presents to the clinic with Diabetes (Check up, labs, refills. Requesting flu shot. )     HPI    Review of Systems     Review of Systems   Constitutional:  Negative for activity change, appetite change, fatigue and fever.   HENT:  Negative for nasal congestion, ear pain, hearing loss, sinus pressure/congestion and sore throat.    Respiratory:  Negative for cough, chest tightness and shortness of breath.    Cardiovascular:  Negative for chest pain and palpitations.   Gastrointestinal:  Negative for abdominal pain and fecal incontinence.   Genitourinary:  Negative for bladder incontinence, difficulty urinating and erectile dysfunction.   Musculoskeletal:  Negative for arthralgias.   Integumentary:  Negative for rash.   Neurological:  Negative for dizziness and headaches.        Medical / Social / Family History     Past Medical History:   Diagnosis Date    Anticoagulant long-term use     CHF (congestive heart failure)     Chronic pain syndrome     Coronary artery disease     Diabetes mellitus, type 2     Hyperlipidemia     Hypertension     Lumbar spondylosis     Lumbosacral radiculopathy     Mild nonproliferative diabetic retinopathy of both eyes without macular edema 2022    Pain in thoracic spine     PVD (peripheral vascular disease)     Renal disorder     Spondylosis without myelopathy or radiculopathy, cervical region        Past Surgical History:    Procedure Laterality Date    ADENOIDECTOMY      AMPUTATION      left index finger removed     CAUDAL EPIDURAL STEROID INJECTION  01/19/2016    Caudal NATHAN - Alee    CIRCUMCISION      CORONARY ARTERY BYPASS GRAFT (CABG)      EYE SURGERY      FOOT SURGERY      heel surgery    HEEL SPUR SURGERY      INJECTION OF FACET JOINT Bilateral 04/16/2015    Bilateral L3-S1 Facet Injection - Alee - 4/16/15, 11/15/12 and 9/27/12    LEFT HEART CATHETERIZATION Left 07/19/2022    Procedure: Left heart cath;  Surgeon: Venu Whitlock MD;  Location: [facility] CATH LAB;  Service: Cardiology;  Laterality: Left;  Patient has PAD and has had bilateral fem pop bypass. He is followed by Dr. Forrest.    RADIOFREQUENCY THERMOCOAGULATION Left 02/26/2013    Left L3-S1 RFTC -Alee    RADIOFREQUENCY THERMOCOAGULATION Right 02/05/2013    Right L3-S1 RFTC - Alee    TONSILLECTOMY, ADENOIDECTOMY, BILATERAL MYRINGOTOMY AND TUBES      TRANSFORAMINAL EPIDURAL INJECTION OF STEROID Left 06/19/2012    Left L3-4 TFESI - Alee    TRANSFORAMINAL EPIDURAL INJECTION OF STEROID Right 04/12/2013    Right L3-4 TFESI - Alee - 4/12/13 and 8/7/12    VASCULAR SURGERY         Social History  Micheal Taylor  reports that he has quit smoking. His smoking use included cigarettes. He has a 120.0 pack-year smoking history. He has been exposed to tobacco smoke. He has quit using smokeless tobacco.  His smokeless tobacco use included chew. He reports that he does not drink alcohol and does not use drugs.    Family History  Micheal Taylor  family history includes Diabetes in his mother; Heart disease in his father and mother; Hypertension in his father and mother; Liver cancer in his mother.    Medications and Allergies     Medications  Outpatient Medications Marked as Taking for the 12/11/23 encounter (Office Visit) with Rodney Morel MD   Medication Sig Dispense Refill    amLODIPine (NORVASC) 5 MG tablet Take 1 tablet (5 mg total) by mouth once daily. 90 tablet 1    aspirin  (ECOTRIN) 81 MG EC tablet Take 81 mg by mouth once daily.      cholecalciferol, vitamin D3, (VITAMIN D3) 25 mcg (1,000 unit) capsule Take 1,000 Units by mouth once daily.      cinnamon bark 500 mg capsule Take 500 mg by mouth once daily.       clopidogreL (PLAVIX) 75 mg tablet Take 1 tablet (75 mg total) by mouth once daily. 90 tablet 0    ezetimibe (ZETIA) 10 mg tablet Take 1 tablet (10 mg total) by mouth once daily. 90 tablet 1    hydrALAZINE (APRESOLINE) 50 MG tablet Take 1 tablet (50 mg total) by mouth 4 (four) times daily. 360 tablet 1    [START ON 12/12/2023] HYDROcodone-acetaminophen (NORCO)  mg per tablet Take 1 tablet by mouth every 8 (eight) hours as needed for Pain (pain). 90 tablet 0    lisinopriL (PRINIVIL,ZESTRIL) 20 MG tablet Take 1 tablet (20 mg total) by mouth once daily. 90 tablet 1    magnesium 250 mg Tab Take 1 tablet by mouth once daily.       metoprolol succinate (TOPROL-XL) 25 MG 24 hr tablet Take 1 tablet (25 mg total) by mouth once daily. 90 tablet 1    nitroGLYCERIN (NITROSTAT) 0.4 MG SL tablet Place 1 tablet (0.4 mg total) under the tongue every 5 (five) minutes as needed for Chest pain. 25 tablet 3    rOPINIRole (REQUIP) 0.5 MG tablet Take 0.5 mg by mouth every evening.      rosuvastatin (CRESTOR) 40 MG Tab TAKE 1 TABLET (40 MG TOTAL) BY MOUTH EVERY EVENING. 90 tablet 3    spironolactone (ALDACTONE) 25 MG tablet Take 25 mg by mouth 2 (two) times daily.      tiZANidine (ZANAFLEX) 4 MG tablet Take 1 tablet (4 mg total) by mouth every 8 (eight) hours. 90 tablet 2    TRESIBA FLEXTOUCH U-200 200 unit/mL (3 mL) insulin pen Inject 54 Units into the skin once daily.       VASCEPA 1 gram Cap Take 2 capsules by mouth 2 (two) times a day.      vitamin A 8000 UNIT capsule Take 8,000 Units by mouth.      vitamin E 400 UNIT capsule Take 400 Units by mouth once daily.       [DISCONTINUED] dapagliflozin propanediol (FARXIGA) 10 mg tablet Take 1 tablet (10 mg total) by mouth once daily. 90 tablet 0     [DISCONTINUED] EScitalopram oxalate (LEXAPRO) 10 MG tablet TAKE 1 TABLET (10 MG TOTAL) BY MOUTH ONCE DAILY. 90 tablet 0    [DISCONTINUED] fenofibrate (TRICOR) 145 MG tablet Take 1 tablet (145 mg total) by mouth once daily. 90 tablet 0    [DISCONTINUED] gabapentin (NEURONTIN) 300 MG capsule TAKE ONE CAPSULE BY MOUTH THREE TIMES DAILY @ 9AM-3PM-9PM (VIAL) 270 capsule 0    [DISCONTINUED] liraglutide 0.6 mg/0.1 mL, 18 mg/3 mL, subq PNIJ (VICTOZA 2-CAITLIN) 0.6 mg/0.1 mL (18 mg/3 mL) PnIj pen Inject 1.8 mg into the skin once daily. 27 mL 0    [DISCONTINUED] metFORMIN (GLUCOPHAGE) 500 MG tablet Take 1 tablet (500 mg total) by mouth 2 (two) times daily with meals. 180 tablet 0    [DISCONTINUED] omeprazole (PRILOSEC) 20 MG capsule TAKE 1 CAPSULE BY MOUTH EVERY DAY 90 capsule 1    [DISCONTINUED] potassium chloride SA (K-DUR,KLOR-CON) 20 MEQ tablet Take 1 tablet (20 mEq total) by mouth once daily. 90 tablet 0       Allergies  Review of patient's allergies indicates:  No Known Allergies    Physical Examination     Vitals:    12/11/23 0905   BP: 122/62   Pulse: (!) 57   Temp: 97.7 °F (36.5 °C)     Physical Exam  Constitutional:       General: He is not in acute distress.     Appearance: He is not ill-appearing.   HENT:      Head: Normocephalic and atraumatic.      Right Ear: Tympanic membrane and ear canal normal.      Left Ear: Tympanic membrane and ear canal normal.      Nose: Nose normal. No congestion or rhinorrhea.   Eyes:      Pupils: Pupils are equal, round, and reactive to light.   Cardiovascular:      Rate and Rhythm: Normal rate and regular rhythm.      Pulses: Normal pulses.      Heart sounds: No murmur heard.  Pulmonary:      Effort: No respiratory distress.      Breath sounds: No wheezing, rhonchi or rales.   Abdominal:      General: Bowel sounds are normal.      Palpations: Abdomen is soft.      Tenderness: There is no abdominal tenderness.      Hernia: No hernia is present.   Musculoskeletal:      Cervical back:  Normal range of motion and neck supple.   Lymphadenopathy:      Cervical: No cervical adenopathy.   Skin:     General: Skin is warm and dry.   Neurological:      Mental Status: He is alert.   Psychiatric:         Behavior: Behavior normal.         Thought Content: Thought content normal.          Assessment and Plan (including Health Maintenance)   :    Plan:         Health Maintenance Due   Topic Date Due    COVID-19 Vaccine (1) Never done    TETANUS VACCINE  Never done    Colorectal Cancer Screening  Never done    RSV Vaccine (Age 60+ and Pregnant patients) (1 - 1-dose 60+ series) Never done    Foot Exam  03/10/2023    PROSTATE-SPECIFIC ANTIGEN  08/29/2023    Eye Exam  09/09/2023    Hemoglobin A1c  12/07/2023       Problem List Items Addressed This Visit          Cardiac/Vascular    Hyperlipidemia - Primary    Relevant Medications    fenofibrate (TRICOR) 145 MG tablet    Other Relevant Orders    Lipid Panel    Hypertension    Relevant Orders    CBC Auto Differential    Comprehensive Metabolic Panel       Endocrine    Type 2 diabetes mellitus with diabetic neuropathy, with long-term current use of insulin    Relevant Medications    liraglutide 0.6 mg/0.1 mL, 18 mg/3 mL, subq PNIJ (VICTOZA 2-CAITLIN) 0.6 mg/0.1 mL (18 mg/3 mL) PnIj pen    metFORMIN (GLUCOPHAGE) 500 MG tablet    Other Relevant Orders    Hemoglobin A1C     Other Visit Diagnoses       Hemoglobin A1c between 7.0% and 9.0%        Relevant Orders    Hemoglobin A1C    Screening for malignant neoplasm of prostate        Relevant Orders    PSA, Screening    Need for influenza vaccination        Relevant Orders    Influenza - Quadrivalent (Adjuvanted) (Completed)          Hyperlipidemia, unspecified hyperlipidemia type  -     Lipid Panel; Future; Expected date: 12/11/2023  -     fenofibrate (TRICOR) 145 MG tablet; Take 1 tablet (145 mg total) by mouth once daily.  Dispense: 90 tablet; Refill: 0    Hypertension, unspecified type  -     CBC Auto Differential;  Future; Expected date: 12/11/2023  -     Comprehensive Metabolic Panel; Future; Expected date: 12/11/2023    Type 2 diabetes mellitus with diabetic neuropathy, with long-term current use of insulin  -     Hemoglobin A1C; Future; Expected date: 12/11/2023    Hemoglobin A1c between 7.0% and 9.0%  -     Hemoglobin A1C; Future; Expected date: 12/11/2023    Screening for malignant neoplasm of prostate  -     PSA, Screening; Future; Expected date: 12/11/2023    Need for influenza vaccination  -     Influenza - Quadrivalent (Adjuvanted)    Other orders  -     dapagliflozin propanediol (FARXIGA) 10 mg tablet; Take 1 tablet (10 mg total) by mouth once daily.  Dispense: 90 tablet; Refill: 0  -     EScitalopram oxalate (LEXAPRO) 10 MG tablet; Take 1 tablet (10 mg total) by mouth once daily.  Dispense: 90 tablet; Refill: 0  -     gabapentin (NEURONTIN) 300 MG capsule; Take 1 capsule (300 mg total) by mouth 3 (three) times daily.  Dispense: 270 capsule; Refill: 0  -     liraglutide 0.6 mg/0.1 mL, 18 mg/3 mL, subq PNIJ (VICTOZA 2-CAITLIN) 0.6 mg/0.1 mL (18 mg/3 mL) PnIj pen; Inject 1.8 mg into the skin once daily.  Dispense: 27 mL; Refill: 0  -     metFORMIN (GLUCOPHAGE) 500 MG tablet; Take 1 tablet (500 mg total) by mouth 2 (two) times daily with meals.  Dispense: 180 tablet; Refill: 0  -     omeprazole (PRILOSEC) 20 MG capsule; Take 1 capsule (20 mg total) by mouth once daily.  Dispense: 90 capsule; Refill: 0  -     potassium chloride SA (K-DUR,KLOR-CON) 20 MEQ tablet; Take 1 tablet (20 mEq total) by mouth once daily.  Dispense: 90 tablet; Refill: 0       Health Maintenance Topics with due status: Not Due       Topic Last Completion Date    Diabetes Urine Screening 03/01/2023    Lipid Panel 03/01/2023    High Dose Statin 12/11/2023    Aspirin/Antiplatelet Therapy 12/11/2023       Procedures     Future Appointments   Date Time Provider Department Center   1/11/2024 10:15 AM Carlos Greene PA [facility]   2/5/2024  1:00  PM [facility] US1 [facility]   2/5/2024  1:45 PM Nette Jurado, [facility]   3/19/2024  9:30 AM Pinky Huang, [facility]OR [facility]   5/30/2024  9:30 AM Patricia Galicia [facility] CARD [facility]   6/12/2024  8:00 AM Rodney Morel MD [facility]        No follow-ups on file.       Signature:  Rodney Morel MD  [facility]  [address] [place] [address]  [phone] Phone  [phone] Fax    Date of encounter: 12/11/23

## 2024-01-02 ENCOUNTER — TELEPHONE (OUTPATIENT)
Dept: FAMILY MEDICINE | Facility: CLINIC | Age: 69
End: 2024-01-02
Payer: COMMERCIAL

## 2024-01-02 NOTE — TELEPHONE ENCOUNTER
----- Message from India Sanchez sent at 12/29/2023  3:55 PM CST -----  [facility] calling to get refill on   gabapentin 300 mg  Farxiga 10 mg  Metformin 500 mg     Fax # [phone]

## 2024-01-02 NOTE — TELEPHONE ENCOUNTER
Spoke with patient about prescriptions. Patient states he does not want [facility]

## 2024-01-09 DIAGNOSIS — Z71.89 COMPLEX CARE COORDINATION: ICD-10-CM

## 2024-01-10 NOTE — PROGRESS NOTES
Subjective:         Patient ID: Micheal Taylor is a 68 y.o. male.    Chief Complaint: Back Pain      Pain  This is a chronic problem. The current episode started more than 1 year ago. The problem occurs daily. The problem has been waxing and waning. Associated symptoms include arthralgias and neck pain. Pertinent negatives include no anorexia, chest pain, chills, coughing, diaphoresis, fever, sore throat, vertigo or vomiting.     Review of Systems   Constitutional:  Negative for activity change, appetite change, chills, diaphoresis, fever and unexpected weight change.   HENT:  Negative for drooling, ear discharge, ear pain, facial swelling, mouth sores, nosebleeds, sore throat, trouble swallowing, voice change and goiter.    Eyes:  Negative for photophobia, pain, discharge, redness and visual disturbance.   Respiratory:  Negative for apnea, cough, choking, chest tightness, shortness of breath, wheezing and stridor.    Cardiovascular:  Negative for chest pain, palpitations and leg swelling.   Gastrointestinal:  Negative for abdominal distention, anorexia, diarrhea, rectal pain, vomiting and fecal incontinence.   Endocrine: Negative for cold intolerance, heat intolerance, polydipsia, polyphagia and polyuria.   Genitourinary:  Negative for bladder incontinence, dysuria, flank pain and frequency.   Musculoskeletal:  Positive for arthralgias, back pain, leg pain, neck pain and neck stiffness.   Integumentary:  Negative for color change and pallor.   Neurological:  Negative for dizziness, vertigo, seizures, syncope, facial asymmetry, speech difficulty, light-headedness, memory loss and coordination difficulties.   Hematological:  Negative for adenopathy. Does not bruise/bleed easily.   Psychiatric/Behavioral:  Negative for agitation, behavioral problems, confusion, decreased concentration, dysphoric mood, hallucinations, self-injury and suicidal ideas. The patient is not nervous/anxious and is not hyperactive.             Past Medical History:   Diagnosis Date    Anticoagulant long-term use     CHF (congestive heart failure)     Chronic pain syndrome     Coronary artery disease     Diabetes mellitus, type 2     Hyperlipidemia     Hypertension     Lumbar spondylosis     Lumbosacral radiculopathy     Mild nonproliferative diabetic retinopathy of both eyes without macular edema 09/09/2022    Pain in thoracic spine     PVD (peripheral vascular disease)     Renal disorder     Spondylosis without myelopathy or radiculopathy, cervical region      Past Surgical History:   Procedure Laterality Date    ADENOIDECTOMY      AMPUTATION      left index finger removed     CAUDAL EPIDURAL STEROID INJECTION  01/19/2016    Caudal NATHAN - Alee    CIRCUMCISION      CORONARY ARTERY BYPASS GRAFT (CABG)      EYE SURGERY      FOOT SURGERY      heel surgery    HEEL SPUR SURGERY      INJECTION OF FACET JOINT Bilateral 04/16/2015    Bilateral L3-S1 Facet Injection - Alee - 4/16/15, 11/15/12 and 9/27/12    LEFT HEART CATHETERIZATION Left 07/19/2022    Procedure: Left heart cath;  Surgeon: Venu Whitlock MD;  Location: [facility] CATH LAB;  Service: Cardiology;  Laterality: Left;  Patient has PAD and has had bilateral fem pop bypass. He is followed by Dr. Forrest.    RADIOFREQUENCY THERMOCOAGULATION Left 02/26/2013    Left L3-S1 RFTC -Alee    RADIOFREQUENCY THERMOCOAGULATION Right 02/05/2013    Right L3-S1 RFTC - Alee    TONSILLECTOMY, ADENOIDECTOMY, BILATERAL MYRINGOTOMY AND TUBES      TRANSFORAMINAL EPIDURAL INJECTION OF STEROID Left 06/19/2012    Left L3-4 TFESI - Alee    TRANSFORAMINAL EPIDURAL INJECTION OF STEROID Right 04/12/2013    Right L3-4 TFESI - Alee - 4/12/13 and 8/7/12    VASCULAR SURGERY       Social History     Socioeconomic History    Marital status:     Number of children: 5   Occupational History    Occupation: retired   Tobacco Use    Smoking status: Former     Current packs/day: 4.00     Average packs/day: 4.0 packs/day for 30.0 years  "(120.0 ttl pk-yrs)     Types: Cigarettes     Passive exposure: Past (parent)    Smokeless tobacco: Former     Types: Chew    Tobacco comments:     quit about 2 weeks ago   Substance and Sexual Activity    Alcohol use: Never    Drug use: Never    Sexual activity: Not Currently     Family History   Problem Relation Age of Onset    Diabetes Mother     Heart disease Mother     Hypertension Mother     Liver cancer Mother     Heart disease Father     Hypertension Father      Review of patient's allergies indicates:  No Known Allergies     Objective:  Vitals:    01/11/24 1042   BP: 136/79   Pulse: (!) 52   Resp: 16   Weight: 84.4 kg (186 lb)   Height: 5' 11" (1.803 m)   PainSc:   7         Physical Exam  Vitals and nursing note reviewed. Exam conducted with a chaperone present.   Constitutional:       General: He is awake.      Appearance: Normal appearance. He is not ill-appearing, toxic-appearing or diaphoretic.   HENT:      Head: Normocephalic and atraumatic.      Nose: Nose normal.      Mouth/Throat:      Mouth: Mucous membranes are moist.      Pharynx: Oropharynx is clear.   Eyes:      Conjunctiva/sclera: Conjunctivae normal.      Pupils: Pupils are equal, round, and reactive to light.   Cardiovascular:      Rate and Rhythm: Normal rate.   Pulmonary:      Effort: Pulmonary effort is normal. No respiratory distress.   Abdominal:      Palpations: Abdomen is soft.   Musculoskeletal:      Right shoulder: Tenderness present.      Left shoulder: Tenderness present.      Cervical back: Normal range of motion and neck supple. Tenderness present.      Thoracic back: Tenderness present.      Lumbar back: Tenderness present. Decreased range of motion.   Skin:     General: Skin is warm and dry.   Neurological:      General: No focal deficit present.      Mental Status: He is alert and oriented to person, place, and time. Mental status is at baseline.      Cranial Nerves: No cranial nerve deficit (II-XII).   Psychiatric:         " Mood and Affect: Mood normal.         Behavior: Behavior normal. Behavior is cooperative.         Thought Content: Thought content normal.           US Lower Extremity Arteries Right  Narrative: EXAMINATION:  US LOWER EXTREMITY ARTERIES RIGHT    CLINICAL HISTORY:  Peripheral vascular disease, unspecified    COMPARISON:  08/24/2022    FINDINGS:  Right common femoral artery peak systolic velocity 112 centimeters/second triphasic waveforms, profundus femorals 107 centimeters/second monophasic waveforms, patient's native SFA proximal 95 centimeters/second monophasic, native SFA mid in distal is occluded common is a femoropopliteal graft which is patent, proximal anastomosis 6.5 mm at 88 centimeters/second, mid graph 35 centimeters/second distal anastomosis is 3.8 mm at 94 centimeters/second    Popliteal artery 58 centimeters/second biphasic waveforms, posterior tibial 22 centimeters/second biphasic, dorsalis pedis 32 centimeters/seconds biphasic  Impression: Patent femoropopliteal graph no progression of velocities distal anastomosis    TECHNOLOGIST: Ronit Fox (RT) (VS) RVT    Electronically signed by: Loida Forrest  Date:    02/10/2023  Time:    07:25       Office Visit on 12/11/2023   Component Date Value Ref Range Status    Sodium 12/11/2023 141  136 - 145 mmol/L Final    Potassium 12/11/2023 4.8  3.5 - 5.1 mmol/L Final    Chloride 12/11/2023 108 (H)  98 - 107 mmol/L Final    CO2 12/11/2023 29  21 - 32 mmol/L Final    Anion Gap 12/11/2023 9  7 - 16 mmol/L Final    Glucose 12/11/2023 195 (H)  74 - 106 mg/dL Final    BUN 12/11/2023 32 (H)  7 - 18 mg/dL Final    Creatinine 12/11/2023 1.73 (H)  0.70 - 1.30 mg/dL Final    BUN/Creatinine Ratio 12/11/2023 18  6 - 20 Final    Calcium 12/11/2023 9.1  8.5 - 10.1 mg/dL Final    Total Protein 12/11/2023 6.6  6.4 - 8.2 g/dL Final    Albumin 12/11/2023 3.6  3.5 - 5.0 g/dL Final    Globulin 12/11/2023 3.0  2.0 - 4.0 g/dL Final    A/G Ratio 12/11/2023 1.2   Final    Bilirubin, Total  12/11/2023 0.6  >0.0 - 1.2 mg/dL Final    Alk Phos 12/11/2023 32 (L)  45 - 115 U/L Final    ALT 12/11/2023 27  16 - 61 U/L Final    AST 12/11/2023 30  15 - 37 U/L Final    eGFR 12/11/2023 42 (L)  >=60 mL/min/1.73m2 Final    Hemoglobin A1C 12/11/2023 8.9 (H)  4.5 - 6.6 % Final    Estimated Average Glucose 12/11/2023 209  mg/dL Final    Triglycerides 12/11/2023 166 (H)  35 - 150 mg/dL Final    Cholesterol 12/11/2023 78  0 - 200 mg/dL Final    HDL Cholesterol 12/11/2023 24 (L)  40 - 60 mg/dL Final    Cholesterol/HDL Ratio (Risk Factor) 12/11/2023 3.3   Final    Non-HDL 12/11/2023 54  mg/dL Final    LDL Calculated 12/11/2023 21  mg/dL Final    VLDL 12/11/2023 33  mg/dL Final    PSA Total 12/11/2023 0.432  <=4.000 ng/mL Final    WBC 12/11/2023 9.26  4.50 - 11.00 K/uL Final    RBC 12/11/2023 4.81  4.60 - 6.20 M/uL Final    Hemoglobin 12/11/2023 12.5 (L)  13.5 - 18.0 g/dL Final    Hematocrit 12/11/2023 40.6  40.0 - 54.0 % Final    MCV 12/11/2023 84.4  80.0 - 96.0 fL Final    MCH 12/11/2023 26.0 (L)  27.0 - 31.0 pg Final    MCHC 12/11/2023 30.8 (L)  32.0 - 36.0 g/dL Final    RDW 12/11/2023 14.8 (H)  11.5 - 14.5 % Final    Platelet Count 12/11/2023 191  150 - 400 K/uL Final    MPV 12/11/2023 12.8 (H)  9.4 - 12.4 fL Final    Neutrophils % 12/11/2023 48.2 (L)  53.0 - 65.0 % Final    Lymphocytes % 12/11/2023 42.4 (H)  27.0 - 41.0 % Final    Monocytes % 12/11/2023 5.7  2.0 - 6.0 % Final    Eosinophils % 12/11/2023 2.8  1.0 - 4.0 % Final    Basophils % 12/11/2023 0.5  0.0 - 1.0 % Final    Immature Granulocytes % 12/11/2023 0.4  0.0 - 0.4 % Final    nRBC, Auto 12/11/2023 0.0  <=0.0 % Final    Neutrophils, Abs 12/11/2023 4.45  1.80 - 7.70 K/uL Final    Lymphocytes, Absolute 12/11/2023 3.93  1.00 - 4.80 K/uL Final    Monocytes, Absolute 12/11/2023 0.53  0.00 - 0.80 K/uL Final    Eosinophils, Absolute 12/11/2023 0.26  0.00 - 0.50 K/uL Final    Basophils, Absolute 12/11/2023 0.05  0.00 - 0.20 K/uL Final    Immature Granulocytes,  Absolute 12/11/2023 0.04  0.00 - 0.04 K/uL Final    nRBC, Absolute 12/11/2023 0.00  <=0.00 x10e3/uL Final    Diff Type 12/11/2023 Auto   Final   Office Visit on 10/11/2023   Component Date Value Ref Range Status    POC Amphetamines 10/11/2023 Negative  Negative, Inconclusive Final    POC Barbiturates 10/11/2023 Negative  Negative, Inconclusive Final    POC Benzodiazepines 10/11/2023 Negative  Negative, Inconclusive Final    POC Cocaine 10/11/2023 Negative  Negative, Inconclusive Final    POC THC 10/11/2023 Negative  Negative, Inconclusive Final    POC Methadone 10/11/2023 Negative  Negative, Inconclusive Final    POC Methamphetamine 10/11/2023 Negative  Negative, Inconclusive Final    POC Opiates 10/11/2023 Presumptive Positive (A)  Negative, Inconclusive Final    POC Oxycodone 10/11/2023 Negative  Negative, Inconclusive Final    POC Phencyclidine 10/11/2023 Negative  Negative, Inconclusive Final    POC Methylenedioxymethamphetamine * 10/11/2023 Negative  Negative, Inconclusive Final    POC Tricyclic Antidepressants 10/11/2023 Negative  Negative, Inconclusive Final    POC Buprenorphine 10/11/2023 Negative   Final     Acceptable 10/11/2023 Yes   Final    POC Temperature (Urine) 10/11/2023 94   Final         Orders Placed This Encounter   Procedures    POCT Urine Drug Screen Presump     Interpretive Information:     Negative:  No drug detected at the cut off level.   Positive:  This result represents presumptive positive for the   tested drug, other substances may yield a positive response other   than the analyte of interest. This result should be utilized for   diagnostic purpose only. Confirmation testing will be performed upon physician request only.            Requested Prescriptions     Signed Prescriptions Disp Refills    HYDROcodone-acetaminophen (NORCO)  mg per tablet 90 tablet 0     Sig: Take 1 tablet by mouth every 8 (eight) hours as needed for Pain (pain).    HYDROcodone-acetaminophen  (NORCO)  mg per tablet 90 tablet 0     Sig: Take 1 tablet by mouth every 8 (eight) hours as needed for Pain (pain).    HYDROcodone-acetaminophen (NORCO)  mg per tablet 90 tablet 0     Sig: Take 1 tablet by mouth every 8 (eight) hours as needed for Pain (pain).    tiZANidine (ZANAFLEX) 4 MG tablet 90 tablet 2     Sig: Take 1 tablet (4 mg total) by mouth every 8 (eight) hours.       Assessment:     1. Lumbosacral radiculopathy    2. Cervical spondylosis without myelopathy    3. Encounter for long-term (current) use of other medications         A's of Opioid Risk Assessment  Activity:Patient can perform ADL.   Analgesia:Patients pain is partially controlled by current medication. Patient has tried OTC medications such as Tylenol and Ibuprofen with out relief.   Adverse Effects: Patient denies constipation or sedation.  Aberrant Behavior:  reviewed with no aberrant drug seeking/taking behavior.  Overdose reversal drug naloxone discussed    Drug screen reviewed      Plan:    Narcan January 2023    Anticoagulated Plavix    [facility] closed on weekends    No new complaints Current medication continues to help his chronic discomfort he would like to continue conservative management    Continue home exercise program as directed    Continue current medication    Follow-up 3 months    Dr. Callahan, February 2023    Bring original prescription medication bottles/container/box with labels to each visit

## 2024-01-11 ENCOUNTER — OFFICE VISIT (OUTPATIENT)
Dept: PAIN MEDICINE | Facility: CLINIC | Age: 69
End: 2024-01-11
Payer: COMMERCIAL

## 2024-01-11 VITALS
DIASTOLIC BLOOD PRESSURE: 79 MMHG | RESPIRATION RATE: 16 BRPM | BODY MASS INDEX: 26.04 KG/M2 | HEART RATE: 52 BPM | SYSTOLIC BLOOD PRESSURE: 136 MMHG | HEIGHT: 71 IN | WEIGHT: 186 LBS

## 2024-01-11 DIAGNOSIS — M54.17 LUMBOSACRAL RADICULOPATHY: Primary | Chronic | ICD-10-CM

## 2024-01-11 DIAGNOSIS — M47.812 CERVICAL SPONDYLOSIS WITHOUT MYELOPATHY: Chronic | ICD-10-CM

## 2024-01-11 DIAGNOSIS — Z79.899 ENCOUNTER FOR LONG-TERM (CURRENT) USE OF OTHER MEDICATIONS: ICD-10-CM

## 2024-01-11 PROCEDURE — 99999PBSHW POCT URINE DRUG SCREEN PRESUMP: Mod: PBBFAC,,,

## 2024-01-11 PROCEDURE — 1101F PT FALLS ASSESS-DOCD LE1/YR: CPT | Mod: CPTII,,, | Performed by: PHYSICIAN ASSISTANT

## 2024-01-11 PROCEDURE — 3008F BODY MASS INDEX DOCD: CPT | Mod: CPTII,,, | Performed by: PHYSICIAN ASSISTANT

## 2024-01-11 PROCEDURE — 99215 OFFICE O/P EST HI 40 MIN: CPT | Mod: PBBFAC | Performed by: PHYSICIAN ASSISTANT

## 2024-01-11 PROCEDURE — 3078F DIAST BP <80 MM HG: CPT | Mod: CPTII,,, | Performed by: PHYSICIAN ASSISTANT

## 2024-01-11 PROCEDURE — 3075F SYST BP GE 130 - 139MM HG: CPT | Mod: CPTII,,, | Performed by: PHYSICIAN ASSISTANT

## 2024-01-11 PROCEDURE — 80305 DRUG TEST PRSMV DIR OPT OBS: CPT | Mod: PBBFAC | Performed by: PHYSICIAN ASSISTANT

## 2024-01-11 PROCEDURE — 99214 OFFICE O/P EST MOD 30 MIN: CPT | Mod: S$PBB,,, | Performed by: PHYSICIAN ASSISTANT

## 2024-01-11 PROCEDURE — 1125F AMNT PAIN NOTED PAIN PRSNT: CPT | Mod: CPTII,,, | Performed by: PHYSICIAN ASSISTANT

## 2024-01-11 PROCEDURE — 1159F MED LIST DOCD IN RCRD: CPT | Mod: CPTII,,, | Performed by: PHYSICIAN ASSISTANT

## 2024-01-11 PROCEDURE — 3288F FALL RISK ASSESSMENT DOCD: CPT | Mod: CPTII,,, | Performed by: PHYSICIAN ASSISTANT

## 2024-01-11 RX ORDER — HYDROCODONE BITARTRATE AND ACETAMINOPHEN 10; 325 MG/1; MG/1
1 TABLET ORAL EVERY 8 HOURS PRN
Qty: 90 TABLET | Refills: 0 | Status: SHIPPED | OUTPATIENT
Start: 2024-03-14 | End: 2024-04-13

## 2024-01-11 RX ORDER — HYDROCODONE BITARTRATE AND ACETAMINOPHEN 10; 325 MG/1; MG/1
1 TABLET ORAL EVERY 8 HOURS PRN
Qty: 90 TABLET | Refills: 0 | Status: SHIPPED | OUTPATIENT
Start: 2024-02-13 | End: 2024-03-14

## 2024-01-11 RX ORDER — TIZANIDINE 4 MG/1
4 TABLET ORAL EVERY 8 HOURS
Qty: 90 TABLET | Refills: 2 | Status: SHIPPED | OUTPATIENT
Start: 2024-01-11 | End: 2024-04-10

## 2024-01-11 RX ORDER — HYDROCODONE BITARTRATE AND ACETAMINOPHEN 10; 325 MG/1; MG/1
1 TABLET ORAL EVERY 8 HOURS PRN
Qty: 90 TABLET | Refills: 0 | Status: SHIPPED | OUTPATIENT
Start: 2024-01-12 | End: 2024-02-11

## 2024-02-05 ENCOUNTER — HOSPITAL ENCOUNTER (OUTPATIENT)
Dept: RADIOLOGY | Facility: HOSPITAL | Age: 69
Discharge: HOME | End: 2024-02-05
Attending: SURGERY
Payer: COMMERCIAL

## 2024-02-05 ENCOUNTER — OFFICE VISIT (OUTPATIENT)
Dept: VASCULAR SURGERY | Facility: CLINIC | Age: 69
End: 2024-02-05
Payer: COMMERCIAL

## 2024-02-05 VITALS — BODY MASS INDEX: 25.62 KG/M2 | WEIGHT: 183 LBS | HEIGHT: 71 IN

## 2024-02-05 DIAGNOSIS — Z87.891 FORMER SMOKER: ICD-10-CM

## 2024-02-05 DIAGNOSIS — I73.9 PVD (PERIPHERAL VASCULAR DISEASE): ICD-10-CM

## 2024-02-05 DIAGNOSIS — I73.9 PVD (PERIPHERAL VASCULAR DISEASE): Primary | ICD-10-CM

## 2024-02-05 PROCEDURE — 93922 UPR/L XTREMITY ART 2 LEVELS: CPT | Mod: TC

## 2024-02-05 PROCEDURE — 1160F RVW MEDS BY RX/DR IN RCRD: CPT | Mod: CPTII,,, | Performed by: NURSE PRACTITIONER

## 2024-02-05 PROCEDURE — 99204 OFFICE O/P NEW MOD 45 MIN: CPT | Mod: S$PBB,,, | Performed by: NURSE PRACTITIONER

## 2024-02-05 PROCEDURE — 1125F AMNT PAIN NOTED PAIN PRSNT: CPT | Mod: CPTII,,, | Performed by: NURSE PRACTITIONER

## 2024-02-05 PROCEDURE — 93922 UPR/L XTREMITY ART 2 LEVELS: CPT | Mod: 26,,, | Performed by: SURGERY

## 2024-02-05 PROCEDURE — 1159F MED LIST DOCD IN RCRD: CPT | Mod: CPTII,,, | Performed by: NURSE PRACTITIONER

## 2024-02-05 PROCEDURE — 3288F FALL RISK ASSESSMENT DOCD: CPT | Mod: CPTII,,, | Performed by: NURSE PRACTITIONER

## 2024-02-05 PROCEDURE — 93925 LOWER EXTREMITY STUDY: CPT | Mod: 26,,, | Performed by: SURGERY

## 2024-02-05 PROCEDURE — 99215 OFFICE O/P EST HI 40 MIN: CPT | Mod: PBBFAC,25 | Performed by: NURSE PRACTITIONER

## 2024-02-05 PROCEDURE — 3008F BODY MASS INDEX DOCD: CPT | Mod: CPTII,,, | Performed by: NURSE PRACTITIONER

## 2024-02-05 PROCEDURE — 1101F PT FALLS ASSESS-DOCD LE1/YR: CPT | Mod: CPTII,,, | Performed by: NURSE PRACTITIONER

## 2024-02-05 RX ORDER — CLOPIDOGREL BISULFATE 75 MG/1
75 TABLET ORAL DAILY
Qty: 30 TABLET | Refills: 11 | Status: SHIPPED | OUTPATIENT
Start: 2024-02-05 | End: 2025-02-04

## 2024-02-05 NOTE — PROGRESS NOTES
Subjective:       Patient ID: Micheal Taylor is a 68 y.o. male.    Chief Complaint: Follow-up (Follow up test results MARYSE)    family history includes Diabetes in his mother; Heart disease in his father and mother; Hypertension in his father and mother; Liver cancer in his mother.  Previous bilateral fem-pop greater than 2 years out     No claudication symptoms    We have been following the right fem-pop duplex studies. The velocities are not doubled this time the anastomosis has not narrowed    Patient not smoking     02/05/2024 1 year follow-up ABIs he has had bilateral fem-pop he has a former smoker no history of stroke he has history of hypertension, obstructive CAD requiring CABG by Dr. Arango PCI by Dr. Whitlock [place] cardiology followed by Luciana Galicia, no significant claudication at staple no rest pain no wounds no tissue loss normal ABIs today     Past Surgical History:   Procedure Laterality Date    ADENOIDECTOMY      AMPUTATION      left index finger removed     CAUDAL EPIDURAL STEROID INJECTION  01/19/2016    Caudal NATHAN - Alee    CIRCUMCISION      CORONARY ARTERY BYPASS GRAFT (CABG)      EYE SURGERY      FOOT SURGERY      heel surgery    HEEL SPUR SURGERY      INJECTION OF FACET JOINT Bilateral 04/16/2015    Bilateral L3-S1 Facet Injection - Alee - 4/16/15, 11/15/12 and 9/27/12    LEFT HEART CATHETERIZATION Left 07/19/2022    Procedure: Left heart cath;  Surgeon: Venu Whitlock MD;  Location: [facility] CATH LAB;  Service: Cardiology;  Laterality: Left;  Patient has PAD and has had bilateral fem pop bypass. He is followed by Dr. Forrest.    RADIOFREQUENCY THERMOCOAGULATION Left 02/26/2013    Left L3-S1 RFTC -Alee    RADIOFREQUENCY THERMOCOAGULATION Right 02/05/2013    Right L3-S1 RFTC - Alee    TONSILLECTOMY, ADENOIDECTOMY, BILATERAL MYRINGOTOMY AND TUBES      TRANSFORAMINAL EPIDURAL INJECTION OF STEROID Left 06/19/2012    Left L3-4 TFESI - Alee    TRANSFORAMINAL EPIDURAL INJECTION OF STEROID Right 04/12/2013     "Right L3-4 TFESI - Alee - 4/12/13 and 8/7/12    VASCULAR SURGERY         reports that he has quit smoking. His smoking use included cigarettes. He has a 120.0 pack-year smoking history. He has been exposed to tobacco smoke. He has quit using smokeless tobacco.  His smokeless tobacco use included chew. He reports that he does not drink alcohol and does not use drugs.   Follow-up      Review of Systems      Objective:      Ht 5' 11" (1.803 m)   Wt 83 kg (183 lb)   BMI 25.52 kg/m²    Physical Exam  Vitals and nursing note reviewed.   Constitutional:       Appearance: Normal appearance.   HENT:      Head: Normocephalic.      Mouth/Throat:      Mouth: Mucous membranes are moist.   Eyes:      Conjunctiva/sclera: Conjunctivae normal.   Cardiovascular:      Rate and Rhythm: Normal rate and regular rhythm.   Pulmonary:      Effort: Pulmonary effort is normal.   Abdominal:      Palpations: Abdomen is soft.   Skin:     General: Skin is warm and dry.   Neurological:      Mental Status: He is alert and oriented to person, place, and time.   Psychiatric:         Mood and Affect: Mood normal.           Assessment:       1. PVD (peripheral vascular disease)    2. Former smoker        Plan:     Follow-up with vascular surgery in 1 year with ABIs call or return sooner any vascular issues  Continue Plavix  Keep scheduled appointment with Mai Galicia cardiology      "

## 2024-03-04 RX ORDER — OMEPRAZOLE 20 MG/1
20 CAPSULE, DELAYED RELEASE ORAL DAILY
Qty: 30 CAPSULE | Refills: 0 | Status: SHIPPED | OUTPATIENT
Start: 2024-03-04 | End: 2024-04-29

## 2024-03-13 ENCOUNTER — PATIENT OUTREACH (OUTPATIENT)
Dept: FAMILY MEDICINE | Facility: CLINIC | Age: 69
End: 2024-03-13
Payer: COMMERCIAL

## 2024-03-13 LAB
LEFT EYE DM RETINOPATHY: POSITIVE
RIGHT EYE DM RETINOPATHY: POSITIVE

## 2024-03-26 ENCOUNTER — OFFICE VISIT (OUTPATIENT)
Dept: FAMILY MEDICINE | Facility: CLINIC | Age: 69
End: 2024-03-26
Payer: COMMERCIAL

## 2024-03-26 VITALS
OXYGEN SATURATION: 96 % | WEIGHT: 183 LBS | TEMPERATURE: 98 F | RESPIRATION RATE: 20 BRPM | HEIGHT: 71 IN | HEART RATE: 55 BPM | DIASTOLIC BLOOD PRESSURE: 60 MMHG | BODY MASS INDEX: 25.62 KG/M2 | SYSTOLIC BLOOD PRESSURE: 130 MMHG

## 2024-03-26 DIAGNOSIS — Z79.899 HISTORY OF LONG-TERM TREATMENT WITH HIGH-RISK MEDICATION: ICD-10-CM

## 2024-03-26 DIAGNOSIS — Z79.4 TYPE 2 DIABETES MELLITUS WITH DIABETIC NEUROPATHY, WITH LONG-TERM CURRENT USE OF INSULIN: ICD-10-CM

## 2024-03-26 DIAGNOSIS — E78.5 HYPERLIPIDEMIA, UNSPECIFIED HYPERLIPIDEMIA TYPE: ICD-10-CM

## 2024-03-26 DIAGNOSIS — I73.9 PVD (PERIPHERAL VASCULAR DISEASE): ICD-10-CM

## 2024-03-26 DIAGNOSIS — F32.9 REACTIVE DEPRESSION: ICD-10-CM

## 2024-03-26 DIAGNOSIS — M54.17 LUMBOSACRAL RADICULOPATHY: Chronic | ICD-10-CM

## 2024-03-26 DIAGNOSIS — E11.22 TYPE 2 DIABETES MELLITUS WITH STAGE 3B CHRONIC KIDNEY DISEASE, WITH LONG-TERM CURRENT USE OF INSULIN: ICD-10-CM

## 2024-03-26 DIAGNOSIS — N18.32 STAGE 3B CHRONIC KIDNEY DISEASE: ICD-10-CM

## 2024-03-26 DIAGNOSIS — Z00.00 ENCOUNTER FOR SUBSEQUENT ANNUAL WELLNESS VISIT (AWV) IN MEDICARE PATIENT: Primary | ICD-10-CM

## 2024-03-26 DIAGNOSIS — E11.40 TYPE 2 DIABETES MELLITUS WITH DIABETIC NEUROPATHY, WITH LONG-TERM CURRENT USE OF INSULIN: ICD-10-CM

## 2024-03-26 DIAGNOSIS — I50.22 CHRONIC SYSTOLIC CONGESTIVE HEART FAILURE: ICD-10-CM

## 2024-03-26 DIAGNOSIS — I10 HYPERTENSION, UNSPECIFIED TYPE: ICD-10-CM

## 2024-03-26 DIAGNOSIS — N18.32 TYPE 2 DIABETES MELLITUS WITH STAGE 3B CHRONIC KIDNEY DISEASE, WITH LONG-TERM CURRENT USE OF INSULIN: ICD-10-CM

## 2024-03-26 DIAGNOSIS — Z79.4 TYPE 2 DIABETES MELLITUS WITH STAGE 3B CHRONIC KIDNEY DISEASE, WITH LONG-TERM CURRENT USE OF INSULIN: ICD-10-CM

## 2024-03-26 DIAGNOSIS — I25.810 CORONARY ARTERY DISEASE INVOLVING CORONARY BYPASS GRAFT WITHOUT ANGINA PECTORIS, UNSPECIFIED WHETHER NATIVE OR TRANSPLANTED HEART: ICD-10-CM

## 2024-03-26 DIAGNOSIS — M47.812 CERVICAL SPONDYLOSIS WITHOUT MYELOPATHY: Chronic | ICD-10-CM

## 2024-03-26 DIAGNOSIS — Z12.11 SCREENING FOR COLON CANCER: ICD-10-CM

## 2024-03-26 PROBLEM — Z72.0 TOBACCO CHEW USE: Status: ACTIVE | Noted: 2019-02-04

## 2024-03-26 PROCEDURE — 3288F FALL RISK ASSESSMENT DOCD: CPT | Mod: ,,, | Performed by: NURSE PRACTITIONER

## 2024-03-26 PROCEDURE — G0439 PPPS, SUBSEQ VISIT: HCPCS | Mod: ,,, | Performed by: NURSE PRACTITIONER

## 2024-03-26 PROCEDURE — 1125F AMNT PAIN NOTED PAIN PRSNT: CPT | Mod: ,,, | Performed by: NURSE PRACTITIONER

## 2024-03-26 PROCEDURE — 1101F PT FALLS ASSESS-DOCD LE1/YR: CPT | Mod: ,,, | Performed by: NURSE PRACTITIONER

## 2024-03-26 PROCEDURE — 1159F MED LIST DOCD IN RCRD: CPT | Mod: ,,, | Performed by: NURSE PRACTITIONER

## 2024-03-26 PROCEDURE — 3075F SYST BP GE 130 - 139MM HG: CPT | Mod: ,,, | Performed by: NURSE PRACTITIONER

## 2024-03-26 PROCEDURE — 4010F ACE/ARB THERAPY RXD/TAKEN: CPT | Mod: ,,, | Performed by: NURSE PRACTITIONER

## 2024-03-26 PROCEDURE — 1170F FXNL STATUS ASSESSED: CPT | Mod: ,,, | Performed by: NURSE PRACTITIONER

## 2024-03-26 PROCEDURE — 3078F DIAST BP <80 MM HG: CPT | Mod: ,,, | Performed by: NURSE PRACTITIONER

## 2024-03-26 RX ORDER — LANOLIN ALCOHOL/MO/W.PET/CERES
100 CREAM (GRAM) TOPICAL DAILY
COMMUNITY

## 2024-03-26 NOTE — PATIENT INSTRUCTIONS
Counseling and Referral of Other Preventative  (Italic type indicates deductible and co-insurance are waived)    Patient Name: Micheal Taylor  Today's Date: 3/26/2024    Health Maintenance       Date Due Completion Date    COVID-19 Vaccine (1) Never done ---    TETANUS VACCINE Never done ---    Sign Pain Contract Never done ---    Colorectal Cancer Screening Never done ---    RSV Vaccine (Age 60+ and Pregnant patients) (1 - 1-dose 60+ series) Never done ---    Foot Exam 03/10/2023 3/10/2022    Diabetes Urine Screening 03/01/2024 3/1/2023    Hemoglobin A1c 06/11/2024 12/11/2023    Eye Exam 10/27/2024 10/27/2023    Override on 1/12/2017: Done (Eye Site)    PROSTATE-SPECIFIC ANTIGEN 12/11/2024 12/11/2023    Lipid Panel 12/11/2024 12/11/2023    High Dose Statin 03/26/2025 3/26/2024    Aspirin/Antiplatelet Therapy 03/26/2025 3/26/2024        Orders Placed This Encounter   Procedures    Cologuard Screening (Multitarget Stool DNA)         The following information is provided to all patients.  This information is to help you find resources for any of the problems found today that may be affecting your health:                  Living healthy guide: [employer]ystemsth.gov    Understanding Diabetes: www.diabetes.org      Eating healthy: www.cdc.gov/healthyweight      [facility] home safety checklist: www.cdc.gov/steadi/patient.html      Agency on Aging: [facility].org    Alcoholics anonymous (AA): www.aa.org      Physical Activity: www.joann.nih.gov/ta1faez      Tobacco use: alabamapubSahareyealth.gov      Counseling and Referral of Other Preventative  (Italic type indicates deductible and co-insurance are waived)    Patient Name: Micheal Taylor  Today's Date: 3/26/2024    Health Maintenance       Date Due Completion Date    COVID-19 Vaccine (1) Never done ---    TETANUS VACCINE Never done ---    Sign Pain Contract Never done ---    Colorectal Cancer Screening Never done ---    RSV Vaccine (Age 60+ and Pregnant patients) (1 - 1-dose  60+ series) Never done ---    Foot Exam 03/10/2023 3/10/2022    Diabetes Urine Screening 03/01/2024 3/1/2023    Hemoglobin A1c 06/11/2024 12/11/2023    Eye Exam 10/27/2024 10/27/2023    Override on 1/12/2017: Done (Eye Site)    PROSTATE-SPECIFIC ANTIGEN 12/11/2024 12/11/2023    Lipid Panel 12/11/2024 12/11/2023    High Dose Statin 03/26/2025 3/26/2024    Aspirin/Antiplatelet Therapy 03/26/2025 3/26/2024        Orders Placed This Encounter   Procedures    Cologuard Screening (Multitarget Stool DNA)       The following information is provided to all patients.  This information is to help you find resources for any of the problems found today that may be affecting your health:                  Living healthy guide: [facility] [employer].gov    Understanding Diabetes: www.diabetes.org      Eating healthy: www.cdc.gov/healthyweight      [facility] home safety checklist: www.cdc.gov/steadi/patient.html      Agency on Aging: [place].org    Alcoholics anonymous (AA): www.aa.org      Physical Activity: www.joann.nih.gov/at4tnnd      Tobacco use: [facility].comth.gov

## 2024-03-26 NOTE — PROGRESS NOTES
"  Micheal Taylor presented for a  Medicare AWV and comprehensive Health Risk Assessment today. The following components were reviewed and updated:    Medical history  Family History  Social history  Allergies and Current Medications  Health Risk Assessment  Health Maintenance  Care Team         ** See Completed Assessments for Annual Wellness Visit within the encounter summary.**         The following assessments were completed:  Living Situation  CAGE  Depression Screening  Timed Get Up and Go  Whisper Test  Cognitive Function Screening  Nutrition Screening  ADL Screening  PAQ Screening        Opioid Documentation    does have a current opioid prescription.      Patient accepted further discussion regarding opioid medication use.      Patient is currently taking hydrocodone narcotic for back pain.        Pain level today is 4/10.       In addition to narcotic pain medications, patient is also using gabapentin for pain control.       Patient is followed by a specialist currently for their pain and will not be referred today.       Patient's opioid risk potential based on ORT-OUD tool:       Rojas each box that applies   No   Yes     Family history of substance abuse   Alcohol [x] []   Illegal drugs [x] []   Rx drugs [x] []     Personal history of substance abuse   Alcohol [x] []   Illegal drugs [x] []   Rx drugs [x] []     Age between 16-45 years   [x]   []     Patient with ADD, OCD, Bipolar disorder, schizoprenia   [x]   []     Patient with depression   []   [x]                         Scoring total     1                                                            Non-opioid treatment options have been discussed today and added to the patient's after visit summary.       Vitals:    03/26/24 1332   BP: 130/60   BP Location: Left arm   Patient Position: Sitting   BP Method: Large (Manual)   Pulse: (!) 55   Resp: 20   Temp: 97.9 °F (36.6 °C)   TempSrc: Oral   SpO2: 96%   Weight: 83 kg (183 lb)   Height: 5' 11" (1.803 m) "     Body mass index is 25.52 kg/m².  Physical Exam  Vitals and nursing note reviewed.   Constitutional:       Appearance: He is well-developed.   HENT:      Head: Normocephalic and atraumatic.      Mouth/Throat:      Mouth: Mucous membranes are moist.   Cardiovascular:      Rate and Rhythm: Normal rate and regular rhythm.      Heart sounds: Normal heart sounds. No murmur heard.  Pulmonary:      Effort: Pulmonary effort is normal.      Breath sounds: Normal breath sounds.   Abdominal:      Tenderness: There is no abdominal tenderness.   Musculoskeletal:      Right lower leg: No edema.      Left lower leg: No edema.   Skin:     General: Skin is warm and dry.   Neurological:      Mental Status: He is alert and oriented to person, place, and time.   Psychiatric:         Behavior: Behavior normal.         Thought Content: Thought content normal.         Judgment: Judgment normal.     Protective Sensation (w/ 10 gram monofilament):  Right: Absent  Left: Absent    Visual Inspection:  Dry Skin -  Bilateral    Pedal Pulses:   Right: Diminished  Left: Diminished    Posterior Tibialis Pulses:   Right:Diminished  Left: Diminished          Diagnoses and health risks identified today and associated recommendations/orders:    1. Encounter for subsequent annual wellness visit (AWV) in Medicare patient  Screenings performed as noted above. Personal preventative testing needs reviewed    2. Hypertension, unspecified type  Controlled. Followed by PCP and card    3. Hyperlipidemia, unspecified hyperlipidemia type  Stable on current regimen    4. Stage 3b chronic kidney disease  Stable on current regimen    5. Type 2 diabetes mellitus with diabetic neuropathy, with long-term current use of insulin  Followed by PCP and endo - last A1C was elevated but states was out of needleless blood sugar device  for sev mos so wasn't using sliding scale, but finally received them and back using sliding scale    6. Chronic systolic congestive heart  failure  Stable. Followed by Cardiology    7. PVD (peripheral vascular disease)  Stable. Followed by vascular sx    8. Coronary artery disease involving coronary bypass graft without angina pectoris, unspecified whether native or transplanted heart  Stable. Followed by Cardiology    9. Lumbosacral radiculopathy  Stable. Followed by pain clinic    10. Cervical spondylosis without myelopathy  Stable. Followed by pain clinic    11. History of long-term treatment with high-risk medication  Stable. Followed by pain clinic    12. BMI 25.0-25.9,adult      13. Screening for colon cancer    - Cologuard Screening (Multitarget Stool DNA); Future  - Cologuard Screening (Multitarget Stool DNA)    14. Type 2 diabetes mellitus with stage 3b chronic kidney disease, with long-term current use of insulin          Provided Micheal with a 5-10 year written screening schedule and personal prevention plan. Recommendations were developed using the USPSTF age appropriate recommendations. Education, counseling, and referrals were provided as needed. After Visit Summary printed and given to patient which includes a list of additional screenings\tests needed.    Follow up in about 1 year (around 3/26/2025) for AWV Follow-up.        I offered to discuss advanced care planning, including how to pick a person who would make decisions for you if you were unable to make them for yourself, called a health care power of , and what kind of decisions you might make such as use of life sustaining treatments such as ventilators and tube feeding when faced with a life limiting illness recorded on a living will that they will need to know. (How you want to be cared for as you near the end of your natural life)     X Patient is interested in learning more about how to make advanced directives.  I provided them paperwork and offered to discuss this with them.    Pinky Huang, AMADO

## 2024-04-08 NOTE — PROGRESS NOTES
Subjective:         Patient ID: Micheal Taylor is a 68 y.o. male.    Chief Complaint: Low-back Pain      Pain  This is a chronic problem. The current episode started more than 1 year ago. The problem occurs daily. The problem has been unchanged. Associated symptoms include arthralgias and neck pain. Pertinent negatives include no anorexia, chest pain, chills, coughing, diaphoresis, fever, sore throat, vertigo or vomiting.     Review of Systems   Constitutional:  Negative for activity change, appetite change, chills, diaphoresis, fever and unexpected weight change.   HENT:  Negative for drooling, ear discharge, ear pain, facial swelling, mouth sores, nosebleeds, sore throat, trouble swallowing, voice change and goiter.    Eyes:  Negative for photophobia, pain, discharge, redness and visual disturbance.   Respiratory:  Negative for apnea, cough, choking, chest tightness, shortness of breath, wheezing and stridor.    Cardiovascular:  Negative for chest pain, palpitations and leg swelling.   Gastrointestinal:  Negative for abdominal distention, anorexia, diarrhea, rectal pain, vomiting and fecal incontinence.   Endocrine: Negative for cold intolerance, heat intolerance, polydipsia, polyphagia and polyuria.   Genitourinary:  Negative for bladder incontinence, dysuria, flank pain and frequency.   Musculoskeletal:  Positive for arthralgias, back pain, leg pain, neck pain and neck stiffness.   Integumentary:  Negative for color change and pallor.   Neurological:  Negative for dizziness, vertigo, seizures, syncope, facial asymmetry, speech difficulty, light-headedness, memory loss and coordination difficulties.   Hematological:  Negative for adenopathy. Does not bruise/bleed easily.   Psychiatric/Behavioral:  Negative for agitation, behavioral problems, confusion, decreased concentration, dysphoric mood, hallucinations, self-injury and suicidal ideas. The patient is not nervous/anxious and is not hyperactive.            Past  Medical History:   Diagnosis Date    Anticoagulant long-term use     CHF (congestive heart failure)     Chronic pain syndrome     Coronary artery disease     Diabetes mellitus, type 2     Hyperlipidemia     Hypertension     Lumbar spondylosis     Lumbosacral radiculopathy     Mild nonproliferative diabetic retinopathy of both eyes without macular edema 09/09/2022    Pain in thoracic spine     PVD (peripheral vascular disease)     Renal disorder     Spondylosis without myelopathy or radiculopathy, cervical region      Past Surgical History:   Procedure Laterality Date    ADENOIDECTOMY      AMPUTATION      left index finger removed     CAUDAL EPIDURAL STEROID INJECTION  01/19/2016    Caudal NATHAN - Alee    CIRCUMCISION      CORONARY ARTERY BYPASS GRAFT (CABG)      EYE SURGERY      FOOT SURGERY      heel surgery    HEEL SPUR SURGERY      INJECTION OF FACET JOINT Bilateral 04/16/2015    Bilateral L3-S1 Facet Injection - Alee - 4/16/15, 11/15/12 and 9/27/12    LEFT HEART CATHETERIZATION Left 07/19/2022    Procedure: Left heart cath;  Surgeon: Venu Whitlock MD;  Location: [facility];  Service: Cardiology;  Laterality: Left;  Patient has PAD and has had bilateral fem pop bypass. He is followed by Dr. Forrest.    RADIOFREQUENCY THERMOCOAGULATION Left 02/26/2013    Left L3-S1 RFTC -Alee    RADIOFREQUENCY THERMOCOAGULATION Right 02/05/2013    Right L3-S1 RFTC - Alee    TONSILLECTOMY, ADENOIDECTOMY, BILATERAL MYRINGOTOMY AND TUBES      TRANSFORAMINAL EPIDURAL INJECTION OF STEROID Left 06/19/2012    Left L3-4 TFESI - Alee    TRANSFORAMINAL EPIDURAL INJECTION OF STEROID Right 04/12/2013    Right L3-4 TFESI - Alee - 4/12/13 and 8/7/12    VASCULAR SURGERY       Social History     Socioeconomic History    Marital status:     Number of children: 5   Occupational History    Occupation: retired   Tobacco Use    Smoking status: Former     Current packs/day: 0.00     Average packs/day: 4.0 packs/day for 30.0 years (120.0 ttl  pk-yrs)     Types: Cigarettes     Start date:      Quit date: 2005     Years since quittin.2     Passive exposure: Past (parent)    Smokeless tobacco: Current     Types: Chew   Substance and Sexual Activity    Alcohol use: Not Currently    Drug use: Yes     Types: Hydrocodone    Sexual activity: Not Currently     Social Determinants of Health     Financial Resource Strain: Low Risk  (3/26/2024)    Overall Financial Resource Strain (CARDIA)     Difficulty of Paying Living Expenses: Not hard at all   Food Insecurity: No Food Insecurity (3/26/2024)    Hunger Vital Sign     Worried About Running Out of Food in the Last Year: Never true     Ran Out of Food in the Last Year: Never true   Transportation Needs: No Transportation Needs (3/26/2024)    PRAPARE - Transportation     Lack of Transportation (Medical): No     Lack of Transportation (Non-Medical): No   Physical Activity: Insufficiently Active (3/26/2024)    Exercise Vital Sign     Days of Exercise per Week: 7 days     Minutes of Exercise per Session: 10 min   Stress: No Stress Concern Present (3/26/2024)    [place] [employer] - Occupational Stress Questionnaire     Feeling of Stress : Not at all   Social Connections: Moderately Isolated (3/26/2024)    Social Connection and Isolation Panel [NHANES]     Frequency of Communication with Friends and Family: More than three times a week     Frequency of Social Gatherings with Friends and Family: Once a week     Attends Gnosticist Services: Never     Active Member of Clubs or Organizations: No     Attends Club or Organization Meetings: Never     Marital Status:    Housing Stability: Low Risk  (3/26/2024)    Housing Stability Vital Sign     Unable to Pay for Housing in the Last Year: No     Number of Places Lived in the Last Year: 1     Unstable Housing in the Last Year: No     Family History   Problem Relation Age of Onset    Diabetes Mother     Heart disease Mother     Hypertension  Mother     Liver cancer Mother     Heart disease Father     Hypertension Father      Review of patient's allergies indicates:  No Known Allergies     Objective:  Vitals:    04/11/24 1055   BP: (!) 110/54   Pulse: (!) 57   Resp: 18   Weight: 82.1 kg (181 lb)   Height: 6' (1.829 m)   PainSc:   8         Physical Exam  Vitals and nursing note reviewed. Exam conducted with a chaperone present.   Constitutional:       General: He is awake.      Appearance: Normal appearance. He is not ill-appearing, toxic-appearing or diaphoretic.   HENT:      Head: Normocephalic and atraumatic.      Nose: Nose normal.      Mouth/Throat:      Mouth: Mucous membranes are moist.      Pharynx: Oropharynx is clear.   Eyes:      Conjunctiva/sclera: Conjunctivae normal.      Pupils: Pupils are equal, round, and reactive to light.   Cardiovascular:      Rate and Rhythm: Normal rate.   Pulmonary:      Effort: Pulmonary effort is normal. No respiratory distress.   Abdominal:      Palpations: Abdomen is soft.   Musculoskeletal:      Right shoulder: Tenderness present.      Left shoulder: Tenderness present.      Cervical back: Normal range of motion and neck supple. Tenderness present.      Thoracic back: Tenderness present.      Lumbar back: Tenderness present. Decreased range of motion.   Skin:     General: Skin is warm and dry.   Neurological:      General: No focal deficit present.      Mental Status: He is alert and oriented to person, place, and time. Mental status is at baseline.      Cranial Nerves: No cranial nerve deficit (II-XII).   Psychiatric:         Mood and Affect: Mood normal.         Behavior: Behavior normal. Behavior is cooperative.         Thought Content: Thought content normal.           US Lower Extrem Arteries Bilat with MARYSE (xpd)  Narrative: EXAMINATION:  US ARTERIAL LOWER EXTREMITY BILAT WITH MARYSE (XPD)    CLINICAL HISTORY:  Peripheral vascular disease, unspecified    TECHNIQUE:  Color-flow Doppler  utilized    COMPARISON:  None    FINDINGS:  Right common femoral artery peak systolic velocity 88 centimeters/second biphasic waveform, profundus femorals 163 centimeters/second biphasic waveform, native SFA is occluded fem-pop graft present is patent.  The proximal anastomosis 5.9 mm at 123 centimeters/second, upper thigh 32 centimeters/second mid thigh 38-74 centimeters/second distal thigh 76 centimeters/second, popliteal 43 centimeters/second biphasic waveform, posterior tibial 11 centimeters/second biphasic waveforms, dorsalis pedis 18 centimeters/second biphasic waveforms    Right brachial pressure 134 mm Hg, MARYSE 1.01    Left brachial pressure 149 mm Hg, MARYSE 1.02  Impression: Bilateral ABIs were normal.  Right lower extremity velocities suggest area of stenosis within the graft between 50% and 90%    TECHNOLOGIST: Ronit Fox (RT) (VS) RVT    Electronically signed by: Loida Forrest  Date:    02/06/2024  Time:    12:59       Patient Outreach on 03/13/2024   Component Date Value Ref Range Status    Left Eye DM Retinopathy 10/27/2023 Positive   Final    Right Eye DM Retinopathy 10/27/2023 Positive   Final   Office Visit on 01/11/2024   Component Date Value Ref Range Status    POC Amphetamines 01/11/2024 Negative  Negative, Inconclusive Final    POC Barbiturates 01/11/2024 Negative  Negative, Inconclusive Final    POC Benzodiazepines 01/11/2024 Negative  Negative, Inconclusive Final    POC Cocaine 01/11/2024 Negative  Negative, Inconclusive Final    POC THC 01/11/2024 Negative  Negative, Inconclusive Final    POC Methadone 01/11/2024 Negative  Negative, Inconclusive Final    POC Methamphetamine 01/11/2024 Negative  Negative, Inconclusive Final    POC Opiates 01/11/2024 Presumptive Positive (A)  Negative, Inconclusive Final    POC Oxycodone 01/11/2024 Negative  Negative, Inconclusive Final    POC Phencyclidine 01/11/2024 Negative  Negative, Inconclusive Final    POC Methylenedioxymethamphetamine * 01/11/2024 Negative   Negative, Inconclusive Final    POC Tricyclic Antidepressants 01/11/2024 Negative  Negative, Inconclusive Final    POC Buprenorphine 01/11/2024 Negative   Final     Acceptable 01/11/2024 Yes   Final    POC Temperature (Urine) 01/11/2024 90   Final   Office Visit on 12/11/2023   Component Date Value Ref Range Status    Sodium 12/11/2023 141  136 - 145 mmol/L Final    Potassium 12/11/2023 4.8  3.5 - 5.1 mmol/L Final    Chloride 12/11/2023 108 (H)  98 - 107 mmol/L Final    CO2 12/11/2023 29  21 - 32 mmol/L Final    Anion Gap 12/11/2023 9  7 - 16 mmol/L Final    Glucose 12/11/2023 195 (H)  74 - 106 mg/dL Final    BUN 12/11/2023 32 (H)  7 - 18 mg/dL Final    Creatinine 12/11/2023 1.73 (H)  0.70 - 1.30 mg/dL Final    BUN/Creatinine Ratio 12/11/2023 18  6 - 20 Final    Calcium 12/11/2023 9.1  8.5 - 10.1 mg/dL Final    Total Protein 12/11/2023 6.6  6.4 - 8.2 g/dL Final    Albumin 12/11/2023 3.6  3.5 - 5.0 g/dL Final    Globulin 12/11/2023 3.0  2.0 - 4.0 g/dL Final    A/G Ratio 12/11/2023 1.2   Final    Bilirubin, Total 12/11/2023 0.6  >0.0 - 1.2 mg/dL Final    Alk Phos 12/11/2023 32 (L)  45 - 115 U/L Final    ALT 12/11/2023 27  16 - 61 U/L Final    AST 12/11/2023 30  15 - 37 U/L Final    eGFR 12/11/2023 42 (L)  >=60 mL/min/1.73m2 Final    Hemoglobin A1C 12/11/2023 8.9 (H)  4.5 - 6.6 % Final    Estimated Average Glucose 12/11/2023 209  mg/dL Final    Triglycerides 12/11/2023 166 (H)  35 - 150 mg/dL Final    Cholesterol 12/11/2023 78  0 - 200 mg/dL Final    HDL Cholesterol 12/11/2023 24 (L)  40 - 60 mg/dL Final    Cholesterol/HDL Ratio (Risk Factor) 12/11/2023 3.3   Final    Non-HDL 12/11/2023 54  mg/dL Final    LDL Calculated 12/11/2023 21  mg/dL Final    VLDL 12/11/2023 33  mg/dL Final    PSA Total 12/11/2023 0.432  <=4.000 ng/mL Final    WBC 12/11/2023 9.26  4.50 - 11.00 K/uL Final    RBC 12/11/2023 4.81  4.60 - 6.20 M/uL Final    Hemoglobin 12/11/2023 12.5 (L)  13.5 - 18.0 g/dL Final    Hematocrit  12/11/2023 40.6  40.0 - 54.0 % Final    MCV 12/11/2023 84.4  80.0 - 96.0 fL Final    MCH 12/11/2023 26.0 (L)  27.0 - 31.0 pg Final    MCHC 12/11/2023 30.8 (L)  32.0 - 36.0 g/dL Final    RDW 12/11/2023 14.8 (H)  11.5 - 14.5 % Final    Platelet Count 12/11/2023 191  150 - 400 K/uL Final    MPV 12/11/2023 12.8 (H)  9.4 - 12.4 fL Final    Neutrophils % 12/11/2023 48.2 (L)  53.0 - 65.0 % Final    Lymphocytes % 12/11/2023 42.4 (H)  27.0 - 41.0 % Final    Monocytes % 12/11/2023 5.7  2.0 - 6.0 % Final    Eosinophils % 12/11/2023 2.8  1.0 - 4.0 % Final    Basophils % 12/11/2023 0.5  0.0 - 1.0 % Final    Immature Granulocytes % 12/11/2023 0.4  0.0 - 0.4 % Final    nRBC, Auto 12/11/2023 0.0  <=0.0 % Final    Neutrophils, Abs 12/11/2023 4.45  1.80 - 7.70 K/uL Final    Lymphocytes, Absolute 12/11/2023 3.93  1.00 - 4.80 K/uL Final    Monocytes, Absolute 12/11/2023 0.53  0.00 - 0.80 K/uL Final    Eosinophils, Absolute 12/11/2023 0.26  0.00 - 0.50 K/uL Final    Basophils, Absolute 12/11/2023 0.05  0.00 - 0.20 K/uL Final    Immature Granulocytes, Absolute 12/11/2023 0.04  0.00 - 0.04 K/uL Final    nRBC, Absolute 12/11/2023 0.00  <=0.00 x10e3/uL Final    Diff Type 12/11/2023 Auto   Final         Orders Placed This Encounter   Procedures    POCT Urine Drug Screen Presump     Interpretive Information:     Negative:  No drug detected at the cut off level.   Positive:  This result represents presumptive positive for the   tested drug, other substances may yield a positive response other   than the analyte of interest. This result should be utilized for   diagnostic purpose only. Confirmation testing will be performed upon physician request only.            Requested Prescriptions     Signed Prescriptions Disp Refills    HYDROcodone-acetaminophen (NORCO)  mg per tablet 90 tablet 0     Sig: Take 1 tablet by mouth every 8 (eight) hours as needed for Pain (pain).    HYDROcodone-acetaminophen (NORCO)  mg per tablet 90 tablet 0      Sig: Take 1 tablet by mouth every 8 (eight) hours as needed for Pain (pain).    tiZANidine (ZANAFLEX) 4 MG tablet 90 tablet 2     Sig: Take 1 tablet (4 mg total) by mouth every 8 (eight) hours.    HYDROcodone-acetaminophen (NORCO)  mg per tablet 90 tablet 0     Sig: Take 1 tablet by mouth every 8 (eight) hours as needed for Pain (pain).       Assessment:     1. Lumbosacral radiculopathy    2. Cervical spondylosis without myelopathy    3. Encounter for long-term (current) use of other medications         A's of Opioid Risk Assessment  Activity:Patient can perform ADL.   Analgesia:Patients pain is partially controlled by current medication. Patient has tried OTC medications such as Tylenol and Ibuprofen with out relief.   Adverse Effects: Patient denies constipation or sedation.  Aberrant Behavior:  reviewed with no aberrant drug seeking/taking behavior.  Overdose reversal drug naloxone discussed    Drug screen reviewed      Plan:    Narcan January 2023    Anticoagulated Plavix    [facility] closed on weekends    Doing well current medication is helping control his discomfort    He would like to continue with conservative management    Continue home exercise program as directed    Continue current medication    Follow-up 3 months    Dr. Callahan, February 2023    Bring original prescription medication bottles/container/box with labels to each visit

## 2024-04-09 RX ORDER — GABAPENTIN 300 MG/1
CAPSULE ORAL
Qty: 270 CAPSULE | Refills: 0 | Status: SHIPPED | OUTPATIENT
Start: 2024-04-09 | End: 2024-06-12 | Stop reason: SDUPTHER

## 2024-04-09 RX ORDER — DAPAGLIFLOZIN 10 MG/1
TABLET, FILM COATED ORAL
Qty: 90 TABLET | Refills: 0 | Status: SHIPPED | OUTPATIENT
Start: 2024-04-09 | End: 2024-06-12 | Stop reason: SDUPTHER

## 2024-04-09 RX ORDER — METFORMIN HYDROCHLORIDE 500 MG/1
TABLET ORAL
Qty: 180 TABLET | Refills: 0 | Status: SHIPPED | OUTPATIENT
Start: 2024-04-09 | End: 2024-06-12 | Stop reason: SDUPTHER

## 2024-04-11 ENCOUNTER — OFFICE VISIT (OUTPATIENT)
Dept: PAIN MEDICINE | Facility: CLINIC | Age: 69
End: 2024-04-11
Payer: COMMERCIAL

## 2024-04-11 VITALS
SYSTOLIC BLOOD PRESSURE: 110 MMHG | RESPIRATION RATE: 18 BRPM | HEIGHT: 72 IN | WEIGHT: 181 LBS | BODY MASS INDEX: 24.52 KG/M2 | DIASTOLIC BLOOD PRESSURE: 54 MMHG | HEART RATE: 57 BPM

## 2024-04-11 DIAGNOSIS — M54.17 LUMBOSACRAL RADICULOPATHY: Primary | Chronic | ICD-10-CM

## 2024-04-11 DIAGNOSIS — M47.812 CERVICAL SPONDYLOSIS WITHOUT MYELOPATHY: Chronic | ICD-10-CM

## 2024-04-11 DIAGNOSIS — Z79.899 ENCOUNTER FOR LONG-TERM (CURRENT) USE OF OTHER MEDICATIONS: ICD-10-CM

## 2024-04-11 PROCEDURE — 1101F PT FALLS ASSESS-DOCD LE1/YR: CPT | Mod: CPTII,,, | Performed by: PHYSICIAN ASSISTANT

## 2024-04-11 PROCEDURE — 99999PBSHW POCT URINE DRUG SCREEN PRESUMP: Mod: PBBFAC,,,

## 2024-04-11 PROCEDURE — 4010F ACE/ARB THERAPY RXD/TAKEN: CPT | Mod: CPTII,,, | Performed by: PHYSICIAN ASSISTANT

## 2024-04-11 PROCEDURE — 99214 OFFICE O/P EST MOD 30 MIN: CPT | Mod: S$PBB,,, | Performed by: PHYSICIAN ASSISTANT

## 2024-04-11 PROCEDURE — 3008F BODY MASS INDEX DOCD: CPT | Mod: CPTII,,, | Performed by: PHYSICIAN ASSISTANT

## 2024-04-11 PROCEDURE — 1125F AMNT PAIN NOTED PAIN PRSNT: CPT | Mod: CPTII,,, | Performed by: PHYSICIAN ASSISTANT

## 2024-04-11 PROCEDURE — 1159F MED LIST DOCD IN RCRD: CPT | Mod: CPTII,,, | Performed by: PHYSICIAN ASSISTANT

## 2024-04-11 PROCEDURE — 3074F SYST BP LT 130 MM HG: CPT | Mod: CPTII,,, | Performed by: PHYSICIAN ASSISTANT

## 2024-04-11 PROCEDURE — 80305 DRUG TEST PRSMV DIR OPT OBS: CPT | Mod: PBBFAC | Performed by: PHYSICIAN ASSISTANT

## 2024-04-11 PROCEDURE — 3078F DIAST BP <80 MM HG: CPT | Mod: CPTII,,, | Performed by: PHYSICIAN ASSISTANT

## 2024-04-11 PROCEDURE — 3288F FALL RISK ASSESSMENT DOCD: CPT | Mod: CPTII,,, | Performed by: PHYSICIAN ASSISTANT

## 2024-04-11 PROCEDURE — 99213 OFFICE O/P EST LOW 20 MIN: CPT | Mod: PBBFAC | Performed by: PHYSICIAN ASSISTANT

## 2024-04-11 RX ORDER — TIZANIDINE 4 MG/1
4 TABLET ORAL EVERY 8 HOURS
Qty: 90 TABLET | Refills: 2 | Status: SHIPPED | OUTPATIENT
Start: 2024-04-11 | End: 2024-07-10

## 2024-04-11 RX ORDER — HYDROCODONE BITARTRATE AND ACETAMINOPHEN 10; 325 MG/1; MG/1
1 TABLET ORAL EVERY 8 HOURS PRN
Qty: 90 TABLET | Refills: 0 | Status: SHIPPED | OUTPATIENT
Start: 2024-06-13 | End: 2024-07-13

## 2024-04-11 RX ORDER — HYDROCODONE BITARTRATE AND ACETAMINOPHEN 10; 325 MG/1; MG/1
1 TABLET ORAL EVERY 8 HOURS PRN
Qty: 90 TABLET | Refills: 0 | Status: SHIPPED | OUTPATIENT
Start: 2024-05-13 | End: 2024-06-12

## 2024-04-11 RX ORDER — HYDROCODONE BITARTRATE AND ACETAMINOPHEN 10; 325 MG/1; MG/1
1 TABLET ORAL EVERY 8 HOURS PRN
Qty: 90 TABLET | Refills: 0 | Status: SHIPPED | OUTPATIENT
Start: 2024-04-12 | End: 2024-05-12

## 2024-04-17 RX ORDER — ESCITALOPRAM OXALATE 10 MG/1
10 TABLET ORAL DAILY
Qty: 90 TABLET | Refills: 0 | Status: SHIPPED | OUTPATIENT
Start: 2024-04-17 | End: 2024-06-12 | Stop reason: SDUPTHER

## 2024-04-17 NOTE — TELEPHONE ENCOUNTER
----- Message from Ofelia Duque sent at 4/17/2024 11:31 AM CDT -----  Regarding: REFILL  PATIENT CALL TO GET A REFILL ON (ESCITALOPRAM) TO [facility], CALL BACK NUMBER [phone]

## 2024-04-29 RX ORDER — OMEPRAZOLE 20 MG/1
20 CAPSULE, DELAYED RELEASE ORAL
Qty: 90 CAPSULE | Refills: 0 | Status: SHIPPED | OUTPATIENT
Start: 2024-04-29 | End: 2024-06-12 | Stop reason: SDUPTHER

## 2024-05-15 DIAGNOSIS — I10 HYPERTENSION, UNSPECIFIED TYPE: ICD-10-CM

## 2024-05-15 RX ORDER — LISINOPRIL 20 MG/1
20 TABLET ORAL DAILY
Qty: 90 TABLET | Refills: 1 | Status: SHIPPED | OUTPATIENT
Start: 2024-05-15 | End: 2024-05-30 | Stop reason: SDUPTHER

## 2024-05-30 ENCOUNTER — OFFICE VISIT (OUTPATIENT)
Dept: CARDIOLOGY | Facility: CLINIC | Age: 69
End: 2024-05-30
Payer: COMMERCIAL

## 2024-05-30 VITALS
OXYGEN SATURATION: 96 % | WEIGHT: 186.81 LBS | HEART RATE: 59 BPM | BODY MASS INDEX: 26.15 KG/M2 | SYSTOLIC BLOOD PRESSURE: 118 MMHG | DIASTOLIC BLOOD PRESSURE: 58 MMHG | HEIGHT: 71 IN

## 2024-05-30 DIAGNOSIS — I51.9 LEFT VENTRICULAR DIASTOLIC DYSFUNCTION: ICD-10-CM

## 2024-05-30 DIAGNOSIS — I25.10 CORONARY ARTERY DISEASE INVOLVING NATIVE CORONARY ARTERY OF NATIVE HEART, UNSPECIFIED WHETHER ANGINA PRESENT: ICD-10-CM

## 2024-05-30 DIAGNOSIS — I10 HYPERTENSION, UNSPECIFIED TYPE: ICD-10-CM

## 2024-05-30 DIAGNOSIS — I25.9 CHEST PAIN DUE TO MYOCARDIAL ISCHEMIA, UNSPECIFIED ISCHEMIC CHEST PAIN TYPE: ICD-10-CM

## 2024-05-30 DIAGNOSIS — E78.5 HYPERLIPIDEMIA, UNSPECIFIED HYPERLIPIDEMIA TYPE: ICD-10-CM

## 2024-05-30 DIAGNOSIS — I35.8 AORTIC VALVE SCLEROSIS: ICD-10-CM

## 2024-05-30 DIAGNOSIS — I25.2 OLD MI (MYOCARDIAL INFARCTION): ICD-10-CM

## 2024-05-30 DIAGNOSIS — I25.10 CORONARY ARTERY DISEASE, UNSPECIFIED VESSEL OR LESION TYPE, UNSPECIFIED WHETHER ANGINA PRESENT, UNSPECIFIED WHETHER NATIVE OR TRANSPLANTED HEART: Primary | ICD-10-CM

## 2024-05-30 DIAGNOSIS — I34.0 MILD MITRAL REGURGITATION BY PRIOR ECHOCARDIOGRAM: ICD-10-CM

## 2024-05-30 PROBLEM — R07.9 CHEST PAIN: Status: RESOLVED | Noted: 2022-06-15 | Resolved: 2024-05-30

## 2024-05-30 PROCEDURE — 93010 ELECTROCARDIOGRAM REPORT: CPT | Mod: S$PBB,,, | Performed by: INTERNAL MEDICINE

## 2024-05-30 PROCEDURE — 99213 OFFICE O/P EST LOW 20 MIN: CPT | Mod: PBBFAC | Performed by: NURSE PRACTITIONER

## 2024-05-30 PROCEDURE — 3288F FALL RISK ASSESSMENT DOCD: CPT | Mod: CPTII,,, | Performed by: NURSE PRACTITIONER

## 2024-05-30 PROCEDURE — 1126F AMNT PAIN NOTED NONE PRSNT: CPT | Mod: CPTII,,, | Performed by: NURSE PRACTITIONER

## 2024-05-30 PROCEDURE — 1101F PT FALLS ASSESS-DOCD LE1/YR: CPT | Mod: CPTII,,, | Performed by: NURSE PRACTITIONER

## 2024-05-30 PROCEDURE — 3008F BODY MASS INDEX DOCD: CPT | Mod: CPTII,,, | Performed by: NURSE PRACTITIONER

## 2024-05-30 PROCEDURE — 1160F RVW MEDS BY RX/DR IN RCRD: CPT | Mod: CPTII,,, | Performed by: NURSE PRACTITIONER

## 2024-05-30 PROCEDURE — 1159F MED LIST DOCD IN RCRD: CPT | Mod: CPTII,,, | Performed by: NURSE PRACTITIONER

## 2024-05-30 PROCEDURE — 4010F ACE/ARB THERAPY RXD/TAKEN: CPT | Mod: CPTII,,, | Performed by: NURSE PRACTITIONER

## 2024-05-30 PROCEDURE — 3078F DIAST BP <80 MM HG: CPT | Mod: CPTII,,, | Performed by: NURSE PRACTITIONER

## 2024-05-30 PROCEDURE — 99214 OFFICE O/P EST MOD 30 MIN: CPT | Mod: S$PBB,,, | Performed by: NURSE PRACTITIONER

## 2024-05-30 PROCEDURE — 3074F SYST BP LT 130 MM HG: CPT | Mod: CPTII,,, | Performed by: NURSE PRACTITIONER

## 2024-05-30 PROCEDURE — 93005 ELECTROCARDIOGRAM TRACING: CPT | Mod: PBBFAC | Performed by: INTERNAL MEDICINE

## 2024-05-30 RX ORDER — LISINOPRIL 20 MG/1
20 TABLET ORAL DAILY
Qty: 90 TABLET | Refills: 1 | Status: SHIPPED | OUTPATIENT
Start: 2024-05-30 | End: 2024-11-26

## 2024-05-30 RX ORDER — METOPROLOL SUCCINATE 25 MG/1
25 TABLET, EXTENDED RELEASE ORAL DAILY
Qty: 90 TABLET | Refills: 1 | Status: SHIPPED | OUTPATIENT
Start: 2024-05-30

## 2024-05-30 RX ORDER — AMLODIPINE BESYLATE 5 MG/1
5 TABLET ORAL DAILY
Qty: 90 TABLET | Refills: 1 | Status: SHIPPED | OUTPATIENT
Start: 2024-05-30

## 2024-05-30 RX ORDER — NITROGLYCERIN 0.4 MG/1
0.4 TABLET SUBLINGUAL EVERY 5 MIN PRN
Qty: 25 TABLET | Refills: 3 | Status: SHIPPED | OUTPATIENT
Start: 2024-05-30 | End: 2025-05-30

## 2024-05-30 NOTE — PROGRESS NOTES
"PCP: Rodney Morel MD    Referring Provider:     Subjective:   Micheal Taylor is a 68 y.o. male with hx of CAD, HTN, DM, CHF, HLD, and PVD,  who presents for routine follow up. He states that he is doing well and denies complaints. He does have chronic, dependent edema of the LLE since his injury of his left foot and the saphenous vein harvest from the LLE at time of CABG.     2023 presents for routine follow up. He reports episodes of left shoulder pain; dull pan similar to MI pain; resolved with rest and at times one sublingual ntg; Discomfort does not occur with exertion but after "working all day" occurs later in the evening. Patient feels may be r/t muscle pain from over use. This has been ongoing for at least 6-8 months and has occurred only 2-3 times in the last 6 months.      2023 presents for routine follow up. He states that he is doing well and has had no cardiac issues.         Fhx:  Family History   Problem Relation Name Age of Onset    Diabetes Mother      Heart disease Mother      Hypertension Mother      Liver cancer Mother      Heart disease Father      Hypertension Father       Shx:   Social History     Socioeconomic History    Marital status:     Number of children: 5   Occupational History    Occupation: retired   Tobacco Use    Smoking status: Former     Current packs/day: 0.00     Average packs/day: 4.0 packs/day for 30.0 years (120.0 ttl pk-yrs)     Types: Cigarettes     Start date:      Quit date: 2005     Years since quittin.4     Passive exposure: Past (parent)    Smokeless tobacco: Current     Types: Chew   Substance and Sexual Activity    Alcohol use: Not Currently    Drug use: Yes     Types: Hydrocodone    Sexual activity: Not Currently     Social Determinants of Health     Financial Resource Strain: Low Risk  (3/26/2024)    Overall Financial Resource Strain (CARDIA)     Difficulty of Paying Living Expenses: Not hard at all   Food Insecurity: No Food " Insecurity (3/26/2024)    Hunger Vital Sign     Worried About Running Out of Food in the Last Year: Never true     Ran Out of Food in the Last Year: Never true   Transportation Needs: No Transportation Needs (3/26/2024)    PRAPARE - Transportation     Lack of Transportation (Medical): No     Lack of Transportation (Non-Medical): No   Physical Activity: Insufficiently Active (3/26/2024)    Exercise Vital Sign     Days of Exercise per Week: 7 days     Minutes of Exercise per Session: 10 min   Stress: No Stress Concern Present (3/26/2024)    [employer] [place] [employer] - Occupational Stress Questionnaire     Feeling of Stress : Not at all   Housing Stability: Low Risk  (3/26/2024)    Housing Stability Vital Sign     Unable to Pay for Housing in the Last Year: No     Number of Places Lived in the Last Year: 1     Unstable Housing in the Last Year: No       EKG   5/30/2024 sinus bradycardia; HR 59 bpm; inferior infarct (cited on or before 11/30/2023); when compared with EKG 11/30/2023 TWI now evident in the inferior leads  11/30/2023 RSR with HR 60 bpm; q waves in leads III and aVF  5/31/2023 sinus bradycardia; HR 50 bpm; o/w normal EKG  7/20/2022 sinus bradycardia; HR 54 bpm; o/w normal EKG    ECHO Results for orders placed during the hospital encounter of 05/25/22    Echo Saline Bubble? No    Interpretation Summary  · The left ventricle is normal in size with moderate concentric hypertrophy and normal systolic function.  · The estimated ejection fraction is 55%.  · Atrial fibrillation not observed.  · Normal left ventricular diastolic function.  · Mild-to-moderate mitral regurgitation.    [facility] Results for orders placed during the hospital encounter of 07/19/22    Cardiac catheterization    Conclusion  · There was severe left ventricular systolic dysfunction.  · The left ventricular end diastolic pressure was severely elevated.  · The pre-procedure left ventricular end diastolic pressure was 30.  · The  ejection fraction was calculated to be 25%.  · There was no mitral valve regurgitation.  · The Mid LM lesion was 95% stenosed with 15% stenosis post-intervention.  · A STENT S UplikeTRON US MR 4.00 X 12MM stent was successfully placed at 14 THERESE for 14 sec.  · The Ost Cx to Prox Cx lesion was 100% stenosed.  · The 1st Mrg lesion was 99% stenosed.  · The Mid Cx lesion was 100% stenosed.  · The Prox RCA to Mid RCA lesion was 99% stenosed.  · The Mid RCA lesion was 100% stenosed.  · The estimated blood loss was none.  · There was three vessel coronary artery disease. There was left main disease.    The procedure log was documented by Documenter: RT Pa and verified by Venu Whitlock MD.    Date: 7/19/2022  Time: 4:10 PM        Lab Results   Component Value Date     12/11/2023    K 4.8 12/11/2023     (H) 12/11/2023    CO2 29 12/11/2023    BUN 32 (H) 12/11/2023    CREATININE 1.73 (H) 12/11/2023    CALCIUM 9.1 12/11/2023    ANIONGAP 9 12/11/2023    ESTGFRAFRICA 47 12/12/2020    EGFRNONAA 32 (L) 07/14/2022       Lab Results   Component Value Date    CHOL 78 12/11/2023    CHOL 85 03/01/2023    CHOL 93 02/28/2022     Lab Results   Component Value Date    HDL 24 (L) 12/11/2023    HDL 26 (L) 03/01/2023    HDL 31 (L) 02/28/2022     Lab Results   Component Value Date    LDLCALC 21 12/11/2023    LDLCALC 30 03/01/2023    LDLCALC 32 02/28/2022     Lab Results   Component Value Date    TRIG 166 (H) 12/11/2023    TRIG 144 03/01/2023    TRIG 152 (H) 02/28/2022     Lab Results   Component Value Date    CHOLHDL 3.3 12/11/2023    CHOLHDL 3.3 03/01/2023    CHOLHDL 3.0 02/28/2022       Lab Results   Component Value Date    WBC 9.26 12/11/2023    HGB 12.5 (L) 12/11/2023    HCT 40.6 12/11/2023    MCV 84.4 12/11/2023     12/11/2023           Current Outpatient Medications:     aspirin (ECOTRIN) 81 MG EC tablet, Take 81 mg by mouth once daily., Disp: , Rfl:     cholecalciferol, vitamin D3, (VITAMIN D3) 25 mcg  (1,000 unit) capsule, Take 1,000 Units by mouth once daily., Disp: , Rfl:     cinnamon bark 500 mg capsule, Take 500 mg by mouth once daily. , Disp: , Rfl:     clopidogreL (PLAVIX) 75 mg tablet, Take 1 tablet (75 mg total) by mouth once daily., Disp: 30 tablet, Rfl: 11    cyanocobalamin (VITAMIN B-12) 1000 MCG tablet, Take 100 mcg by mouth once daily., Disp: , Rfl:     EScitalopram oxalate (LEXAPRO) 10 MG tablet, Take 1 tablet (10 mg total) by mouth once daily., Disp: 90 tablet, Rfl: 0    FARXIGA 10 mg tablet, TAKE ONE TABLET BY MOUTH ONCE DAILY (VIAL), Disp: 90 tablet, Rfl: 0    gabapentin (NEURONTIN) 300 MG capsule, TAKE ONE CAPSULE BY MOUTH THREE TIMES DAILY (VIAL), Disp: 270 capsule, Rfl: 0    HYDROcodone-acetaminophen (NORCO)  mg per tablet, Take 1 tablet by mouth every 8 (eight) hours as needed for Pain (pain)., Disp: 90 tablet, Rfl: 0    [START ON 6/13/2024] HYDROcodone-acetaminophen (NORCO)  mg per tablet, Take 1 tablet by mouth every 8 (eight) hours as needed for Pain (pain)., Disp: 90 tablet, Rfl: 0    liraglutide 0.6 mg/0.1 mL, 18 mg/3 mL, subq PNIJ (VICTOZA 2-CAITLIN) 0.6 mg/0.1 mL (18 mg/3 mL) PnIj pen, Inject 1.8 mg into the skin once daily., Disp: 27 mL, Rfl: 0    metFORMIN (GLUCOPHAGE) 500 MG tablet, TAKE ONE TABLET BY MOUTH TWICE DAILY WITH MEALS (VIAL), Disp: 180 tablet, Rfl: 0    naloxone (NARCAN) 4 mg/actuation Spry, 1 spray by Nasal route once., Disp: , Rfl:     omeprazole (PRILOSEC) 20 MG capsule, TAKE 1 CAPSULE BY MOUTH EVERY DAY, Disp: 90 capsule, Rfl: 0    rosuvastatin (CRESTOR) 40 MG Tab, TAKE 1 TABLET (40 MG TOTAL) BY MOUTH EVERY EVENING., Disp: 90 tablet, Rfl: 3    tiZANidine (ZANAFLEX) 4 MG tablet, Take 1 tablet (4 mg total) by mouth every 8 (eight) hours., Disp: 90 tablet, Rfl: 2    TRESIBA FLEXTOUCH U-200 200 unit/mL (3 mL) insulin pen, Inject 54 Units into the skin once daily. , Disp: , Rfl:     VASCEPA 1 gram Cap, Take 2 capsules by mouth 2 (two) times a day., Disp: , Rfl:      vitamin A 8000 UNIT capsule, Take 8,000 Units by mouth., Disp: , Rfl:     vitamin E 400 UNIT capsule, Take 400 Units by mouth once daily. , Disp: , Rfl:     amLODIPine (NORVASC) 5 MG tablet, Take 1 tablet (5 mg total) by mouth once daily., Disp: 90 tablet, Rfl: 1    ezetimibe (ZETIA) 10 mg tablet, Take 1 tablet (10 mg total) by mouth once daily. (Patient not taking: Reported on 5/30/2024), Disp: 90 tablet, Rfl: 1    fenofibrate (TRICOR) 145 MG tablet, Take 1 tablet (145 mg total) by mouth once daily. (Patient not taking: Reported on 5/30/2024), Disp: 90 tablet, Rfl: 0    hydrALAZINE (APRESOLINE) 50 MG tablet, Take 1 tablet (50 mg total) by mouth 4 (four) times daily. (Patient not taking: Reported on 5/30/2024), Disp: 360 tablet, Rfl: 1    insulin regular 100 unit/mL Inj injection, Novolin R Regular U-100 Insuln  14-32 u bidwm (Patient not taking: Reported on 5/30/2024), Disp: , Rfl:     lisinopriL (PRINIVIL,ZESTRIL) 20 MG tablet, Take 1 tablet (20 mg total) by mouth once daily., Disp: 90 tablet, Rfl: 1    magnesium 250 mg Tab, Take 1 tablet by mouth once daily.  (Patient not taking: Reported on 5/30/2024), Disp: , Rfl:     metoprolol succinate (TOPROL-XL) 25 MG 24 hr tablet, Take 1 tablet (25 mg total) by mouth once daily., Disp: 90 tablet, Rfl: 1    nitroGLYCERIN (NITROSTAT) 0.4 MG SL tablet, Place 1 tablet (0.4 mg total) under the tongue every 5 (five) minutes as needed for Chest pain., Disp: 25 tablet, Rfl: 3    potassium chloride SA (K-DUR,KLOR-CON) 20 MEQ tablet, Take 1 tablet (20 mEq total) by mouth once daily. (Patient not taking: Reported on 5/30/2024), Disp: 90 tablet, Rfl: 0    rOPINIRole (REQUIP) 0.5 MG tablet, Take 0.5 mg by mouth every evening. (Patient not taking: Reported on 5/30/2024), Disp: , Rfl:     spironolactone (ALDACTONE) 25 MG tablet, Take 25 mg by mouth 2 (two) times daily. (Patient not taking: Reported on 5/30/2024), Disp: , Rfl:   Meds reviewed and reconciled.       Review of Systems  "  Respiratory:  Negative for shortness of breath.    Cardiovascular:  Positive for leg swelling. Negative for chest pain, palpitations, orthopnea, claudication and PND.   Neurological:  Negative for dizziness, loss of consciousness and weakness.           Objective:   BP (!) 118/58 (BP Location: Left arm, Patient Position: Sitting)   Pulse (!) 59   Ht 5' 11" (1.803 m)   Wt 84.7 kg (186 lb 12.8 oz)   SpO2 96%   BMI 26.05 kg/m²     Physical Exam  Vitals and nursing note reviewed.   Constitutional:       Appearance: Normal appearance. He is normal weight.   HENT:      Head: Normocephalic and atraumatic.   Neck:      Vascular: No carotid bruit.   Cardiovascular:      Rate and Rhythm: Normal rate and regular rhythm.      Pulses: Normal pulses.      Heart sounds: Normal heart sounds.   Pulmonary:      Effort: Pulmonary effort is normal.      Breath sounds: Normal breath sounds.   Abdominal:      Palpations: Abdomen is soft.   Musculoskeletal:      Cervical back: Neck supple.      Right lower leg: No edema.      Left lower leg: No edema.   Skin:     General: Skin is warm and dry.      Capillary Refill: Capillary refill takes less than 2 seconds.   Neurological:      Mental Status: He is alert.           Assessment:     1. Coronary artery disease, unspecified vessel or lesion type, unspecified whether angina present, unspecified whether native or transplanted heart  EKG 12-lead      2. Hypertension, unspecified type  amLODIPine (NORVASC) 5 MG tablet    lisinopriL (PRINIVIL,ZESTRIL) 20 MG tablet    metoprolol succinate (TOPROL-XL) 25 MG 24 hr tablet      3. Coronary artery disease involving native coronary artery of native heart, unspecified whether angina present  nitroGLYCERIN (NITROSTAT) 0.4 MG SL tablet      4. Chest pain due to myocardial ischemia, unspecified ischemic chest pain type  nitroGLYCERIN (NITROSTAT) 0.4 MG SL tablet      5. Aortic valve sclerosis        6. Hyperlipidemia, unspecified hyperlipidemia type "        7. Left ventricular diastolic dysfunction        8. Mild mitral regurgitation by prior echocardiogram        9. Old MI (myocardial infarction)              Plan:   Aortic valve sclerosis  Mild aortic sclerosis by echo 5/25/2022  No aortic stenosis    Coronary artery disease  S/p CABG 1/23/2020- Dr. Levy  SVG to the dLAD; SVG to the OM and SVG to the PL  2/7/2019 BECKIE dLAD and mid LAD to overlap the BECKIE in the dLAD  Asymptomatic  Continue ASA/Plavix/Crestor       Hyperlipidemia  Lab Results   Component Value Date    CHOL 78 12/11/2023    CHOL 85 03/01/2023    CHOL 93 02/28/2022     Lab Results   Component Value Date    HDL 24 (L) 12/11/2023    HDL 26 (L) 03/01/2023    HDL 31 (L) 02/28/2022     Lab Results   Component Value Date    LDLCALC 21 12/11/2023    LDLCALC 30 03/01/2023    LDLCALC 32 02/28/2022     Lab Results   Component Value Date    TRIG 166 (H) 12/11/2023    TRIG 144 03/01/2023    TRIG 152 (H) 02/28/2022       Lab Results   Component Value Date    CHOLHDL 3.3 12/11/2023    CHOLHDL 3.3 03/01/2023    CHOLHDL 3.0 02/28/2022     Crestor 10 mg po daily  Vascepa 2 gm po bid    Hypertension  118/58 mmHg    Left ventricular diastolic dysfunction  LVEDP 30 mmHg- 7/19/2022    Mild mitral regurgitation by prior echocardiogram  Mild to moderate MR by echo 5/25/2022    Old MI (myocardial infarction)  X3     RTC: 6 months

## 2024-05-30 NOTE — ASSESSMENT & PLAN NOTE
S/p CABG 1/23/2020- Dr. Levy  SVG to the dLAD; SVG to the OM and SVG to the PL  2/7/2019 BECKIE dLAD and mid LAD to overlap the BECKIE in the dLAD  Asymptomatic  Continue ASA/Plavix/Crestor

## 2024-05-30 NOTE — ASSESSMENT & PLAN NOTE
Lab Results   Component Value Date    CHOL 78 12/11/2023    CHOL 85 03/01/2023    CHOL 93 02/28/2022     Lab Results   Component Value Date    HDL 24 (L) 12/11/2023    HDL 26 (L) 03/01/2023    HDL 31 (L) 02/28/2022     Lab Results   Component Value Date    LDLCALC 21 12/11/2023    LDLCALC 30 03/01/2023    LDLCALC 32 02/28/2022     Lab Results   Component Value Date    TRIG 166 (H) 12/11/2023    TRIG 144 03/01/2023    TRIG 152 (H) 02/28/2022       Lab Results   Component Value Date    CHOLHDL 3.3 12/11/2023    CHOLHDL 3.3 03/01/2023    CHOLHDL 3.0 02/28/2022     Crestor 10 mg po daily  Vascepa 2 gm po bid

## 2024-05-31 LAB
OHS QRS DURATION: 106 MS
OHS QTC CALCULATION: 431 MS

## 2024-06-12 ENCOUNTER — OFFICE VISIT (OUTPATIENT)
Dept: FAMILY MEDICINE | Facility: CLINIC | Age: 69
End: 2024-06-12
Payer: MEDICARE

## 2024-06-12 VITALS
SYSTOLIC BLOOD PRESSURE: 122 MMHG | WEIGHT: 186.19 LBS | TEMPERATURE: 98 F | OXYGEN SATURATION: 97 % | HEART RATE: 51 BPM | DIASTOLIC BLOOD PRESSURE: 70 MMHG | HEIGHT: 71 IN | BODY MASS INDEX: 26.06 KG/M2

## 2024-06-12 DIAGNOSIS — E78.5 HYPERLIPIDEMIA, UNSPECIFIED HYPERLIPIDEMIA TYPE: ICD-10-CM

## 2024-06-12 DIAGNOSIS — I10 HYPERTENSION, UNSPECIFIED TYPE: Primary | ICD-10-CM

## 2024-06-12 DIAGNOSIS — Z79.4 TYPE 2 DIABETES MELLITUS WITH DIABETIC NEUROPATHY, WITH LONG-TERM CURRENT USE OF INSULIN: ICD-10-CM

## 2024-06-12 DIAGNOSIS — R73.9 HEMOGLOBIN A1C BETWEEN 7.0% AND 9.0%: ICD-10-CM

## 2024-06-12 DIAGNOSIS — E11.40 TYPE 2 DIABETES MELLITUS WITH DIABETIC NEUROPATHY, WITH LONG-TERM CURRENT USE OF INSULIN: ICD-10-CM

## 2024-06-12 LAB
ALBUMIN SERPL BCP-MCNC: 3.6 G/DL (ref 3.5–5)
ALBUMIN/GLOB SERPL: 1.2 {RATIO}
ALP SERPL-CCNC: 31 U/L (ref 45–115)
ALT SERPL W P-5'-P-CCNC: 19 U/L (ref 16–61)
ANION GAP SERPL CALCULATED.3IONS-SCNC: 10 MMOL/L (ref 7–16)
AST SERPL W P-5'-P-CCNC: 19 U/L (ref 15–37)
BASOPHILS # BLD AUTO: 0.07 K/UL (ref 0–0.2)
BASOPHILS NFR BLD AUTO: 0.8 % (ref 0–1)
BILIRUB SERPL-MCNC: 0.4 MG/DL (ref ?–1.2)
BUN SERPL-MCNC: 44 MG/DL (ref 7–18)
BUN/CREAT SERPL: 24 (ref 6–20)
CALCIUM SERPL-MCNC: 8.8 MG/DL (ref 8.5–10.1)
CHLORIDE SERPL-SCNC: 106 MMOL/L (ref 98–107)
CHOLEST SERPL-MCNC: 122 MG/DL (ref 0–200)
CHOLEST/HDLC SERPL: 4.4 {RATIO}
CO2 SERPL-SCNC: 29 MMOL/L (ref 21–32)
CREAT SERPL-MCNC: 1.83 MG/DL (ref 0.7–1.3)
CREAT UR-MCNC: 49 MG/DL (ref 39–259)
DIFFERENTIAL METHOD BLD: ABNORMAL
EGFR (NO RACE VARIABLE) (RUSH/TITUS): 40 ML/MIN/1.73M2
EOSINOPHIL # BLD AUTO: 0.6 K/UL (ref 0–0.5)
EOSINOPHIL NFR BLD AUTO: 6.6 % (ref 1–4)
ERYTHROCYTE [DISTWIDTH] IN BLOOD BY AUTOMATED COUNT: 15.9 % (ref 11.5–14.5)
EST. AVERAGE GLUCOSE BLD GHB EST-MCNC: 157 MG/DL
GLOBULIN SER-MCNC: 3 G/DL (ref 2–4)
GLUCOSE SERPL-MCNC: 149 MG/DL (ref 74–106)
HBA1C MFR BLD HPLC: 7.1 % (ref 4.5–6.6)
HCT VFR BLD AUTO: 40 % (ref 40–54)
HDLC SERPL-MCNC: 28 MG/DL (ref 40–60)
HGB BLD-MCNC: 11.8 G/DL (ref 13.5–18)
IMM GRANULOCYTES # BLD AUTO: 0.03 K/UL (ref 0–0.04)
IMM GRANULOCYTES NFR BLD: 0.3 % (ref 0–0.4)
LDLC SERPL CALC-MCNC: 56 MG/DL
LDLC/HDLC SERPL: 2 {RATIO}
LYMPHOCYTES # BLD AUTO: 4.7 K/UL (ref 1–4.8)
LYMPHOCYTES NFR BLD AUTO: 51.7 % (ref 27–41)
MCH RBC QN AUTO: 25.4 PG (ref 27–31)
MCHC RBC AUTO-ENTMCNC: 29.5 G/DL (ref 32–36)
MCV RBC AUTO: 86 FL (ref 80–96)
MICROALBUMIN UR-MCNC: 5.4 MG/DL (ref 0–2.8)
MICROALBUMIN/CREAT RATIO PNL UR: 110.2 MG/G (ref 0–30)
MONOCYTES # BLD AUTO: 0.54 K/UL (ref 0–0.8)
MONOCYTES NFR BLD AUTO: 5.9 % (ref 2–6)
MPC BLD CALC-MCNC: 12.2 FL (ref 9.4–12.4)
NEUTROPHILS # BLD AUTO: 3.15 K/UL (ref 1.8–7.7)
NEUTROPHILS NFR BLD AUTO: 34.7 % (ref 53–65)
NONHDLC SERPL-MCNC: 94 MG/DL
NRBC # BLD AUTO: 0 X10E3/UL
NRBC, AUTO (.00): 0 %
PLATELET # BLD AUTO: 152 K/UL (ref 150–400)
POTASSIUM SERPL-SCNC: 5 MMOL/L (ref 3.5–5.1)
PROT SERPL-MCNC: 6.6 G/DL (ref 6.4–8.2)
RBC # BLD AUTO: 4.65 M/UL (ref 4.6–6.2)
SODIUM SERPL-SCNC: 140 MMOL/L (ref 136–145)
TRIGL SERPL-MCNC: 191 MG/DL (ref 35–150)
VLDLC SERPL-MCNC: 38 MG/DL
WBC # BLD AUTO: 9.09 K/UL (ref 4.5–11)

## 2024-06-12 PROCEDURE — 83036 HEMOGLOBIN GLYCOSYLATED A1C: CPT | Mod: ,,, | Performed by: CLINICAL MEDICAL LABORATORY

## 2024-06-12 PROCEDURE — 82043 UR ALBUMIN QUANTITATIVE: CPT | Mod: ,,, | Performed by: CLINICAL MEDICAL LABORATORY

## 2024-06-12 PROCEDURE — 3074F SYST BP LT 130 MM HG: CPT | Mod: ,,, | Performed by: FAMILY MEDICINE

## 2024-06-12 PROCEDURE — 3078F DIAST BP <80 MM HG: CPT | Mod: ,,, | Performed by: FAMILY MEDICINE

## 2024-06-12 PROCEDURE — 3066F NEPHROPATHY DOC TX: CPT | Mod: ,,, | Performed by: FAMILY MEDICINE

## 2024-06-12 PROCEDURE — 85025 COMPLETE CBC W/AUTO DIFF WBC: CPT | Mod: ,,, | Performed by: CLINICAL MEDICAL LABORATORY

## 2024-06-12 PROCEDURE — 3288F FALL RISK ASSESSMENT DOCD: CPT | Mod: ,,, | Performed by: FAMILY MEDICINE

## 2024-06-12 PROCEDURE — 99214 OFFICE O/P EST MOD 30 MIN: CPT | Mod: ,,, | Performed by: FAMILY MEDICINE

## 2024-06-12 PROCEDURE — 3051F HG A1C>EQUAL 7.0%<8.0%: CPT | Mod: ,,, | Performed by: FAMILY MEDICINE

## 2024-06-12 PROCEDURE — 1101F PT FALLS ASSESS-DOCD LE1/YR: CPT | Mod: ,,, | Performed by: FAMILY MEDICINE

## 2024-06-12 PROCEDURE — 1159F MED LIST DOCD IN RCRD: CPT | Mod: ,,, | Performed by: FAMILY MEDICINE

## 2024-06-12 PROCEDURE — 4010F ACE/ARB THERAPY RXD/TAKEN: CPT | Mod: ,,, | Performed by: FAMILY MEDICINE

## 2024-06-12 PROCEDURE — 3008F BODY MASS INDEX DOCD: CPT | Mod: ,,, | Performed by: FAMILY MEDICINE

## 2024-06-12 PROCEDURE — 3060F POS MICROALBUMINURIA REV: CPT | Mod: ,,, | Performed by: FAMILY MEDICINE

## 2024-06-12 PROCEDURE — 80061 LIPID PANEL: CPT | Mod: ,,, | Performed by: CLINICAL MEDICAL LABORATORY

## 2024-06-12 PROCEDURE — 80053 COMPREHEN METABOLIC PANEL: CPT | Mod: ,,, | Performed by: CLINICAL MEDICAL LABORATORY

## 2024-06-12 PROCEDURE — 82570 ASSAY OF URINE CREATININE: CPT | Mod: ,,, | Performed by: CLINICAL MEDICAL LABORATORY

## 2024-06-12 RX ORDER — DAPAGLIFLOZIN 10 MG/1
10 TABLET, FILM COATED ORAL DAILY
Qty: 90 TABLET | Refills: 0 | Status: SHIPPED | OUTPATIENT
Start: 2024-06-12

## 2024-06-12 RX ORDER — ESCITALOPRAM OXALATE 10 MG/1
10 TABLET ORAL DAILY
Qty: 90 TABLET | Refills: 0 | Status: SHIPPED | OUTPATIENT
Start: 2024-06-12 | End: 2025-06-12

## 2024-06-12 RX ORDER — OMEPRAZOLE 20 MG/1
20 CAPSULE, DELAYED RELEASE ORAL DAILY
Qty: 90 CAPSULE | Refills: 0 | Status: SHIPPED | OUTPATIENT
Start: 2024-06-12

## 2024-06-12 RX ORDER — GABAPENTIN 300 MG/1
300 CAPSULE ORAL 3 TIMES DAILY
Qty: 270 CAPSULE | Refills: 0 | Status: SHIPPED | OUTPATIENT
Start: 2024-06-12

## 2024-06-12 RX ORDER — METFORMIN HYDROCHLORIDE 500 MG/1
500 TABLET ORAL 2 TIMES DAILY WITH MEALS
Qty: 180 TABLET | Refills: 0 | Status: SHIPPED | OUTPATIENT
Start: 2024-06-12

## 2024-06-17 NOTE — PROGRESS NOTES
Rodney Morel MD   [facility]  [address] [place] [address]     PATIENT NAME: Micheal Taylor  : 1955  DATE: 24  MRN: [id number]      Billing Provider: Rodney Morel MD  Level of Service: CA OFFICE/OUTPT VISIT, EST, LEVL IV, 30-39 MIN  Patient PCP Information       Provider PCP Type    Rodney Morel MD General            Reason for Visit / Chief Complaint: Diabetes (Check up, labs, refills ) and Hypertension         History of Present Illness / Problem Focused Workflow     Micheal Taylor presents to the clinic with Diabetes (Check up, labs, refills ) and Hypertension     HPI    Review of Systems     Review of Systems   Constitutional:  Negative for activity change, appetite change, fatigue and fever.   HENT:  Negative for nasal congestion, ear pain, hearing loss, sinus pressure/congestion and sore throat.    Respiratory:  Negative for cough, chest tightness and shortness of breath.    Cardiovascular:  Negative for chest pain and palpitations.   Gastrointestinal:  Negative for abdominal pain and fecal incontinence.   Genitourinary:  Negative for bladder incontinence, difficulty urinating and erectile dysfunction.   Musculoskeletal:  Negative for arthralgias.   Integumentary:  Negative for rash.   Neurological:  Negative for dizziness and headaches.        Medical / Social / Family History     Past Medical History:   Diagnosis Date    Anticoagulant long-term use     CHF (congestive heart failure)     Chronic pain syndrome     Coronary artery disease     Diabetes mellitus, type 2     Hyperlipidemia     Hypertension     Lumbar spondylosis     Lumbosacral radiculopathy     Mild nonproliferative diabetic retinopathy of both eyes without macular edema 2022    Pain in thoracic spine     PVD (peripheral vascular disease)     Renal disorder     Spondylosis without myelopathy or radiculopathy, cervical region        Past Surgical History:   Procedure  Laterality Date    ADENOIDECTOMY      AMPUTATION      left index finger removed     CAUDAL EPIDURAL STEROID INJECTION  01/19/2016    Caudal NATHAN - Alee    CIRCUMCISION      CORONARY ARTERY BYPASS GRAFT (CABG)      EYE SURGERY      FOOT SURGERY      heel surgery    HEEL SPUR SURGERY      INJECTION OF FACET JOINT Bilateral 04/16/2015    Bilateral L3-S1 Facet Injection - Alee - 4/16/15, 11/15/12 and 9/27/12    LEFT HEART CATHETERIZATION Left 07/19/2022    Procedure: Left heart cath;  Surgeon: Venu Whitlock MD;  Location: [facility] CATH LAB;  Service: Cardiology;  Laterality: Left;  Patient has PAD and has had bilateral fem pop bypass. He is followed by Dr. Forrest.    RADIOFREQUENCY THERMOCOAGULATION Left 02/26/2013    Left L3-S1 RFTC -Alee    RADIOFREQUENCY THERMOCOAGULATION Right 02/05/2013    Right L3-S1 RFTC - Alee    TONSILLECTOMY, ADENOIDECTOMY, BILATERAL MYRINGOTOMY AND TUBES      TRANSFORAMINAL EPIDURAL INJECTION OF STEROID Left 06/19/2012    Left L3-4 TFESI - Alee    TRANSFORAMINAL EPIDURAL INJECTION OF STEROID Right 04/12/2013    Right L3-4 TFESI - Alee - 4/12/13 and 8/7/12    VASCULAR SURGERY         Social History  Micheal Taylor  reports that he quit smoking about 19 years ago. His smoking use included cigarettes. He started smoking about 49 years ago. He has a 120 pack-year smoking history. He has been exposed to tobacco smoke. His smokeless tobacco use includes chew. He reports that he does not currently use alcohol. He reports current drug use. Drug: Hydrocodone.    Family History  Micheal Taylor  family history includes Diabetes in his mother; Heart disease in his father and mother; Hypertension in his father and mother; Liver cancer in his mother.    Medications and Allergies     Medications  Outpatient Medications Marked as Taking for the 6/12/24 encounter (Office Visit) with Rodney Morel MD   Medication Sig Dispense Refill    amLODIPine (NORVASC) 5 MG tablet Take 1 tablet (5 mg total) by mouth once  daily. 90 tablet 1    aspirin (ECOTRIN) 81 MG EC tablet Take 81 mg by mouth once daily.      cholecalciferol, vitamin D3, (VITAMIN D3) 25 mcg (1,000 unit) capsule Take 1,000 Units by mouth once daily.      cinnamon bark 500 mg capsule Take 500 mg by mouth once daily.       clopidogreL (PLAVIX) 75 mg tablet Take 1 tablet (75 mg total) by mouth once daily. 30 tablet 11    cyanocobalamin (VITAMIN B-12) 1000 MCG tablet Take 100 mcg by mouth once daily.      [] HYDROcodone-acetaminophen (NORCO)  mg per tablet Take 1 tablet by mouth every 8 (eight) hours as needed for Pain (pain). 90 tablet 0    HYDROcodone-acetaminophen (NORCO)  mg per tablet Take 1 tablet by mouth every 8 (eight) hours as needed for Pain (pain). 90 tablet 0    lisinopriL (PRINIVIL,ZESTRIL) 20 MG tablet Take 1 tablet (20 mg total) by mouth once daily. 90 tablet 1    magnesium 250 mg Tab Take 1 tablet by mouth once daily.      metoprolol succinate (TOPROL-XL) 25 MG 24 hr tablet Take 1 tablet (25 mg total) by mouth once daily. 90 tablet 1    naloxone (NARCAN) 4 mg/actuation Spry 1 spray by Nasal route once.      nitroGLYCERIN (NITROSTAT) 0.4 MG SL tablet Place 1 tablet (0.4 mg total) under the tongue every 5 (five) minutes as needed for Chest pain. 25 tablet 3    rosuvastatin (CRESTOR) 40 MG Tab TAKE 1 TABLET (40 MG TOTAL) BY MOUTH EVERY EVENING. 90 tablet 3    tiZANidine (ZANAFLEX) 4 MG tablet Take 1 tablet (4 mg total) by mouth every 8 (eight) hours. 90 tablet 2    TRESIBA FLEXTOUCH U-200 200 unit/mL (3 mL) insulin pen Inject 54 Units into the skin once daily.       VASCEPA 1 gram Cap Take 2 capsules by mouth 2 (two) times a day.      vitamin A 8000 UNIT capsule Take 8,000 Units by mouth.      vitamin E 400 UNIT capsule Take 400 Units by mouth once daily.       [DISCONTINUED] EScitalopram oxalate (LEXAPRO) 10 MG tablet Take 1 tablet (10 mg total) by mouth once daily. 90 tablet 0    [DISCONTINUED] FARXIGA 10 mg tablet TAKE ONE TABLET BY  MOUTH ONCE DAILY (VIAL) 90 tablet 0    [DISCONTINUED] gabapentin (NEURONTIN) 300 MG capsule TAKE ONE CAPSULE BY MOUTH THREE TIMES DAILY (VIAL) 270 capsule 0    [DISCONTINUED] liraglutide 0.6 mg/0.1 mL, 18 mg/3 mL, subq PNIJ (VICTOZA 2-CAITLIN) 0.6 mg/0.1 mL (18 mg/3 mL) PnIj pen Inject 1.8 mg into the skin once daily. 27 mL 0    [DISCONTINUED] metFORMIN (GLUCOPHAGE) 500 MG tablet TAKE ONE TABLET BY MOUTH TWICE DAILY WITH MEALS (VIAL) 180 tablet 0    [DISCONTINUED] omeprazole (PRILOSEC) 20 MG capsule TAKE 1 CAPSULE BY MOUTH EVERY DAY 90 capsule 0       Allergies  Review of patient's allergies indicates:  No Known Allergies    Physical Examination     Vitals:    06/12/24 0831   BP: 122/70   Pulse: (!) 51   Temp: 97.7 °F (36.5 °C)     Physical Exam  Constitutional:       General: He is not in acute distress.     Appearance: He is not ill-appearing.   HENT:      Head: Normocephalic and atraumatic.      Right Ear: Tympanic membrane and ear canal normal.      Left Ear: Tympanic membrane and ear canal normal.      Nose: Nose normal. No congestion or rhinorrhea.   Eyes:      Pupils: Pupils are equal, round, and reactive to light.   Cardiovascular:      Rate and Rhythm: Normal rate and regular rhythm.      Pulses: Normal pulses.      Heart sounds: No murmur heard.  Pulmonary:      Effort: No respiratory distress.      Breath sounds: No wheezing, rhonchi or rales.   Abdominal:      General: Bowel sounds are normal.      Palpations: Abdomen is soft.      Tenderness: There is no abdominal tenderness.      Hernia: No hernia is present.   Musculoskeletal:      Cervical back: Normal range of motion and neck supple.   Lymphadenopathy:      Cervical: No cervical adenopathy.   Skin:     General: Skin is warm and dry.   Neurological:      Mental Status: He is alert.   Psychiatric:         Behavior: Behavior normal.         Thought Content: Thought content normal.          Assessment and Plan (including Health Maintenance)   :    Plan:          Health Maintenance Due   Topic Date Due    TETANUS VACCINE  Never done    Colorectal Cancer Screening  Never done    RSV Vaccine (Age 60+ and Pregnant patients) (1 - 1-dose 60+ series) Never done    COVID-19 Vaccine (1 - 2023-24 season) Never done       Problem List Items Addressed This Visit          Cardiac/Vascular    Hyperlipidemia    Relevant Orders    Lipid Panel (Completed)    Hypertension - Primary    Relevant Orders    CBC Auto Differential (Completed)    Comprehensive Metabolic Panel (Completed)       Endocrine    Type 2 diabetes mellitus with diabetic neuropathy, with long-term current use of insulin    Relevant Medications    liraglutide 0.6 mg/0.1 mL, 18 mg/3 mL, subq PNIJ (VICTOZA 2-CAITLIN) 0.6 mg/0.1 mL (18 mg/3 mL) PnIj pen    metFORMIN (GLUCOPHAGE) 500 MG tablet    Other Relevant Orders    Hemoglobin A1C (Completed)    Microalbumin/Creatinine Ratio, Urine (Completed)     Other Visit Diagnoses       Hemoglobin A1c between 7.0% and 9.0%        Relevant Orders    Hemoglobin A1C (Completed)          Hypertension, unspecified type  -     CBC Auto Differential; Future; Expected date: 06/12/2024  -     Comprehensive Metabolic Panel; Future; Expected date: 06/12/2024    Hyperlipidemia, unspecified hyperlipidemia type  -     Lipid Panel; Future; Expected date: 06/12/2024    Type 2 diabetes mellitus with diabetic neuropathy, with long-term current use of insulin  -     Hemoglobin A1C; Future; Expected date: 06/12/2024  -     Microalbumin/Creatinine Ratio, Urine; Future; Expected date: 06/12/2024    Hemoglobin A1c between 7.0% and 9.0%  -     Hemoglobin A1C; Future; Expected date: 06/12/2024    Other orders  -     EScitalopram oxalate (LEXAPRO) 10 MG tablet; Take 1 tablet (10 mg total) by mouth once daily.  Dispense: 90 tablet; Refill: 0  -     dapagliflozin propanediol (FARXIGA) 10 mg tablet; Take 1 tablet (10 mg total) by mouth once daily.  Dispense: 90 tablet; Refill: 0  -     gabapentin (NEURONTIN)  300 MG capsule; Take 1 capsule (300 mg total) by mouth 3 (three) times daily.  Dispense: 270 capsule; Refill: 0  -     liraglutide 0.6 mg/0.1 mL, 18 mg/3 mL, subq PNIJ (VICTOZA 2-CAITLIN) 0.6 mg/0.1 mL (18 mg/3 mL) PnIj pen; Inject 1.8 mg into the skin once daily.  Dispense: 27 mL; Refill: 0  -     metFORMIN (GLUCOPHAGE) 500 MG tablet; Take 1 tablet (500 mg total) by mouth 2 (two) times daily with meals.  Dispense: 180 tablet; Refill: 0  -     omeprazole (PRILOSEC) 20 MG capsule; Take 1 capsule (20 mg total) by mouth once daily.  Dispense: 90 capsule; Refill: 0       Health Maintenance Topics with due status: Not Due       Topic Last Completion Date    Eye Exam 10/27/2023    PROSTATE-SPECIFIC ANTIGEN 12/11/2023    Foot Exam 03/26/2024    High Dose Statin 06/12/2024    Aspirin/Antiplatelet Therapy 06/12/2024    Diabetes Urine Screening 06/12/2024    Lipid Panel 06/12/2024    Hemoglobin A1c 06/12/2024       Procedures     Future Appointments   Date Time Provider Department Center   7/10/2024 10:15 AM Juliana Callahan MD [facility] ASC   12/2/2024 10:45 AM Patricia Galicia [facility] CARD [facility]   12/16/2024  8:40 AM Rodney Morel MD [facility] DIOR [place]   2/3/2025  1:00 PM [facility] VAS US1 [facility] VASCUS [facility]    2/3/2025  2:30 PM [facility] Jurado [facility]   4/1/2025  1:00 PM [facility] [place]        No follow-ups on file.       Signature:  Rodney Morel MD  [facility]  [address] [place] [address]  [phone] Phone  [phone] Fax    Date of encounter: 6/12/24

## 2024-06-25 DIAGNOSIS — I10 HYPERTENSION, UNSPECIFIED TYPE: ICD-10-CM

## 2024-06-25 RX ORDER — HYDRALAZINE HYDROCHLORIDE 50 MG/1
50 TABLET, FILM COATED ORAL 4 TIMES DAILY
Qty: 360 TABLET | Refills: 1 | Status: SHIPPED | OUTPATIENT
Start: 2024-06-25

## 2024-06-25 NOTE — TELEPHONE ENCOUNTER
----- Message from [facility] sent at 6/25/2024 12:40 PM CDT -----  Patient called needing a refill on the prescription hydrALAZINE (APRESOLINE) 50 MG tablet but didn't leave the pharmacy on where he wants it sent. A good phone number to reach him is  [phone].

## 2024-07-10 ENCOUNTER — OFFICE VISIT (OUTPATIENT)
Dept: PAIN MEDICINE | Facility: CLINIC | Age: 69
End: 2024-07-10
Payer: MEDICARE

## 2024-07-10 VITALS
BODY MASS INDEX: 26.88 KG/M2 | HEART RATE: 48 BPM | RESPIRATION RATE: 18 BRPM | WEIGHT: 192 LBS | SYSTOLIC BLOOD PRESSURE: 153 MMHG | DIASTOLIC BLOOD PRESSURE: 52 MMHG | HEIGHT: 71 IN

## 2024-07-10 DIAGNOSIS — Z79.899 ENCOUNTER FOR LONG-TERM (CURRENT) USE OF OTHER MEDICATIONS: Primary | ICD-10-CM

## 2024-07-10 DIAGNOSIS — M54.6 PAIN IN THORACIC SPINE: ICD-10-CM

## 2024-07-10 DIAGNOSIS — M54.17 LUMBOSACRAL RADICULOPATHY: Chronic | ICD-10-CM

## 2024-07-10 DIAGNOSIS — M96.1 POSTLAMINECTOMY SYNDROME, LUMBAR REGION: Chronic | ICD-10-CM

## 2024-07-10 PROCEDURE — 99214 OFFICE O/P EST MOD 30 MIN: CPT | Mod: S$PBB,,, | Performed by: PAIN MEDICINE

## 2024-07-10 PROCEDURE — 3077F SYST BP >= 140 MM HG: CPT | Mod: CPTII,,, | Performed by: PAIN MEDICINE

## 2024-07-10 PROCEDURE — 3066F NEPHROPATHY DOC TX: CPT | Mod: CPTII,,, | Performed by: PAIN MEDICINE

## 2024-07-10 PROCEDURE — 99999 PR PBB SHADOW E&M-EST. PATIENT-LVL V: CPT | Mod: PBBFAC,,, | Performed by: PAIN MEDICINE

## 2024-07-10 PROCEDURE — 99999PBSHW POCT URINE DRUG SCREEN PRESUMP: Mod: PBBFAC,,,

## 2024-07-10 PROCEDURE — 3078F DIAST BP <80 MM HG: CPT | Mod: CPTII,,, | Performed by: PAIN MEDICINE

## 2024-07-10 PROCEDURE — 1101F PT FALLS ASSESS-DOCD LE1/YR: CPT | Mod: CPTII,,, | Performed by: PAIN MEDICINE

## 2024-07-10 PROCEDURE — 99215 OFFICE O/P EST HI 40 MIN: CPT | Mod: PBBFAC | Performed by: PAIN MEDICINE

## 2024-07-10 PROCEDURE — 4010F ACE/ARB THERAPY RXD/TAKEN: CPT | Mod: CPTII,,, | Performed by: PAIN MEDICINE

## 2024-07-10 PROCEDURE — 3051F HG A1C>EQUAL 7.0%<8.0%: CPT | Mod: CPTII,,, | Performed by: PAIN MEDICINE

## 2024-07-10 PROCEDURE — 3288F FALL RISK ASSESSMENT DOCD: CPT | Mod: CPTII,,, | Performed by: PAIN MEDICINE

## 2024-07-10 PROCEDURE — 80305 DRUG TEST PRSMV DIR OPT OBS: CPT | Mod: PBBFAC | Performed by: PAIN MEDICINE

## 2024-07-10 PROCEDURE — 1159F MED LIST DOCD IN RCRD: CPT | Mod: CPTII,,, | Performed by: PAIN MEDICINE

## 2024-07-10 PROCEDURE — 3060F POS MICROALBUMINURIA REV: CPT | Mod: CPTII,,, | Performed by: PAIN MEDICINE

## 2024-07-10 PROCEDURE — 3008F BODY MASS INDEX DOCD: CPT | Mod: CPTII,,, | Performed by: PAIN MEDICINE

## 2024-07-10 PROCEDURE — 1125F AMNT PAIN NOTED PAIN PRSNT: CPT | Mod: CPTII,,, | Performed by: PAIN MEDICINE

## 2024-07-10 RX ORDER — HYDROCODONE BITARTRATE AND ACETAMINOPHEN 10; 325 MG/1; MG/1
1 TABLET ORAL EVERY 8 HOURS PRN
Qty: 90 TABLET | Refills: 0 | Status: SHIPPED | OUTPATIENT
Start: 2024-08-12 | End: 2024-09-11

## 2024-07-10 RX ORDER — HYDROCODONE BITARTRATE AND ACETAMINOPHEN 10; 325 MG/1; MG/1
1 TABLET ORAL EVERY 8 HOURS PRN
Qty: 90 TABLET | Refills: 0 | Status: SHIPPED | OUTPATIENT
Start: 2024-09-11 | End: 2024-10-11

## 2024-07-10 RX ORDER — HYDROCODONE BITARTRATE AND ACETAMINOPHEN 10; 325 MG/1; MG/1
1 TABLET ORAL EVERY 8 HOURS PRN
Qty: 90 TABLET | Refills: 0 | Status: SHIPPED | OUTPATIENT
Start: 2024-07-12 | End: 2024-08-11

## 2024-07-10 NOTE — PROGRESS NOTES
She Disclaimer: This note has been generated using voice-recognition software. There may be typographical errors that have been missed during proof-reading        Patient ID: Micheal Taylor is a 68 y.o. male.      Chief Complaint: Low-back Pain      68-year-old male returns for re-evaluation of chronic lower back and post-laminectomy pain syndrome.  He complains of lower back pain that is often exacerbated with prolonged walking and standing.  He denies radicular symptoms to the lower extremities.  He is not a candidate for surgical revision per Dr. Bray and [facility].  He remains on chronic anticoagulants for severe peripheral vascular disease.  He returns today for medication refill.  He denies any new changes since his last office visit.                              Pain Assessment  Pain Assessment: 0-10  Pain Score:   7  Pain Location: Back  Pain Descriptors: Dull, Aching  Pain Frequency: Constant/continuous  Pain Onset: Awakened from sleep  Clinical Progression: Not changed  Aggravating Factors: Standing, Walking  Pain Intervention(s): Medication (See eMAR), Home medication, Heat applied, Cold applied      A's of Opioid Risk Assessment  Activity:Patient can partially perform ADL.   Analgesia:Patients pain is  controlled by current medication.   Adverse Effects: Patient denies constipation or sedation.  Aberrant Behavior:  reviewed with no aberrant drug seeking/taking behavior.      Patient denies any suicidal or homicidal ideations    Physical Therapy/Home Exercise: yes          Review of Systems   Constitutional: Negative.    HENT: Negative.     Eyes: Negative.    Respiratory: Negative.     Cardiovascular: Negative.    Gastrointestinal: Negative.    Endocrine: Negative.    Genitourinary: Negative.    Musculoskeletal:  Positive for arthralgias (right shoulder) and back pain.   Integumentary:  Negative.   Allergic/Immunologic: Negative.    Neurological: Negative.    Hematological: Negative.     Psychiatric/Behavioral: Negative.               Past Medical History:   Diagnosis Date    Anticoagulant long-term use     CHF (congestive heart failure)     Chronic pain syndrome     Coronary artery disease     Diabetes mellitus, type 2     Hyperlipidemia     Hypertension     Lumbar spondylosis     Lumbosacral radiculopathy     Mild nonproliferative diabetic retinopathy of both eyes without macular edema 09/09/2022    Pain in thoracic spine     PVD (peripheral vascular disease)     Renal disorder     Spondylosis without myelopathy or radiculopathy, cervical region      Past Surgical History:   Procedure Laterality Date    ADENOIDECTOMY      AMPUTATION      left index finger removed     CAUDAL EPIDURAL STEROID INJECTION  01/19/2016    Caudal NATHAN - Alee    CIRCUMCISION      CORONARY ARTERY BYPASS GRAFT (CABG)      EYE SURGERY      FOOT SURGERY      heel surgery    HEEL SPUR SURGERY      INJECTION OF FACET JOINT Bilateral 04/16/2015    Bilateral L3-S1 Facet Injection - Alee - 4/16/15, 11/15/12 and 9/27/12    LEFT HEART CATHETERIZATION Left 07/19/2022    Procedure: Left heart cath;  Surgeon: Venu Whitlock MD;  Location: [facility] CATH LAB;  Service: Cardiology;  Laterality: Left;  Patient has PAD and has had bilateral fem pop bypass. He is followed by Dr. Forrest.    RADIOFREQUENCY THERMOCOAGULATION Left 02/26/2013    Left L3-S1 RFTC -Alee    RADIOFREQUENCY THERMOCOAGULATION Right 02/05/2013    Right L3-S1 RFTC - Alee    TONSILLECTOMY, ADENOIDECTOMY, BILATERAL MYRINGOTOMY AND TUBES      TRANSFORAMINAL EPIDURAL INJECTION OF STEROID Left 06/19/2012    Left L3-4 TFESI - Alee    TRANSFORAMINAL EPIDURAL INJECTION OF STEROID Right 04/12/2013    Right L3-4 TFESI - Alee - 4/12/13 and 8/7/12    VASCULAR SURGERY       Social History     Socioeconomic History    Marital status:     Number of children: 5   Occupational History    Occupation: retired   Tobacco Use    Smoking status: Former     Current packs/day: 0.00      Average packs/day: 4.0 packs/day for 30.0 years (120.0 ttl pk-yrs)     Types: Cigarettes     Start date:      Quit date:      Years since quittin.5     Passive exposure: Past (parent)    Smokeless tobacco: Current     Types: Chew   Substance and Sexual Activity    Alcohol use: Not Currently    Drug use: Yes     Types: Hydrocodone    Sexual activity: Not Currently     Social Determinants of Health     Financial Resource Strain: Low Risk  (3/26/2024)    Overall Financial Resource Strain (CARDIA)     Difficulty of Paying Living Expenses: Not hard at all   Food Insecurity: No Food Insecurity (3/26/2024)    Hunger Vital Sign     Worried About Running Out of Food in the Last Year: Never true     Ran Out of Food in the Last Year: Never true   Transportation Needs: No Transportation Needs (3/26/2024)    PRAPARE - Transportation     Lack of Transportation (Medical): No     Lack of Transportation (Non-Medical): No   Physical Activity: Insufficiently Active (3/26/2024)    Exercise Vital Sign     Days of Exercise per Week: 7 days     Minutes of Exercise per Session: 10 min   Stress: No Stress Concern Present (3/26/2024)    [employer] [employer] - Occupational Stress Questionnaire     Feeling of Stress : Not at all   Housing Stability: Low Risk  (3/26/2024)    Housing Stability Vital Sign     Unable to Pay for Housing in the Last Year: No     Number of Places Lived in the Last Year: 1     Unstable Housing in the Last Year: No     Family History   Problem Relation Name Age of Onset    Diabetes Mother      Heart disease Mother      Hypertension Mother      Liver cancer Mother      Heart disease Father      Hypertension Father       Review of patient's allergies indicates:  No Known Allergies  has a current medication list which includes the following prescription(s): amlodipine, aspirin, cholecalciferol (vitamin d3), cinnamon bark, clopidogrel, cyanocobalamin, dapagliflozin propanediol, escitalopram  oxalate, gabapentin, hydralazine, hydrocodone-acetaminophen, liraglutide 0.6 mg/0.1 ml (18 mg/3 ml) subq pnij, lisinopril, magnesium, metformin, metoprolol succinate, omeprazole, rosuvastatin, tresiba flextouch u-200, vascepa, vitamin a, vitamin e, ezetimibe, fenofibrate, [START ON 7/12/2024] hydrocodone-acetaminophen, [START ON 8/12/2024] hydrocodone-acetaminophen, [START ON 9/11/2024] hydrocodone-acetaminophen, insulin regular, naloxone, nitroglycerin, potassium chloride sa, ropinirole, and spironolactone.      Objective:  Vitals:    07/10/24 1114   BP: (!) 153/52   Pulse: (!) 48   Resp: 18        Physical Exam  Vitals and nursing note reviewed.   Constitutional:       General: He is not in acute distress.     Appearance: Normal appearance. He is not ill-appearing, toxic-appearing or diaphoretic.   HENT:      Head: Normocephalic and atraumatic.      Nose: Nose normal.      Mouth/Throat:      Mouth: Mucous membranes are moist.   Eyes:      Extraocular Movements: Extraocular movements intact.      Pupils: Pupils are equal, round, and reactive to light.   Cardiovascular:      Rate and Rhythm: Normal rate and regular rhythm.      Heart sounds: Normal heart sounds.   Pulmonary:      Effort: Pulmonary effort is normal. No respiratory distress.      Breath sounds: Normal breath sounds. No stridor. No wheezing or rhonchi.   Abdominal:      General: Bowel sounds are normal.      Palpations: Abdomen is soft.   Musculoskeletal:         General: No swelling or deformity.      Cervical back: Normal and normal range of motion. No spasms or tenderness. No pain with movement. Normal range of motion.      Thoracic back: Normal.      Lumbar back: Tenderness present. No spasms. Decreased range of motion. Negative right straight leg raise test and negative left straight leg raise test. No scoliosis.      Right lower leg: No edema.      Left lower leg: No edema.      Comments: Bilateral lumbar paraspinous and facet tenderness to  palpation from L3-S1   Skin:     General: Skin is warm.   Neurological:      General: No focal deficit present.      Mental Status: He is alert and oriented to person, place, and time. Mental status is at baseline.      Cranial Nerves: No cranial nerve deficit.      Sensory: Sensation is intact. No sensory deficit.      Motor: No weakness.      Coordination: Coordination normal.      Gait: Gait normal.      Deep Tendon Reflexes: Reflexes are normal and symmetric.   Psychiatric:         Mood and Affect: Mood normal.         Behavior: Behavior normal.           Assessment:      1. Encounter for long-term (current) use of other medications    2. Lumbosacral radiculopathy    3. Postlaminectomy syndrome, lumbar region    4. Pain in thoracic spine          Plan:  1. reviewed    2.Addiction, Dependency, Tolerance, Opioid abuse-misuse, Death, Diversion Discussed. Overdose reversal drug Naloxone discussed.    3.Refill/Continue medications for pain control and function.  Continue Norco 10/325 TID #90 with refills 07/12/2024, 08/12/2024 and 09/11/2024    4.Urine drug screen point of care obtained and consistent with prescribed medications and medication refill date.  Drug screen is to monitor compliance with prescribed opiates and to ensure that there was no misuse/abuse of other non-prescribed opioids or illicit drugs.  From this information I will determine if patient is suitable for opioid therapy     5.Follow with MARCELL Nguyễn in 3 months for re-evaluation and medication refill       report:  Reviewed and consistent with medication use as prescribed.      The total time spent for evaluation and management on 07/11/2024 including reviewing separately obtained history, performing a medically appropriate exam and evaluation, documenting clinical information in the health record, independently interpreting results and communicating them to the patient/family/caregiver, and ordering medications/tests/procedures was  between 15-29 minutes.    The above plan and management options were discussed at length with patient. Patient is in agreement with the above and verbalized understanding. It will be communicated with the referring physician via electronic record, fax, or mail.

## 2024-07-30 RX ORDER — ROSUVASTATIN CALCIUM 40 MG/1
40 TABLET, COATED ORAL NIGHTLY
Qty: 90 TABLET | Refills: 1 | Status: SHIPPED | OUTPATIENT
Start: 2024-07-30 | End: 2025-07-30

## 2024-08-09 DIAGNOSIS — Z71.89 COMPLEX CARE COORDINATION: ICD-10-CM

## 2024-08-13 RX ORDER — DAPAGLIFLOZIN 10 MG/1
10 TABLET, FILM COATED ORAL
Qty: 90 TABLET | Refills: 0 | Status: SHIPPED | OUTPATIENT
Start: 2024-08-13

## 2024-08-13 RX ORDER — GABAPENTIN 300 MG/1
300 CAPSULE ORAL 3 TIMES DAILY
Qty: 270 CAPSULE | Refills: 0 | Status: SHIPPED | OUTPATIENT
Start: 2024-08-13

## 2024-08-13 RX ORDER — METFORMIN HYDROCHLORIDE 500 MG/1
500 TABLET ORAL 2 TIMES DAILY WITH MEALS
Qty: 180 TABLET | Refills: 0 | Status: SHIPPED | OUTPATIENT
Start: 2024-08-13

## 2024-09-26 NOTE — PROGRESS NOTES
Subjective:         Patient ID: Micheal Taylor is a 68 y.o. male.    Chief Complaint: Low-back Pain      Pain  This is a chronic problem. The current episode started more than 1 year ago. The problem occurs daily. The problem has been waxing and waning. Associated symptoms include arthralgias and neck pain. Pertinent negatives include no anorexia, chest pain, chills, coughing, diaphoresis, fever, sore throat, vertigo or vomiting.     Review of Systems   Constitutional:  Negative for activity change, appetite change, chills, diaphoresis, fever and unexpected weight change.   HENT:  Negative for drooling, ear discharge, ear pain, facial swelling, mouth sores, nosebleeds, sore throat, trouble swallowing, voice change and goiter.    Eyes:  Negative for photophobia, pain, discharge, redness and visual disturbance.   Respiratory:  Negative for apnea, cough, choking, chest tightness, shortness of breath, wheezing and stridor.    Cardiovascular:  Negative for chest pain, palpitations and leg swelling.   Gastrointestinal:  Negative for abdominal distention, anorexia, diarrhea, rectal pain, vomiting and fecal incontinence.   Endocrine: Negative for cold intolerance, heat intolerance, polydipsia, polyphagia and polyuria.   Genitourinary:  Negative for bladder incontinence, dysuria, flank pain and frequency.   Musculoskeletal:  Positive for arthralgias, back pain, leg pain, neck pain and neck stiffness.   Integumentary:  Negative for color change and pallor.   Neurological:  Negative for dizziness, vertigo, seizures, syncope, facial asymmetry, speech difficulty, light-headedness, memory loss and coordination difficulties.   Hematological:  Negative for adenopathy. Does not bruise/bleed easily.   Psychiatric/Behavioral:  Negative for agitation, behavioral problems, confusion, decreased concentration, dysphoric mood, hallucinations, self-injury and suicidal ideas. The patient is not nervous/anxious and is not hyperactive.             Past Medical History:   Diagnosis Date    Anticoagulant long-term use     CHF (congestive heart failure)     Chronic pain syndrome     Coronary artery disease     Diabetes mellitus, type 2     Hyperlipidemia     Hypertension     Lumbar spondylosis     Lumbosacral radiculopathy     Mild nonproliferative diabetic retinopathy of both eyes without macular edema 09/09/2022    Pain in thoracic spine     PVD (peripheral vascular disease)     Renal disorder     Spondylosis without myelopathy or radiculopathy, cervical region      Past Surgical History:   Procedure Laterality Date    ADENOIDECTOMY      AMPUTATION      left index finger removed     CAUDAL EPIDURAL STEROID INJECTION  01/19/2016    Caudal NATHAN - Alee    CIRCUMCISION      CORONARY ARTERY BYPASS GRAFT (CABG)      EYE SURGERY      FOOT SURGERY      heel surgery    HEEL SPUR SURGERY      INJECTION OF FACET JOINT Bilateral 04/16/2015    Bilateral L3-S1 Facet Injection - Alee - 4/16/15, 11/15/12 and 9/27/12    LEFT HEART CATHETERIZATION Left 07/19/2022    Procedure: Left heart cath;  Surgeon: Venu Whitlock MD;  Location: [facility] CATH LAB;  Service: Cardiology;  Laterality: Left;  Patient has PAD and has had bilateral fem pop bypass. He is followed by Dr. Forrest.    RADIOFREQUENCY THERMOCOAGULATION Left 02/26/2013    Left L3-S1 RFTC -Alee    RADIOFREQUENCY THERMOCOAGULATION Right 02/05/2013    Right L3-S1 RFTC - Alee    TONSILLECTOMY, ADENOIDECTOMY, BILATERAL MYRINGOTOMY AND TUBES      TRANSFORAMINAL EPIDURAL INJECTION OF STEROID Left 06/19/2012    Left L3-4 TFESI - Alee    TRANSFORAMINAL EPIDURAL INJECTION OF STEROID Right 04/12/2013    Right L3-4 TFESI - Alee - 4/12/13 and 8/7/12    VASCULAR SURGERY       Social History     Socioeconomic History    Marital status:     Number of children: 5   Occupational History    Occupation: retired   Tobacco Use    Smoking status: Former     Current packs/day: 0.00     Average packs/day: 4.0 packs/day for 30.0 years  "(120.0 ttl pk-yrs)     Types: Cigarettes     Start date:      Quit date: 2005     Years since quittin.7     Passive exposure: Past (parent)    Smokeless tobacco: Current     Types: Chew   Substance and Sexual Activity    Alcohol use: Not Currently    Drug use: Yes     Types: Hydrocodone    Sexual activity: Not Currently     Social Drivers of Health     Financial Resource Strain: Low Risk  (3/26/2024)    Overall Financial Resource Strain (CARDIA)     Difficulty of Paying Living Expenses: Not hard at all   Food Insecurity: No Food Insecurity (3/26/2024)    Hunger Vital Sign     Worried About Running Out of Food in the Last Year: Never true     Ran Out of Food in the Last Year: Never true   Transportation Needs: No Transportation Needs (3/26/2024)    PRAPARE - Transportation     Lack of Transportation (Medical): No     Lack of Transportation (Non-Medical): No   Physical Activity: Insufficiently Active (3/26/2024)    Exercise Vital Sign     Days of Exercise per Week: 7 days     Minutes of Exercise per Session: 10 min   Stress: No Stress Concern Present (3/26/2024)    [employer] - Occupational Stress Questionnaire     Feeling of Stress : Not at all   Housing Stability: Low Risk  (3/26/2024)    Housing Stability Vital Sign     Unable to Pay for Housing in the Last Year: No     Number of Places Lived in the Last Year: 1     Unstable Housing in the Last Year: No     Family History   Problem Relation Name Age of Onset    Diabetes Mother      Heart disease Mother      Hypertension Mother      Liver cancer Mother      Heart disease Father      Hypertension Father       Review of patient's allergies indicates:  No Known Allergies     Objective:  Vitals:    10/10/24 0950 10/10/24 0951   BP: (!) 127/50    Pulse: (!) 44    Resp: 16    Weight: 83.9 kg (185 lb)    Height: 5' 11" (1.803 m)    PainSc:   7   7           Physical Exam  Vitals and nursing note reviewed. Exam conducted with a " chaperone present.   Constitutional:       General: He is awake.      Appearance: Normal appearance. He is not ill-appearing, toxic-appearing or diaphoretic.   HENT:      Head: Normocephalic and atraumatic.      Nose: Nose normal.      Mouth/Throat:      Mouth: Mucous membranes are moist.      Pharynx: Oropharynx is clear.   Eyes:      Conjunctiva/sclera: Conjunctivae normal.      Pupils: Pupils are equal, round, and reactive to light.   Cardiovascular:      Rate and Rhythm: Normal rate.   Pulmonary:      Effort: Pulmonary effort is normal. No respiratory distress.   Abdominal:      Palpations: Abdomen is soft.   Musculoskeletal:      Right shoulder: Tenderness present.      Left shoulder: Tenderness present.      Cervical back: Normal range of motion and neck supple. Tenderness present.      Thoracic back: Tenderness present.      Lumbar back: Tenderness present. Decreased range of motion.   Skin:     General: Skin is warm and dry.   Neurological:      General: No focal deficit present.      Mental Status: He is alert and oriented to person, place, and time. Mental status is at baseline.      Cranial Nerves: No cranial nerve deficit (II-XII).   Psychiatric:         Mood and Affect: Mood normal.         Behavior: Behavior normal. Behavior is cooperative.         Thought Content: Thought content normal.           US Lower Extrem Arteries Bilat with MARYSE (xpd)  Narrative: EXAMINATION:  US ARTERIAL LOWER EXTREMITY BILAT WITH MARYSE (XPD)    CLINICAL HISTORY:  Peripheral vascular disease, unspecified    TECHNIQUE:  Color-flow Doppler utilized    COMPARISON:  None    FINDINGS:  Right common femoral artery peak systolic velocity 88 centimeters/second biphasic waveform, profundus femorals 163 centimeters/second biphasic waveform, native SFA is occluded fem-pop graft present is patent.  The proximal anastomosis 5.9 mm at 123 centimeters/second, upper thigh 32 centimeters/second mid thigh 38-74 centimeters/second distal thigh 76  centimeters/second, popliteal 43 centimeters/second biphasic waveform, posterior tibial 11 centimeters/second biphasic waveforms, dorsalis pedis 18 centimeters/second biphasic waveforms    Right brachial pressure 134 mm Hg, MARYSE 1.01    Left brachial pressure 149 mm Hg, MARYSE 1.02  Impression: Bilateral ABIs were normal.  Right lower extremity velocities suggest area of stenosis within the graft between 50% and 90%    TECHNOLOGIST: Ronit Fox (RT) (VS) RVT    Electronically signed by: Loida Forrest  Date:    02/06/2024  Time:    12:59       Office Visit on 07/10/2024   Component Date Value Ref Range Status    POC Amphetamines 07/10/2024 Negative  Negative, Inconclusive Final    POC Barbiturates 07/10/2024 Negative  Negative, Inconclusive Final    POC Benzodiazepines 07/10/2024 Negative  Negative, Inconclusive Final    POC Cocaine 07/10/2024 Negative  Negative, Inconclusive Final    POC THC 07/10/2024 Negative  Negative, Inconclusive Final    POC Methadone 07/10/2024 Negative  Negative, Inconclusive Final    POC Methamphetamine 07/10/2024 Negative  Negative, Inconclusive Final    POC Opiates 07/10/2024 Presumptive Positive (A)  Negative, Inconclusive Final    POC Oxycodone 07/10/2024 Negative  Negative, Inconclusive Final    POC Phencyclidine 07/10/2024 Negative  Negative, Inconclusive Final    POC Methylenedioxymethamphetamine * 07/10/2024 Negative  Negative, Inconclusive Final    POC Tricyclic Antidepressants 07/10/2024 Negative  Negative, Inconclusive Final    POC Buprenorphine 07/10/2024 Negative   Final     Acceptable 07/10/2024 Yes   Final    POC Temperature (Urine) 07/10/2024 92   Final   Office Visit on 06/12/2024   Component Date Value Ref Range Status    Sodium 06/12/2024 140  136 - 145 mmol/L Final    Potassium 06/12/2024 5.0  3.5 - 5.1 mmol/L Final    Chloride 06/12/2024 106  98 - 107 mmol/L Final    CO2 06/12/2024 29  21 - 32 mmol/L Final    Anion Gap 06/12/2024 10  7 - 16 mmol/L Final     Glucose 06/12/2024 149 (H)  74 - 106 mg/dL Final    BUN 06/12/2024 44 (H)  7 - 18 mg/dL Final    Creatinine 06/12/2024 1.83 (H)  0.70 - 1.30 mg/dL Final    BUN/Creatinine Ratio 06/12/2024 24 (H)  6 - 20 Final    Calcium 06/12/2024 8.8  8.5 - 10.1 mg/dL Final    Total Protein 06/12/2024 6.6  6.4 - 8.2 g/dL Final    Albumin 06/12/2024 3.6  3.5 - 5.0 g/dL Final    Globulin 06/12/2024 3.0  2.0 - 4.0 g/dL Final    A/G Ratio 06/12/2024 1.2   Final    Bilirubin, Total 06/12/2024 0.4  >0.0 - 1.2 mg/dL Final    Alk Phos 06/12/2024 31 (L)  45 - 115 U/L Final    ALT 06/12/2024 19  16 - 61 U/L Final    AST 06/12/2024 19  15 - 37 U/L Final    eGFR 06/12/2024 40 (L)  >=60 mL/min/1.73m2 Final    Hemoglobin A1C 06/12/2024 7.1 (H)  4.5 - 6.6 % Final    Estimated Average Glucose 06/12/2024 157  mg/dL Final    Triglycerides 06/12/2024 191 (H)  35 - 150 mg/dL Final    Cholesterol 06/12/2024 122  0 - 200 mg/dL Final    HDL Cholesterol 06/12/2024 28 (L)  40 - 60 mg/dL Final    Cholesterol/HDL Ratio (Risk Factor) 06/12/2024 4.4   Final    Non-HDL 06/12/2024 94  mg/dL Final    LDL Calculated 06/12/2024 56  mg/dL Final    LDL/HDL 06/12/2024 2.0   Final    VLDL 06/12/2024 38  mg/dL Final    Creatinine, Urine 06/12/2024 49  39 - 259 mg/dL Final    Microalbumin 06/12/2024 5.4 (H)  0.0 - 2.8 mg/dL Final    Microalbumin/Creatinine Ratio 06/12/2024 110.2 (H)  0.0 - 30.0 mg/g Final    WBC 06/12/2024 9.09  4.50 - 11.00 K/uL Final    RBC 06/12/2024 4.65  4.60 - 6.20 M/uL Final    Hemoglobin 06/12/2024 11.8 (L)  13.5 - 18.0 g/dL Final    Hematocrit 06/12/2024 40.0  40.0 - 54.0 % Final    MCV 06/12/2024 86.0  80.0 - 96.0 fL Final    MCH 06/12/2024 25.4 (L)  27.0 - 31.0 pg Final    MCHC 06/12/2024 29.5 (L)  32.0 - 36.0 g/dL Final    RDW 06/12/2024 15.9 (H)  11.5 - 14.5 % Final    Platelet Count 06/12/2024 152  150 - 400 K/uL Final    MPV 06/12/2024 12.2  9.4 - 12.4 fL Final    Neutrophils % 06/12/2024 34.7 (L)  53.0 - 65.0 % Final    Lymphocytes %  06/12/2024 51.7 (H)  27.0 - 41.0 % Final    Monocytes % 06/12/2024 5.9  2.0 - 6.0 % Final    Eosinophils % 06/12/2024 6.6 (H)  1.0 - 4.0 % Final    Basophils % 06/12/2024 0.8  0.0 - 1.0 % Final    Immature Granulocytes % 06/12/2024 0.3  0.0 - 0.4 % Final    nRBC, Auto 06/12/2024 0.0  <=0.0 % Final    Neutrophils, Abs 06/12/2024 3.15  1.80 - 7.70 K/uL Final    Lymphocytes, Absolute 06/12/2024 4.70  1.00 - 4.80 K/uL Final    Monocytes, Absolute 06/12/2024 0.54  0.00 - 0.80 K/uL Final    Eosinophils, Absolute 06/12/2024 0.60 (H)  0.00 - 0.50 K/uL Final    Basophils, Absolute 06/12/2024 0.07  0.00 - 0.20 K/uL Final    Immature Granulocytes, Absolute 06/12/2024 0.03  0.00 - 0.04 K/uL Final    nRBC, Absolute 06/12/2024 0.00  <=0.00 x10e3/uL Final    Diff Type 06/12/2024 Auto   Final   Office Visit on 05/30/2024   Component Date Value Ref Range Status    QRS Duration 05/30/2024 106  ms Final    [facility] QTC Calculation 05/30/2024 431  ms Final   Office Visit on 04/11/2024   Component Date Value Ref Range Status    POC Amphetamines 04/11/2024 Negative  Negative, Inconclusive Final    POC Barbiturates 04/11/2024 Negative  Negative, Inconclusive Final    POC Benzodiazepines 04/11/2024 Negative  Negative, Inconclusive Final    POC Cocaine 04/11/2024 Negative  Negative, Inconclusive Final    POC THC 04/11/2024 Negative  Negative, Inconclusive Final    POC Methadone 04/11/2024 Negative  Negative, Inconclusive Final    POC Methamphetamine 04/11/2024 Negative  Negative, Inconclusive Final    POC Opiates 04/11/2024 Presumptive Positive (A)  Negative, Inconclusive Final    POC Oxycodone 04/11/2024 Negative  Negative, Inconclusive Final    POC Phencyclidine 04/11/2024 Negative  Negative, Inconclusive Final    POC Methylenedioxymethamphetamine * 04/11/2024 Negative  Negative, Inconclusive Final    POC Tricyclic Antidepressants 04/11/2024 Negative  Negative, Inconclusive Final    POC Buprenorphine 04/11/2024 Negative   Final    Quality  Control Acceptable 04/11/2024 Yes   Final    POC Temperature (Urine) 04/11/2024 94   Final         Orders Placed This Encounter   Procedures    POCT Urine Drug Screen Presump     Interpretive Information:     Negative:  No drug detected at the cut off level.   Positive:  This result represents presumptive positive for the   tested drug, other substances may yield a positive response other   than the analyte of interest. This result should be utilized for   diagnostic purpose only. Confirmation testing will be performed upon physician request only.            Requested Prescriptions     Signed Prescriptions Disp Refills    HYDROcodone-acetaminophen (NORCO)  mg per tablet 90 tablet 0     Sig: Take 1 tablet by mouth every 8 (eight) hours as needed for Pain (pain).    HYDROcodone-acetaminophen (NORCO)  mg per tablet 90 tablet 0     Sig: Take 1 tablet by mouth every 8 (eight) hours as needed for Pain (pain).    HYDROcodone-acetaminophen (NORCO)  mg per tablet 90 tablet 0     Sig: Take 1 tablet by mouth every 8 (eight) hours as needed for Pain (pain).       Assessment:     1. Lumbosacral radiculopathy    2. Cervical spondylosis without myelopathy    3. Postlaminectomy syndrome, lumbar region    4. Encounter for long-term (current) use of medications           A's of Opioid Risk Assessment  Activity:Patient can perform ADL.   Analgesia:Patients pain is partially controlled by current medication. Patient has tried OTC medications such as Tylenol and Ibuprofen with out relief.   Adverse Effects: Patient denies constipation or sedation.  Aberrant Behavior:  reviewed with no aberrant drug seeking/taking behavior.  Overdose reversal drug naloxone discussed    Drug screen reviewed      Plan:    Narcan January 2023    Anticoagulated Plavix    [facility] closed on weekends        No new complaints     He states current medications helping control his discomfort     He would like to continue current  medication    Continue home exercise program as directed    Continue current medication    Follow-up 3 months    Dr. Callahan, July 2025    Bring original prescription medication bottles/container/box with labels to each visit

## 2024-10-03 NOTE — TELEPHONE ENCOUNTER
----- Message from Mikel sent at 10/3/2024  9:52 AM CDT -----  Regarding: Refill  Who Called: Micheal Taylor    Refill or New Rx:Refill  RX Name and Strength:EScitalopram oxalate (LEXAPRO) 10 MG tablet  How is the patient currently taking it? (ex. 1XDay):Take 1 tablet (10 mg total) by mouth once daily. - Oral  List of preferred pharmacies on file (remove unneeded): [unfilled][facility] [place] - [address] [place] [address]      Preferred Method of Contact: Phone Call  Patient's Preferred Phone Number on File: [phone]   Best Call Back Number, if different:  Additional Information: Pt would like a refill. Been out for 2 weeks.

## 2024-10-04 RX ORDER — ESCITALOPRAM OXALATE 10 MG/1
10 TABLET ORAL DAILY
Qty: 90 TABLET | Refills: 0 | Status: SHIPPED | OUTPATIENT
Start: 2024-10-04 | End: 2025-10-04

## 2024-10-10 ENCOUNTER — OFFICE VISIT (OUTPATIENT)
Dept: PAIN MEDICINE | Facility: CLINIC | Age: 69
End: 2024-10-10
Payer: MEDICARE

## 2024-10-10 VITALS
SYSTOLIC BLOOD PRESSURE: 127 MMHG | DIASTOLIC BLOOD PRESSURE: 50 MMHG | WEIGHT: 185 LBS | BODY MASS INDEX: 25.9 KG/M2 | RESPIRATION RATE: 16 BRPM | HEIGHT: 71 IN | HEART RATE: 44 BPM

## 2024-10-10 DIAGNOSIS — M54.17 LUMBOSACRAL RADICULOPATHY: Primary | Chronic | ICD-10-CM

## 2024-10-10 DIAGNOSIS — M47.812 CERVICAL SPONDYLOSIS WITHOUT MYELOPATHY: Chronic | ICD-10-CM

## 2024-10-10 DIAGNOSIS — M96.1 POSTLAMINECTOMY SYNDROME, LUMBAR REGION: Chronic | ICD-10-CM

## 2024-10-10 DIAGNOSIS — Z79.899 ENCOUNTER FOR LONG-TERM (CURRENT) USE OF MEDICATIONS: ICD-10-CM

## 2024-10-10 PROCEDURE — 80305 DRUG TEST PRSMV DIR OPT OBS: CPT | Mod: PBBFAC | Performed by: PHYSICIAN ASSISTANT

## 2024-10-10 PROCEDURE — 99999PBSHW POCT URINE DRUG SCREEN PRESUMP: Mod: PBBFAC,,,

## 2024-10-10 PROCEDURE — 99213 OFFICE O/P EST LOW 20 MIN: CPT | Mod: PBBFAC | Performed by: PHYSICIAN ASSISTANT

## 2024-10-10 PROCEDURE — 3066F NEPHROPATHY DOC TX: CPT | Mod: ,,, | Performed by: PHYSICIAN ASSISTANT

## 2024-10-10 PROCEDURE — 99999 PR PBB SHADOW E&M-EST. PATIENT-LVL III: CPT | Mod: PBBFAC,,, | Performed by: PHYSICIAN ASSISTANT

## 2024-10-10 PROCEDURE — 99214 OFFICE O/P EST MOD 30 MIN: CPT | Mod: S$PBB,,, | Performed by: PHYSICIAN ASSISTANT

## 2024-10-10 PROCEDURE — 4010F ACE/ARB THERAPY RXD/TAKEN: CPT | Mod: ,,, | Performed by: PHYSICIAN ASSISTANT

## 2024-10-10 PROCEDURE — 3060F POS MICROALBUMINURIA REV: CPT | Mod: ,,, | Performed by: PHYSICIAN ASSISTANT

## 2024-10-10 RX ORDER — HYDROCODONE BITARTRATE AND ACETAMINOPHEN 10; 325 MG/1; MG/1
1 TABLET ORAL EVERY 8 HOURS PRN
Qty: 90 TABLET | Refills: 0 | Status: SHIPPED | OUTPATIENT
Start: 2024-10-11 | End: 2024-11-10

## 2024-10-10 RX ORDER — HYDROCODONE BITARTRATE AND ACETAMINOPHEN 10; 325 MG/1; MG/1
1 TABLET ORAL EVERY 8 HOURS PRN
Qty: 90 TABLET | Refills: 0 | Status: SHIPPED | OUTPATIENT
Start: 2024-12-11 | End: 2025-01-10

## 2024-10-10 RX ORDER — HYDROCODONE BITARTRATE AND ACETAMINOPHEN 10; 325 MG/1; MG/1
1 TABLET ORAL EVERY 8 HOURS PRN
Qty: 90 TABLET | Refills: 0 | Status: SHIPPED | OUTPATIENT
Start: 2024-11-11 | End: 2024-12-11

## 2024-10-10 NOTE — PLAN OF CARE
Problem: Adult Inpatient Plan of Care  Goal: Plan of Care Review  Outcome: Ongoing, Progressing  Goal: Patient-Specific Goal (Individualized)  Outcome: Ongoing, Progressing  Goal: Absence of Hospital-Acquired Illness or Injury  Outcome: Ongoing, Progressing  Goal: Optimal Comfort and Wellbeing  Outcome: Ongoing, Progressing  Goal: Readiness for Transition of Care  Outcome: Ongoing, Progressing     Problem: Diabetes Comorbidity  Goal: Blood Glucose Level Within Targeted Range  Outcome: Ongoing, Progressing     Problem: Fall Injury Risk  Goal: Absence of Fall and Fall-Related Injury  Outcome: Ongoing, Progressing     Problem: Arrhythmia/Dysrhythmia (Cardiac Catheterization)  Goal: Stable Heart Rate and Rhythm  Outcome: Ongoing, Progressing     Problem: Bleeding (Cardiac Catheterization)  Goal: Absence of Bleeding  Outcome: Ongoing, Progressing     Problem: Contrast-Induced Injury Risk (Cardiac Catheterization)  Goal: Absence of Contrast-Induced Injury  Outcome: Ongoing, Progressing     Problem: Embolism (Cardiac Catheterization)  Goal: Absence of Embolism Signs and Symptoms  Outcome: Ongoing, Progressing     Problem: Ongoing Anesthesia/Sedation Effects (Cardiac Catheterization)  Goal: Anesthesia/Sedation Recovery  Outcome: Ongoing, Progressing     Problem: Pain (Cardiac Catheterization)  Goal: Acceptable Pain Control  Outcome: Ongoing, Progressing     Problem: Vascular Access Protection (Cardiac Catheterization)  Goal: Absence of Vascular Access Complication  Outcome: Ongoing, Progressing      Patient transferred via cart from room 209 on cardiac monitor to IR room 1 for an IR procedure.

## 2024-11-04 DIAGNOSIS — E78.5 HYPERLIPIDEMIA, UNSPECIFIED HYPERLIPIDEMIA TYPE: ICD-10-CM

## 2024-11-04 RX ORDER — OMEPRAZOLE 20 MG/1
20 CAPSULE, DELAYED RELEASE ORAL DAILY
Qty: 90 CAPSULE | Refills: 0 | Status: SHIPPED | OUTPATIENT
Start: 2024-11-04

## 2024-11-05 RX ORDER — FENOFIBRATE 145 MG/1
145 TABLET, FILM COATED ORAL DAILY
Qty: 90 TABLET | Refills: 0 | Status: SHIPPED | OUTPATIENT
Start: 2024-11-05

## 2024-12-02 ENCOUNTER — OFFICE VISIT (OUTPATIENT)
Dept: CARDIOLOGY | Facility: CLINIC | Age: 69
End: 2024-12-02
Payer: MEDICARE

## 2024-12-02 ENCOUNTER — HOSPITAL ENCOUNTER (OUTPATIENT)
Facility: HOSPITAL | Age: 69
Discharge: HOME | End: 2024-12-03
Attending: EMERGENCY MEDICINE | Admitting: FAMILY MEDICINE
Payer: MEDICARE

## 2024-12-02 VITALS
WEIGHT: 189.38 LBS | OXYGEN SATURATION: 98 % | BODY MASS INDEX: 26.51 KG/M2 | SYSTOLIC BLOOD PRESSURE: 132 MMHG | HEIGHT: 71 IN | DIASTOLIC BLOOD PRESSURE: 64 MMHG | HEART RATE: 66 BPM

## 2024-12-02 DIAGNOSIS — Z95.1 HX OF CABG: ICD-10-CM

## 2024-12-02 DIAGNOSIS — Z87.891 FORMER SMOKER: ICD-10-CM

## 2024-12-02 DIAGNOSIS — G47.33 OBSTRUCTIVE SLEEP APNEA: ICD-10-CM

## 2024-12-02 DIAGNOSIS — I50.22 CHRONIC SYSTOLIC CONGESTIVE HEART FAILURE: ICD-10-CM

## 2024-12-02 DIAGNOSIS — E11.59 TYPE 2 DIABETES MELLITUS WITH OTHER CIRCULATORY COMPLICATION, WITHOUT LONG-TERM CURRENT USE OF INSULIN: ICD-10-CM

## 2024-12-02 DIAGNOSIS — I34.0 MITRAL VALVE INSUFFICIENCY, UNSPECIFIED ETIOLOGY: ICD-10-CM

## 2024-12-02 DIAGNOSIS — I35.8 AORTIC VALVE SCLEROSIS: ICD-10-CM

## 2024-12-02 DIAGNOSIS — I24.9 ACUTE CORONARY SYNDROME: ICD-10-CM

## 2024-12-02 DIAGNOSIS — I73.9 PVD (PERIPHERAL VASCULAR DISEASE): ICD-10-CM

## 2024-12-02 DIAGNOSIS — I25.2 OLD MI (MYOCARDIAL INFARCTION): ICD-10-CM

## 2024-12-02 DIAGNOSIS — I10 HYPERTENSION, UNSPECIFIED TYPE: ICD-10-CM

## 2024-12-02 DIAGNOSIS — I25.10 CORONARY ARTERY DISEASE, UNSPECIFIED VESSEL OR LESION TYPE, UNSPECIFIED WHETHER ANGINA PRESENT, UNSPECIFIED WHETHER NATIVE OR TRANSPLANTED HEART: Primary | ICD-10-CM

## 2024-12-02 DIAGNOSIS — I20.0 UNSTABLE ANGINA: Primary | ICD-10-CM

## 2024-12-02 DIAGNOSIS — E78.5 HYPERLIPIDEMIA, UNSPECIFIED HYPERLIPIDEMIA TYPE: ICD-10-CM

## 2024-12-02 DIAGNOSIS — R07.9 CHEST PAIN: ICD-10-CM

## 2024-12-02 DIAGNOSIS — I50.9 CHF (CONGESTIVE HEART FAILURE): ICD-10-CM

## 2024-12-02 LAB
ALBUMIN SERPL BCP-MCNC: 4.1 G/DL (ref 3.4–4.8)
ALBUMIN/GLOB SERPL: 1.1 {RATIO}
ALP SERPL-CCNC: 34 U/L (ref 40–150)
ALT SERPL W P-5'-P-CCNC: 19 U/L
ANION GAP SERPL CALCULATED.3IONS-SCNC: 15 MMOL/L (ref 7–16)
APTT PPP: 29.9 SECONDS (ref 25.2–37.3)
AST SERPL W P-5'-P-CCNC: 42 U/L (ref 5–34)
BASOPHILS # BLD AUTO: 0.07 K/UL (ref 0–0.2)
BASOPHILS # BLD AUTO: 0.07 K/UL (ref 0–0.2)
BASOPHILS NFR BLD AUTO: 0.6 % (ref 0–1)
BASOPHILS NFR BLD AUTO: 0.6 % (ref 0–1)
BASOPHILS NFR BLD MANUAL: 1 % (ref 0–1)
BILIRUB SERPL-MCNC: 0.7 MG/DL
BUN SERPL-MCNC: 44 MG/DL (ref 8–26)
BUN/CREAT SERPL: 24 (ref 6–20)
CALCIUM SERPL-MCNC: 9.5 MG/DL (ref 8.8–10)
CHLORIDE SERPL-SCNC: 103 MMOL/L (ref 98–107)
CO2 SERPL-SCNC: 23 MMOL/L (ref 23–31)
CREAT SERPL-MCNC: 1.8 MG/DL (ref 0.72–1.25)
DIFFERENTIAL METHOD BLD: ABNORMAL
DIFFERENTIAL METHOD BLD: ABNORMAL
EGFR (NO RACE VARIABLE) (RUSH/TITUS): 40 ML/MIN/1.73M2
EOSINOPHIL # BLD AUTO: 0.41 K/UL (ref 0–0.5)
EOSINOPHIL # BLD AUTO: 0.42 K/UL (ref 0–0.5)
EOSINOPHIL NFR BLD AUTO: 3.4 % (ref 1–4)
EOSINOPHIL NFR BLD AUTO: 3.5 % (ref 1–4)
EOSINOPHIL NFR BLD MANUAL: 1 % (ref 1–4)
EOSINOPHIL NFR BLD MANUAL: 2 % (ref 1–4)
ERYTHROCYTE [DISTWIDTH] IN BLOOD BY AUTOMATED COUNT: 15.2 % (ref 11.5–14.5)
ERYTHROCYTE [DISTWIDTH] IN BLOOD BY AUTOMATED COUNT: 15.3 % (ref 11.5–14.5)
EST. AVERAGE GLUCOSE BLD GHB EST-MCNC: 189 MG/DL
GLOBULIN SER-MCNC: 3.8 G/DL (ref 2–4)
GLUCOSE SERPL-MCNC: 166 MG/DL (ref 70–105)
GLUCOSE SERPL-MCNC: 213 MG/DL (ref 70–105)
GLUCOSE SERPL-MCNC: 94 MG/DL (ref 82–115)
HBA1C MFR BLD HPLC: 8.2 %
HCT VFR BLD AUTO: 42.6 % (ref 40–54)
HCT VFR BLD AUTO: 42.9 % (ref 40–54)
HGB BLD-MCNC: 12.4 G/DL (ref 13.5–18)
HGB BLD-MCNC: 12.6 G/DL (ref 13.5–18)
IMM GRANULOCYTES # BLD AUTO: 0.03 K/UL (ref 0–0.04)
IMM GRANULOCYTES # BLD AUTO: 0.04 K/UL (ref 0–0.04)
IMM GRANULOCYTES NFR BLD: 0.2 % (ref 0–0.4)
IMM GRANULOCYTES NFR BLD: 0.3 % (ref 0–0.4)
INR BLD: 1.11
LYMPHOCYTES # BLD AUTO: 6.23 K/UL (ref 1–4.8)
LYMPHOCYTES # BLD AUTO: 6.76 K/UL (ref 1–4.8)
LYMPHOCYTES NFR BLD AUTO: 51.6 % (ref 27–41)
LYMPHOCYTES NFR BLD AUTO: 55.7 % (ref 27–41)
LYMPHOCYTES NFR BLD MANUAL: 58 % (ref 27–41)
LYMPHOCYTES NFR BLD MANUAL: 64 % (ref 27–41)
MCH RBC QN AUTO: 25.4 PG (ref 27–31)
MCH RBC QN AUTO: 25.5 PG (ref 27–31)
MCHC RBC AUTO-ENTMCNC: 29.1 G/DL (ref 32–36)
MCHC RBC AUTO-ENTMCNC: 29.4 G/DL (ref 32–36)
MCV RBC AUTO: 86.7 FL (ref 80–96)
MCV RBC AUTO: 87.1 FL (ref 80–96)
MONOCYTES # BLD AUTO: 0.66 K/UL (ref 0–0.8)
MONOCYTES # BLD AUTO: 0.74 K/UL (ref 0–0.8)
MONOCYTES NFR BLD AUTO: 5.4 % (ref 2–6)
MONOCYTES NFR BLD AUTO: 6.1 % (ref 2–6)
MONOCYTES NFR BLD MANUAL: 3 % (ref 2–6)
MONOCYTES NFR BLD MANUAL: 5 % (ref 2–6)
MPC BLD CALC-MCNC: 11.5 FL (ref 9.4–12.4)
MPC BLD CALC-MCNC: 11.6 FL (ref 9.4–12.4)
NEUTROPHILS # BLD AUTO: 4.21 K/UL (ref 1.8–7.7)
NEUTROPHILS # BLD AUTO: 4.58 K/UL (ref 1.8–7.7)
NEUTROPHILS NFR BLD AUTO: 34.7 % (ref 53–65)
NEUTROPHILS NFR BLD AUTO: 37.9 % (ref 53–65)
NEUTS BAND NFR BLD MANUAL: 2 % (ref 1–5)
NEUTS SEG NFR BLD MANUAL: 29 % (ref 50–62)
NEUTS SEG NFR BLD MANUAL: 35 % (ref 50–62)
NRBC # BLD AUTO: 0 X10E3/UL
NRBC # BLD AUTO: 0 X10E3/UL
NRBC, AUTO (.00): 0 %
NRBC, AUTO (.00): 0 %
NT-PROBNP SERPL-MCNC: 922 PG/ML (ref 1–125)
OHS QRS DURATION: 138 MS
OHS QTC CALCULATION: 459 MS
OVALOCYTES BLD QL SMEAR: ABNORMAL
PLATELET # BLD AUTO: 165 K/UL (ref 150–400)
PLATELET # BLD AUTO: 180 K/UL (ref 150–400)
PLATELET MORPHOLOGY: ABNORMAL
PLATELET MORPHOLOGY: NORMAL
POTASSIUM SERPL-SCNC: 4.7 MMOL/L (ref 3.5–5.1)
PROT SERPL-MCNC: 7.9 G/DL (ref 5.8–7.6)
PROTHROMBIN TIME: 14.2 SECONDS (ref 11.7–14.7)
RBC # BLD AUTO: 4.89 M/UL (ref 4.6–6.2)
RBC # BLD AUTO: 4.95 M/UL (ref 4.6–6.2)
RBC MORPH BLD: NORMAL
SODIUM SERPL-SCNC: 136 MMOL/L (ref 136–145)
TROPONIN I SERPL HS-MCNC: 23.9 NG/L
TROPONIN I SERPL HS-MCNC: 28.3 NG/L
WBC # BLD AUTO: 12.08 K/UL (ref 4.5–11)
WBC # BLD AUTO: 12.14 K/UL (ref 4.5–11)

## 2024-12-02 PROCEDURE — 99215 OFFICE O/P EST HI 40 MIN: CPT | Mod: S$PBB,,, | Performed by: NURSE PRACTITIONER

## 2024-12-02 PROCEDURE — 93010 ELECTROCARDIOGRAM REPORT: CPT | Mod: S$PBB,76,, | Performed by: INTERNAL MEDICINE

## 2024-12-02 PROCEDURE — 3051F HG A1C>EQUAL 7.0%<8.0%: CPT | Mod: CPTII,,, | Performed by: NURSE PRACTITIONER

## 2024-12-02 PROCEDURE — 1101F PT FALLS ASSESS-DOCD LE1/YR: CPT | Mod: CPTII,,, | Performed by: NURSE PRACTITIONER

## 2024-12-02 PROCEDURE — 1160F RVW MEDS BY RX/DR IN RCRD: CPT | Mod: CPTII,,, | Performed by: NURSE PRACTITIONER

## 2024-12-02 PROCEDURE — 3066F NEPHROPATHY DOC TX: CPT | Mod: CPTII,,, | Performed by: NURSE PRACTITIONER

## 2024-12-02 PROCEDURE — 99900035 HC TECH TIME PER 15 MIN (STAT)

## 2024-12-02 PROCEDURE — 99223 1ST HOSP IP/OBS HIGH 75: CPT | Mod: AI,GC,, | Performed by: FAMILY MEDICINE

## 2024-12-02 PROCEDURE — 82962 GLUCOSE BLOOD TEST: CPT

## 2024-12-02 PROCEDURE — 3288F FALL RISK ASSESSMENT DOCD: CPT | Mod: CPTII,,, | Performed by: NURSE PRACTITIONER

## 2024-12-02 PROCEDURE — 99999 PR PBB SHADOW E&M-EST. PATIENT-LVL V: CPT | Mod: PBBFAC,,, | Performed by: NURSE PRACTITIONER

## 2024-12-02 PROCEDURE — 93005 ELECTROCARDIOGRAM TRACING: CPT | Mod: PBBFAC | Performed by: INTERNAL MEDICINE

## 2024-12-02 PROCEDURE — 85025 COMPLETE CBC W/AUTO DIFF WBC: CPT | Performed by: EMERGENCY MEDICINE

## 2024-12-02 PROCEDURE — 3008F BODY MASS INDEX DOCD: CPT | Mod: CPTII,,, | Performed by: NURSE PRACTITIONER

## 2024-12-02 PROCEDURE — 80053 COMPREHEN METABOLIC PANEL: CPT | Performed by: EMERGENCY MEDICINE

## 2024-12-02 PROCEDURE — 3075F SYST BP GE 130 - 139MM HG: CPT | Mod: CPTII,,, | Performed by: NURSE PRACTITIONER

## 2024-12-02 PROCEDURE — 1125F AMNT PAIN NOTED PAIN PRSNT: CPT | Mod: CPTII,,, | Performed by: NURSE PRACTITIONER

## 2024-12-02 PROCEDURE — 99285 EMERGENCY DEPT VISIT HI MDM: CPT | Mod: 25

## 2024-12-02 PROCEDURE — 94799 UNLISTED PULMONARY SVC/PX: CPT

## 2024-12-02 PROCEDURE — 25000003 PHARM REV CODE 250

## 2024-12-02 PROCEDURE — 94761 N-INVAS EAR/PLS OXIMETRY MLT: CPT

## 2024-12-02 PROCEDURE — 85025 COMPLETE CBC W/AUTO DIFF WBC: CPT | Mod: 91

## 2024-12-02 PROCEDURE — 4010F ACE/ARB THERAPY RXD/TAKEN: CPT | Mod: CPTII,,, | Performed by: NURSE PRACTITIONER

## 2024-12-02 PROCEDURE — 93005 ELECTROCARDIOGRAM TRACING: CPT

## 2024-12-02 PROCEDURE — 1159F MED LIST DOCD IN RCRD: CPT | Mod: CPTII,,, | Performed by: NURSE PRACTITIONER

## 2024-12-02 PROCEDURE — 85730 THROMBOPLASTIN TIME PARTIAL: CPT

## 2024-12-02 PROCEDURE — 83880 ASSAY OF NATRIURETIC PEPTIDE: CPT | Performed by: EMERGENCY MEDICINE

## 2024-12-02 PROCEDURE — 99215 OFFICE O/P EST HI 40 MIN: CPT | Mod: PBBFAC,25 | Performed by: NURSE PRACTITIONER

## 2024-12-02 PROCEDURE — 36415 COLL VENOUS BLD VENIPUNCTURE: CPT | Performed by: EMERGENCY MEDICINE

## 2024-12-02 PROCEDURE — 99499 UNLISTED E&M SERVICE: CPT | Mod: ,,, | Performed by: INTERNAL MEDICINE

## 2024-12-02 PROCEDURE — 96372 THER/PROPH/DIAG INJ SC/IM: CPT

## 2024-12-02 PROCEDURE — 83036 HEMOGLOBIN GLYCOSYLATED A1C: CPT

## 2024-12-02 PROCEDURE — 36415 COLL VENOUS BLD VENIPUNCTURE: CPT | Mod: 91

## 2024-12-02 PROCEDURE — G0378 HOSPITAL OBSERVATION PER HR: HCPCS

## 2024-12-02 PROCEDURE — 93010 ELECTROCARDIOGRAM REPORT: CPT | Mod: ,,, | Performed by: INTERNAL MEDICINE

## 2024-12-02 PROCEDURE — 63600175 PHARM REV CODE 636 W HCPCS

## 2024-12-02 PROCEDURE — 84484 ASSAY OF TROPONIN QUANT: CPT | Mod: 91 | Performed by: EMERGENCY MEDICINE

## 2024-12-02 PROCEDURE — 3060F POS MICROALBUMINURIA REV: CPT | Mod: CPTII,,, | Performed by: NURSE PRACTITIONER

## 2024-12-02 PROCEDURE — 85610 PROTHROMBIN TIME: CPT

## 2024-12-02 PROCEDURE — 3078F DIAST BP <80 MM HG: CPT | Mod: CPTII,,, | Performed by: NURSE PRACTITIONER

## 2024-12-02 RX ORDER — FENOFIBRATE 145 MG/1
145 TABLET, FILM COATED ORAL DAILY
Status: DISCONTINUED | OUTPATIENT
Start: 2024-12-03 | End: 2024-12-03 | Stop reason: HOSPADM

## 2024-12-02 RX ORDER — ESCITALOPRAM OXALATE 10 MG/1
10 TABLET ORAL DAILY
Status: DISCONTINUED | OUTPATIENT
Start: 2024-12-03 | End: 2024-12-03 | Stop reason: HOSPADM

## 2024-12-02 RX ORDER — ATORVASTATIN CALCIUM 80 MG/1
80 TABLET, FILM COATED ORAL NIGHTLY
Status: DISCONTINUED | OUTPATIENT
Start: 2024-12-02 | End: 2024-12-03 | Stop reason: HOSPADM

## 2024-12-02 RX ORDER — MORPHINE SULFATE 4 MG/ML
4 INJECTION, SOLUTION INTRAMUSCULAR; INTRAVENOUS EVERY 6 HOURS PRN
Status: DISCONTINUED | OUTPATIENT
Start: 2024-12-02 | End: 2024-12-03 | Stop reason: HOSPADM

## 2024-12-02 RX ORDER — INSULIN GLARGINE 100 [IU]/ML
15 INJECTION, SOLUTION SUBCUTANEOUS NIGHTLY
Status: DISCONTINUED | OUTPATIENT
Start: 2024-12-03 | End: 2024-12-03 | Stop reason: HOSPADM

## 2024-12-02 RX ORDER — CLOPIDOGREL BISULFATE 75 MG/1
75 TABLET ORAL DAILY
Status: DISCONTINUED | OUTPATIENT
Start: 2024-12-03 | End: 2024-12-03 | Stop reason: HOSPADM

## 2024-12-02 RX ORDER — PANTOPRAZOLE SODIUM 40 MG/1
40 TABLET, DELAYED RELEASE ORAL DAILY
Status: DISCONTINUED | OUTPATIENT
Start: 2024-12-03 | End: 2024-12-03 | Stop reason: HOSPADM

## 2024-12-02 RX ORDER — ENOXAPARIN SODIUM 100 MG/ML
1 INJECTION SUBCUTANEOUS ONCE
Status: COMPLETED | OUTPATIENT
Start: 2024-12-02 | End: 2024-12-02

## 2024-12-02 RX ORDER — NITROGLYCERIN 0.4 MG/1
0.4 TABLET SUBLINGUAL EVERY 5 MIN PRN
Status: DISCONTINUED | OUTPATIENT
Start: 2024-12-02 | End: 2024-12-03 | Stop reason: HOSPADM

## 2024-12-02 RX ORDER — HEPARIN SODIUM 10000 [USP'U]/100ML
17 INJECTION, SOLUTION INTRAVENOUS CONTINUOUS
Status: DISCONTINUED | OUTPATIENT
Start: 2024-12-02 | End: 2024-12-02

## 2024-12-02 RX ORDER — HYDROCODONE BITARTRATE AND ACETAMINOPHEN 10; 325 MG/1; MG/1
1 TABLET ORAL EVERY 8 HOURS PRN
Status: DISCONTINUED | OUTPATIENT
Start: 2024-12-02 | End: 2024-12-03 | Stop reason: HOSPADM

## 2024-12-02 RX ORDER — GABAPENTIN 300 MG/1
300 CAPSULE ORAL 3 TIMES DAILY
Status: DISCONTINUED | OUTPATIENT
Start: 2024-12-02 | End: 2024-12-03 | Stop reason: HOSPADM

## 2024-12-02 RX ORDER — LISINOPRIL 20 MG/1
20 TABLET ORAL DAILY
Status: DISCONTINUED | OUTPATIENT
Start: 2024-12-03 | End: 2024-12-03 | Stop reason: HOSPADM

## 2024-12-02 RX ORDER — HYDRALAZINE HYDROCHLORIDE 20 MG/ML
10 INJECTION INTRAMUSCULAR; INTRAVENOUS EVERY 6 HOURS PRN
Status: DISCONTINUED | OUTPATIENT
Start: 2024-12-02 | End: 2024-12-03 | Stop reason: HOSPADM

## 2024-12-02 RX ORDER — ASPIRIN 81 MG/1
81 TABLET ORAL DAILY
Status: DISCONTINUED | OUTPATIENT
Start: 2024-12-03 | End: 2024-12-03 | Stop reason: HOSPADM

## 2024-12-02 RX ORDER — ACETAMINOPHEN 325 MG/1
650 TABLET ORAL EVERY 6 HOURS PRN
Status: DISCONTINUED | OUTPATIENT
Start: 2024-12-02 | End: 2024-12-03

## 2024-12-02 RX ORDER — METOPROLOL SUCCINATE 25 MG/1
25 TABLET, EXTENDED RELEASE ORAL DAILY
Status: DISCONTINUED | OUTPATIENT
Start: 2024-12-03 | End: 2024-12-03

## 2024-12-02 RX ORDER — AMLODIPINE BESYLATE 5 MG/1
5 TABLET ORAL DAILY
Status: DISCONTINUED | OUTPATIENT
Start: 2024-12-03 | End: 2024-12-03 | Stop reason: HOSPADM

## 2024-12-02 RX ORDER — ONDANSETRON HYDROCHLORIDE 2 MG/ML
4 INJECTION, SOLUTION INTRAVENOUS EVERY 6 HOURS PRN
Status: DISCONTINUED | OUTPATIENT
Start: 2024-12-02 | End: 2024-12-03

## 2024-12-02 RX ORDER — INSULIN ASPART 100 [IU]/ML
0-10 INJECTION, SOLUTION INTRAVENOUS; SUBCUTANEOUS EVERY 6 HOURS PRN
Status: DISCONTINUED | OUTPATIENT
Start: 2024-12-02 | End: 2024-12-03 | Stop reason: HOSPADM

## 2024-12-02 RX ORDER — HYDRALAZINE HYDROCHLORIDE 50 MG/1
50 TABLET, FILM COATED ORAL 4 TIMES DAILY
Status: DISCONTINUED | OUTPATIENT
Start: 2024-12-02 | End: 2024-12-03

## 2024-12-02 RX ORDER — IPRATROPIUM BROMIDE AND ALBUTEROL SULFATE 2.5; .5 MG/3ML; MG/3ML
3 SOLUTION RESPIRATORY (INHALATION) EVERY 6 HOURS PRN
Status: DISCONTINUED | OUTPATIENT
Start: 2024-12-02 | End: 2024-12-03 | Stop reason: HOSPADM

## 2024-12-02 RX ORDER — GLUCAGON 1 MG
1 KIT INJECTION
Status: DISCONTINUED | OUTPATIENT
Start: 2024-12-02 | End: 2024-12-03 | Stop reason: HOSPADM

## 2024-12-02 RX ADMIN — HYDRALAZINE HYDROCHLORIDE 50 MG: 50 TABLET ORAL at 05:12

## 2024-12-02 RX ADMIN — ATORVASTATIN CALCIUM 80 MG: 80 TABLET, FILM COATED ORAL at 08:12

## 2024-12-02 RX ADMIN — GABAPENTIN 300 MG: 300 CAPSULE ORAL at 08:12

## 2024-12-02 RX ADMIN — ENOXAPARIN SODIUM 90 MG: 100 INJECTION SUBCUTANEOUS at 05:12

## 2024-12-02 RX ADMIN — INSULIN ASPART 4 UNITS: 100 INJECTION, SOLUTION INTRAVENOUS; SUBCUTANEOUS at 06:12

## 2024-12-02 NOTE — ASSESSMENT & PLAN NOTE
S/p CABG 1/23/2020- Dr. Levy  SVG to the dLAD; SVG to the OM and SVG to the PL  2/7/2019 BECKIE dLAD and mid LAD to overlap the BECKIE in the dLAD      +ACS with new LBBB on EKG today  Referred to ED

## 2024-12-02 NOTE — ASSESSMENT & PLAN NOTE
Pt continued on statins (Atorvastatin 80 mg inpatient) due to cardiac history of CABG 2020 and LHC 2022.

## 2024-12-02 NOTE — ASSESSMENT & PLAN NOTE
Patient reports more frequent episodes of angina over the last 1-2 months. He has limited his activity but has continued to have ongoing issues with chest discomfort walking a few feet. He describes this as substernal chest pain radiating to both shoulders which he identifies as the same pain as he had prior to previous MI and revascularization. He had a particularly bad episodes yesterday with severe pain which as relieved with rest and one sublingual ntg. His EKG today demonstrates a new LBBB.   Case reviewed with Dr. Howell who agrees the patient should be referred to the ED for further work up and LHC.  This was discussed the patient and his family member and they are in agreement. Report was called to the ED and the cardiology hospital team was notified.

## 2024-12-02 NOTE — ASSESSMENT & PLAN NOTE
Lab Results   Component Value Date    CHOL 122 06/12/2024    CHOL 78 12/11/2023    CHOL 85 03/01/2023     Lab Results   Component Value Date    HDL 28 (L) 06/12/2024    HDL 24 (L) 12/11/2023    HDL 26 (L) 03/01/2023     Lab Results   Component Value Date    LDLCALC 56 06/12/2024    LDLCALC 21 12/11/2023    LDLCALC 30 03/01/2023     Lab Results   Component Value Date    TRIG 191 (H) 06/12/2024    TRIG 166 (H) 12/11/2023    TRIG 144 03/01/2023       Lab Results   Component Value Date    CHOLHDL 4.4 06/12/2024    CHOLHDL 3.3 12/11/2023    CHOLHDL 3.3 03/01/2023   Crestor 40 mg po daily  Lipids followed by PCP

## 2024-12-02 NOTE — Clinical Note
Patient was suppose to have colonoscopy with Dr. Lemus. She states she is in a wheelchair and is not sure how she is going to do the prep since she is in wheelchair. She is afraid she will not be able to get to bathroom soon enough once she starts prep. Please advise on what you think we should do.     Thanks.    The catheter was repositioned into the ostial SVG graft. An angiography was performed of the graft. Multiple views were taken. SVG to LAD

## 2024-12-02 NOTE — PHARMACY MED REC
"Admission Medication History     The home medication history was taken by Lynda Garsia.    You may go to "Admission" then "Reconcile Home Medications" tabs to review and/or act upon these items.     The home medication list has been updated by the Pharmacy department.   Please read ALL comments highlighted in yellow.   Please address this information as you see fit.    Feel free to contact us if you have any questions or require assistance.  Medication Updated:  Insulin degludec  Patient stated he does more of a sliding scale, depending on his readings. This morning he did 30 units.      Medications listed below were obtained from: Patient/family and Analytic software- [employer]  (Not in a hospital admission)        Current Outpatient Medications on File Prior to Encounter   Medication Sig Dispense Refill Last Dose/Taking    amLODIPine (NORVASC) 5 MG tablet Take 1 tablet (5 mg total) by mouth once daily. 90 tablet 1 12/2/2024 Morning    aspirin (ECOTRIN) 81 MG EC tablet Take 81 mg by mouth once daily.   12/2/2024 Morning    cholecalciferol, vitamin D3, (VITAMIN D3) 25 mcg (1,000 unit) capsule Take 1,000 Units by mouth once daily.   12/2/2024 Morning    cinnamon bark 500 mg capsule Take 500 mg by mouth once daily.    12/2/2024 Morning    clopidogreL (PLAVIX) 75 mg tablet Take 1 tablet (75 mg total) by mouth once daily. 30 tablet 11 12/2/2024    cyanocobalamin (VITAMIN B-12) 1000 MCG tablet Take 100 mcg by mouth once daily.   12/2/2024    EScitalopram oxalate (LEXAPRO) 10 MG tablet Take 1 tablet (10 mg total) by mouth once daily. 90 tablet 0 12/2/2024    FARXIGA 10 mg tablet TAKE ONE TABLET BY MOUTH ONCE DAILY 90 tablet 0 12/2/2024    fenofibrate (TRICOR) 145 MG tablet TAKE 1 TABLET (145 MG TOTAL) BY MOUTH ONCE DAILY. 90 tablet 0 12/2/2024    gabapentin (NEURONTIN) 300 MG capsule TAKE ONE CAPSULE BY MOUTH THREE TIMES DAILY 270 capsule 0 12/2/2024    hydrALAZINE (APRESOLINE) 50 MG tablet Take 1 tablet (50 mg total) by " mouth 4 (four) times daily. 360 tablet 1 12/2/2024    HYDROcodone-acetaminophen (NORCO)  mg per tablet Take 1 tablet by mouth every 8 (eight) hours as needed for Pain (pain). 90 tablet 0 12/2/2024    liraglutide 0.6 mg/0.1 mL, 18 mg/3 mL, subq PNIJ (VICTOZA 2-CAITLIN) 0.6 mg/0.1 mL (18 mg/3 mL) PnIj pen Inject 1.8 mg into the skin once daily. 27 mL 0 12/1/2024 Evening    lisinopriL (PRINIVIL,ZESTRIL) 20 MG tablet Take 1 tablet (20 mg total) by mouth once daily. 90 tablet 1 12/2/2024    magnesium 250 mg Tab Take 1 tablet by mouth once daily.   12/2/2024    metFORMIN (GLUCOPHAGE) 500 MG tablet TAKE ONE TABLET BY MOUTH TWICE DAILY WITH MEALS 180 tablet 0 12/2/2024    metoprolol succinate (TOPROL-XL) 25 MG 24 hr tablet Take 1 tablet (25 mg total) by mouth once daily. 90 tablet 1 12/2/2024    nitroGLYCERIN (NITROSTAT) 0.4 MG SL tablet Place 1 tablet (0.4 mg total) under the tongue every 5 (five) minutes as needed for Chest pain. 25 tablet 3 12/2/2024    omeprazole (PRILOSEC) 20 MG capsule TAKE 1 CAPSULE (20 MG TOTAL) BY MOUTH ONCE DAILY. 90 capsule 0 12/2/2024    rosuvastatin (CRESTOR) 40 MG Tab TAKE 1 TABLET (40 MG TOTAL) BY MOUTH EVERY EVENING. 90 tablet 1 12/1/2024    TRESIBA FLEXTOUCH U-200 200 unit/mL (3 mL) insulin pen Inject into the skin once daily. On a sort of sliding scale depending on his BG readings; has a Freestyle [place] reader   12/2/2024 Morning    VASCEPA 1 gram Cap Take 2 capsules by mouth 2 (two) times a day.   12/2/2024    vitamin A 8000 UNIT capsule Take 8,000 Units by mouth.   12/2/2024    vitamin E 400 UNIT capsule Take 400 Units by mouth once daily.    12/2/2024    [START ON 12/11/2024] HYDROcodone-acetaminophen (NORCO)  mg per tablet Take 1 tablet by mouth every 8 (eight) hours as needed for Pain (pain). 90 tablet 0     naloxone (NARCAN) 4 mg/actuation Spry 1 spray by Nasal route once.       [DISCONTINUED] ezetimibe (ZETIA) 10 mg tablet Take 1 tablet (10 mg total) by mouth once daily.  (Patient not taking: Reported on 5/30/2024) 90 tablet 1     [DISCONTINUED] insulin regular 100 unit/mL Inj injection Novolin R Regular U-100 Insuln   14-32 u bidwm (Patient not taking: Reported on 5/30/2024)       [DISCONTINUED] potassium chloride SA (K-DUR,KLOR-CON) 20 MEQ tablet Take 1 tablet (20 mEq total) by mouth once daily. (Patient not taking: Reported on 5/30/2024) 90 tablet 0     [DISCONTINUED] rOPINIRole (REQUIP) 0.5 MG tablet Take 0.5 mg by mouth every evening. (Patient not taking: Reported on 5/30/2024)       [DISCONTINUED] spironolactone (ALDACTONE) 25 MG tablet Take 25 mg by mouth 2 (two) times daily. (Patient not taking: Reported on 5/30/2024)            Potential issues to be addressed PRIOR TO DISCHARGE  No issues identified.    Lynda Garsia  Medication Access Specialist  [phone]. [phone]    .

## 2024-12-02 NOTE — HPI
Patient is an 69 y.o male who presents to [facility] ED today sent from cardiology clinic with c/o chest pain and abnormal EKG. Patient states he has been having increased angina episodes over the last two months that have increased in frequency. Patient states every few steps he has noticed more chest pain that radiates to his bilateral shoulders. Patient states the pain is similar to prior MI. Patient states his chest pain does improve with one NTG tablet. While in clinic today patient was noted to have new LBBB on EKG. Patient also complains of HA, dyspnea, orthopnea and BLE edema. Patent has a PMH of CAD, HTN, T2DM, CHF, HLD and PVD. Patient has surgical history of CABG and stent to LAD. Patient lives with wife and son he is still able to complete all ADLS  he still chew tobacco daily and denies alcohol use. Patient sent to ED from cardiology for work up and LHC.     In the ED, Initial vitals were T. 97.7, pulse 57, RR, 14, /64, O2 97 percent. Completed imaging includes CXR. Pertinent labs WBC 12, H/H 12/42. BUN/Cr 44/1.8, GFR 40, Troponin 28.2 and ProBNP 822.    Patient is admitted to [facility] Medicine team for the continued care and medical management.

## 2024-12-02 NOTE — Clinical Note
The catheter was repositioned into the ostial SVG graft. An angiography was performed of the graft. Multiple views were taken. SVG to CIRC

## 2024-12-02 NOTE — PROGRESS NOTES
"PCP: Rodney Morel MD    Referring Provider:   Chief Complaint   Patient presents with    Coronary Artery Disease    Congestive Heart Failure    Chest Pain     With any exertion, bending over- having to take Nitro more in the last 7 days. Feels like it did when he had last MI.    Shortness of Breath     With exertion    Palpitations     At times    Edema     Goes down at night.      Subjective:   Micheal Taylor is a 69 y.o. male with hx of CAD, HTN, DM, CHF, HLD, and PVD,  who presents for routine follow up. Patient reports more frequent episodes of angina over the last 1-2 months. He has limited his activity but has continued to have ongoing issues with chest discomfort walking a few feet. He describes this as substernal chest pain radiating to both shoulders which he identifies as the same pain as he had prior to previous MI and revascularization. He had a particularly bad episodes yesterday with severe pain which as relieved with rest and one sublingual ntg. His EKG today demonstrates a new LBBB.   Case reviewed with Dr. Howell who agrees the patient should be referred to the ED for further work up and LHC.  This was discussed the patient and his family member and they are in agreement. Report was called to the ED and the cardiology hospital team was notified.     5/30/2024 presents for routine follow up. He states that he is doing well and denies complaints. He does have chronic, dependent edema of the LLE since his injury of his left foot and the saphenous vein harvest from the LLE at time of CABG.     11/30/2023 presents for routine follow up. He reports episodes of left shoulder pain; dull pan similar to MI pain; resolved with rest and at times one sublingual ntg; Discomfort does not occur with exertion but after "working all day" occurs later in the evening. Patient feels may be r/t muscle pain from over use. This has been ongoing for at least 6-8 months and has occurred only 2-3 times in the last 6 " months.      2023 presents for routine follow up. He states that he is doing well and has had no cardiac issues.         Fhx:  Family History   Problem Relation Name Age of Onset    Diabetes Mother      Heart disease Mother      Hypertension Mother      Liver cancer Mother      Heart disease Father      Hypertension Father       Shx:   Social History     Socioeconomic History    Marital status:     Number of children: 5   Occupational History    Occupation: retired   Tobacco Use    Smoking status: Former     Current packs/day: 0.00     Average packs/day: 4.0 packs/day for 30.0 years (120.0 ttl pk-yrs)     Types: Cigarettes     Start date:      Quit date:      Years since quittin.9     Passive exposure: Past (parent)    Smokeless tobacco: Current     Types: Chew   Substance and Sexual Activity    Alcohol use: Not Currently    Drug use: Yes     Types: Hydrocodone    Sexual activity: Not Currently     Social Drivers of Health     Financial Resource Strain: Low Risk  (3/26/2024)    Overall Financial Resource Strain (CARDIA)     Difficulty of Paying Living Expenses: Not hard at all   Food Insecurity: No Food Insecurity (3/26/2024)    Hunger Vital Sign     Worried About Running Out of Food in the Last Year: Never true     Ran Out of Food in the Last Year: Never true   Transportation Needs: No Transportation Needs (3/26/2024)    PRAPARE - Transportation     Lack of Transportation (Medical): No     Lack of Transportation (Non-Medical): No   Physical Activity: Insufficiently Active (3/26/2024)    Exercise Vital Sign     Days of Exercise per Week: 7 days     Minutes of Exercise per Session: 10 min   Stress: No Stress Concern Present (3/26/2024)    [employer] - Occupational Stress Questionnaire     Feeling of Stress : Not at all   Housing Stability: Low Risk  (3/26/2024)    Housing Stability Vital Sign     Unable to Pay for Housing in the Last Year: No     Number of Places  Lived in the Last Year: 1     Unstable Housing in the Last Year: No       EKG   12/2/2024 RSR with HR 63 bpm; left axis deviation; LBBB; when compared with EKG 5/30/2024 LBBB is now present; criteria for inferior infarct are no longer present.   5/30/2024 sinus bradycardia; HR 59 bpm; inferior infarct (cited on or before 11/30/2023); when compared with EKG 11/30/2023 TWI now evident in the inferior leads  11/30/2023 RSR with HR 60 bpm; q waves in leads III and aVF  5/31/2023 sinus bradycardia; HR 50 bpm; o/w normal EKG  7/20/2022 sinus bradycardia; HR 54 bpm; o/w normal EKG    ECHO Results for orders placed during the hospital encounter of 05/25/22    Echo Saline Bubble? No    Interpretation Summary  · The left ventricle is normal in size with moderate concentric hypertrophy and normal systolic function.  · The estimated ejection fraction is 55%.  · Atrial fibrillation not observed.  · Normal left ventricular diastolic function.  · Mild-to-moderate mitral regurgitation.    [facility] Results for orders placed during the hospital encounter of 07/19/22    Cardiac catheterization    Conclusion  · There was severe left ventricular systolic dysfunction.  · The left ventricular end diastolic pressure was severely elevated.  · The pre-procedure left ventricular end diastolic pressure was 30.  · The ejection fraction was calculated to be 25%.  · There was no mitral valve regurgitation.  · The Mid LM lesion was 95% stenosed with 15% stenosis post-intervention.  · A STENT S Catalyze US MR 4.00 X 12MM stent was successfully placed at 14 THERESE for 14 sec.  · The Ost Cx to Prox Cx lesion was 100% stenosed.  · The 1st Mrg lesion was 99% stenosed.  · The Mid Cx lesion was 100% stenosed.  · The Prox RCA to Mid RCA lesion was 99% stenosed.  · The Mid RCA lesion was 100% stenosed.  · The estimated blood loss was none.  · There was three vessel coronary artery disease. There was left main disease.    The procedure log was documented by  Documenter: Roxana Troncoso RT and verified by Venu Whitlock MD.    Date: 7/19/2022  Time: 4:10 PM  Impression:     1. Severe 3 vessel coronary artery disease including 90% distal left main stenosis.     2. Patent saphenous vein graft to the mid LAD with 90% mid LAD stenosis above the graft.     3. Patent saphenous vein graft to the OM branch of the circumflex but poor runoff with severe disease either side of the anastomosis.     4. Closed saphenous vein graft to the RCA.  (PL).     5. Dilated AS, ischemic cardiomyopathy with severely impaired left ventricular systolic function, ejection fraction 25%.     6. No significant mitral regurgitation.     7. Successful primary stent of the distal left main coronary artery using a 4.0 x 12 mm negative drawn [place] BECK[place].   Impression:     1. Severe 3 vessel coronary artery disease including 90% distal left main stenosis.     2. Patent saphenous vein graft to the mid LAD with 90% mid LAD stenosis above the graft.     3. Patent saphenous vein graft to the OM branch of the circumflex but poor runoff with severe disease either side of the anastomosis.     4. Closed saphenous vein graft to the RCA.  (PL).     5. Dilated AS, ischemic cardiomyopathy with severely impaired left ventricular systolic function, ejection fraction 25%.     6. No significant mitral regurgitation.     7. Successful primary stent of the distal left main coronary artery using a 4.0 x 12 mm negative drawn [employer] BECK[place].     S/p CABG 1/23/2020- Dr. Levy  SVG to the dLAD; SVG to the OM and SVG to the PL           Lab Results   Component Value Date     06/12/2024    K 5.0 06/12/2024     06/12/2024    CO2 29 06/12/2024    BUN 44 (H) 06/12/2024    CREATININE 1.83 (H) 06/12/2024    CALCIUM 8.8 06/12/2024    ANIONGAP 10 06/12/2024    ESTGFRAFRICA 47 12/12/2020    EGFRNONAA 32 (L) 07/14/2022       Lab Results   Component Value Date    CHOL 122 06/12/2024    CHOL 78 12/11/2023    CHOL 85 03/01/2023     Lab  Results   Component Value Date    HDL 28 (L) 06/12/2024    HDL 24 (L) 12/11/2023    HDL 26 (L) 03/01/2023     Lab Results   Component Value Date    LDLCALC 56 06/12/2024    LDLCALC 21 12/11/2023    LDLCALC 30 03/01/2023     Lab Results   Component Value Date    TRIG 191 (H) 06/12/2024    TRIG 166 (H) 12/11/2023    TRIG 144 03/01/2023     Lab Results   Component Value Date    CHOLHDL 4.4 06/12/2024    CHOLHDL 3.3 12/11/2023    CHOLHDL 3.3 03/01/2023       Lab Results   Component Value Date    WBC 9.09 06/12/2024    HGB 11.8 (L) 06/12/2024    HCT 40.0 06/12/2024    MCV 86.0 06/12/2024     06/12/2024           Current Outpatient Medications:     amLODIPine (NORVASC) 5 MG tablet, Take 1 tablet (5 mg total) by mouth once daily., Disp: 90 tablet, Rfl: 1    aspirin (ECOTRIN) 81 MG EC tablet, Take 81 mg by mouth once daily., Disp: , Rfl:     cholecalciferol, vitamin D3, (VITAMIN D3) 25 mcg (1,000 unit) capsule, Take 1,000 Units by mouth once daily., Disp: , Rfl:     cinnamon bark 500 mg capsule, Take 500 mg by mouth once daily. , Disp: , Rfl:     clopidogreL (PLAVIX) 75 mg tablet, Take 1 tablet (75 mg total) by mouth once daily., Disp: 30 tablet, Rfl: 11    cyanocobalamin (VITAMIN B-12) 1000 MCG tablet, Take 100 mcg by mouth once daily., Disp: , Rfl:     EScitalopram oxalate (LEXAPRO) 10 MG tablet, Take 1 tablet (10 mg total) by mouth once daily., Disp: 90 tablet, Rfl: 0    ezetimibe (ZETIA) 10 mg tablet, Take 1 tablet (10 mg total) by mouth once daily. (Patient not taking: Reported on 5/30/2024), Disp: 90 tablet, Rfl: 1    FARXIGA 10 mg tablet, TAKE ONE TABLET BY MOUTH ONCE DAILY, Disp: 90 tablet, Rfl: 0    fenofibrate (TRICOR) 145 MG tablet, TAKE 1 TABLET (145 MG TOTAL) BY MOUTH ONCE DAILY., Disp: 90 tablet, Rfl: 0    gabapentin (NEURONTIN) 300 MG capsule, TAKE ONE CAPSULE BY MOUTH THREE TIMES DAILY, Disp: 270 capsule, Rfl: 0    hydrALAZINE (APRESOLINE) 50 MG tablet, Take 1 tablet (50 mg total) by mouth 4 (four)  times daily., Disp: 360 tablet, Rfl: 1    HYDROcodone-acetaminophen (NORCO)  mg per tablet, Take 1 tablet by mouth every 8 (eight) hours as needed for Pain (pain)., Disp: 90 tablet, Rfl: 0    [START ON 12/11/2024] HYDROcodone-acetaminophen (NORCO)  mg per tablet, Take 1 tablet by mouth every 8 (eight) hours as needed for Pain (pain)., Disp: 90 tablet, Rfl: 0    insulin regular 100 unit/mL Inj injection, Novolin R Regular U-100 Insuln  14-32 u bidwm (Patient not taking: Reported on 5/30/2024), Disp: , Rfl:     liraglutide 0.6 mg/0.1 mL, 18 mg/3 mL, subq PNIJ (VICTOZA 2-CAITLIN) 0.6 mg/0.1 mL (18 mg/3 mL) PnIj pen, Inject 1.8 mg into the skin once daily., Disp: 27 mL, Rfl: 0    lisinopriL (PRINIVIL,ZESTRIL) 20 MG tablet, Take 1 tablet (20 mg total) by mouth once daily., Disp: 90 tablet, Rfl: 1    magnesium 250 mg Tab, Take 1 tablet by mouth once daily., Disp: , Rfl:     metFORMIN (GLUCOPHAGE) 500 MG tablet, TAKE ONE TABLET BY MOUTH TWICE DAILY WITH MEALS, Disp: 180 tablet, Rfl: 0    metoprolol succinate (TOPROL-XL) 25 MG 24 hr tablet, Take 1 tablet (25 mg total) by mouth once daily., Disp: 90 tablet, Rfl: 1    naloxone (NARCAN) 4 mg/actuation Spry, 1 spray by Nasal route once., Disp: , Rfl:     nitroGLYCERIN (NITROSTAT) 0.4 MG SL tablet, Place 1 tablet (0.4 mg total) under the tongue every 5 (five) minutes as needed for Chest pain., Disp: 25 tablet, Rfl: 3    omeprazole (PRILOSEC) 20 MG capsule, TAKE 1 CAPSULE (20 MG TOTAL) BY MOUTH ONCE DAILY., Disp: 90 capsule, Rfl: 0    potassium chloride SA (K-DUR,KLOR-CON) 20 MEQ tablet, Take 1 tablet (20 mEq total) by mouth once daily. (Patient not taking: Reported on 5/30/2024), Disp: 90 tablet, Rfl: 0    rOPINIRole (REQUIP) 0.5 MG tablet, Take 0.5 mg by mouth every evening. (Patient not taking: Reported on 5/30/2024), Disp: , Rfl:     rosuvastatin (CRESTOR) 40 MG Tab, TAKE 1 TABLET (40 MG TOTAL) BY MOUTH EVERY EVENING., Disp: 90 tablet, Rfl: 1    spironolactone  "(ALDACTONE) 25 MG tablet, Take 25 mg by mouth 2 (two) times daily. (Patient not taking: Reported on 5/30/2024), Disp: , Rfl:     TRESIBA FLEXTOUCH U-200 200 unit/mL (3 mL) insulin pen, Inject 54 Units into the skin once daily. , Disp: , Rfl:     VASCEPA 1 gram Cap, Take 2 capsules by mouth 2 (two) times a day., Disp: , Rfl:     vitamin A 8000 UNIT capsule, Take 8,000 Units by mouth., Disp: , Rfl:     vitamin E 400 UNIT capsule, Take 400 Units by mouth once daily. , Disp: , Rfl:   Meds reviewed but not reconciled. He did not bring his meds today.      Review of Systems   Respiratory:  Positive for shortness of breath.    Cardiovascular:  Positive for chest pain, palpitations and leg swelling. Negative for orthopnea, claudication and PND.   Neurological:  Negative for dizziness, loss of consciousness and weakness.           Objective:   /64 (BP Location: Left arm, Patient Position: Sitting)   Pulse 66   Ht 5' 11" (1.803 m)   Wt 85.9 kg (189 lb 6.4 oz)   SpO2 98%   BMI 26.42 kg/m²     Physical Exam  Vitals and nursing note reviewed.   Constitutional:       Appearance: Normal appearance. He is normal weight.   HENT:      Head: Normocephalic and atraumatic.   Neck:      Vascular: No carotid bruit.   Cardiovascular:      Rate and Rhythm: Normal rate and regular rhythm.      Pulses: Normal pulses.      Heart sounds: Normal heart sounds.   Pulmonary:      Effort: Pulmonary effort is normal.      Breath sounds: Normal breath sounds.   Abdominal:      Palpations: Abdomen is soft.   Musculoskeletal:      Cervical back: Neck supple.      Right lower leg: No edema.      Left lower leg: No edema.   Skin:     General: Skin is warm and dry.      Capillary Refill: Capillary refill takes less than 2 seconds.   Neurological:      Mental Status: He is alert.           Assessment:     1. Coronary artery disease, unspecified vessel or lesion type, unspecified whether angina present, unspecified whether native or transplanted " heart  EKG 12-lead    EKG 12-lead      2. Acute coronary syndrome        3. PVD (peripheral vascular disease)        4. Old MI (myocardial infarction)        5. Hypertension, unspecified type        6. Hyperlipidemia, unspecified hyperlipidemia type        7. Chronic systolic congestive heart failure        8. Aortic valve sclerosis        9. Mitral valve insufficiency, unspecified etiology        10. Former smoker              Plan:   Acute coronary syndrome  Patient reports more frequent episodes of angina over the last 1-2 months. He has limited his activity but has continued to have ongoing issues with chest discomfort walking a few feet. He describes this as substernal chest pain radiating to both shoulders which he identifies as the same pain as he had prior to previous MI and revascularization. He had a particularly bad episodes yesterday with severe pain which as relieved with rest and one sublingual ntg. His EKG today demonstrates a new LBBB.   Case reviewed with Dr. Howell who agrees the patient should be referred to the ED for further work up and LHC.  This was discussed the patient and his family member and they are in agreement. Report was called to the ED and the cardiology hospital team was notified.     PVD (peripheral vascular disease)  02/05/2024 1 year follow-up ABIs he has had bilateral fem-pop he has a former smoker   Followed by Dr. Forrest- vascular surgery    Old MI (myocardial infarction)  x3    Hypertension  132/64 mmHg    Hyperlipidemia  Lab Results   Component Value Date    CHOL 122 06/12/2024    CHOL 78 12/11/2023    CHOL 85 03/01/2023     Lab Results   Component Value Date    HDL 28 (L) 06/12/2024    HDL 24 (L) 12/11/2023    HDL 26 (L) 03/01/2023     Lab Results   Component Value Date    LDLCALC 56 06/12/2024    LDLCALC 21 12/11/2023    LDLCALC 30 03/01/2023     Lab Results   Component Value Date    TRIG 191 (H) 06/12/2024    TRIG 166 (H) 12/11/2023    TRIG 144 03/01/2023       Lab Results  "  Component Value Date    CHOLHDL 4.4 06/12/2024    CHOLHDL 3.3 12/11/2023    CHOLHDL 3.3 03/01/2023   Crestor 40 mg po daily  Lipids followed by PCP      Coronary artery disease  S/p CABG 1/23/2020- Dr. Levy  SVG to the dLAD; SVG to the OM and SVG to the PL  2/7/2019 BECKIE dLAD and mid LAD to overlap the BECKIE in the dLAD      +ACS with new LBBB on EKG today  Referred to ED       CHF (congestive heart failure)  Patient has Systolic (HFrEF) heart failure that is Chronic. On presentation their CHF was well compensated. Most recent BNP and echo results are listed below.  No results for input(s): "BNP" in the last 72 hours.  Latest ECHO  Results for orders placed during the hospital encounter of 05/25/22    Echo Saline Bubble? No    Interpretation Summary  · The left ventricle is normal in size with moderate concentric hypertrophy and normal systolic function.  · The estimated ejection fraction is 55%.  · Atrial fibrillation not observed.  · Normal left ventricular diastolic function.  · Mild-to-moderate mitral regurgitation.    Current Heart Failure Medications   Metoprolol succinate  Lisinopril  Farxiga  aldactone        Aortic valve sclerosis  Mild sclerosis without stenosis by echo 5/25/2022    Mitral regurgitation  Mild to moderate MR by echo 5/25/2022    Former smoker  Quit smoking when diagnosed with PAD      RTC: after HD        "

## 2024-12-02 NOTE — Clinical Note
Diagnosis: Chest pain [158315]   Future Attending Provider: MARIAM REESE JR [[id number]]   Special Needs:: No Special Needs [1]

## 2024-12-02 NOTE — Clinical Note
Pt transferred back to room 466. Bedside report given to BRODY Ruffin. Right groin site CDI. No hematoma noted. Family at bedside. VSS.

## 2024-12-02 NOTE — ASSESSMENT & PLAN NOTE
02/05/2024 1 year follow-up ABIs he has had bilateral fem-pop he has a former smoker   Followed by Dr. Forrest- vascular surgery

## 2024-12-02 NOTE — ASSESSMENT & PLAN NOTE
"Patient has Systolic (HFrEF) heart failure that is Chronic. On presentation their CHF was well compensated. Most recent BNP and echo results are listed below.  No results for input(s): "BNP" in the last 72 hours.  Latest ECHO  Results for orders placed during the hospital encounter of 05/25/22    Echo Saline Bubble? No    Interpretation Summary  · The left ventricle is normal in size with moderate concentric hypertrophy and normal systolic function.  · The estimated ejection fraction is 55%.  · Atrial fibrillation not observed.  · Normal left ventricular diastolic function.  · Mild-to-moderate mitral regurgitation.    Current Heart Failure Medications   Metoprolol succinate  Lisinopril  Farxiga  aldactone      "

## 2024-12-02 NOTE — ASSESSMENT & PLAN NOTE
Creatine stable for now. BMP reviewed- noted Estimated Creatinine Clearance: 41.3 mL/min (A) (based on SCr of 1.8 mg/dL (H)). according to latest data. Based on current GFR, CKD stage is stage 3 - GFR 30-59.  Monitor UOP and serial BMP and adjust therapy as needed. Renally dose meds. Avoid nephrotoxic medications and procedures.

## 2024-12-02 NOTE — H&P
[facility] - Emergency Department  Hospital Medicine  History & Physical    Patient Name: Micheal Taylor  MRN: [id number]  Patient Class: OP- Observation  Admission Date: 12/2/2024  Attending Physician: Jean Doshi Jr., MD   Primary Care Provider: Rodney Morel MD         Patient information was obtained from patient and ER records.     Subjective:     Principal Problem:<principal problem not specified>    Chief Complaint:   Chief Complaint   Patient presents with    Chest Pain     Presents to ED from cardiology clinic for c/o chest pain with EKG changes. Denies chest pain at this time.    Abnormal ECG        HPI: Patient is an 69 y.o male who presents to [facility] ED today sent from cardiology clinic with c/o chest pain and abnormal EKG. Patient states he has been having increased angina episodes over the last two months that have increased in frequency. Patient states every few steps he has noticed more chest pain that radiates to his bilateral shoulders. Patient states the pain is similar to prior MI. Patient states his chest pain does improve with one NTG tablet. While in clinic today patient was noted to have new LBBB on EKG. Patient also complains of HA, dyspnea, orthopnea and BLE edema. Patent has a PMH of CAD, HTN, T2DM, CHF, HLD and PVD. Patient has surgical history of CABG and stent to LAD. Patient lives with wife and son he is still able to complete all ADLS  he still chew tobacco daily and denies alcohol use. Patient sent to ED from cardiology for work up and LHC.     In the ED, Initial vitals were T. 97.7, pulse 57, RR, 14, /64, O2 97 percent. Completed imaging includes CXR. Pertinent labs WBC 12, H/H 12/42. BUN/Cr 44/1.8, GFR 40, Troponin 28.2 and ProBNP 822.    Patient is admitted to [facility] Medicine team for the continued care and medical management.                    Past Medical History:   Diagnosis Date    Anticoagulant long-term use     CHF (congestive heart  failure)     Chronic pain syndrome     Coronary artery disease     Diabetes mellitus, type 2     Hyperlipidemia     Hypertension     Lumbar spondylosis     Lumbosacral radiculopathy     Mild nonproliferative diabetic retinopathy of both eyes without macular edema 09/09/2022    Pain in thoracic spine     PVD (peripheral vascular disease)     Renal disorder     Spondylosis without myelopathy or radiculopathy, cervical region        Past Surgical History:   Procedure Laterality Date    ADENOIDECTOMY      AMPUTATION      left index finger removed     CAUDAL EPIDURAL STEROID INJECTION  01/19/2016    Caudal NATHAN - Alee    CIRCUMCISION      CORONARY ARTERY BYPASS GRAFT (CABG)      EYE SURGERY      FOOT SURGERY      heel surgery    HEEL SPUR SURGERY      INJECTION OF FACET JOINT Bilateral 04/16/2015    Bilateral L3-S1 Facet Injection - Alee - 4/16/15, 11/15/12 and 9/27/12    LEFT HEART CATHETERIZATION Left 07/19/2022    Procedure: Left heart cath;  Surgeon: Venu Whitlock MD;  Location: [facility] CATH LAB;  Service: Cardiology;  Laterality: Left;  Patient has PAD and has had bilateral fem pop bypass. He is followed by Dr. Forrest.    RADIOFREQUENCY THERMOCOAGULATION Left 02/26/2013    Left L3-S1 RFTC -Alee    RADIOFREQUENCY THERMOCOAGULATION Right 02/05/2013    Right L3-S1 RFTC - Alee    TONSILLECTOMY, ADENOIDECTOMY, BILATERAL MYRINGOTOMY AND TUBES      TRANSFORAMINAL EPIDURAL INJECTION OF STEROID Left 06/19/2012    Left L3-4 TFESI - Alee    TRANSFORAMINAL EPIDURAL INJECTION OF STEROID Right 04/12/2013    Right L3-4 TFESI - Alee - 4/12/13 and 8/7/12    VASCULAR SURGERY         Review of patient's allergies indicates:  No Known Allergies    No current facility-administered medications on file prior to encounter.     Current Outpatient Medications on File Prior to Encounter   Medication Sig    amLODIPine (NORVASC) 5 MG tablet Take 1 tablet (5 mg total) by mouth once daily.    aspirin (ECOTRIN) 81 MG EC tablet Take 81 mg by mouth  once daily.    cholecalciferol, vitamin D3, (VITAMIN D3) 25 mcg (1,000 unit) capsule Take 1,000 Units by mouth once daily.    cinnamon bark 500 mg capsule Take 500 mg by mouth once daily.     clopidogreL (PLAVIX) 75 mg tablet Take 1 tablet (75 mg total) by mouth once daily.    cyanocobalamin (VITAMIN B-12) 1000 MCG tablet Take 100 mcg by mouth once daily.    EScitalopram oxalate (LEXAPRO) 10 MG tablet Take 1 tablet (10 mg total) by mouth once daily.    FARXIGA 10 mg tablet TAKE ONE TABLET BY MOUTH ONCE DAILY    fenofibrate (TRICOR) 145 MG tablet TAKE 1 TABLET (145 MG TOTAL) BY MOUTH ONCE DAILY.    gabapentin (NEURONTIN) 300 MG capsule TAKE ONE CAPSULE BY MOUTH THREE TIMES DAILY    hydrALAZINE (APRESOLINE) 50 MG tablet Take 1 tablet (50 mg total) by mouth 4 (four) times daily.    HYDROcodone-acetaminophen (NORCO)  mg per tablet Take 1 tablet by mouth every 8 (eight) hours as needed for Pain (pain).    liraglutide 0.6 mg/0.1 mL, 18 mg/3 mL, subq PNIJ (VICTOZA 2-CAITLIN) 0.6 mg/0.1 mL (18 mg/3 mL) PnIj pen Inject 1.8 mg into the skin once daily.    lisinopriL (PRINIVIL,ZESTRIL) 20 MG tablet Take 1 tablet (20 mg total) by mouth once daily.    magnesium 250 mg Tab Take 1 tablet by mouth once daily.    metFORMIN (GLUCOPHAGE) 500 MG tablet TAKE ONE TABLET BY MOUTH TWICE DAILY WITH MEALS    metoprolol succinate (TOPROL-XL) 25 MG 24 hr tablet Take 1 tablet (25 mg total) by mouth once daily.    nitroGLYCERIN (NITROSTAT) 0.4 MG SL tablet Place 1 tablet (0.4 mg total) under the tongue every 5 (five) minutes as needed for Chest pain.    omeprazole (PRILOSEC) 20 MG capsule TAKE 1 CAPSULE (20 MG TOTAL) BY MOUTH ONCE DAILY.    rosuvastatin (CRESTOR) 40 MG Tab TAKE 1 TABLET (40 MG TOTAL) BY MOUTH EVERY EVENING.    TRESIBA FLEXTOUCH U-200 200 unit/mL (3 mL) insulin pen Inject into the skin once daily. On a sort of sliding scale depending on his BG readings; has a Freestyle Felton reader    VASCEPA 1 gram Cap Take 2 capsules by mouth  2 (two) times a day.    vitamin A 8000 UNIT capsule Take 8,000 Units by mouth.    vitamin E 400 UNIT capsule Take 400 Units by mouth once daily.     [START ON 2024] HYDROcodone-acetaminophen (NORCO)  mg per tablet Take 1 tablet by mouth every 8 (eight) hours as needed for Pain (pain).    naloxone (NARCAN) 4 mg/actuation Spry 1 spray by Nasal route once.    [DISCONTINUED] ezetimibe (ZETIA) 10 mg tablet Take 1 tablet (10 mg total) by mouth once daily. (Patient not taking: Reported on 2024)    [DISCONTINUED] insulin regular 100 unit/mL Inj injection Novolin R Regular U-100 Insuln   14-32 u bidwm (Patient not taking: Reported on 2024)    [DISCONTINUED] potassium chloride SA (K-DUR,KLOR-CON) 20 MEQ tablet Take 1 tablet (20 mEq total) by mouth once daily. (Patient not taking: Reported on 2024)    [DISCONTINUED] rOPINIRole (REQUIP) 0.5 MG tablet Take 0.5 mg by mouth every evening. (Patient not taking: Reported on 2024)    [DISCONTINUED] spironolactone (ALDACTONE) 25 MG tablet Take 25 mg by mouth 2 (two) times daily. (Patient not taking: Reported on 2024)     Family History       Problem Relation (Age of Onset)    Diabetes Mother    Heart disease Mother, Father    Hypertension Mother, Father    Liver cancer Mother          Tobacco Use    Smoking status: Former     Current packs/day: 0.00     Average packs/day: 4.0 packs/day for 30.0 years (120.0 ttl pk-yrs)     Types: Cigarettes     Start date:      Quit date:      Years since quittin.9     Passive exposure: Past (parent)    Smokeless tobacco: Current     Types: Chew   Substance and Sexual Activity    Alcohol use: Not Currently    Drug use: Yes     Types: Hydrocodone    Sexual activity: Not Currently     Review of Systems   Constitutional:  Negative for fatigue and fever.   HENT:  Negative for congestion.    Eyes:  Negative for pain, discharge and itching.   Respiratory:  Positive for shortness of breath.    Cardiovascular:   Positive for chest pain and leg swelling.   Gastrointestinal:  Negative for abdominal distention, abdominal pain, constipation, diarrhea and nausea.   Endocrine: Negative for cold intolerance, heat intolerance and polydipsia.   Genitourinary:  Negative for difficulty urinating and hematuria.   Musculoskeletal:  Negative for arthralgias and back pain.   Skin:  Negative for wound.   Neurological:  Positive for headaches.   Hematological: Negative.    Psychiatric/Behavioral: Negative.       Objective:     Vital Signs (Most Recent):  Temp: 97.7 °F (36.5 °C) (12/02/24 1247)  Pulse: 66 (12/02/24 1546)  Resp: 11 (12/02/24 1546)  BP: (!) 145/61 (12/02/24 1546)  SpO2: 96 % (12/02/24 1546) Vital Signs (24h Range):  Temp:  [97.7 °F (36.5 °C)] 97.7 °F (36.5 °C)  Pulse:  [57-69] 66  Resp:  [11-26] 11  SpO2:  [96 %-99 %] 96 %  BP: (127-159)/(55-71) 145/61     Weight: 85.7 kg (189 lb)  Body mass index is 26.36 kg/m².     Physical Exam  Constitutional:       Appearance: He is obese.   HENT:      Head: Normocephalic.      Mouth/Throat:      Mouth: Mucous membranes are moist.   Eyes:      Conjunctiva/sclera: Conjunctivae normal.      Pupils: Pupils are equal, round, and reactive to light.   Cardiovascular:      Rate and Rhythm: Normal rate and regular rhythm.      Pulses: Normal pulses.      Heart sounds: Normal heart sounds.   Pulmonary:      Effort: Pulmonary effort is normal.      Breath sounds: Normal breath sounds.   Abdominal:      General: Abdomen is flat. Bowel sounds are normal.   Musculoskeletal:      Cervical back: Normal range of motion.      Right lower leg: Edema present.      Left lower leg: Edema present.   Skin:     General: Skin is warm and dry.      Capillary Refill: Capillary refill takes less than 2 seconds.   Neurological:      Mental Status: He is alert and oriented to person, place, and time.              CRANIAL NERVES     CN III, IV, VI   Pupils are equal, round, and reactive to light.       Significant  Labs: All pertinent labs within the past 24 hours have been reviewed.  CBC:   Recent Labs   Lab 12/02/24  1321   WBC 12.14*   HGB 12.6*   HCT 42.9        CMP:   Recent Labs   Lab 12/02/24  1321      K 4.7      CO2 23   GLU 94   BUN 44*   CREATININE 1.80*   CALCIUM 9.5   PROT 7.9*   ALBUMIN 4.1   BILITOT 0.7   ALKPHOS 34*   AST 42*   ALT 19   ANIONGAP 15       Significant Imaging: I have reviewed all pertinent imaging results/findings within the past 24 hours.  Assessment/Plan:     Acute coronary syndrome  Patient presents with ACS. Chest pain is currently controlled. ARMANDO score is 6. Patient is currently on NSTEMI Pathway.    EKG reviewed. Troponins reviewed and results noted-   Troponin 28.3  second pending    Lipid panel reviewed and shows-     Lab Results   Component Value Date    LDLCALC 56 06/12/2024     Lab Results   Component Value Date    TRIG 191 (H) 06/12/2024         Medical management includes; Beta Blocker, Dual Anti-Platelet therapy, Anticoagulation, High Intensity Stain, ACE/ARB, and Nitrate Echo has not been performed. Latest ECHO results are as follows- Results for orders placed during the hospital encounter of 05/25/22    Echo Saline Bubble? No    Interpretation Summary  · The left ventricle is normal in size with moderate concentric hypertrophy and normal systolic function.  · The estimated ejection fraction is 55%.  · Atrial fibrillation not observed.  · Normal left ventricular diastolic function.  · Mild-to-moderate mitral regurgitation.  .   Consult for cardiac rehab is ordered. Patient counseled on lifestyle modifications- begin progressive daily aerobic exercise program, follow a low fat, low cholesterol diet, decrease or avoid alcohol intake, reduce salt in diet and cooking, improve dietary compliance, continue current medications, continue current healthy lifestyle patterns, and return for routine annual checkups. Cardiology is consulted. Plan of care reviewed with  "cardiology team. Continue to monitor patient closely and adjust therapy as needed.    -[facility] planned for tomorrow  -NPO after midnight      Stage 3b chronic kidney disease  Creatine stable for now. BMP reviewed- noted Estimated Creatinine Clearance: 41.3 mL/min (A) (based on SCr of 1.8 mg/dL (H)). according to latest data. Based on current GFR, CKD stage is stage 3 - GFR 30-59.  Monitor UOP and serial BMP and adjust therapy as needed. Renally dose meds. Avoid nephrotoxic medications and procedures.    Tobacco chew use    Pt advised to reduce or quit tobacco use due to cardiac history.    Type 2 diabetes mellitus with diabetic neuropathy, with long-term current use of insulin  Patient's FSGs are controlled on current medication regimen.  Last A1c reviewed-   Lab Results   Component Value Date    HGBA1C 7.1 (H) 06/12/2024     Most recent fingerstick glucose reviewed- No results for input(s): "POCTGLUCOSE" in the last 24 hours.  Current correctional scale  Low  Maintain anti-hyperglycemic dose as follows-   Antihyperglycemics (From admission, onward)      Start     Stop Route Frequency Ordered    12/03/24 2100  insulin glargine U-100 (Lantus) injection 15 Units         -- SubQ Nightly 12/02/24 1609    12/02/24 1707  insulin aspart U-100 injection 0-10 Units         -- SubQ Every 6 hours PRN 12/02/24 1609          Hold Oral hypoglycemics while patient is in the hospital.    Hypertension  Patient's blood pressure range in the last 24 hours was: BP  Min: 127/71  Max: 159/64.The patient's inpatient anti-hypertensive regimen is listed below:  Current Antihypertensives  amLODIPine tablet 5 mg, Daily, Oral  hydrALAZINE tablet 50 mg, 4 times daily, Oral  lisinopriL tablet 20 mg, Daily, Oral  metoprolol succinate (TOPROL-XL) 24 hr tablet 25 mg, Daily, Oral  nitroGLYCERIN SL tablet 0.4 mg, Every 5 min PRN, Sublingual  hydrALAZINE injection 10 mg, Every 6 hours PRN, Intravenous    Plan  - BP is uncontrolled, will adjust as follows: " "See the medications above  - We will continue to monitor     Hyperlipidemia      Pt continued on statins (Atorvastatin 80 mg inpatient) due to cardiac history of CABG 2020 and C 2022.     CHF (congestive heart failure)  Patient has Combined Systolic and Diastolic heart failure that is Acute on chronic. On presentation their CHF was well compensated. Most recent BNP and echo results are listed below.  No results for input(s): "BNP" in the last 72 hours.  Latest ECHO  Results for orders placed during the hospital encounter of 05/25/22    Echo Saline Bubble? No    Interpretation Summary  · The left ventricle is normal in size with moderate concentric hypertrophy and normal systolic function.  · The estimated ejection fraction is 55%.  · Atrial fibrillation not observed.  · Normal left ventricular diastolic function.  · Mild-to-moderate mitral regurgitation.    Current Heart Failure Medications  hydrALAZINE tablet 50 mg, 4 times daily, Oral  lisinopriL tablet 20 mg, Daily, Oral  metoprolol succinate (TOPROL-XL) 24 hr tablet 25 mg, Daily, Oral  hydrALAZINE injection 10 mg, Every 6 hours PRN, Intravenous    Plan  - Monitor strict I&Os and daily weights.    - Place on telemetry  - Low sodium diet  - Place on fluid restriction of 1.5 L.   - Cardiology has been consulted  - The patient's volume status is at their baseline  -Echo pending        VTE Risk Mitigation (From admission, onward)           Ordered     enoxaparin injection 90 mg  Once         12/02/24 1643                           On 12/02/2024, patient should be placed in hospital observation services under my care in collaboration with Dr. Jean Doshi.         Malik Craft Jr., MD  Department of Hospital Medicine  [facility] - Emergency Department          "

## 2024-12-02 NOTE — SUBJECTIVE & OBJECTIVE
Past Medical History:   Diagnosis Date    Anticoagulant long-term use     CHF (congestive heart failure)     Chronic pain syndrome     Coronary artery disease     Diabetes mellitus, type 2     Hyperlipidemia     Hypertension     Lumbar spondylosis     Lumbosacral radiculopathy     Mild nonproliferative diabetic retinopathy of both eyes without macular edema 09/09/2022    Pain in thoracic spine     PVD (peripheral vascular disease)     Renal disorder     Spondylosis without myelopathy or radiculopathy, cervical region        Past Surgical History:   Procedure Laterality Date    ADENOIDECTOMY      AMPUTATION      left index finger removed     CAUDAL EPIDURAL STEROID INJECTION  01/19/2016    Caudal NATHAN - Alee    CIRCUMCISION      CORONARY ARTERY BYPASS GRAFT (CABG)      EYE SURGERY      FOOT SURGERY      heel surgery    HEEL SPUR SURGERY      INJECTION OF FACET JOINT Bilateral 04/16/2015    Bilateral L3-S1 Facet Injection - Alee - 4/16/15, 11/15/12 and 9/27/12    LEFT HEART CATHETERIZATION Left 07/19/2022    Procedure: Left heart cath;  Surgeon: Venu Whitlock MD;  Location: [facility] CATH LAB;  Service: Cardiology;  Laterality: Left;  Patient has PAD and has had bilateral fem pop bypass. He is followed by Dr. Forrest.    RADIOFREQUENCY THERMOCOAGULATION Left 02/26/2013    Left L3-S1 RFTC -Alee    RADIOFREQUENCY THERMOCOAGULATION Right 02/05/2013    Right L3-S1 RFTC - Alee    TONSILLECTOMY, ADENOIDECTOMY, BILATERAL MYRINGOTOMY AND TUBES      TRANSFORAMINAL EPIDURAL INJECTION OF STEROID Left 06/19/2012    Left L3-4 TFESI - Alee    TRANSFORAMINAL EPIDURAL INJECTION OF STEROID Right 04/12/2013    Right L3-4 TFESI - Alee - 4/12/13 and 8/7/12    VASCULAR SURGERY         Review of patient's allergies indicates:  No Known Allergies    No current facility-administered medications on file prior to encounter.     Current Outpatient Medications on File Prior to Encounter   Medication Sig    amLODIPine (NORVASC) 5 MG tablet Take 1  tablet (5 mg total) by mouth once daily.    aspirin (ECOTRIN) 81 MG EC tablet Take 81 mg by mouth once daily.    cholecalciferol, vitamin D3, (VITAMIN D3) 25 mcg (1,000 unit) capsule Take 1,000 Units by mouth once daily.    cinnamon bark 500 mg capsule Take 500 mg by mouth once daily.     clopidogreL (PLAVIX) 75 mg tablet Take 1 tablet (75 mg total) by mouth once daily.    cyanocobalamin (VITAMIN B-12) 1000 MCG tablet Take 100 mcg by mouth once daily.    EScitalopram oxalate (LEXAPRO) 10 MG tablet Take 1 tablet (10 mg total) by mouth once daily.    FARXIGA 10 mg tablet TAKE ONE TABLET BY MOUTH ONCE DAILY    fenofibrate (TRICOR) 145 MG tablet TAKE 1 TABLET (145 MG TOTAL) BY MOUTH ONCE DAILY.    gabapentin (NEURONTIN) 300 MG capsule TAKE ONE CAPSULE BY MOUTH THREE TIMES DAILY    hydrALAZINE (APRESOLINE) 50 MG tablet Take 1 tablet (50 mg total) by mouth 4 (four) times daily.    HYDROcodone-acetaminophen (NORCO)  mg per tablet Take 1 tablet by mouth every 8 (eight) hours as needed for Pain (pain).    liraglutide 0.6 mg/0.1 mL, 18 mg/3 mL, subq PNIJ (VICTOZA 2-CAITLIN) 0.6 mg/0.1 mL (18 mg/3 mL) PnIj pen Inject 1.8 mg into the skin once daily.    lisinopriL (PRINIVIL,ZESTRIL) 20 MG tablet Take 1 tablet (20 mg total) by mouth once daily.    magnesium 250 mg Tab Take 1 tablet by mouth once daily.    metFORMIN (GLUCOPHAGE) 500 MG tablet TAKE ONE TABLET BY MOUTH TWICE DAILY WITH MEALS    metoprolol succinate (TOPROL-XL) 25 MG 24 hr tablet Take 1 tablet (25 mg total) by mouth once daily.    nitroGLYCERIN (NITROSTAT) 0.4 MG SL tablet Place 1 tablet (0.4 mg total) under the tongue every 5 (five) minutes as needed for Chest pain.    omeprazole (PRILOSEC) 20 MG capsule TAKE 1 CAPSULE (20 MG TOTAL) BY MOUTH ONCE DAILY.    rosuvastatin (CRESTOR) 40 MG Tab TAKE 1 TABLET (40 MG TOTAL) BY MOUTH EVERY EVENING.    TRESIBA FLEXTOUCH U-200 200 unit/mL (3 mL) insulin pen Inject into the skin once daily. On a sort of sliding scale  depending on his BG readings; has a Freestyle Felton reader    VASCEPA 1 gram Cap Take 2 capsules by mouth 2 (two) times a day.    vitamin A 8000 UNIT capsule Take 8,000 Units by mouth.    vitamin E 400 UNIT capsule Take 400 Units by mouth once daily.     [START ON 2024] HYDROcodone-acetaminophen (NORCO)  mg per tablet Take 1 tablet by mouth every 8 (eight) hours as needed for Pain (pain).    naloxone (NARCAN) 4 mg/actuation Spry 1 spray by Nasal route once.    [DISCONTINUED] ezetimibe (ZETIA) 10 mg tablet Take 1 tablet (10 mg total) by mouth once daily. (Patient not taking: Reported on 2024)    [DISCONTINUED] insulin regular 100 unit/mL Inj injection Novolin R Regular U-100 Insuln   14-32 u bidwm (Patient not taking: Reported on 2024)    [DISCONTINUED] potassium chloride SA (K-DUR,KLOR-CON) 20 MEQ tablet Take 1 tablet (20 mEq total) by mouth once daily. (Patient not taking: Reported on 2024)    [DISCONTINUED] rOPINIRole (REQUIP) 0.5 MG tablet Take 0.5 mg by mouth every evening. (Patient not taking: Reported on 2024)    [DISCONTINUED] spironolactone (ALDACTONE) 25 MG tablet Take 25 mg by mouth 2 (two) times daily. (Patient not taking: Reported on 2024)     Family History       Problem Relation (Age of Onset)    Diabetes Mother    Heart disease Mother, Father    Hypertension Mother, Father    Liver cancer Mother          Tobacco Use    Smoking status: Former     Current packs/day: 0.00     Average packs/day: 4.0 packs/day for 30.0 years (120.0 ttl pk-yrs)     Types: Cigarettes     Start date:      Quit date: 2005     Years since quittin.9     Passive exposure: Past (parent)    Smokeless tobacco: Current     Types: Chew   Substance and Sexual Activity    Alcohol use: Not Currently    Drug use: Yes     Types: Hydrocodone    Sexual activity: Not Currently     Review of Systems   Constitutional:  Negative for fatigue and fever.   HENT:  Negative for congestion.    Eyes:   Negative for pain, discharge and itching.   Respiratory:  Positive for shortness of breath.    Cardiovascular:  Positive for chest pain and leg swelling.   Gastrointestinal:  Negative for abdominal distention, abdominal pain, constipation, diarrhea and nausea.   Endocrine: Negative for cold intolerance, heat intolerance and polydipsia.   Genitourinary:  Negative for difficulty urinating and hematuria.   Musculoskeletal:  Negative for arthralgias and back pain.   Skin:  Negative for wound.   Neurological:  Positive for headaches.   Hematological: Negative.    Psychiatric/Behavioral: Negative.       Objective:     Vital Signs (Most Recent):  Temp: 97.7 °F (36.5 °C) (12/02/24 1247)  Pulse: 66 (12/02/24 1546)  Resp: 11 (12/02/24 1546)  BP: (!) 145/61 (12/02/24 1546)  SpO2: 96 % (12/02/24 1546) Vital Signs (24h Range):  Temp:  [97.7 °F (36.5 °C)] 97.7 °F (36.5 °C)  Pulse:  [57-69] 66  Resp:  [11-26] 11  SpO2:  [96 %-99 %] 96 %  BP: (127-159)/(55-71) 145/61     Weight: 85.7 kg (189 lb)  Body mass index is 26.36 kg/m².     Physical Exam  Constitutional:       Appearance: He is obese.   HENT:      Head: Normocephalic.      Mouth/Throat:      Mouth: Mucous membranes are moist.   Eyes:      Conjunctiva/sclera: Conjunctivae normal.      Pupils: Pupils are equal, round, and reactive to light.   Cardiovascular:      Rate and Rhythm: Normal rate and regular rhythm.      Pulses: Normal pulses.      Heart sounds: Normal heart sounds.   Pulmonary:      Effort: Pulmonary effort is normal.      Breath sounds: Normal breath sounds.   Abdominal:      General: Abdomen is flat. Bowel sounds are normal.   Musculoskeletal:      Cervical back: Normal range of motion.      Right lower leg: Edema present.      Left lower leg: Edema present.   Skin:     General: Skin is warm and dry.      Capillary Refill: Capillary refill takes less than 2 seconds.   Neurological:      Mental Status: He is alert and oriented to person, place, and time.               CRANIAL NERVES     CN III, IV, VI   Pupils are equal, round, and reactive to light.       Significant Labs: All pertinent labs within the past 24 hours have been reviewed.  CBC:   Recent Labs   Lab 12/02/24  1321   WBC 12.14*   HGB 12.6*   HCT 42.9        CMP:   Recent Labs   Lab 12/02/24  1321      K 4.7      CO2 23   GLU 94   BUN 44*   CREATININE 1.80*   CALCIUM 9.5   PROT 7.9*   ALBUMIN 4.1   BILITOT 0.7   ALKPHOS 34*   AST 42*   ALT 19   ANIONGAP 15       Significant Imaging: I have reviewed all pertinent imaging results/findings within the past 24 hours.

## 2024-12-02 NOTE — ASSESSMENT & PLAN NOTE
"Patient's FSGs are controlled on current medication regimen.  Last A1c reviewed-   Lab Results   Component Value Date    HGBA1C 7.1 (H) 06/12/2024     Most recent fingerstick glucose reviewed- No results for input(s): "POCTGLUCOSE" in the last 24 hours.  Current correctional scale  Low  Maintain anti-hyperglycemic dose as follows-   Antihyperglycemics (From admission, onward)      Start     Stop Route Frequency Ordered    12/03/24 2100  insulin glargine U-100 (Lantus) injection 15 Units         -- SubQ Nightly 12/02/24 1609    12/02/24 1707  insulin aspart U-100 injection 0-10 Units         -- SubQ Every 6 hours PRN 12/02/24 1609          Hold Oral hypoglycemics while patient is in the hospital.  "

## 2024-12-02 NOTE — Clinical Note
dry, intact, no bleeding and no hematoma. Education provided on the pain management plan of care/Opioids not applicable/not prescribed

## 2024-12-02 NOTE — ASSESSMENT & PLAN NOTE
Patient's blood pressure range in the last 24 hours was: BP  Min: 127/71  Max: 159/64.The patient's inpatient anti-hypertensive regimen is listed below:  Current Antihypertensives  amLODIPine tablet 5 mg, Daily, Oral  hydrALAZINE tablet 50 mg, 4 times daily, Oral  lisinopriL tablet 20 mg, Daily, Oral  metoprolol succinate (TOPROL-XL) 24 hr tablet 25 mg, Daily, Oral  nitroGLYCERIN SL tablet 0.4 mg, Every 5 min PRN, Sublingual  hydrALAZINE injection 10 mg, Every 6 hours PRN, Intravenous    Plan  - BP is uncontrolled, will adjust as follows: See the medications above  - We will continue to monitor

## 2024-12-02 NOTE — ASSESSMENT & PLAN NOTE
"Patient has Combined Systolic and Diastolic heart failure that is Acute on chronic. On presentation their CHF was well compensated. Most recent BNP and echo results are listed below.  No results for input(s): "BNP" in the last 72 hours.  Latest ECHO  Results for orders placed during the hospital encounter of 05/25/22    Echo Saline Bubble? No    Interpretation Summary  · The left ventricle is normal in size with moderate concentric hypertrophy and normal systolic function.  · The estimated ejection fraction is 55%.  · Atrial fibrillation not observed.  · Normal left ventricular diastolic function.  · Mild-to-moderate mitral regurgitation.    Current Heart Failure Medications  hydrALAZINE tablet 50 mg, 4 times daily, Oral  lisinopriL tablet 20 mg, Daily, Oral  metoprolol succinate (TOPROL-XL) 24 hr tablet 25 mg, Daily, Oral  hydrALAZINE injection 10 mg, Every 6 hours PRN, Intravenous    Plan  - Monitor strict I&Os and daily weights.    - Place on telemetry  - Low sodium diet  - Place on fluid restriction of 1.5 L.   - Cardiology has been consulted  - The patient's volume status is at their baseline  -Echo pending    "

## 2024-12-03 VITALS
OXYGEN SATURATION: 97 % | HEART RATE: 62 BPM | SYSTOLIC BLOOD PRESSURE: 152 MMHG | BODY MASS INDEX: 26.45 KG/M2 | TEMPERATURE: 98 F | HEIGHT: 71 IN | DIASTOLIC BLOOD PRESSURE: 64 MMHG | WEIGHT: 188.94 LBS | RESPIRATION RATE: 18 BRPM

## 2024-12-03 PROBLEM — G47.33 OBSTRUCTIVE SLEEP APNEA: Status: ACTIVE | Noted: 2024-12-03

## 2024-12-03 PROBLEM — I24.9 ACUTE CORONARY SYNDROME: Status: RESOLVED | Noted: 2024-12-02 | Resolved: 2024-12-03

## 2024-12-03 LAB
ALBUMIN SERPL BCP-MCNC: 3.4 G/DL (ref 3.4–4.8)
ALBUMIN/GLOB SERPL: 1.1 {RATIO}
ALP SERPL-CCNC: 34 U/L (ref 40–150)
ALT SERPL W P-5'-P-CCNC: 16 U/L
ANION GAP SERPL CALCULATED.3IONS-SCNC: 13 MMOL/L (ref 7–16)
AORTIC ROOT ANNULUS: 2.14 CM
AORTIC VALVE CUSP SEPERATION: 2.24 CM
APICAL FOUR CHAMBER EJECTION FRACTION: 48 %
AST SERPL W P-5'-P-CCNC: 34 U/L (ref 5–34)
AV INDEX (PROSTH): 0.91
AV MEAN GRADIENT: 2 MMHG
AV PEAK GRADIENT: 3.2 MMHG
AV VALVE AREA BY VELOCITY RATIO: 4 CM²
AV VALVE AREA: 4.1 CM²
AV VELOCITY RATIO: 0.89
BASOPHILS # BLD AUTO: 0.07 K/UL (ref 0–0.2)
BASOPHILS NFR BLD AUTO: 0.7 % (ref 0–1)
BILIRUB SERPL-MCNC: 0.3 MG/DL
BSA FOR ECHO PROCEDURE: 2.07 M2
BUN SERPL-MCNC: 35 MG/DL (ref 8–26)
BUN/CREAT SERPL: 22 (ref 6–20)
CALCIUM SERPL-MCNC: 8.9 MG/DL (ref 8.8–10)
CHLORIDE SERPL-SCNC: 104 MMOL/L (ref 98–107)
CHOLEST SERPL-MCNC: 110 MG/DL
CHOLEST/HDLC SERPL: 4.6 {RATIO}
CO2 SERPL-SCNC: 26 MMOL/L (ref 23–31)
CREAT SERPL-MCNC: 1.6 MG/DL (ref 0.72–1.25)
CV ECHO LV RWT: 0.62 CM
DIFFERENTIAL METHOD BLD: ABNORMAL
DOP CALC AO PEAK VEL: 0.9 M/S
DOP CALC AO VTI: 22.4 CM
DOP CALC LVOT AREA: 4.5 CM2
DOP CALC LVOT DIAMETER: 2.4 CM
DOP CALC LVOT PEAK VEL: 0.8 M/S
DOP CALC LVOT STROKE VOLUME: 91.8 CM3
DOP CALCLVOT PEAK VEL VTI: 20.3 CM
E WAVE DECELERATION TIME: 156.7 MSEC
E/A RATIO: 1.14
E/E' RATIO: 12.13 M/S
ECHO LV POSTERIOR WALL: 1.6 CM (ref 0.6–1.1)
EGFR (NO RACE VARIABLE) (RUSH/TITUS): 46 ML/MIN/1.73M2
EJECTION FRACTION: 50 %
EOSINOPHIL # BLD AUTO: 0.38 K/UL (ref 0–0.5)
EOSINOPHIL NFR BLD AUTO: 3.9 % (ref 1–4)
EOSINOPHIL NFR BLD MANUAL: 2 % (ref 1–4)
ERYTHROCYTE [DISTWIDTH] IN BLOOD BY AUTOMATED COUNT: 15 % (ref 11.5–14.5)
FRACTIONAL SHORTENING: 28.8 % (ref 28–44)
GLOBULIN SER-MCNC: 3 G/DL (ref 2–4)
GLUCOSE SERPL-MCNC: 119 MG/DL (ref 70–105)
GLUCOSE SERPL-MCNC: 119 MG/DL (ref 82–115)
GLUCOSE SERPL-MCNC: 96 MG/DL (ref 70–105)
HCT VFR BLD AUTO: 38.4 % (ref 40–54)
HDLC SERPL-MCNC: 24 MG/DL (ref 35–60)
HGB BLD-MCNC: 11.5 G/DL (ref 13.5–18)
IMM GRANULOCYTES # BLD AUTO: 0.02 K/UL (ref 0–0.04)
IMM GRANULOCYTES NFR BLD: 0.2 % (ref 0–0.4)
INTERVENTRICULAR SEPTUM: 1.5 CM (ref 0.6–1.1)
IVC DIAMETER: 1.59 CM
LDLC SERPL CALC-MCNC: 55 MG/DL
LDLC/HDLC SERPL: 2.3 {RATIO}
LEFT ATRIUM AREA SYSTOLIC (APICAL 4 CHAMBER): 11.88 CM2
LEFT ATRIUM SIZE: 4.02 CM
LEFT INTERNAL DIMENSION IN SYSTOLE: 3.7 CM (ref 2.1–4)
LEFT VENTRICLE DIASTOLIC VOLUME INDEX: 62.06 ML/M2
LEFT VENTRICLE DIASTOLIC VOLUME: 127.85 ML
LEFT VENTRICLE END DIASTOLIC VOLUME APICAL 4 CHAMBER: 146.01 ML
LEFT VENTRICLE END SYSTOLIC VOLUME APICAL 4 CHAMBER: 25.35 ML
LEFT VENTRICLE MASS INDEX: 174.4 G/M2
LEFT VENTRICLE SYSTOLIC VOLUME INDEX: 28.1 ML/M2
LEFT VENTRICLE SYSTOLIC VOLUME: 57.84 ML
LEFT VENTRICULAR INTERNAL DIMENSION IN DIASTOLE: 5.2 CM (ref 3.5–6)
LEFT VENTRICULAR MASS: 359.3 G
LV LATERAL E/E' RATIO: 9.1 M/S
LV SEPTAL E/E' RATIO: 18.2 M/S
LVED V (TEICH): 127.85 ML
LVES V (TEICH): 57.84 ML
LVOT MG: 1.43 MMHG
LVOT MV: 0.57 CM/S
LYMPHOCYTES # BLD AUTO: 5.91 K/UL (ref 1–4.8)
LYMPHOCYTES NFR BLD AUTO: 61.2 % (ref 27–41)
LYMPHOCYTES NFR BLD MANUAL: 49 % (ref 27–41)
MAGNESIUM SERPL-MCNC: 2.2 MG/DL (ref 1.6–2.6)
MCH RBC QN AUTO: 25.3 PG (ref 27–31)
MCHC RBC AUTO-ENTMCNC: 29.9 G/DL (ref 32–36)
MCV RBC AUTO: 84.4 FL (ref 80–96)
MONOCYTES # BLD AUTO: 0.59 K/UL (ref 0–0.8)
MONOCYTES NFR BLD AUTO: 6.1 % (ref 2–6)
MONOCYTES NFR BLD MANUAL: 5 % (ref 2–6)
MPC BLD CALC-MCNC: 11.2 FL (ref 9.4–12.4)
MV PEAK A VEL: 0.8 M/S
MV PEAK E VEL: 0.91 M/S
NEUTROPHILS # BLD AUTO: 2.68 K/UL (ref 1.8–7.7)
NEUTROPHILS NFR BLD AUTO: 27.9 % (ref 53–65)
NEUTS SEG NFR BLD MANUAL: 44 % (ref 50–62)
NONHDLC SERPL-MCNC: 86 MG/DL
NRBC # BLD AUTO: 0 X10E3/UL
NRBC, AUTO (.00): 0 %
OHS CV RV/LV RATIO: 0.69 CM
PLATELET # BLD AUTO: 153 K/UL (ref 150–400)
PLATELET MORPHOLOGY: NORMAL
POTASSIUM SERPL-SCNC: 4.2 MMOL/L (ref 3.5–5.1)
PROT SERPL-MCNC: 6.4 G/DL (ref 5.8–7.6)
PV PEAK GRADIENT: 3 MMHG
PV PEAK VELOCITY: 0.81 M/S
RA MAJOR: 3.6 CM
RA PRESSURE ESTIMATED: 3 MMHG
RBC # BLD AUTO: 4.55 M/UL (ref 4.6–6.2)
RBC MORPH BLD: NORMAL
RIGHT VENTRICLE DIASTOLIC BASEL DIMENSION: 3.6 CM
RIGHT VENTRICLE DIASTOLIC LENGTH: 5.4 CM
RIGHT VENTRICLE DIASTOLIC MID DIMENSION: 3.3 CM
RIGHT VENTRICULAR LENGTH IN DIASTOLE (APICAL 4-CHAMBER VIEW): 5.38 CM
RV MID DIAMA: 3.27 CM
SODIUM SERPL-SCNC: 139 MMOL/L (ref 136–145)
TDI LATERAL: 0.1 M/S
TDI SEPTAL: 0.05 M/S
TDI: 0.08 M/S
TRICUSPID ANNULAR PLANE SYSTOLIC EXCURSION: 1.65 CM
TRIGL SERPL-MCNC: 153 MG/DL (ref 34–140)
VLDLC SERPL-MCNC: 31 MG/DL
WBC # BLD AUTO: 9.65 K/UL (ref 4.5–11)
Z-SCORE OF LEFT VENTRICULAR DIMENSION IN END DIASTOLE: -1.79
Z-SCORE OF LEFT VENTRICULAR DIMENSION IN END SYSTOLE: -0.23

## 2024-12-03 PROCEDURE — 25000003 PHARM REV CODE 250: Performed by: STUDENT IN AN ORGANIZED HEALTH CARE EDUCATION/TRAINING PROGRAM

## 2024-12-03 PROCEDURE — 25500020 PHARM REV CODE 255: Performed by: STUDENT IN AN ORGANIZED HEALTH CARE EDUCATION/TRAINING PROGRAM

## 2024-12-03 PROCEDURE — 63600175 PHARM REV CODE 636 W HCPCS: Performed by: STUDENT IN AN ORGANIZED HEALTH CARE EDUCATION/TRAINING PROGRAM

## 2024-12-03 PROCEDURE — G0378 HOSPITAL OBSERVATION PER HR: HCPCS

## 2024-12-03 PROCEDURE — 27201423 OPTIME MED/SURG SUP & DEVICES STERILE SUPPLY: Performed by: STUDENT IN AN ORGANIZED HEALTH CARE EDUCATION/TRAINING PROGRAM

## 2024-12-03 PROCEDURE — 99152 MOD SED SAME PHYS/QHP 5/>YRS: CPT | Performed by: STUDENT IN AN ORGANIZED HEALTH CARE EDUCATION/TRAINING PROGRAM

## 2024-12-03 PROCEDURE — 99153 MOD SED SAME PHYS/QHP EA: CPT | Performed by: STUDENT IN AN ORGANIZED HEALTH CARE EDUCATION/TRAINING PROGRAM

## 2024-12-03 PROCEDURE — 93459 L HRT ART/GRFT ANGIO: CPT | Mod: 26,,, | Performed by: STUDENT IN AN ORGANIZED HEALTH CARE EDUCATION/TRAINING PROGRAM

## 2024-12-03 PROCEDURE — 93459 L HRT ART/GRFT ANGIO: CPT | Performed by: STUDENT IN AN ORGANIZED HEALTH CARE EDUCATION/TRAINING PROGRAM

## 2024-12-03 PROCEDURE — 80053 COMPREHEN METABOLIC PANEL: CPT

## 2024-12-03 PROCEDURE — 94799 UNLISTED PULMONARY SVC/PX: CPT

## 2024-12-03 PROCEDURE — 99152 MOD SED SAME PHYS/QHP 5/>YRS: CPT | Mod: ,,, | Performed by: STUDENT IN AN ORGANIZED HEALTH CARE EDUCATION/TRAINING PROGRAM

## 2024-12-03 PROCEDURE — 80061 LIPID PANEL: CPT | Performed by: NURSE PRACTITIONER

## 2024-12-03 PROCEDURE — 36415 COLL VENOUS BLD VENIPUNCTURE: CPT

## 2024-12-03 PROCEDURE — C1760 CLOSURE DEV, VASC: HCPCS | Performed by: STUDENT IN AN ORGANIZED HEALTH CARE EDUCATION/TRAINING PROGRAM

## 2024-12-03 PROCEDURE — 63600175 PHARM REV CODE 636 W HCPCS

## 2024-12-03 PROCEDURE — 85025 COMPLETE CBC W/AUTO DIFF WBC: CPT

## 2024-12-03 PROCEDURE — C1894 INTRO/SHEATH, NON-LASER: HCPCS | Performed by: STUDENT IN AN ORGANIZED HEALTH CARE EDUCATION/TRAINING PROGRAM

## 2024-12-03 PROCEDURE — 25000003 PHARM REV CODE 250

## 2024-12-03 PROCEDURE — 82962 GLUCOSE BLOOD TEST: CPT

## 2024-12-03 PROCEDURE — 83735 ASSAY OF MAGNESIUM: CPT

## 2024-12-03 PROCEDURE — 99900035 HC TECH TIME PER 15 MIN (STAT)

## 2024-12-03 DEVICE — ANGIO-SEAL VIP VASCULAR CLOSURE DEVICE
Type: IMPLANTABLE DEVICE | Site: GROIN | Status: FUNCTIONAL
Brand: ANGIO-SEAL

## 2024-12-03 RX ORDER — ONDANSETRON 4 MG/1
8 TABLET, ORALLY DISINTEGRATING ORAL EVERY 8 HOURS PRN
Status: DISCONTINUED | OUTPATIENT
Start: 2024-12-03 | End: 2024-12-03 | Stop reason: HOSPADM

## 2024-12-03 RX ORDER — ACETAMINOPHEN 325 MG/1
650 TABLET ORAL EVERY 4 HOURS PRN
Status: DISCONTINUED | OUTPATIENT
Start: 2024-12-03 | End: 2024-12-03 | Stop reason: HOSPADM

## 2024-12-03 RX ORDER — FENTANYL CITRATE 50 UG/ML
INJECTION, SOLUTION INTRAMUSCULAR; INTRAVENOUS
Status: DISCONTINUED | OUTPATIENT
Start: 2024-12-03 | End: 2024-12-03 | Stop reason: HOSPADM

## 2024-12-03 RX ORDER — METOPROLOL SUCCINATE 50 MG/1
50 TABLET, EXTENDED RELEASE ORAL DAILY
Status: DISCONTINUED | OUTPATIENT
Start: 2024-12-04 | End: 2024-12-03 | Stop reason: HOSPADM

## 2024-12-03 RX ORDER — METOPROLOL SUCCINATE 50 MG/1
50 TABLET, EXTENDED RELEASE ORAL DAILY
Qty: 90 TABLET | Refills: 3 | Status: SHIPPED | OUTPATIENT
Start: 2024-12-04 | End: 2024-12-10 | Stop reason: SDUPTHER

## 2024-12-03 RX ORDER — HYDRALAZINE HYDROCHLORIDE 100 MG/1
100 TABLET, FILM COATED ORAL EVERY 12 HOURS
Qty: 60 TABLET | Refills: 11 | Status: SHIPPED | OUTPATIENT
Start: 2024-12-03 | End: 2024-12-10 | Stop reason: SDUPTHER

## 2024-12-03 RX ORDER — MIDAZOLAM HYDROCHLORIDE 5 MG/ML
INJECTION INTRAMUSCULAR; INTRAVENOUS
Status: DISCONTINUED | OUTPATIENT
Start: 2024-12-03 | End: 2024-12-03 | Stop reason: HOSPADM

## 2024-12-03 RX ORDER — SODIUM CHLORIDE 9 MG/ML
INJECTION, SOLUTION INTRAVENOUS
Status: DISCONTINUED | OUTPATIENT
Start: 2024-12-03 | End: 2024-12-03 | Stop reason: HOSPADM

## 2024-12-03 RX ORDER — IOPAMIDOL 755 MG/ML
INJECTION, SOLUTION INTRAVASCULAR
Status: DISCONTINUED | OUTPATIENT
Start: 2024-12-03 | End: 2024-12-03 | Stop reason: HOSPADM

## 2024-12-03 RX ORDER — ISOSORBIDE MONONITRATE 30 MG/1
30 TABLET, EXTENDED RELEASE ORAL DAILY
Qty: 30 TABLET | Refills: 11 | Status: SHIPPED | OUTPATIENT
Start: 2024-12-04 | End: 2024-12-10 | Stop reason: SDUPTHER

## 2024-12-03 RX ORDER — LIDOCAINE HYDROCHLORIDE 10 MG/ML
INJECTION, SOLUTION INFILTRATION; PERINEURAL
Status: DISCONTINUED | OUTPATIENT
Start: 2024-12-03 | End: 2024-12-03 | Stop reason: HOSPADM

## 2024-12-03 RX ORDER — HYDRALAZINE HYDROCHLORIDE 100 MG/1
100 TABLET, FILM COATED ORAL EVERY 12 HOURS
Status: DISCONTINUED | OUTPATIENT
Start: 2024-12-03 | End: 2024-12-03 | Stop reason: HOSPADM

## 2024-12-03 RX ORDER — ISOSORBIDE MONONITRATE 30 MG/1
30 TABLET, EXTENDED RELEASE ORAL DAILY
Status: DISCONTINUED | OUTPATIENT
Start: 2024-12-03 | End: 2024-12-03 | Stop reason: HOSPADM

## 2024-12-03 RX ADMIN — PANTOPRAZOLE SODIUM 40 MG: 40 TABLET, DELAYED RELEASE ORAL at 08:12

## 2024-12-03 RX ADMIN — ASPIRIN 81 MG: 81 TABLET, COATED ORAL at 08:12

## 2024-12-03 RX ADMIN — FENOFIBRATE 145 MG: 145 TABLET ORAL at 08:12

## 2024-12-03 RX ADMIN — ISOSORBIDE MONONITRATE 30 MG: 30 TABLET, EXTENDED RELEASE ORAL at 03:12

## 2024-12-03 RX ADMIN — GABAPENTIN 300 MG: 300 CAPSULE ORAL at 03:12

## 2024-12-03 RX ADMIN — ESCITALOPRAM OXALATE 10 MG: 10 TABLET ORAL at 08:12

## 2024-12-03 RX ADMIN — GABAPENTIN 300 MG: 300 CAPSULE ORAL at 08:12

## 2024-12-03 RX ADMIN — CLOPIDOGREL BISULFATE 75 MG: 75 TABLET ORAL at 08:12

## 2024-12-03 NOTE — ASSESSMENT & PLAN NOTE
Patient presents with ACS. Chest pain is currently controlled. ARMANDO score is 6. Patient is currently on NSTEMI Pathway.    EKG reviewed. Troponins reviewed and results noted-   Troponin 28.3  second pending    Lipid panel reviewed and shows-     Lab Results   Component Value Date    LDLCALC 55 12/03/2024     Lab Results   Component Value Date    TRIG 153 (H) 12/03/2024         Medical management includes; Beta Blocker, Dual Anti-Platelet therapy, Anticoagulation, High Intensity Stain, ACE/ARB, and Nitrate Echo has not been performed. Latest ECHO results are as follows- Results for orders placed during the hospital encounter of 05/25/22    Echo Saline Bubble? No    Interpretation Summary  · The left ventricle is normal in size with moderate concentric hypertrophy and normal systolic function.  · The estimated ejection fraction is 55%.  · Atrial fibrillation not observed.  · Normal left ventricular diastolic function.  · Mild-to-moderate mitral regurgitation.  .   Consult for cardiac rehab is ordered. Patient counseled on lifestyle modifications- begin progressive daily aerobic exercise program, follow a low fat, low cholesterol diet, decrease or avoid alcohol intake, reduce salt in diet and cooking, improve dietary compliance, continue current medications, continue current healthy lifestyle patterns, and return for routine annual checkups. Cardiology is consulted. Plan of care reviewed with cardiology team. Continue to monitor patient closely and adjust therapy as needed.    -[facility] planned for tomorrow  -NPO after midnight    12/03  -NPO  -Cardiology consulted  -Possible C today      12/03 Summary         The Ost Cx to Prox Cx lesion was 100% stenosed.    The Mid Cx lesion was 100% stenosed.    The Prox RCA to Mid RCA lesion was 99% stenosed.    The Mid RCA lesion was 100% stenosed.    The 1st Mrg lesion was 99% stenosed.    The Prox LAD to Mid LAD lesion was 90% stenosed.    The Prox LAD lesion was 75% stenosed.     The Mid LAD to Dist LAD lesion was 60% stenosed.    The left ventricular end diastolic pressure was normal.    The pre-procedure left ventricular end diastolic pressure was 15.    The estimated blood loss was none.    There was three vessel coronary artery disease.     Severe three vessel CAD with patent SVG to LAD and SVG to OM2 and occluded SVG to R-PLV  LM - large with mild diffuse disease  LAD - large with mid LAD 90% stenosis and patent stent. The distal LAD is perfused via SVG graft. The mid LAD fills retrograde and the septals provide collateral filling of the R-PPLV. Distal to the anastomosis there is a 60% distal LAD stenosis. There is a large D1 that has 70% discrete plaque involving the the prox LAD.   LCX - small and is 100% occluded - ; OM1 has a patent SVG graft however there vessel is small and diffusely diseased.   RCA - large, dominant vessel with 100% mid RCA . RCA is diffusely disease with multiple L-R collaterals.   Patent SVG to LAD  Patent SVG to OM1  Known occluded SVG to PLV (unable to engage)  Normal left sided filling pressures, LVEDP - 15mmHg     Plan:  Continue DAPT  Up titrate anti-anginal therapy; increase metoprolol to 50mg qd. Increase IMDUR to 30mg qd. Up titrate as tolerated  Cardiac rehab     The procedure log was documented by Documenter: Marichuy Carlos RN and verified by Alexys Gleason MD.     Date: 12/3/2024  Time: 2:15 PM

## 2024-12-03 NOTE — PROGRESS NOTES
[facility]  [facility] Medicine  Progress Note    Patient Name: Micheal Taylor  MRN: [id number]  Patient Class: OP- Observation   Admission Date: 12/2/2024  Length of Stay: 0 days  Attending Physician: Moe Villalobos MD  Primary Care Provider: Rodney Morel MD        Subjective     Principal Problem:<principal problem not specified>        HPI:  Patient is an 69 y.o male who presents to [facility] ED today sent from cardiology clinic with c/o chest pain and abnormal EKG. Patient states he has been having increased angina episodes over the last two months that have increased in frequency. Patient states every few steps he has noticed more chest pain that radiates to his bilateral shoulders. Patient states the pain is similar to prior MI. Patient states his chest pain does improve with one NTG tablet. While in clinic today patient was noted to have new LBBB on EKG. Patient also complains of HA, dyspnea, orthopnea and BLE edema. Patent has a PMH of CAD, HTN, T2DM, CHF, HLD and PVD. Patient has surgical history of CABG and stent to LAD. Patient lives with wife and son he is still able to complete all ADLS  he still chew tobacco daily and denies alcohol use. Patient sent to ED from cardiology for work up and LHC.     In the ED, Initial vitals were T. 97.7, pulse 57, RR, 14, /64, O2 97 percent. Completed imaging includes CXR. Pertinent labs WBC 12, H/H 12/42. BUN/Cr 44/1.8, GFR 40, Troponin 28.2 and ProBNP 822.    Patient is admitted to [facility] Medicine team for the continued care and medical management.                    Overview/Hospital Course:  No notes on file    Interval History: Pt seen today with no complaints of chest pain or SOB. NPO awaiting LHC possibly today.     Review of Systems   Constitutional:  Negative for fatigue and fever.   HENT:  Negative for congestion.    Eyes:  Negative for pain, discharge and itching.   Cardiovascular:  Positive for leg swelling.    Gastrointestinal:  Negative for abdominal distention, abdominal pain, constipation, diarrhea and nausea.   Endocrine: Negative for cold intolerance, heat intolerance and polydipsia.   Genitourinary:  Negative for difficulty urinating and hematuria.   Musculoskeletal:  Negative for arthralgias and back pain.   Skin:  Negative for wound.   Hematological: Negative.    Psychiatric/Behavioral: Negative.       Objective:     Vital Signs (Most Recent):  Temp: 97.6 °F (36.4 °C) (12/03/24 0809)  Pulse: (!) 59 (12/03/24 0809)  Resp: 20 (12/03/24 0809)  BP: (!) 166/74 (12/03/24 0809)  SpO2: 96 % (12/03/24 0809) Vital Signs (24h Range):  Temp:  [97.6 °F (36.4 °C)-98.3 °F (36.8 °C)] 97.6 °F (36.4 °C)  Pulse:  [57-87] 59  Resp:  [11-26] 20  SpO2:  [91 %-99 %] 96 %  BP: (104-166)/(34-74) 166/74     Weight: 85.7 kg (188 lb 15 oz)  Body mass index is 26.35 kg/m².  No intake or output data in the 24 hours ending 12/03/24 1125      Physical Exam  HENT:      Head: Normocephalic.      Mouth/Throat:      Mouth: Mucous membranes are moist.   Eyes:      Conjunctiva/sclera: Conjunctivae normal.      Pupils: Pupils are equal, round, and reactive to light.   Cardiovascular:      Rate and Rhythm: Normal rate and regular rhythm.      Pulses: Normal pulses.      Heart sounds: Normal heart sounds.   Pulmonary:      Effort: Pulmonary effort is normal.      Breath sounds: Normal breath sounds.   Abdominal:      General: Abdomen is flat. Bowel sounds are normal.   Musculoskeletal:      Cervical back: Normal range of motion.      Right lower leg: Edema present.      Left lower leg: Edema present.   Skin:     General: Skin is warm.   Neurological:      Mental Status: He is alert and oriented to person, place, and time.   Psychiatric:         Behavior: Behavior normal.         Thought Content: Thought content normal.         Judgment: Judgment normal.             Significant Labs: All pertinent labs within the past 24 hours have been  reviewed.    Significant Imaging: I have reviewed all pertinent imaging results/findings within the past 24 hours.    Assessment and Plan     Acute coronary syndrome  Patient presents with ACS. Chest pain is currently controlled. ARMANDO score is 6. Patient is currently on NSTEMI Pathway.    EKG reviewed. Troponins reviewed and results noted-   Troponin 28.3  second pending    Lipid panel reviewed and shows-     Lab Results   Component Value Date    LDLCALC 55 12/03/2024     Lab Results   Component Value Date    TRIG 153 (H) 12/03/2024         Medical management includes; Beta Blocker, Dual Anti-Platelet therapy, Anticoagulation, High Intensity Stain, ACE/ARB, and Nitrate Echo has not been performed. Latest ECHO results are as follows- Results for orders placed during the hospital encounter of 05/25/22    Echo Saline Bubble? No    Interpretation Summary  · The left ventricle is normal in size with moderate concentric hypertrophy and normal systolic function.  · The estimated ejection fraction is 55%.  · Atrial fibrillation not observed.  · Normal left ventricular diastolic function.  · Mild-to-moderate mitral regurgitation.  .   Consult for cardiac rehab is ordered. Patient counseled on lifestyle modifications- begin progressive daily aerobic exercise program, follow a low fat, low cholesterol diet, decrease or avoid alcohol intake, reduce salt in diet and cooking, improve dietary compliance, continue current medications, continue current healthy lifestyle patterns, and return for routine annual checkups. Cardiology is consulted. Plan of care reviewed with cardiology team. Continue to monitor patient closely and adjust therapy as needed.    -[facility] planned for tomorrow  -NPO after midnight    12/03  -NPO  -Cardiology consulted  -Possible [facility] today      Stage 3b chronic kidney disease  Creatine stable for now. BMP reviewed- noted Estimated Creatinine Clearance: 46.4 mL/min (A) (based on SCr of 1.6 mg/dL (H)). according to  "latest data. Based on current GFR, CKD stage is stage 3 - GFR 30-59.  Monitor UOP and serial BMP and adjust therapy as needed. Renally dose meds. Avoid nephrotoxic medications and procedures.    Tobacco chew use    Pt advised to reduce or quit tobacco use due to cardiac history.    Type 2 diabetes mellitus with diabetic neuropathy, with long-term current use of insulin  Patient's FSGs are controlled on current medication regimen.  Last A1c reviewed-   Lab Results   Component Value Date    HGBA1C 8.2 (H) 12/02/2024     Most recent fingerstick glucose reviewed- No results for input(s): "POCTGLUCOSE" in the last 24 hours.  Current correctional scale  Low  Maintain anti-hyperglycemic dose as follows-   Antihyperglycemics (From admission, onward)      Start     Stop Route Frequency Ordered    12/03/24 2100  insulin glargine U-100 (Lantus) injection 15 Units         -- SubQ Nightly 12/02/24 1609    12/02/24 1707  insulin aspart U-100 injection 0-10 Units         -- SubQ Every 6 hours PRN 12/02/24 1609          Hold Oral hypoglycemics while patient is in the hospital.    Hypertension  Patient's blood pressure range in the last 24 hours was: BP  Min: 104/34  Max: 166/74.The patient's inpatient anti-hypertensive regimen is listed below:  Current Antihypertensives  amLODIPine tablet 5 mg, Daily, Oral  hydrALAZINE tablet 50 mg, 4 times daily, Oral  lisinopriL tablet 20 mg, Daily, Oral  metoprolol succinate (TOPROL-XL) 24 hr tablet 25 mg, Daily, Oral  nitroGLYCERIN SL tablet 0.4 mg, Every 5 min PRN, Sublingual  hydrALAZINE injection 10 mg, Every 6 hours PRN, Intravenous    Plan  - BP is uncontrolled, will adjust as follows: See the medications above  - We will continue to monitor     Hyperlipidemia      Pt continued on statins (Atorvastatin 80 mg inpatient) due to cardiac history of CABG 2020 and LHC 2022.     CHF (congestive heart failure)  Patient has Combined Systolic and Diastolic heart failure that is Acute on chronic. On " "presentation their CHF was well compensated. Most recent BNP and echo results are listed below.  No results for input(s): "BNP" in the last 72 hours.  Latest ECHO  Results for orders placed during the hospital encounter of 05/25/22    Echo Saline Bubble? No    Interpretation Summary  · The left ventricle is normal in size with moderate concentric hypertrophy and normal systolic function.  · The estimated ejection fraction is 55%.  · Atrial fibrillation not observed.  · Normal left ventricular diastolic function.  · Mild-to-moderate mitral regurgitation.    Current Heart Failure Medications  hydrALAZINE tablet 50 mg, 4 times daily, Oral  lisinopriL tablet 20 mg, Daily, Oral  metoprolol succinate (TOPROL-XL) 24 hr tablet 25 mg, Daily, Oral  hydrALAZINE injection 10 mg, Every 6 hours PRN, Intravenous    Plan  - Monitor strict I&Os and daily weights.    - Place on telemetry  - Low sodium diet  - Place on fluid restriction of 1.5 L.   - Cardiology has been consulted  - The patient's volume status is at their baseline  -Echo pending        VTE Risk Mitigation (From admission, onward)      None            Discharge Planning   CHICA:      Code Status: DNR   Is the patient medically ready for discharge?:      Discharge Plan A: Home with family                        Malik Craft Jr., MD  Department of Hospital Medicine   [facility]    "

## 2024-12-03 NOTE — ED PROVIDER NOTES
Encounter Date: 12/2/2024       History     Chief Complaint   Patient presents with    Chest Pain     Presents to ED from cardiology clinic for c/o chest pain with EKG changes. Denies chest pain at this time.    Abnormal ECG     HPI  Review of patient's allergies indicates:  No Known Allergies  Past Medical History:   Diagnosis Date    Anticoagulant long-term use     CHF (congestive heart failure)     Chronic pain syndrome     Coronary artery disease     Diabetes mellitus, type 2     Hyperlipidemia     Hypertension     Lumbar spondylosis     Lumbosacral radiculopathy     Mild nonproliferative diabetic retinopathy of both eyes without macular edema 09/09/2022    Obstructive sleep apnea 12/3/2024    Pain in thoracic spine     PVD (peripheral vascular disease)     Renal disorder     Spondylosis without myelopathy or radiculopathy, cervical region      Past Surgical History:   Procedure Laterality Date    ADENOIDECTOMY      AMPUTATION      left index finger removed     ANGIOGRAM, CORONARY, WITH LEFT HEART CATHETERIZATION N/A 12/3/2024    Procedure: Angiogram, Coronary, with Left Heart Cath;  Surgeon: Alexys Gleason MD;  Location: [facility] CATH LAB;  Service: Cardiology;  Laterality: N/A;    CAUDAL EPIDURAL STEROID INJECTION  01/19/2016    Caudal NATHAN - Alee    CIRCUMCISION      CORONARY ARTERY BYPASS GRAFT (CABG)      EYE SURGERY      FOOT SURGERY      heel surgery    HEEL SPUR SURGERY      INJECTION OF FACET JOINT Bilateral 04/16/2015    Bilateral L3-S1 Facet Injection - A[facility] - 4/16/15, 11/15/12 and 9/27/12    LEFT HEART CATHETERIZATION Left 07/19/2022    Procedure: Left heart cath;  Surgeon: Venu Whitlock MD;  Location: [facility] CATH LAB;  Service: Cardiology;  Laterality: Left;  Patient has PAD and has had bilateral fem pop bypass. He is followed by Dr. Forrest.    RADIOFREQUENCY THERMOCOAGULATION Left 02/26/2013    Left L3-S1 RFTC -Alee    RADIOFREQUENCY THERMOCOAGULATION Right 02/05/2013    Right L3-S1 RFTC -  Alee    TONSILLECTOMY, ADENOIDECTOMY, BILATERAL MYRINGOTOMY AND TUBES      TRANSFORAMINAL EPIDURAL INJECTION OF STEROID Left 2012    Left L3-4 TFESI - Alee    TRANSFORAMINAL EPIDURAL INJECTION OF STEROID Right 2013    Right L3-4 TFESI - Alee - 13 and 12    VASCULAR SURGERY       Family History   Problem Relation Name Age of Onset    Diabetes Mother      Heart disease Mother      Hypertension Mother      Liver cancer Mother      Heart disease Father      Hypertension Father       Social History     Tobacco Use    Smoking status: Former     Current packs/day: 0.00     Average packs/day: 4.0 packs/day for 30.0 years (120.0 ttl pk-yrs)     Types: Cigarettes     Start date:      Quit date:      Years since quittin.9     Passive exposure: Past (parent)    Smokeless tobacco: Current     Types: Chew   Substance Use Topics    Alcohol use: Not Currently    Drug use: Yes     Types: Hydrocodone     Review of Systems    Physical Exam     Initial Vitals [24 1247]   BP Pulse Resp Temp SpO2   (!) 159/64 (!) 57 14 97.7 °F (36.5 °C) 97 %      MAP       --         Physical Exam    Medical Screening Exam   See Full Note    ED Course   Procedures  Labs Reviewed   COMPREHENSIVE METABOLIC PANEL - Abnormal       Result Value    Sodium 136      Potassium 4.7      Chloride 103      CO2 23      Anion Gap 15      Glucose 94      BUN 44 (*)     Creatinine 1.80 (*)     BUN/Creatinine Ratio 24 (*)     Calcium 9.5      Total Protein 7.9 (*)     Albumin 4.1      Globulin 3.8      A/G Ratio 1.1      Bilirubin, Total 0.7      Alk Phos 34 (*)     ALT 19      AST 42 (*)     eGFR 40 (*)    NT-PRO NATRIURETIC PEPTIDE - Abnormal    ProBNP 922 (*)    CBC WITH DIFFERENTIAL - Abnormal    WBC 12.14 (*)     RBC 4.95      Hemoglobin 12.6 (*)     Hematocrit 42.9      MCV 86.7      MCH 25.5 (*)     MCHC 29.4 (*)     RDW 15.3 (*)     Platelet Count 165      MPV 11.5      Neutrophils % 34.7 (*)     Lymphocytes % 55.7 (*)      Monocytes % 5.4      Eosinophils % 3.4      Basophils % 0.6      Immature Granulocytes % 0.2      nRBC, Auto 0.0      Neutrophils, Abs 4.21      Lymphocytes, Absolute 6.76 (*)     Monocytes, Absolute 0.66      Eosinophils, Absolute 0.41      Basophils, Absolute 0.07      Immature Granulocytes, Absolute 0.03      nRBC, Absolute 0.00      Diff Type Manual     MANUAL DIFFERENTIAL - Abnormal    Segmented Neutrophils, Man % 35 (*)     Lymphocytes, Man % 58 (*)     Monocytes, Man % 5      Eosinophils, Man % 2      Platelet Morphology Normal      RBC Morphology Normal     HEMOGLOBIN A1C - Abnormal    Hemoglobin A1C 8.2 (*)     Estimated Average Glucose 189     CBC WITH DIFFERENTIAL - Abnormal    WBC 12.08 (*)     RBC 4.89      Hemoglobin 12.4 (*)     Hematocrit 42.6      MCV 87.1      MCH 25.4 (*)     MCHC 29.1 (*)     RDW 15.2 (*)     Platelet Count 180      MPV 11.6      Neutrophils % 37.9 (*)     Lymphocytes % 51.6 (*)     Monocytes % 6.1 (*)     Eosinophils % 3.5      Basophils % 0.6      Immature Granulocytes % 0.3      nRBC, Auto 0.0      Neutrophils, Abs 4.58      Lymphocytes, Absolute 6.23 (*)     Monocytes, Absolute 0.74      Eosinophils, Absolute 0.42      Basophils, Absolute 0.07      Immature Granulocytes, Absolute 0.04      nRBC, Absolute 0.00      Diff Type Manual     MANUAL DIFFERENTIAL - Abnormal    Segmented Neutrophils, Man % 29 (*)     Bands, Man % 2      Lymphocytes, Man % 64 (*)     Monocytes, Man % 3      Eosinophils, Man % 1      Basophils, Man % 1      Platelet Morphology Few Large Platelets (*)     Ovalocytes Few     POCT GLUCOSE MONITORING CONTINUOUS - Abnormal    POC Glucose 213 (*)    POCT GLUCOSE MONITORING CONTINUOUS - Abnormal    POC Glucose 166 (*)    TROPONIN I - Normal    Troponin I High Sensitivity 28.3     TROPONIN I - Normal    Troponin I High Sensitivity 23.9     APTT - Normal    PTT 29.9     PROTIME-INR - Normal    PT 14.2      INR 1.11     CBC W/ AUTO DIFFERENTIAL    Narrative:      The following orders were created for panel order CBC auto differential.  Procedure                               Abnormality         Status                     ---------                               -----------         ------                     CBC with Differential[[id number]]       Abnormal            Final result               Manual Differential[[id number]]         Abnormal            Final result                 Please view results for these tests on the individual orders.   CBC W/ AUTO DIFFERENTIAL    Narrative:     The following orders were created for panel order CBC auto differential.  Procedure                               Abnormality         Status                     ---------                               -----------         ------                     CBC with Differential[[id number]]       Abnormal            Final result               Manual Differential[[id number]]         Abnormal            Final result                 Please view results for these tests on the individual orders.   POCT GLUCOSE MONITORING CONTINUOUS        ECG Results              EKG 12-lead (Final result)        Collection Time Result Time QRS Duration OHS QTC Calculation    12/02/24 12:45:30 12/02/24 18:47:16 138 459                     Final result by Interface, Lab In [facility] (12/02/24 18:47:21)                   Narrative:    Test Reason : R07.9,    Vent. Rate :  57 BPM     Atrial Rate :    BPM     P-R Int : 168 ms          QRS Dur : 138 ms      QT Int : 464 ms       P-R-T Axes : -13 -61  84 degrees    QTcB Int : 459 ms    Sinus rhythm  Marked left axis deviation  IV conduction defect  Left ventricular hypertrophy  Lateral ST-T abnormality may be due to the hypertrophy and/or ischemia  Abnormal ECG    Confirmed by Cher Frausto ([id number]) on 12/2/2024 6:47:14 PM    Referred By: AAAREFERRAL SELF           Confirmed By: Dagobertot Jett                                  Imaging Results              X-Ray Chest AP Portable (Final  result)  Result time 12/02/24 13:54:56      Final result by Pedro Herrera MD (12/02/24 13:54:56)                   Impression:      No convincing evidence of acute cardiopulmonary disease.      Electronically signed by: Pedro Herrera  Date:    12/02/2024  Time:    13:54               Narrative:    EXAMINATION:  XR CHEST AP PORTABLE    CLINICAL HISTORY:  Chest Pain;    TECHNIQUE:  Single frontal view of the chest was performed.    COMPARISON:  Chest radiograph performed 07/14/2022, 11:21 hours.    FINDINGS:  Status post median sternotomy.    Grossly unchanged cardiomediastinal contours.    Chronic elevation of the right hemidiaphragm.  Right basilar opacity favored to reflect atelectasis.    Lungs otherwise essentially clear.  No definite pneumothorax or large volume pleural effusion.    No acute findings in the visualized abdomen.  Osseous and soft tissue structures appear without definite acute change.                                       Medications   amLODIPine tablet 5 mg (5 mg Oral Not Given 12/3/24 0839)   aspirin EC tablet 81 mg (81 mg Oral Given 12/3/24 0837)   clopidogreL tablet 75 mg (75 mg Oral Given 12/3/24 0837)   EScitalopram oxalate tablet 10 mg (10 mg Oral Given 12/3/24 0837)   fenofibrate tablet 145 mg (145 mg Oral Given 12/3/24 0837)   gabapentin capsule 300 mg (300 mg Oral Given 12/3/24 1515)   lisinopriL tablet 20 mg (20 mg Oral Not Given 12/3/24 0839)   nitroGLYCERIN SL tablet 0.4 mg (has no administration in time range)   pantoprazole EC tablet 40 mg (40 mg Oral Given 12/3/24 0836)   atorvastatin tablet 80 mg (80 mg Oral Given 12/2/24 2007)   hydrALAZINE injection 10 mg (has no administration in time range)   glucagon (human recombinant) injection 1 mg (has no administration in time range)   insulin glargine U-100 (Lantus) injection 15 Units (has no administration in time range)   insulin aspart U-100 injection 0-10 Units (4 Units Subcutaneous Given 12/2/24 1818)    HYDROcodone-acetaminophen  mg per tablet 1 tablet (has no administration in time range)   morphine injection 4 mg (has no administration in time range)   dextrose 10% bolus 125 mL 125 mL (has no administration in time range)   dextrose 10% bolus 250 mL 250 mL (has no administration in time range)   albuterol-ipratropium 2.5 mg-0.5 mg/3 mL nebulizer solution 3 mL (has no administration in time range)   0.9% NaCl infusion ( Intravenous Stopped 12/3/24 1410)   metoprolol succinate (TOPROL-XL) 24 hr tablet 50 mg (has no administration in time range)   isosorbide mononitrate 24 hr tablet 30 mg (30 mg Oral Given 12/3/24 1515)   acetaminophen tablet 650 mg (has no administration in time range)   ondansetron disintegrating tablet 8 mg (has no administration in time range)   hydrALAZINE tablet 100 mg (has no administration in time range)   enoxaparin injection 90 mg (90 mg Subcutaneous Given 12/2/24 1735)     Medical Decision Making             ED Course as of 12/03/24 1734   Mon Dec 02, 2024   1316 I discussed case with cardiologist on-call, Dr. Vincent who wants patient to be admitted to the hospitalist service and plans to do a heart catheterization in the morning. [BB]   1316 Medical decision-making:  Differential diagnosis includes chest pain, STEMI, NSTEMI, ACS, unstable angina.  All testing ordered and interpreted by me. [BB]   1446 Chest x-ray by my interpretation shows no acute disease. [BB]   1513 CBC is normal  CMP is normal except elevated creatinine of 1.8 which is around baseline compared to review of outside medical record.  Pro BNP is mildly elevated.  Initial troponin is normal [BB]   1513 I made the decision to admit patient and discussed case with internal medicine hospitalist on-call who agrees with admission. [BB]      ED Course User Index  [BB] Jeremie Villafana MD                           Clinical Impression:   Final diagnoses:  [R07.9] Chest pain  [I20.0] Unstable angina (Primary)        ED  Disposition Condition    Observation Stable                Jeremie Villafana MD  12/03/24 [id number]

## 2024-12-03 NOTE — ASSESSMENT & PLAN NOTE
- patient has never undergone sleep study, was supposed to have performed after CABG then COVID hit and it was never scheduled  - significant daytime fatigue and falling asleep, loud snoring, and reports O2 sats dropping while asleep in ED  - will refer for outpatient sleep study

## 2024-12-03 NOTE — SUBJECTIVE & OBJECTIVE
Interval History: Pt seen today with no complaints of chest pain or SOB. NPO awaiting C possibly today.     Review of Systems   Constitutional:  Negative for fatigue and fever.   HENT:  Negative for congestion.    Eyes:  Negative for pain, discharge and itching.   Cardiovascular:  Positive for leg swelling.   Gastrointestinal:  Negative for abdominal distention, abdominal pain, constipation, diarrhea and nausea.   Endocrine: Negative for cold intolerance, heat intolerance and polydipsia.   Genitourinary:  Negative for difficulty urinating and hematuria.   Musculoskeletal:  Negative for arthralgias and back pain.   Skin:  Negative for wound.   Hematological: Negative.    Psychiatric/Behavioral: Negative.       Objective:     Vital Signs (Most Recent):  Temp: 97.6 °F (36.4 °C) (12/03/24 0809)  Pulse: (!) 59 (12/03/24 0809)  Resp: 20 (12/03/24 0809)  BP: (!) 166/74 (12/03/24 0809)  SpO2: 96 % (12/03/24 0809) Vital Signs (24h Range):  Temp:  [97.6 °F (36.4 °C)-98.3 °F (36.8 °C)] 97.6 °F (36.4 °C)  Pulse:  [57-87] 59  Resp:  [11-26] 20  SpO2:  [91 %-99 %] 96 %  BP: (104-166)/(34-74) 166/74     Weight: 85.7 kg (188 lb 15 oz)  Body mass index is 26.35 kg/m².  No intake or output data in the 24 hours ending 12/03/24 1125      Physical Exam  HENT:      Head: Normocephalic.      Mouth/Throat:      Mouth: Mucous membranes are moist.   Eyes:      Conjunctiva/sclera: Conjunctivae normal.      Pupils: Pupils are equal, round, and reactive to light.   Cardiovascular:      Rate and Rhythm: Normal rate and regular rhythm.      Pulses: Normal pulses.      Heart sounds: Normal heart sounds.   Pulmonary:      Effort: Pulmonary effort is normal.      Breath sounds: Normal breath sounds.   Abdominal:      General: Abdomen is flat. Bowel sounds are normal.   Musculoskeletal:      Cervical back: Normal range of motion.      Right lower leg: Edema present.      Left lower leg: Edema present.   Skin:     General: Skin is warm.    Neurological:      Mental Status: He is alert and oriented to person, place, and time.   Psychiatric:         Behavior: Behavior normal.         Thought Content: Thought content normal.         Judgment: Judgment normal.             Significant Labs: All pertinent labs within the past 24 hours have been reviewed.    Significant Imaging: I have reviewed all pertinent imaging results/findings within the past 24 hours.

## 2024-12-03 NOTE — DISCHARGE SUMMARY
[facility]  [facility] Medicine  Discharge Summary      Patient Name: Micheal Taylor  MRN: [id number]  VICKI: [id number]  Patient Class: OP- Observation  Admission Date: 12/2/2024  Hospital Length of Stay: 0 days  Discharge Date and Time:  12/03/2024 5:20 PM  Attending Physician: Moe Villalobos MD   Discharging Provider: Moe Villalobos MD  Primary Care Provider: Rodney Morel MD    Primary Care Team: Networked reference to record PCT     HPI:   Patient is an 69 y.o male who presents to [facility] ED today sent from cardiology clinic with c/o chest pain and abnormal EKG. Patient states he has been having increased angina episodes over the last two months that have increased in frequency. Patient states every few steps he has noticed more chest pain that radiates to his bilateral shoulders. Patient states the pain is similar to prior MI. Patient states his chest pain does improve with one NTG tablet. While in clinic today patient was noted to have new LBBB on EKG. Patient also complains of HA, dyspnea, orthopnea and BLE edema. Patent has a PMH of CAD, HTN, T2DM, CHF, HLD and PVD. Patient has surgical history of CABG and stent to LAD. Patient lives with wife and son he is still able to complete all ADLS  he still chew tobacco daily and denies alcohol use. Patient sent to ED from cardiology for work up and LHC.     In the ED, Initial vitals were T. 97.7, pulse 57, RR, 14, /64, O2 97 percent. Completed imaging includes CXR. Pertinent labs WBC 12, H/H 12/42. BUN/Cr 44/1.8, GFR 40, Troponin 28.2 and ProBNP 822.    Patient is admitted to [facility] Medicine team for the continued care and medical management.                    Procedure(s) (LRB):  Angiogram, Coronary, with Left Heart Cath (N/A)      Hospital Course:     12/03  records reviewed.  Patient had presented with chest pain similar to previous episodes.  Had anterior chest tightness and shortness of breaths.  Admitted from  Cardiology referral for evaluation.     PMH of CAD s/p CABG 1/23/2020- Dr. Levy, [facility] 7/2022 s/p stent distal LM, HTN, DM, CHF, HLD, and PVD,   - s/p CABG 1/23/2020- Dr. Levy; SVG to the dLAD; SVG to the OM and SVG to the PL  - [facility] 7/2022 patent SVG to mid LAD with mid LAD stenosis above the graft, patent SVG to OM branch of LCx but poor runoff with severe disease either side of the anastomosis, closed SVG to RCA, successful primary stent of distal left main     Discussed with patient the importance of controlling risk factors.  Needs outpatient sleep study.     Left heart catheterization with three-vessel coronary artery disease to be treated medically  Cardiac rehab recommended  Adjustment of medication    Released by Cardiology  Will be discharged  Follow up outpatient    Patient counseled on the importance of keeping all hospital follow up appointments. Stressed compliance with medications, therapies, or devices as prescribed in order to provide for the best health outcomes and decrease the risk of reoccurrence or further progression of issues.    Additionally, patient given written literature regarding their current disease processes and home care recommendations.   Patient given opportunity to ask questions and verbalized understanding of all information discussed.  Reinforced patient's primary care provider can be a big assets in monitoring and organizing issues after discharge.         Goals of Care Treatment Preferences:  Code Status: DNR      SDOH Screening:  The patient was screened for utility difficulties, food insecurity, transport difficulties, housing insecurity, and interpersonal safety and there were no concerns identified this admission.     Consults:   Consults (From admission, onward)          Status Ordering Provider     Inpatient consult to Cardiology  Once        Provider:  Rodney Vincent DO Completed BEARD, MICHAEL            Obstructive sleep apnea    Needs outpatient sleep  "study    Stage 3b chronic kidney disease  Creatine stable for now. BMP reviewed- noted Estimated Creatinine Clearance: 46.4 mL/min (A) (based on SCr of 1.6 mg/dL (H)). according to latest data. Based on current GFR, CKD stage is stage 3 - GFR 30-59.  Monitor UOP and serial BMP and adjust therapy as needed. Renally dose meds. Avoid nephrotoxic medications and procedures.    Tobacco chew use    Pt advised to reduce or quit tobacco use due to cardiac history.    Type 2 diabetes mellitus with diabetic neuropathy, with long-term current use of insulin  Patient's FSGs are controlled on current medication regimen.  Last A1c reviewed-   Lab Results   Component Value Date    HGBA1C 8.2 (H) 12/02/2024     Most recent fingerstick glucose reviewed- No results for input(s): "POCTGLUCOSE" in the last 24 hours.  Current correctional scale  Low  Maintain anti-hyperglycemic dose as follows-   Antihyperglycemics (From admission, onward)      Start     Stop Route Frequency Ordered    12/03/24 2100  insulin glargine U-100 (Lantus) injection 15 Units         -- SubQ Nightly 12/02/24 1609    12/02/24 1707  insulin aspart U-100 injection 0-10 Units         -- SubQ Every 6 hours PRN 12/02/24 1609          Hold Oral hypoglycemics while patient is in the hospital.    Hypertension  Patient's blood pressure range in the last 24 hours was: BP  Min: 104/34  Max: 166/74.The patient's inpatient anti-hypertensive regimen is listed below:  Current Antihypertensives  amLODIPine tablet 5 mg, Daily, Oral  hydrALAZINE tablet 50 mg, 4 times daily, Oral  lisinopriL tablet 20 mg, Daily, Oral  metoprolol succinate (TOPROL-XL) 24 hr tablet 25 mg, Daily, Oral  nitroGLYCERIN SL tablet 0.4 mg, Every 5 min PRN, Sublingual  hydrALAZINE injection 10 mg, Every 6 hours PRN, Intravenous    Plan  - BP is uncontrolled, will adjust as follows: See the medications above  - We will continue to monitor     Hyperlipidemia      Pt continued on statins (Atorvastatin 80 mg " "inpatient) due to cardiac history of CABG 2020 and C 2022.     CHF (congestive heart failure)  Patient has Combined Systolic and Diastolic heart failure that is Acute on chronic. On presentation their CHF was well compensated. Most recent BNP and echo results are listed below.  No results for input(s): "BNP" in the last 72 hours.  Latest ECHO  Results for orders placed during the hospital encounter of 05/25/22    Echo Saline Bubble? No    Interpretation Summary  · The left ventricle is normal in size with moderate concentric hypertrophy and normal systolic function.  · The estimated ejection fraction is 55%.  · Atrial fibrillation not observed.  · Normal left ventricular diastolic function.  · Mild-to-moderate mitral regurgitation.    Current Heart Failure Medications  hydrALAZINE tablet 50 mg, 4 times daily, Oral  lisinopriL tablet 20 mg, Daily, Oral  metoprolol succinate (TOPROL-XL) 24 hr tablet 25 mg, Daily, Oral  hydrALAZINE injection 10 mg, Every 6 hours PRN, Intravenous    Plan  - Monitor strict I&Os and daily weights.    - Place on telemetry  - Low sodium diet  - Place on fluid restriction of 1.5 L.   - Cardiology has been consulted  - The patient's volume status is at their baseline  -Echo pending        Final Active Diagnoses:    Diagnosis Date Noted POA    Obstructive sleep apnea [G47.33] 12/03/2024 Yes    Stage 3b chronic kidney disease [N18.32] 03/26/2024 Yes    Type 2 diabetes mellitus with diabetic neuropathy, with long-term current use of insulin [E11.40, Z79.4] 04/19/2022 Not Applicable    CHF (congestive heart failure) [I50.9]  Yes    Hyperlipidemia [E78.5]  Yes    Hypertension [I10]  Yes    Tobacco chew use [Z72.0] 02/04/2019 Yes      Problems Resolved During this Admission:    Diagnosis Date Noted Date Resolved POA    PRINCIPAL PROBLEM:  Acute coronary syndrome [I24.9] 12/02/2024 12/03/2024 Yes       Discharged Condition: fair    Disposition: Home or Self Care    Follow Up:   Follow-up " Information       Patricia Galicia ACNP Follow up on 12/10/2024.    Specialty: Cardiology  Why: Appointment scheduled with Cardiology on 12/10/2024 at 9:30 a.m.  Contact information:  [address] [facility]  [place] [address]  [phone]               Rodney Morel MD. Schedule an appointment as soon as possible for a visit in 1 week(s).    Specialty: Family Medicine  Contact information:  [address] [place] [id number]  [phone]                           Patient Instructions:      Ambulatory referral/consult to Sleep Disorders   Standing Status: Future   Referral Priority: Routine Referral Type: Consultation   Requested Specialty: Sleep Medicine   Number of Visits Requested: 1     Diet Cardiac     Diet diabetic     Cardiac rehab phase II   Standing Status: Future Standing Exp. Date: 12/03/25   Order Comments: Cardiac Re[facility] to [facility]     Order Specific Question Answer Comments   [facility]    Select qualifying diagnosis: I20.8 - Stable angina      Activity as tolerated       Significant Diagnostic Studies: Labs: BMP:   Recent Labs   Lab 12/02/24  1321 12/03/24  0521   GLU 94 119*    139   K 4.7 4.2    104   CO2 23 26   BUN 44* 35*   CREATININE 1.80* 1.60*   CALCIUM 9.5 8.9   MG  --  2.2   , CMP   Recent Labs   Lab 12/02/24  1321 12/03/24  0521    139   K 4.7 4.2    104   CO2 23 26   GLU 94 119*   BUN 44* 35*   CREATININE 1.80* 1.60*   CALCIUM 9.5 8.9   PROT 7.9* 6.4   ALBUMIN 4.1 3.4   BILITOT 0.7 0.3   ALKPHOS 34* 34*   AST 42* 34   ALT 19 16   ANIONGAP 15 13   , and CBC   Recent Labs   Lab 12/02/24  1321 12/02/24  1617 12/03/24  0521   WBC 12.14* 12.08* 9.65   HGB 12.6* 12.4* 11.5*   HCT 42.9 42.6 38.4*    180 153     Imaging Results              X-Ray Chest AP Portable (Final result)  Result time 12/02/24 13:54:56      Final result by Pedro Herrera,  MD (12/02/24 13:54:56)                   Impression:      No convincing evidence of acute cardiopulmonary disease.      Electronically signed by: Pedro Herrera  Date:    12/02/2024  Time:    13:54               Narrative:    EXAMINATION:  XR CHEST AP PORTABLE    CLINICAL HISTORY:  Chest Pain;    TECHNIQUE:  Single frontal view of the chest was performed.    COMPARISON:  Chest radiograph performed 07/14/2022, 11:21 hours.    FINDINGS:  Status post median sternotomy.    Grossly unchanged cardiomediastinal contours.    Chronic elevation of the right hemidiaphragm.  Right basilar opacity favored to reflect atelectasis.    Lungs otherwise essentially clear.  No definite pneumothorax or large volume pleural effusion.    No acute findings in the visualized abdomen.  Osseous and soft tissue structures appear without definite acute change.                                    Intake/Output - Last 3 Shifts       None          Microbiology Results (last 7 days)       ** No results found for the last 168 hours. **                  Pending Diagnostic Studies:       None           Medications:  Reconciled Home Medications:      Medication List        START taking these medications      isosorbide mononitrate 30 MG 24 hr tablet  Commonly known as: IMDUR  Take 1 tablet (30 mg total) by mouth once daily.  Start taking on: December 4, 2024            CHANGE how you take these medications      hydrALAZINE 100 MG tablet  Commonly known as: APRESOLINE  Take 1 tablet (100 mg total) by mouth every 12 (twelve) hours.  What changed:   medication strength  how much to take  when to take this     metoprolol succinate 50 MG 24 hr tablet  Commonly known as: TOPROL-XL  Take 1 tablet (50 mg total) by mouth once daily.  Start taking on: December 4, 2024  What changed:   medication strength  how much to take            CONTINUE taking these medications      amLODIPine 5 MG tablet  Commonly known as: NORVASC  Take 1 tablet (5 mg total) by mouth once  daily.     aspirin 81 MG EC tablet  Commonly known as: ECOTRIN  Take 81 mg by mouth once daily.     cholecalciferol (vitamin D3) 25 mcg (1,000 unit) capsule  Commonly known as: VITAMIN D3  Take 1,000 Units by mouth once daily.     cinnamon bark 500 mg capsule  Take 500 mg by mouth once daily.     clopidogreL 75 mg tablet  Commonly known as: PLAVIX  Take 1 tablet (75 mg total) by mouth once daily.     cyanocobalamin 1000 MCG tablet  Commonly known as: VITAMIN B-12  Take 100 mcg by mouth once daily.     EScitalopram oxalate 10 MG tablet  Commonly known as: LEXAPRO  Take 1 tablet (10 mg total) by mouth once daily.     FARXIGA 10 mg tablet  Generic drug: dapagliflozin propanediol  TAKE ONE TABLET BY MOUTH ONCE DAILY     fenofibrate 145 MG tablet  Commonly known as: TRICOR  TAKE 1 TABLET (145 MG TOTAL) BY MOUTH ONCE DAILY.     gabapentin 300 MG capsule  Commonly known as: NEURONTIN  TAKE ONE CAPSULE BY MOUTH THREE TIMES DAILY     * HYDROcodone-acetaminophen  mg per tablet  Commonly known as: NORCO  Take 1 tablet by mouth every 8 (eight) hours as needed for Pain (pain).     * HYDROcodone-acetaminophen  mg per tablet  Commonly known as: NORCO  Take 1 tablet by mouth every 8 (eight) hours as needed for Pain (pain).  Start taking on: December 11, 2024     liraglutide 0.6 mg/0.1 mL (18 mg/3 mL) subq PNIJ 0.6 mg/0.1 mL (18 mg/3 mL) Pnij pen  Commonly known as: VICTOZA 2-CAITLIN  Inject 1.8 mg into the skin once daily.     lisinopriL 20 MG tablet  Commonly known as: PRINIVIL,ZESTRIL  Take 1 tablet (20 mg total) by mouth once daily.     magnesium 250 mg Tab  Take 1 tablet by mouth once daily.     naloxone 4 mg/actuation Spry  Commonly known as: NARCAN  1 spray by Nasal route once.     nitroGLYCERIN 0.4 MG SL tablet  Commonly known as: NITROSTAT  Place 1 tablet (0.4 mg total) under the tongue every 5 (five) minutes as needed for Chest pain.     omeprazole 20 MG capsule  Commonly known as: PRILOSEC  TAKE 1 CAPSULE (20 MG  TOTAL) BY MOUTH ONCE DAILY.     rosuvastatin 40 MG Tab  Commonly known as: CRESTOR  TAKE 1 TABLET (40 MG TOTAL) BY MOUTH EVERY EVENING.     TRESIBA FLEXTOUCH U-200 200 unit/mL (3 mL) insulin pen  Generic drug: insulin degludec  Inject into the skin once daily. On a sort of sliding scale depending on his BG readings; has a Freestyle Felton reader     VASCEPA 1 gram Cap  Generic drug: icosapent ethyL  Take 2 capsules by mouth 2 (two) times a day.     vitamin A 8000 UNIT capsule  Take 8,000 Units by mouth.     vitamin E 400 UNIT capsule  Take 400 Units by mouth once daily.           * This list has 2 medication(s) that are the same as other medications prescribed for you. Read the directions carefully, and ask your doctor or other care provider to review them with you.                STOP taking these medications      metFORMIN 500 MG tablet  Commonly known as: GLUCOPHAGE              Indwelling Lines/Drains at time of discharge:   Lines/Drains/Airways       None                   Time spent on the discharge of patient: 35 minutes         Moe Villalobos MD  Department of Hospital Medicine  [facility]

## 2024-12-03 NOTE — ASSESSMENT & PLAN NOTE
Creatine stable for now. BMP reviewed- noted Estimated Creatinine Clearance: 46.4 mL/min (A) (based on SCr of 1.6 mg/dL (H)). according to latest data. Based on current GFR, CKD stage is stage 3 - GFR 30-59.  Monitor UOP and serial BMP and adjust therapy as needed. Renally dose meds. Avoid nephrotoxic medications and procedures.

## 2024-12-03 NOTE — HOSPITAL COURSE
12/03  records reviewed.  Patient had presented with chest pain similar to previous episodes.  Had anterior chest tightness and shortness of breaths.  Admitted from Cardiology referral for evaluation.     PMH of CAD s/p CABG 1/23/2020- Dr. Levy, [facility] 7/2022 s/p stent distal LM, HTN, DM, CHF, HLD, and PVD,   - s/p CABG 1/23/2020- Dr. Levy; SVG to the dLAD; SVG to the OM and SVG to the PL  - [facility] 7/2022 patent SVG to mid LAD with mid LAD stenosis above the graft, patent SVG to OM branch of LCx but poor runoff with severe disease either side of the anastomosis, closed SVG to RCA, successful primary stent of distal left main     Discussed with patient the importance of controlling risk factors.  Needs outpatient sleep study.     Left heart catheterization with three-vessel coronary artery disease to be treated medically  Cardiac rehab recommended  Adjustment of medication    Released by Cardiology  Will be discharged  Follow up outpatient    Patient counseled on the importance of keeping all hospital follow up appointments. Stressed compliance with medications, therapies, or devices as prescribed in order to provide for the best health outcomes and decrease the risk of reoccurrence or further progression of issues.    Additionally, patient given written literature regarding their current disease processes and home care recommendations.   Patient given opportunity to ask questions and verbalized understanding of all information discussed.  Reinforced patient's primary care provider can be a big assets in monitoring and organizing issues after discharge.

## 2024-12-03 NOTE — ASSESSMENT & PLAN NOTE
"Patient's FSGs are controlled on current medication regimen.  Last A1c reviewed-   Lab Results   Component Value Date    HGBA1C 8.2 (H) 12/02/2024     Most recent fingerstick glucose reviewed- No results for input(s): "POCTGLUCOSE" in the last 24 hours.  Current correctional scale  Low  Maintain anti-hyperglycemic dose as follows-   Antihyperglycemics (From admission, onward)    Start     Stop Route Frequency Ordered    12/03/24 2100  insulin glargine U-100 (Lantus) injection 15 Units         -- SubQ Nightly 12/02/24 1609    12/02/24 1707  insulin aspart U-100 injection 0-10 Units         -- SubQ Every 6 hours PRN 12/02/24 1609        Hold Oral hypoglycemics while patient is in the hospital.  "

## 2024-12-03 NOTE — PLAN OF CARE
[facility]  Initial Discharge Assessment       Primary Care Provider: Rodney Morel MD    Admission Diagnosis: Unstable angina [I20.0]  CHF (congestive heart failure) [I50.9]  Hx of CABG [Z95.1]  Chest pain [R07.9]    Admission Date: 12/2/2024  Expected Discharge Date:     Transition of Care Barriers: None    Payor: HU[employer] MANAGED MEDICARE / Plan: HU[employer] MEDICARE HMO / Product Type: Capitation /     Extended Emergency Contact Information  Primary Emergency Contact: Angelica [address]  Address: [address]  Home Phone: [phone]  Relation: Spouse  Preferred language: English   needed? No    Discharge Plan A: Home with family  Discharge Plan B: Home with family, Home Health, Long-term acute care facility (LTAC), Rehab, Skilled Nursing Facility      [facility] [address] [facility]  [address] [place] [address]  Phone: [phone] Fax: [phone]    [facility] ([place] - [address] [address]  Phone: [phone] Fax: [phone]      Initial Assessment (most recent)       Adult Discharge Assessment - 12/03/24 1125          Discharge Assessment    Assessment Type Discharge Planning Assessment     Source of Information patient     Communicated CHICA with patient/caregiver Date not available/Unable to determine     People in Home spouse;child(lorne), adult     Do you expect to return to your current living situation? Yes     Do you have help at home or someone to help you manage your care at home? Yes     Prior to hospitilization cognitive status: Unable to Assess     Current cognitive status: Alert/Oriented     Walking or Climbing Stairs Difficulty no     Dressing/Bathing Difficulty no     Home Accessibility stairs to enter home     Number of Stairs, Main Entrance three     Home Layout Able to live on 1st floor     Equipment  Currently Used at Home none     Patient currently being followed by outpatient case management? No     Do you currently have service(s) that help you manage your care at home? No     Do you take prescription medications? Yes     Do you have prescription coverage? Yes     Coverage [employer]     Do you have any problems affording any of your prescribed medications? No     Is the patient taking medications as prescribed? yes     Who is going to help you get home at discharge? Son     How do you get to doctors appointments? car, drives self     Are you on dialysis? No     Do you take coumadin? No     Discharge Plan A Home with family     Discharge Plan B Home with family;Home Health;Long-term acute care facility (LTAC);Rehab;Skilled Nursing Facility     DME Needed Upon Discharge  none     Discharge Plan discussed with: Patient     Transition of Care Barriers None        Physical Activity    On average, how many days per week do you engage in moderate to strenuous exercise (like a brisk walk)? 0 days     On average, how many minutes do you engage in exercise at this level? 0 min        Financial Resource Strain    How hard is it for you to pay for the very basics like food, housing, medical care, and heating? Not hard at all        Housing Stability    In the last 12 months, was there a time when you were not able to pay the mortgage or rent on time? No     At any time in the past 12 months, were you homeless or living in a shelter (including now)? No        Transportation Needs    Has the lack of transportation kept you from medical appointments, meetings, work or from getting things needed for daily living? No        Food Insecurity    Within the past 12 months, you worried that your food would run out before you got the money to buy more. Never true     Within the past 12 months, the food you bought just didn't last and you didn't have money to get more. Never true        Stress    Do you feel stress - tense, restless,  nervous, or anxious, or unable to sleep at night because your mind is troubled all the time - these days? Not at all        Social Isolation    How often do you feel lonely or isolated from those around you?  Never        Alcohol Use    Q1: How often do you have a drink containing alcohol? Never     Q2: How many drinks containing alcohol do you have on a typical day when you are drinking? Patient does not drink     Q3: How often do you have six or more drinks on one occasion? Never        Utilities    In the past 12 months has the electric, gas, oil, or water company threatened to shut off services in your home? No        Health Literacy    How often do you need to have someone help you when you read instructions, pamphlets, or other written material from your doctor or pharmacy? Never                        CM spoke with pt in his room. Pt lives at home with family, no hh or dme pta. Discharge plan is to return home with no anticipated needs. SDOH updated. CM will follow

## 2024-12-03 NOTE — ASSESSMENT & PLAN NOTE
Patient's blood pressure range in the last 24 hours was: BP  Min: 104/34  Max: 166/74.The patient's inpatient anti-hypertensive regimen is listed below:  Current Antihypertensives  amLODIPine tablet 5 mg, Daily, Oral  hydrALAZINE tablet 50 mg, 4 times daily, Oral  lisinopriL tablet 20 mg, Daily, Oral  metoprolol succinate (TOPROL-XL) 24 hr tablet 25 mg, Daily, Oral  nitroGLYCERIN SL tablet 0.4 mg, Every 5 min PRN, Sublingual  hydrALAZINE injection 10 mg, Every 6 hours PRN, Intravenous    Plan  - BP is uncontrolled, will adjust as follows: See the medications above  - We will continue to monitor

## 2024-12-03 NOTE — ASSESSMENT & PLAN NOTE
- s/p CABG 1/23/2020- Dr. Levy; SVG to the dLAD; SVG to the OM and SVG to the PL  - [facility] 7/2022 patent SVG to mid LAD with mid LAD stenosis above the graft, patent SVG to OM branch of LCx but poor runoff with severe disease either side of the anastomosis, closed SVG to RCA, successful primary stent of distal left main  - troponins negative, unstable angina  - plan for [facility] today.  Procedure, risks, and benefits discussed in detail with patient.  He verbalized understanding and wishes to proceed.  Consent obtained and on chart.  - if no new findings on catheterization, patient will need GI evaluation  - further recommendations to follow

## 2024-12-03 NOTE — NURSING
Patient returned to floor at 1430 via hospital bed per cath lab staff. No distress or discomfort noted. Patient's family accompanied staff person. Will continue to monitor

## 2024-12-03 NOTE — HPI
69 y./o male with PMH of CAD s/p CABG 1/23/2020- Dr. Levy, [facility] 7/2022 s/p stent distal LM, HTN, DM, CHF, HLD, and PVD,

## 2024-12-03 NOTE — ED NOTES
BP on the soft side & pt reports he would feel more comfortable if we did not bottom his BP out. Hydralazine not being given as it was just given at 1735.

## 2024-12-03 NOTE — ASSESSMENT & PLAN NOTE
Patient presents with ACS. Chest pain is currently controlled. ARMANDO score is 6. Patient is currently on NSTEMI Pathway.    EKG reviewed. Troponins reviewed and results noted-   Troponin 28.3  second pending    Lipid panel reviewed and shows-     Lab Results   Component Value Date    LDLCALC 55 12/03/2024     Lab Results   Component Value Date    TRIG 153 (H) 12/03/2024         Medical management includes; Beta Blocker, Dual Anti-Platelet therapy, Anticoagulation, High Intensity Stain, ACE/ARB, and Nitrate Echo has not been performed. Latest ECHO results are as follows- Results for orders placed during the hospital encounter of 05/25/22    Echo Saline Bubble? No    Interpretation Summary  · The left ventricle is normal in size with moderate concentric hypertrophy and normal systolic function.  · The estimated ejection fraction is 55%.  · Atrial fibrillation not observed.  · Normal left ventricular diastolic function.  · Mild-to-moderate mitral regurgitation.  .   Consult for cardiac rehab is ordered. Patient counseled on lifestyle modifications- begin progressive daily aerobic exercise program, follow a low fat, low cholesterol diet, decrease or avoid alcohol intake, reduce salt in diet and cooking, improve dietary compliance, continue current medications, continue current healthy lifestyle patterns, and return for routine annual checkups. Cardiology is consulted. Plan of care reviewed with cardiology team. Continue to monitor patient closely and adjust therapy as needed.    -C planned for tomorrow  -NPO after midnight    12/03  -NPO  -Cardiology consulted  -Possible LHC today

## 2024-12-03 NOTE — ASSESSMENT & PLAN NOTE
Last A1c reviewed-   Lab Results   Component Value Date    HGBA1C 8.2 (H) 12/02/2024   - being followed by primary team

## 2024-12-03 NOTE — CONSULTS
[facility]  Cardiology  Consult Note    Patient Name: Micheal Taylor  MRN: [id number]  Admission Date: 12/2/2024  Hospital Length of Stay: 0 days  Code Status: DNR   Attending Provider: Moe Villalobos MD   Consulting Provider: AMADO Johnson  Primary Care Physician: Rodney Morel MD  Principal Problem:<principal problem not specified>    Patient information was obtained from patient, past medical records, ER records, and primary team.     Inpatient consult to Cardiology  Consult performed by: Millie Carlos FNP  Consult ordered by: Curry Grace MD  Reason for consult: ACS        Subjective:     Chief Complaint:  Chest pain     HPI:    69 y./o male with PMH of CAD s/p CABG 1/23/2020- Dr. Levy, [facility] 7/2022 s/p stent distal LM, HTN, DM, CHF, HLD, and PVD,     No new subjective & objective note has been filed under this hospital service since the last note was generated.    Assessment and Plan:     Obstructive sleep apnea  - patient has never undergone sleep study, was supposed to have performed after CABG then COVID hit and it was never scheduled  - significant daytime fatigue and falling asleep, loud snoring, and reports O2 sats dropping while asleep in ED  - will refer for outpatient sleep study    Acute coronary syndrome  - s/p CABG 1/23/2020- Dr. Levy; SVG to the dLAD; SVG to the OM and SVG to the PL  - [facility] 7/2022 patent SVG to mid LAD with mid LAD stenosis above the graft, patent SVG to OM branch of LCx but poor runoff with severe disease either side of the anastomosis, closed SVG to RCA, successful primary stent of distal left main  - troponins negative, unstable angina  - plan for [facility] today.  Procedure, risks, and benefits discussed in detail with patient.  He verbalized understanding and wishes to proceed.  Consent obtained and on chart.  - if no new findings on catheterization, patient will need GI evaluation  - further recommendations to follow    Stage 3b chronic kidney  disease  - creatinine 1.6 (baseline 1.8-2.0)    Type 2 diabetes mellitus with diabetic neuropathy, with long-term current use of insulin  Last A1c reviewed-   Lab Results   Component Value Date    HGBA1C 8.2 (H) 12/02/2024   - being followed by primary team    Hyperlipidemia  - LDL 55, continue statin therapy      CHF (congestive heart failure)  - EF 55% by echo 05/2022  - EF 25% by LV-gram 07/2022  - repeat echo pending        VTE Risk Mitigation (From admission, onward)      None            Thank you for your consult. I will follow-up with patient. Please contact us if you have any additional questions.    Millie Carlos, AMADO  Cardiology   [facility]    Pt seen and examined, chart reviewed independently, essentially agree with findings as documented. Patient presents with chest pain consistent with previous anginal symptoms experienced prior to CABG.  Discussed options, recs for LHC/poss, risks and benefits discussed, will schedule LHC at next available.

## 2024-12-04 ENCOUNTER — PATIENT OUTREACH (OUTPATIENT)
Dept: ADMINISTRATIVE | Facility: CLINIC | Age: 69
End: 2024-12-04

## 2024-12-04 DIAGNOSIS — I10 HYPERTENSION, UNSPECIFIED TYPE: ICD-10-CM

## 2024-12-04 LAB
OHS QRS DURATION: 132 MS
OHS QTC CALCULATION: 456 MS

## 2024-12-04 RX ORDER — AMLODIPINE BESYLATE 5 MG/1
5 TABLET ORAL DAILY
Qty: 90 TABLET | Refills: 1 | OUTPATIENT
Start: 2024-12-04

## 2024-12-04 NOTE — PLAN OF CARE
[facility]  Discharge Final Note    Primary Care Provider: Rodney Morel MD    Expected Discharge Date: 12/3/2024    Final Discharge Note (most recent)       Final Note - 12/04/24 0833          Final Note    Assessment Type Final Discharge Note     Anticipated Discharge Disposition Home or Self Care                     Important Message from Medicare             Contact Info       Patricia Galicia ACNP   Specialty: Cardiology   Relationship: Nurse Practitioner    [address] [place] [address] [place] [address]   Phone: [phone]       Next Steps: Follow up on 12/10/2024    Instructions: Appointment scheduled with Cardiology on 12/10/2024 at 9:30 a.m.    Rodney Morel MD   Specialty: Family Medicine   Relationship: PCP - General    [address] [place] [address]   Phone: [phone]       Next Steps: Schedule an appointment as soon as possible for a visit in 1 week(s)          Pt discharged home with no needs.

## 2024-12-04 NOTE — PROGRESS NOTES
C3 nurse spoke with Micheal Taylor  for a TCC post hospital discharge follow up call. The patient has a scheduled [facility] appointment with Rodney Morel MD  on 12/16/24 @ 2777.

## 2024-12-05 RX ORDER — AMLODIPINE BESYLATE 5 MG/1
5 TABLET ORAL DAILY
Qty: 90 TABLET | Refills: 1 | OUTPATIENT
Start: 2024-12-05

## 2024-12-05 RX ORDER — AMLODIPINE BESYLATE 5 MG/1
5 TABLET ORAL DAILY
Qty: 90 TABLET | Refills: 1 | Status: SHIPPED | OUTPATIENT
Start: 2024-12-05

## 2024-12-05 NOTE — TELEPHONE ENCOUNTER
----- Message from Philomena sent at 12/5/2024  9:14 AM CST -----  Regarding: denied refill  Who Called: Micheal Taylor    Caller is requesting assistance/information from provider's office.      Preferred Method of Contact: Phone Call  Patient's Preferred Phone Number on File: [phone]   Best Call Back Number, if different: [phone]  Additional Information: Prescription for amLODIPine (NORVASC) 5 MG tablet denied at the pharmacy  Patient is wondering what he needs to do, unsure of why it denied.     [facility] - [address] [address]  Phone: [phone] Fax: [phone]

## 2024-12-10 ENCOUNTER — OFFICE VISIT (OUTPATIENT)
Dept: CARDIOLOGY | Facility: CLINIC | Age: 69
End: 2024-12-10
Payer: MEDICARE

## 2024-12-10 VITALS
WEIGHT: 188.38 LBS | HEART RATE: 66 BPM | HEIGHT: 71 IN | SYSTOLIC BLOOD PRESSURE: 122 MMHG | DIASTOLIC BLOOD PRESSURE: 62 MMHG | OXYGEN SATURATION: 95 % | BODY MASS INDEX: 26.37 KG/M2

## 2024-12-10 DIAGNOSIS — I51.9 LEFT VENTRICULAR DIASTOLIC DYSFUNCTION: ICD-10-CM

## 2024-12-10 DIAGNOSIS — E78.2 MIXED HYPERLIPIDEMIA: ICD-10-CM

## 2024-12-10 DIAGNOSIS — R06.02 SHORTNESS OF BREATH ON EXERTION: ICD-10-CM

## 2024-12-10 DIAGNOSIS — I10 HYPERTENSION, UNSPECIFIED TYPE: ICD-10-CM

## 2024-12-10 DIAGNOSIS — I50.22 CHRONIC SYSTOLIC CONGESTIVE HEART FAILURE: ICD-10-CM

## 2024-12-10 DIAGNOSIS — G47.33 OBSTRUCTIVE SLEEP APNEA: ICD-10-CM

## 2024-12-10 DIAGNOSIS — R29.818 SUSPECTED SLEEP APNEA: Primary | ICD-10-CM

## 2024-12-10 DIAGNOSIS — I20.89 STABLE ANGINA PECTORIS: ICD-10-CM

## 2024-12-10 DIAGNOSIS — I34.0 MITRAL VALVE INSUFFICIENCY, UNSPECIFIED ETIOLOGY: ICD-10-CM

## 2024-12-10 DIAGNOSIS — I25.9 CHEST PAIN DUE TO MYOCARDIAL ISCHEMIA, UNSPECIFIED ISCHEMIC CHEST PAIN TYPE: ICD-10-CM

## 2024-12-10 DIAGNOSIS — I25.10 CORONARY ARTERY DISEASE INVOLVING NATIVE CORONARY ARTERY OF NATIVE HEART, UNSPECIFIED WHETHER ANGINA PRESENT: ICD-10-CM

## 2024-12-10 DIAGNOSIS — I73.9 PVD (PERIPHERAL VASCULAR DISEASE): ICD-10-CM

## 2024-12-10 DIAGNOSIS — I25.2 OLD MI (MYOCARDIAL INFARCTION): ICD-10-CM

## 2024-12-10 PROCEDURE — 3066F NEPHROPATHY DOC TX: CPT | Mod: CPTII,,, | Performed by: NURSE PRACTITIONER

## 2024-12-10 PROCEDURE — 99999 PR PBB SHADOW E&M-EST. PATIENT-LVL V: CPT | Mod: PBBFAC,,, | Performed by: NURSE PRACTITIONER

## 2024-12-10 PROCEDURE — 3074F SYST BP LT 130 MM HG: CPT | Mod: CPTII,,, | Performed by: NURSE PRACTITIONER

## 2024-12-10 PROCEDURE — 1101F PT FALLS ASSESS-DOCD LE1/YR: CPT | Mod: CPTII,,, | Performed by: NURSE PRACTITIONER

## 2024-12-10 PROCEDURE — 1126F AMNT PAIN NOTED NONE PRSNT: CPT | Mod: CPTII,,, | Performed by: NURSE PRACTITIONER

## 2024-12-10 PROCEDURE — 4010F ACE/ARB THERAPY RXD/TAKEN: CPT | Mod: CPTII,,, | Performed by: NURSE PRACTITIONER

## 2024-12-10 PROCEDURE — 99215 OFFICE O/P EST HI 40 MIN: CPT | Mod: PBBFAC | Performed by: NURSE PRACTITIONER

## 2024-12-10 PROCEDURE — 1159F MED LIST DOCD IN RCRD: CPT | Mod: CPTII,,, | Performed by: NURSE PRACTITIONER

## 2024-12-10 PROCEDURE — 1160F RVW MEDS BY RX/DR IN RCRD: CPT | Mod: CPTII,,, | Performed by: NURSE PRACTITIONER

## 2024-12-10 PROCEDURE — 3288F FALL RISK ASSESSMENT DOCD: CPT | Mod: CPTII,,, | Performed by: NURSE PRACTITIONER

## 2024-12-10 PROCEDURE — 3078F DIAST BP <80 MM HG: CPT | Mod: CPTII,,, | Performed by: NURSE PRACTITIONER

## 2024-12-10 PROCEDURE — 3052F HG A1C>EQUAL 8.0%<EQUAL 9.0%: CPT | Mod: CPTII,,, | Performed by: NURSE PRACTITIONER

## 2024-12-10 PROCEDURE — 3060F POS MICROALBUMINURIA REV: CPT | Mod: CPTII,,, | Performed by: NURSE PRACTITIONER

## 2024-12-10 PROCEDURE — 3008F BODY MASS INDEX DOCD: CPT | Mod: CPTII,,, | Performed by: NURSE PRACTITIONER

## 2024-12-10 PROCEDURE — 99214 OFFICE O/P EST MOD 30 MIN: CPT | Mod: S$PBB,,, | Performed by: NURSE PRACTITIONER

## 2024-12-10 RX ORDER — ISOSORBIDE MONONITRATE 30 MG/1
30 TABLET, EXTENDED RELEASE ORAL DAILY
Qty: 90 TABLET | Refills: 3 | Status: SHIPPED | OUTPATIENT
Start: 2024-12-10 | End: 2025-12-10

## 2024-12-10 RX ORDER — HYDRALAZINE HYDROCHLORIDE 100 MG/1
100 TABLET, FILM COATED ORAL EVERY 12 HOURS
Qty: 180 TABLET | Refills: 3 | Status: SHIPPED | OUTPATIENT
Start: 2024-12-10 | End: 2025-12-10

## 2024-12-10 RX ORDER — METOPROLOL SUCCINATE 50 MG/1
50 TABLET, EXTENDED RELEASE ORAL DAILY
Qty: 90 TABLET | Refills: 3 | Status: SHIPPED | OUTPATIENT
Start: 2024-12-10 | End: 2025-12-10

## 2024-12-10 RX ORDER — NITROGLYCERIN 0.4 MG/1
0.4 TABLET SUBLINGUAL EVERY 5 MIN PRN
Qty: 25 TABLET | Refills: 3 | Status: SHIPPED | OUTPATIENT
Start: 2024-12-10 | End: 2025-12-10

## 2024-12-10 NOTE — ASSESSMENT & PLAN NOTE
Lab Results   Component Value Date    CHOL 110 12/03/2024    CHOL 122 06/12/2024    CHOL 78 12/11/2023     Lab Results   Component Value Date    HDL 24 (L) 12/03/2024    HDL 28 (L) 06/12/2024    HDL 24 (L) 12/11/2023     Lab Results   Component Value Date    LDLCALC 55 12/03/2024    LDLCALC 56 06/12/2024    LDLCALC 21 12/11/2023     Lab Results   Component Value Date    TRIG 153 (H) 12/03/2024    TRIG 191 (H) 06/12/2024    TRIG 166 (H) 12/11/2023       Lab Results   Component Value Date    CHOLHDL 4.6 12/03/2024    CHOLHDL 4.4 06/12/2024    CHOLHDL 3.3 12/11/2023     Crestor 40 mg po daily  Lipids followed by PCP

## 2024-12-10 NOTE — ASSESSMENT & PLAN NOTE
"Patient has  heart failure with improved LVEF that is Chronic. On presentation their CHF was well compensated. Most recent BNP and echo results are listed below.  No results for input(s): "BNP" in the last 72 hours.  Latest ECHO  Results for orders placed during the hospital encounter of 12/02/24    Echo    Interpretation Summary    Left Ventricle: The left ventricle is at the upper limits of normal in size. Mildly increased wall thickness. There is mild concentric hypertrophy. There is low normal systolic function with a visually estimated ejection fraction of 50 - 55%. Ejection fraction is approximately 50%. There is normal diastolic function.    Right Ventricle: Normal right ventricular cavity size.    Left Atrium: Left atrium is mildly dilated.    Mitral Valve: There is mild regurgitation with an eccentric jet.    IVC/SVC: Normal venous pressure at 3 mmHg.    Current Heart Failure Medications  hydrALAZINE (APRESOLINE) tablet, Every 12 hours, Oral  metoprolol (TOPROL-XL) 24 hr tablet, Daily, Oral        "

## 2024-12-10 NOTE — PROGRESS NOTES
PCP: Rodney Morel MD    Referring Provider:   Chief Complaint   Patient presents with    Coronary Artery Disease    Chest Pain     2 nights in a row that wakes him up.    Shortness of Breath     All the time.    Palpitations     When he wakes up with the chest pain.      Subjective:   Micheal Taylor is a 69 y.o. male with hx of CAD, HTN, DM, CHF, HLD, and PVD,  who presents for HD follow up after admission to [facility] 12/2/2024 for ACS and [facility]  12/3/2024 3 vessel CAD with patent SVG to LAD and OM; collateral circulation from septal perforators to the R-PDA; medical management recommended. He was started on Imdur and his dose of metoprolol was increased.He reports that he is doing much better with the med changes. He has had issues with  two episodes of chest pain since hospital DC that awakened him from sleep and he felt his heart racing and sob suspicious for BRAXTON.Patient states he had an appointment to be evaluated for BRAXTON when Covid hit. He reports that he has had witnessed sleep apnea while in the ED.    12/2/2023 presents for routine follow up. Patient reports more frequent episodes of angina over the last 1-2 months. He has limited his activity but has continued to have ongoing issues with chest discomfort walking a few feet. He describes this as substernal chest pain radiating to both shoulders which he identifies as the same pain as he had prior to previous MI and revascularization. He had a particularly bad episodes yesterday with severe pain which as relieved with rest and one sublingual ntg. His EKG today demonstrates a new LBBB.   Case reviewed with Dr. Howell who agrees the patient should be referred to the ED for further work up and LHC.  This was discussed the patient and his family member and they are in agreement. Report was called to the ED and the cardiology hospital team was notified.     5/30/2024 presents for routine follow up. He states that he is doing well and denies complaints. He  "does have chronic, dependent edema of the LLE since his injury of his left foot and the saphenous vein harvest from the LLE at time of CABG.     2023 presents for routine follow up. He reports episodes of left shoulder pain; dull pan similar to MI pain; resolved with rest and at times one sublingual ntg; Discomfort does not occur with exertion but after "working all day" occurs later in the evening. Patient feels may be r/t muscle pain from over use. This has been ongoing for at least 6-8 months and has occurred only 2-3 times in the last 6 months.      2023 presents for routine follow up. He states that he is doing well and has had no cardiac issues.         Fhx:  Family History   Problem Relation Name Age of Onset    Diabetes Mother      Heart disease Mother      Hypertension Mother      Liver cancer Mother      Heart disease Father      Hypertension Father       Shx:   Social History     Socioeconomic History    Marital status:     Number of children: 5   Occupational History    Occupation: retired   Tobacco Use    Smoking status: Former     Current packs/day: 0.00     Average packs/day: 4.0 packs/day for 30.0 years (120.0 ttl pk-yrs)     Types: Cigarettes     Start date:      Quit date:      Years since quittin.9     Passive exposure: Past (parent)    Smokeless tobacco: Current     Types: Chew   Substance and Sexual Activity    Alcohol use: Not Currently    Drug use: Yes     Types: Hydrocodone    Sexual activity: Not Currently     Social Drivers of Health     Financial Resource Strain: Low Risk  (12/3/2024)    Overall Financial Resource Strain (CARDIA)     Difficulty of Paying Living Expenses: Not hard at all   Food Insecurity: No Food Insecurity (12/3/2024)    Hunger Vital Sign     Worried About Running Out of Food in the Last Year: Never true     Ran Out of Food in the Last Year: Never true   Transportation Needs: No Transportation Needs (12/3/2024)    TRANSPORTATION NEEDS     " Transportation : No   Physical Activity: Inactive (12/3/2024)    Exercise Vital Sign     Days of Exercise per Week: 0 days     Minutes of Exercise per Session: 0 min   Stress: No Stress Concern Present (12/3/2024)    [employer] - Occupational Stress Questionnaire     Feeling of Stress : Not at all   Housing Stability: Low Risk  (12/3/2024)    Housing Stability Vital Sign     Unable to Pay for Housing in the Last Year: No     Homeless in the Last Year: No       EKG   12/2/2024 RSR with HR 63 bpm; left axis deviation; LBBB; when compared with EKG 5/30/2024 LBBB is now present; criteria for inferior infarct are no longer present.   5/30/2024 sinus bradycardia; HR 59 bpm; inferior infarct (cited on or before 11/30/2023); when compared with EKG 11/30/2023 TWI now evident in the inferior leads  11/30/2023 RSR with HR 60 bpm; q waves in leads III and aVF  5/31/2023 sinus bradycardia; HR 50 bpm; o/w normal EKG  7/20/2022 sinus bradycardia; HR 54 bpm; o/w normal EKG      Results for orders placed during the hospital encounter of 12/02/24    Echo    Interpretation Summary    Left Ventricle: The left ventricle is at the upper limits of normal in size. Mildly increased wall thickness. There is mild concentric hypertrophy. There is low normal systolic function with a visually estimated ejection fraction of 50 - 55%. Ejection fraction is approximately 50%. There is normal diastolic function.    Right Ventricle: Normal right ventricular cavity size.    Left Atrium: Left atrium is mildly dilated.    Mitral Valve: There is mild regurgitation with an eccentric jet.    IVC/SVC: Normal venous pressure at 3 mmHg.    ECHO Results for orders placed during the hospital encounter of 05/25/22    Echo Saline Bubble? No    Interpretation Summary  · The left ventricle is normal in size with moderate concentric hypertrophy and normal systolic function.  · The estimated ejection fraction is 55%.  · Atrial  fibrillation not observed.  · Normal left ventricular diastolic function.  · Mild-to-moderate mitral regurgitation.      Results for orders placed during the hospital encounter of 12/02/24    Cardiac catheterization    Conclusion    The Ost Cx to Prox Cx lesion was 100% stenosed.    The Mid Cx lesion was 100% stenosed.    The Prox RCA to Mid RCA lesion was 99% stenosed.    The Mid RCA lesion was 100% stenosed.    The 1st Mrg lesion was 99% stenosed.    The Prox LAD to Mid LAD lesion was 90% stenosed.    The Prox LAD lesion was 75% stenosed.    The Mid LAD to Dist LAD lesion was 60% stenosed.    The left ventricular end diastolic pressure was normal.    The pre-procedure left ventricular end diastolic pressure was 15.    The estimated blood loss was none.    There was three vessel coronary artery disease.    Severe three vessel CAD with patent SVG to LAD and SVG to OM2 and occluded SVG to R-PLV  LM - large with mild diffuse disease  LAD - large with mid LAD 90% stenosis and patent stent. The distal LAD is perfused via SVG graft. The mid LAD fills retrograde and the septals provide collateral filling of the R-PPLV. Distal to the anastomosis there is a 60% distal LAD stenosis. There is a large D1 that has 70% discrete plaque involving the the prox LAD.  LCX - small and is 100% occluded - ; OM1 has a patent SVG graft however there vessel is small and diffusely diseased.  RCA - large, dominant vessel with 100% mid RCA . RCA is diffusely disease with multiple L-R collaterals.  Patent SVG to LAD  Patent SVG to OM1  Known occluded SVG to PLV (unable to engage)  Normal left sided filling pressures, LVEDP - 15mmHg    Plan:  Continue DAPT  Up titrate anti-anginal therapy; increase metoprolol to 50mg qd. Increase IMDUR to 30mg qd. Up titrate as tolerated  Cardiac rehab    The procedure log was documented by Documenter: Marichuy Carlos RN and verified by Alexys Gleason MD.    Date: 12/3/2024  Time: 2:15 PM    [facility]  Results for orders placed during the hospital encounter of 07/19/22    Cardiac catheterization    Conclusion  · There was severe left ventricular systolic dysfunction.  · The left ventricular end diastolic pressure was severely elevated.  · The pre-procedure left ventricular end diastolic pressure was 30.  · The ejection fraction was calculated to be 25%.  · There was no mitral valve regurgitation.  · The Mid LM lesion was 95% stenosed with 15% stenosis post-intervention.  · A STENT S INVERMARTTRON US MR 4.00 X 12MM stent was successfully placed at 14 THERESE for 14 sec.  · The Ost Cx to Prox Cx lesion was 100% stenosed.  · The 1st Mrg lesion was 99% stenosed.  · The Mid Cx lesion was 100% stenosed.  · The Prox RCA to Mid RCA lesion was 99% stenosed.  · The Mid RCA lesion was 100% stenosed.  · The estimated blood loss was none.  · There was three vessel coronary artery disease. There was left main disease.    The procedure log was documented by Documenter: RT Pa and verified by Venu Whitlock MD.    Date: 7/19/2022  Time: 4:10 PM  Impression:     1. Severe 3 vessel coronary artery disease including 90% distal left main stenosis.     2. Patent saphenous vein graft to the mid LAD with 90% mid LAD stenosis above the graft.     3. Patent saphenous vein graft to the OM branch of the circumflex but poor runoff with severe disease either side of the anastomosis.     4. Closed saphenous vein graft to the RCA.  (PL).     5. Dilated AS, ischemic cardiomyopathy with severely impaired left ventricular systolic function, ejection fraction 25%.     6. No significant mitral regurgitation.     7. Successful primary stent of the distal left main coronary artery using a 4.0 x 12 mm negative drawn [place].   Impression:     1. Severe 3 vessel coronary artery disease including 90% distal left main stenosis.     2. Patent saphenous vein graft to the mid LAD with 90% mid LAD stenosis above the graft.     3. Patent saphenous vein  graft to the OM branch of the circumflex but poor runoff with severe disease either side of the anastomosis.     4. Closed saphenous vein graft to the RCA.  (PL).     5. Dilated AS, ischemic cardiomyopathy with severely impaired left ventricular systolic function, ejection fraction 25%.     6. No significant mitral regurgitation.     7. Successful primary stent of the distal left main coronary artery using a 4.0 x 12 mm negative drawn [place].     S/p CABG 1/23/2020- Dr. Levy  SVG to the dLAD; SVG to the OM and SVG to the PL           Lab Results   Component Value Date     12/03/2024    K 4.2 12/03/2024     12/03/2024    CO2 26 12/03/2024    BUN 35 (H) 12/03/2024    CREATININE 1.60 (H) 12/03/2024    CALCIUM 8.9 12/03/2024    ANIONGAP 13 12/03/2024    ESTGFRAFRICA 47 12/12/2020    EGFRNONAA 32 (L) 07/14/2022       Lab Results   Component Value Date    CHOL 110 12/03/2024    CHOL 122 06/12/2024    CHOL 78 12/11/2023     Lab Results   Component Value Date    HDL 24 (L) 12/03/2024    HDL 28 (L) 06/12/2024    HDL 24 (L) 12/11/2023     Lab Results   Component Value Date    LDLCALC 55 12/03/2024    LDLCALC 56 06/12/2024    LDLCALC 21 12/11/2023     Lab Results   Component Value Date    TRIG 153 (H) 12/03/2024    TRIG 191 (H) 06/12/2024    TRIG 166 (H) 12/11/2023     Lab Results   Component Value Date    CHOLHDL 4.6 12/03/2024    CHOLHDL 4.4 06/12/2024    CHOLHDL 3.3 12/11/2023       Lab Results   Component Value Date    WBC 9.65 12/03/2024    HGB 11.5 (L) 12/03/2024    HCT 38.4 (L) 12/03/2024    MCV 84.4 12/03/2024     12/03/2024           Current Outpatient Medications:     amLODIPine (NORVASC) 5 MG tablet, Take 1 tablet (5 mg total) by mouth once daily., Disp: 90 tablet, Rfl: 1    aspirin (ECOTRIN) 81 MG EC tablet, Take 81 mg by mouth once daily., Disp: , Rfl:     cholecalciferol, vitamin D3, (VITAMIN D3) 25 mcg (1,000 unit) capsule, Take 1,000 Units by mouth once daily., Disp: , Rfl:     cinnamon  bark 500 mg capsule, Take 500 mg by mouth once daily. , Disp: , Rfl:     clopidogreL (PLAVIX) 75 mg tablet, Take 1 tablet (75 mg total) by mouth once daily., Disp: 30 tablet, Rfl: 11    cyanocobalamin (VITAMIN B-12) 1000 MCG tablet, Take 100 mcg by mouth once daily., Disp: , Rfl:     EScitalopram oxalate (LEXAPRO) 10 MG tablet, Take 1 tablet (10 mg total) by mouth once daily., Disp: 90 tablet, Rfl: 0    FARXIGA 10 mg tablet, TAKE ONE TABLET BY MOUTH ONCE DAILY, Disp: 90 tablet, Rfl: 0    fenofibrate (TRICOR) 145 MG tablet, TAKE 1 TABLET (145 MG TOTAL) BY MOUTH ONCE DAILY., Disp: 90 tablet, Rfl: 0    gabapentin (NEURONTIN) 300 MG capsule, TAKE ONE CAPSULE BY MOUTH THREE TIMES DAILY, Disp: 270 capsule, Rfl: 0    hydrALAZINE (APRESOLINE) 100 MG tablet, Take 1 tablet (100 mg total) by mouth every 12 (twelve) hours., Disp: 180 tablet, Rfl: 3    HYDROcodone-acetaminophen (NORCO)  mg per tablet, Take 1 tablet by mouth every 8 (eight) hours as needed for Pain (pain)., Disp: 90 tablet, Rfl: 0    [START ON 12/11/2024] HYDROcodone-acetaminophen (NORCO)  mg per tablet, Take 1 tablet by mouth every 8 (eight) hours as needed for Pain (pain)., Disp: 90 tablet, Rfl: 0    isosorbide mononitrate (IMDUR) 30 MG 24 hr tablet, Take 1 tablet (30 mg total) by mouth once daily., Disp: 90 tablet, Rfl: 3    liraglutide 0.6 mg/0.1 mL, 18 mg/3 mL, subq PNIJ (VICTOZA 2-CAITLIN) 0.6 mg/0.1 mL (18 mg/3 mL) PnIj pen, Inject 1.8 mg into the skin once daily., Disp: 27 mL, Rfl: 0    lisinopriL (PRINIVIL,ZESTRIL) 20 MG tablet, Take 1 tablet (20 mg total) by mouth once daily., Disp: 90 tablet, Rfl: 1    magnesium 250 mg Tab, Take 1 tablet by mouth once daily., Disp: , Rfl:     metoprolol succinate (TOPROL-XL) 50 MG 24 hr tablet, Take 1 tablet (50 mg total) by mouth once daily., Disp: 90 tablet, Rfl: 3    naloxone (NARCAN) 4 mg/actuation Spry, 1 spray by Nasal route once., Disp: , Rfl:     nitroGLYCERIN (NITROSTAT) 0.4 MG SL tablet, Place 1  "tablet (0.4 mg total) under the tongue every 5 (five) minutes as needed for Chest pain., Disp: 25 tablet, Rfl: 3    omeprazole (PRILOSEC) 20 MG capsule, TAKE 1 CAPSULE (20 MG TOTAL) BY MOUTH ONCE DAILY., Disp: 90 capsule, Rfl: 0    rosuvastatin (CRESTOR) 40 MG Tab, TAKE 1 TABLET (40 MG TOTAL) BY MOUTH EVERY EVENING., Disp: 90 tablet, Rfl: 1    TRESIBA FLEXTOUCH U-200 200 unit/mL (3 mL) insulin pen, Inject into the skin once daily. On a sort of sliding scale depending on his BG readings; has a Freestyle [place] reader, Disp: , Rfl:     VASCEPA 1 gram Cap, Take 2 capsules by mouth 2 (two) times a day., Disp: , Rfl:     vitamin A 8000 UNIT capsule, Take 8,000 Units by mouth., Disp: , Rfl:     vitamin E 400 UNIT capsule, Take 400 Units by mouth once daily. , Disp: , Rfl:   Meds reviewed but not reconciled. He did not bring his meds today.      Review of Systems   Respiratory:  Positive for shortness of breath.    Cardiovascular:  Positive for chest pain and palpitations. Negative for orthopnea, claudication, leg swelling and PND.   Neurological:  Negative for dizziness, loss of consciousness and weakness.           Objective:   /62 (BP Location: Left arm, Patient Position: Sitting)   Pulse 66   Ht 5' 11" (1.803 m)   Wt 85.5 kg (188 lb 6.4 oz)   SpO2 95%   BMI 26.28 kg/m²     Physical Exam  Vitals and nursing note reviewed.   Constitutional:       Appearance: Normal appearance. He is normal weight.   HENT:      Head: Normocephalic and atraumatic.   Neck:      Vascular: No carotid bruit.   Cardiovascular:      Rate and Rhythm: Normal rate and regular rhythm.      Pulses: Normal pulses.      Heart sounds: Normal heart sounds.   Pulmonary:      Effort: Pulmonary effort is normal.      Breath sounds: Normal breath sounds.   Abdominal:      Palpations: Abdomen is soft.   Musculoskeletal:      Cervical back: Neck supple.      Right lower leg: No edema.      Left lower leg: No edema.   Skin:     General: Skin is warm " and dry.      Capillary Refill: Capillary refill takes less than 2 seconds.   Neurological:      Mental Status: He is alert.           Assessment:     1. Suspected sleep apnea  Ambulatory referral/consult to Sleep Disorders      2. Hypertension, unspecified type  hydrALAZINE (APRESOLINE) 100 MG tablet    metoprolol succinate (TOPROL-XL) 50 MG 24 hr tablet      3. Stable angina pectoris  metoprolol succinate (TOPROL-XL) 50 MG 24 hr tablet    isosorbide mononitrate (IMDUR) 30 MG 24 hr tablet      4. Coronary artery disease involving native coronary artery of native heart, unspecified whether angina present  nitroGLYCERIN (NITROSTAT) 0.4 MG SL tablet      5. Chest pain due to myocardial ischemia, unspecified ischemic chest pain type  nitroGLYCERIN (NITROSTAT) 0.4 MG SL tablet      6. PVD (peripheral vascular disease)        7. Old MI (myocardial infarction)        8. Mitral valve insufficiency, unspecified etiology        9. Left ventricular diastolic dysfunction        10. Mixed hyperlipidemia        11. Chronic systolic congestive heart failure        12. Obstructive sleep apnea        13. Shortness of breath on exertion              Plan:   PVD (peripheral vascular disease)  02/05/2024  h/o bilateral fem-pop; former smoker   Followed by Dr. Forrest    Old MI (myocardial infarction)  x3    Mitral regurgitation  Mild MR by echo 12/3/2024    Left ventricular diastolic dysfunction  LVEDP 30 mmHg 1/15/2020  LVEDP 15 mmHg 12/3/2024    Hypertension  122/62 mmHg    Hyperlipidemia  Lab Results   Component Value Date    CHOL 110 12/03/2024    CHOL 122 06/12/2024    CHOL 78 12/11/2023     Lab Results   Component Value Date    HDL 24 (L) 12/03/2024    HDL 28 (L) 06/12/2024    HDL 24 (L) 12/11/2023     Lab Results   Component Value Date    LDLCALC 55 12/03/2024    LDLCALC 56 06/12/2024    LDLCALC 21 12/11/2023     Lab Results   Component Value Date    TRIG 153 (H) 12/03/2024    TRIG 191 (H) 06/12/2024    TRIG 166 (H) 12/11/2023  "      Lab Results   Component Value Date    CHOLHDL 4.6 12/03/2024    CHOLHDL 4.4 06/12/2024    CHOLHDL 3.3 12/11/2023     Crestor 40 mg po daily  Lipids followed by PCP    Coronary artery disease  S/p CABG 1/23/2020- Dr. Levy  SVG to the dLAD; SVG to the OM and SVG to the PL  2/7/2019 BECKIE dLAD and mid LAD to overlap the BECKIE in the dLAD  [facility] 12/3/2024 3 vessel CAD with patent SVG to LAD and OM; collateral circulation from septal perforators to the R-PDA; medical management recommended. He was started on Imdur and his dose of metoprolol was increased.      Continue ASA/Plavix/Zetia/tricor/Crestor       CHF (congestive heart failure)  Patient has  heart failure with improved LVEF that is Chronic. On presentation their CHF was well compensated. Most recent BNP and echo results are listed below.  No results for input(s): "BNP" in the last 72 hours.  Latest ECHO  Results for orders placed during the hospital encounter of 12/02/24    Echo    Interpretation Summary    Left Ventricle: The left ventricle is at the upper limits of normal in size. Mildly increased wall thickness. There is mild concentric hypertrophy. There is low normal systolic function with a visually estimated ejection fraction of 50 - 55%. Ejection fraction is approximately 50%. There is normal diastolic function.    Right Ventricle: Normal right ventricular cavity size.    Left Atrium: Left atrium is mildly dilated.    Mitral Valve: There is mild regurgitation with an eccentric jet.    IVC/SVC: Normal venous pressure at 3 mmHg.    Current Heart Failure Medications  hydrALAZINE (APRESOLINE) tablet, Every 12 hours, Oral  metoprolol (TOPROL-XL) 24 hr tablet, Daily, Oral          Obstructive sleep apnea  Patient states he had an appointment to be evaluated for BRAXTON when Covid hit. He reports that he has had witnessed sleep apnea while in the ED. He has had two episodes of chest pain since hospital DC that awakened him from sleep and he felt his heart racing " and sob suspicious for BRAXTON.    Re-refer to sleep medicine.    Shortness of breath on exertion  Chronic and stable since CABG 1/2020      RTC:3 months

## 2024-12-10 NOTE — ASSESSMENT & PLAN NOTE
S/p CABG 1/23/2020- Dr. Levy  SVG to the dLAD; SVG to the OM and SVG to the PL  2/7/2019 BECKIE dLAD and mid LAD to overlap the BECKIE in the dLAD  [facility] 12/3/2024 3 vessel CAD with patent SVG to LAD and OM; collateral circulation from septal perforators to the R-PDA; medical management recommended. He was started on Imdur and his dose of metoprolol was increased.      Continue ASA/Plavix/Zetia/tricor/Crestor

## 2024-12-10 NOTE — ASSESSMENT & PLAN NOTE
Patient states he had an appointment to be evaluated for BRAXTON when Covid hit. He reports that he has had witnessed sleep apnea while in the ED. He has had two episodes of chest pain since hospital DC that awakened him from sleep and he felt his heart racing and sob suspicious for BRAXTON.    Re-refer to sleep medicine.

## 2024-12-16 ENCOUNTER — OFFICE VISIT (OUTPATIENT)
Dept: FAMILY MEDICINE | Facility: CLINIC | Age: 69
End: 2024-12-16
Payer: MEDICARE

## 2024-12-16 VITALS
SYSTOLIC BLOOD PRESSURE: 136 MMHG | OXYGEN SATURATION: 97 % | WEIGHT: 193.19 LBS | HEIGHT: 71 IN | DIASTOLIC BLOOD PRESSURE: 66 MMHG | BODY MASS INDEX: 27.05 KG/M2 | TEMPERATURE: 98 F | HEART RATE: 65 BPM

## 2024-12-16 DIAGNOSIS — E11.40 TYPE 2 DIABETES MELLITUS WITH DIABETIC NEUROPATHY, WITH LONG-TERM CURRENT USE OF INSULIN: ICD-10-CM

## 2024-12-16 DIAGNOSIS — Z79.4 TYPE 2 DIABETES MELLITUS WITH DIABETIC NEUROPATHY, WITH LONG-TERM CURRENT USE OF INSULIN: ICD-10-CM

## 2024-12-16 DIAGNOSIS — I25.810 CORONARY ARTERY DISEASE INVOLVING CORONARY BYPASS GRAFT WITHOUT ANGINA PECTORIS, UNSPECIFIED WHETHER NATIVE OR TRANSPLANTED HEART: ICD-10-CM

## 2024-12-16 DIAGNOSIS — E78.5 HYPERLIPIDEMIA, UNSPECIFIED HYPERLIPIDEMIA TYPE: ICD-10-CM

## 2024-12-16 DIAGNOSIS — G62.9 NEUROPATHY: Primary | ICD-10-CM

## 2024-12-16 DIAGNOSIS — I73.9 PVD (PERIPHERAL VASCULAR DISEASE): ICD-10-CM

## 2024-12-16 DIAGNOSIS — I10 HYPERTENSION, UNSPECIFIED TYPE: Primary | ICD-10-CM

## 2024-12-16 PROCEDURE — 3052F HG A1C>EQUAL 8.0%<EQUAL 9.0%: CPT | Mod: ,,, | Performed by: FAMILY MEDICINE

## 2024-12-16 PROCEDURE — 3008F BODY MASS INDEX DOCD: CPT | Mod: ,,, | Performed by: FAMILY MEDICINE

## 2024-12-16 PROCEDURE — 3288F FALL RISK ASSESSMENT DOCD: CPT | Mod: ,,, | Performed by: FAMILY MEDICINE

## 2024-12-16 PROCEDURE — 1101F PT FALLS ASSESS-DOCD LE1/YR: CPT | Mod: ,,, | Performed by: FAMILY MEDICINE

## 2024-12-16 PROCEDURE — 99214 OFFICE O/P EST MOD 30 MIN: CPT | Mod: 25,,, | Performed by: FAMILY MEDICINE

## 2024-12-16 PROCEDURE — 4010F ACE/ARB THERAPY RXD/TAKEN: CPT | Mod: ,,, | Performed by: FAMILY MEDICINE

## 2024-12-16 PROCEDURE — 3060F POS MICROALBUMINURIA REV: CPT | Mod: ,,, | Performed by: FAMILY MEDICINE

## 2024-12-16 PROCEDURE — 3066F NEPHROPATHY DOC TX: CPT | Mod: ,,, | Performed by: FAMILY MEDICINE

## 2024-12-16 PROCEDURE — 3078F DIAST BP <80 MM HG: CPT | Mod: ,,, | Performed by: FAMILY MEDICINE

## 2024-12-16 PROCEDURE — 3075F SYST BP GE 130 - 139MM HG: CPT | Mod: ,,, | Performed by: FAMILY MEDICINE

## 2024-12-16 PROCEDURE — 96372 THER/PROPH/DIAG INJ SC/IM: CPT | Mod: ,,, | Performed by: FAMILY MEDICINE

## 2024-12-16 RX ORDER — METHYLPREDNISOLONE ACETATE 80 MG/ML
20 INJECTION, SUSPENSION INTRA-ARTICULAR; INTRALESIONAL; INTRAMUSCULAR; SOFT TISSUE
Status: DISCONTINUED | OUTPATIENT
Start: 2024-12-16 | End: 2024-12-16

## 2024-12-16 RX ORDER — GABAPENTIN 300 MG/1
300 CAPSULE ORAL 3 TIMES DAILY
Qty: 270 CAPSULE | Refills: 0 | Status: SHIPPED | OUTPATIENT
Start: 2024-12-16

## 2024-12-16 RX ORDER — CEFTRIAXONE 1 G/1
1 INJECTION, POWDER, FOR SOLUTION INTRAMUSCULAR; INTRAVENOUS
Status: DISCONTINUED | OUTPATIENT
Start: 2024-12-16 | End: 2024-12-16

## 2024-12-16 RX ORDER — CEFTRIAXONE 1 G/1
1 INJECTION, POWDER, FOR SOLUTION INTRAMUSCULAR; INTRAVENOUS
Status: COMPLETED | OUTPATIENT
Start: 2024-12-16 | End: 2024-12-16

## 2024-12-16 RX ORDER — DEXAMETHASONE SODIUM PHOSPHATE 4 MG/ML
2 INJECTION, SOLUTION INTRA-ARTICULAR; INTRALESIONAL; INTRAMUSCULAR; INTRAVENOUS; SOFT TISSUE
Status: DISCONTINUED | OUTPATIENT
Start: 2024-12-16 | End: 2024-12-16

## 2024-12-16 RX ORDER — CEFUROXIME AXETIL 250 MG/1
250 TABLET ORAL EVERY 12 HOURS
Qty: 20 TABLET | Refills: 0 | Status: SHIPPED | OUTPATIENT
Start: 2024-12-16

## 2024-12-16 RX ORDER — DEXAMETHASONE SODIUM PHOSPHATE 4 MG/ML
2 INJECTION, SOLUTION INTRA-ARTICULAR; INTRALESIONAL; INTRAMUSCULAR; INTRAVENOUS; SOFT TISSUE
Status: COMPLETED | OUTPATIENT
Start: 2024-12-16 | End: 2024-12-16

## 2024-12-16 RX ORDER — METHYLPREDNISOLONE ACETATE 80 MG/ML
20 INJECTION, SUSPENSION INTRA-ARTICULAR; INTRALESIONAL; INTRAMUSCULAR; SOFT TISSUE
Status: COMPLETED | OUTPATIENT
Start: 2024-12-16 | End: 2024-12-16

## 2024-12-16 RX ADMIN — METHYLPREDNISOLONE ACETATE 20 MG: 80 INJECTION, SUSPENSION INTRA-ARTICULAR; INTRALESIONAL; INTRAMUSCULAR; SOFT TISSUE at 09:12

## 2024-12-16 RX ADMIN — CEFTRIAXONE 1 G: 1 INJECTION, POWDER, FOR SOLUTION INTRAMUSCULAR; INTRAVENOUS at 09:12

## 2024-12-16 RX ADMIN — DEXAMETHASONE SODIUM PHOSPHATE 2 MG: 4 INJECTION, SOLUTION INTRA-ARTICULAR; INTRALESIONAL; INTRAMUSCULAR; INTRAVENOUS; SOFT TISSUE at 09:12

## 2024-12-19 NOTE — PROGRESS NOTES
Subjective:         Patient ID: Micheal Taylor is a 69 y.o. male.    Chief Complaint: Low-back Pain      Pain  This is a chronic problem. The current episode started more than 1 year ago. The problem occurs daily. The problem has been unchanged. Associated symptoms include arthralgias and neck pain. Pertinent negatives include no anorexia, chest pain, chills, coughing, diaphoresis, fever, sore throat, vertigo or vomiting.     Review of Systems   Constitutional:  Negative for activity change, appetite change, chills, diaphoresis, fever and unexpected weight change.   HENT:  Negative for drooling, ear discharge, ear pain, facial swelling, mouth sores, nosebleeds, sore throat, trouble swallowing, voice change and goiter.    Eyes:  Negative for photophobia, pain, discharge, redness and visual disturbance.   Respiratory:  Negative for apnea, cough, choking, chest tightness, shortness of breath, wheezing and stridor.    Cardiovascular:  Negative for chest pain, palpitations and leg swelling.   Gastrointestinal:  Negative for abdominal distention, anorexia, diarrhea, rectal pain, vomiting and fecal incontinence.   Endocrine: Negative for cold intolerance, heat intolerance, polydipsia, polyphagia and polyuria.   Genitourinary:  Negative for bladder incontinence, dysuria, flank pain and frequency.   Musculoskeletal:  Positive for arthralgias, back pain, leg pain, neck pain and neck stiffness.   Integumentary:  Negative for color change and pallor.   Neurological:  Negative for dizziness, vertigo, seizures, syncope, facial asymmetry, speech difficulty, light-headedness, memory loss and coordination difficulties.   Hematological:  Negative for adenopathy. Does not bruise/bleed easily.   Psychiatric/Behavioral:  Negative for agitation, behavioral problems, confusion, decreased concentration, dysphoric mood, hallucinations, self-injury and suicidal ideas. The patient is not nervous/anxious and is not hyperactive.            Past  Medical History:   Diagnosis Date    Anticoagulant long-term use     CHF (congestive heart failure)     Chronic pain syndrome     Coronary artery disease     Diabetes mellitus, type 2     Hyperlipidemia     Hypertension     Lumbar spondylosis     Lumbosacral radiculopathy     Mild nonproliferative diabetic retinopathy of both eyes without macular edema 09/09/2022    Obstructive sleep apnea 12/3/2024    Pain in thoracic spine     PVD (peripheral vascular disease)     Renal disorder     Spondylosis without myelopathy or radiculopathy, cervical region      Past Surgical History:   Procedure Laterality Date    ADENOIDECTOMY      AMPUTATION      left index finger removed     ANGIOGRAM, CORONARY, WITH LEFT HEART CATHETERIZATION N/A 12/3/2024    Procedure: Angiogram, Coronary, with Left Heart Cath;  Surgeon: Alexys Gleason MD;  Location: [facility] CATH LAB;  Service: Cardiology;  Laterality: N/A;    CAUDAL EPIDURAL STEROID INJECTION  01/19/2016    Caudal NATHAN - Alee    CIRCUMCISION      CORONARY ARTERY BYPASS GRAFT (CABG)      EYE SURGERY      FOOT SURGERY      heel surgery    HEEL SPUR SURGERY      INJECTION OF FACET JOINT Bilateral 04/16/2015    Bilateral L3-S1 Facet Injection - A[facility] - 4/16/15, 11/15/12 and 9/27/12    LEFT HEART CATHETERIZATION Left 07/19/2022    Procedure: Left heart cath;  Surgeon: Venu Whitlock MD;  Location: [facility] CATH LAB;  Service: Cardiology;  Laterality: Left;  Patient has PAD and has had bilateral fem pop bypass. He is followed by Dr. Forrest.    RADIOFREQUENCY THERMOCOAGULATION Left 02/26/2013    Left L3-S1 RFTC -Alee    RADIOFREQUENCY THERMOCOAGULATION Right 02/05/2013    Right L3-S1 RFTC - [facility]    TONSILLECTOMY, ADENOIDECTOMY, BILATERAL MYRINGOTOMY AND TUBES      TRANSFORAMINAL EPIDURAL INJECTION OF STEROID Left 06/19/2012    Left L3-4 TFESI - Alee    TRANSFORAMINAL EPIDURAL INJECTION OF STEROID Right 04/12/2013    Right L3-4 TFESI - Alee - 4/12/13 and 8/7/12    VASCULAR SURGERY        Social History     Socioeconomic History    Marital status:     Number of children: 5   Occupational History    Occupation: retired   Tobacco Use    Smoking status: Former     Current packs/day: 0.00     Average packs/day: 4.0 packs/day for 30.0 years (120.0 ttl pk-yrs)     Types: Cigarettes     Start date:      Quit date:      Years since quittin.0     Passive exposure: Past (parent)    Smokeless tobacco: Current     Types: Chew   Substance and Sexual Activity    Alcohol use: Not Currently    Drug use: Yes     Types: Hydrocodone    Sexual activity: Not Currently     Social Drivers of Health     Financial Resource Strain: Low Risk  (12/3/2024)    Overall Financial Resource Strain (CARDIA)     Difficulty of Paying Living Expenses: Not hard at all   Food Insecurity: No Food Insecurity (12/3/2024)    Hunger Vital Sign     Worried About Running Out of Food in the Last Year: Never true     Ran Out of Food in the Last Year: Never true   Transportation Needs: No Transportation Needs (12/3/2024)    TRANSPORTATION NEEDS     Transportation : No   Physical Activity: Inactive (12/3/2024)    Exercise Vital Sign     Days of Exercise per Week: 0 days     Minutes of Exercise per Session: 0 min   Stress: No Stress Concern Present (12/3/2024)    [employer] - Occupational Stress Questionnaire     Feeling of Stress : Not at all   Housing Stability: Low Risk  (12/3/2024)    Housing Stability Vital Sign     Unable to Pay for Housing in the Last Year: No     Homeless in the Last Year: No     Family History   Problem Relation Name Age of Onset    Diabetes Mother      Heart disease Mother      Hypertension Mother      Liver cancer Mother      Heart disease Father      Hypertension Father       Review of patient's allergies indicates:  No Known Allergies     Objective:  Vitals:    25 0910   BP: (!) 161/67   Pulse: 78   Weight: 88.5 kg (195 lb)   Height: 6' (1.829 m)   PainSc:   8              Physical Exam  Vitals and nursing note reviewed. Exam conducted with a chaperone present.   Constitutional:       General: He is awake.      Appearance: Normal appearance. He is not ill-appearing, toxic-appearing or diaphoretic.   HENT:      Head: Normocephalic and atraumatic.      Nose: Nose normal.      Mouth/Throat:      Mouth: Mucous membranes are moist.      Pharynx: Oropharynx is clear.   Eyes:      Conjunctiva/sclera: Conjunctivae normal.      Pupils: Pupils are equal, round, and reactive to light.   Cardiovascular:      Rate and Rhythm: Normal rate.   Pulmonary:      Effort: Pulmonary effort is normal. No respiratory distress.   Abdominal:      Palpations: Abdomen is soft.   Musculoskeletal:      Right shoulder: Tenderness present.      Left shoulder: Tenderness present.      Cervical back: Normal range of motion and neck supple. Tenderness present.      Thoracic back: Tenderness present.      Lumbar back: Tenderness present. Decreased range of motion.   Skin:     General: Skin is warm and dry.   Neurological:      General: No focal deficit present.      Mental Status: He is alert and oriented to person, place, and time. Mental status is at baseline.      Cranial Nerves: No cranial nerve deficit (II-XII).   Psychiatric:         Mood and Affect: Mood normal.         Behavior: Behavior normal. Behavior is cooperative.         Thought Content: Thought content normal.           Cardiac catheterization    The Ost Cx to Prox Cx lesion was 100% stenosed.    The Mid Cx lesion was 100% stenosed.    The Prox RCA to Mid RCA lesion was 99% stenosed.    The Mid RCA lesion was 100% stenosed.    The 1st Mrg lesion was 99% stenosed.    The Prox LAD to Mid LAD lesion was 90% stenosed.    The Prox LAD lesion was 75% stenosed.    The Mid LAD to Dist LAD lesion was 60% stenosed.    The left ventricular end diastolic pressure was normal.    The pre-procedure left ventricular end diastolic pressure was 15.    The  estimated blood loss was none.    There was three vessel coronary artery disease.    Severe three vessel CAD with patent SVG to LAD and SVG to OM2 and occluded   SVG to R-PLV  LM - large with mild diffuse disease  LAD - large with mid LAD 90% stenosis and patent stent. The distal LAD is   perfused via SVG graft. The mid LAD fills retrograde and the septals   provide collateral filling of the R-PPLV. Distal to the anastomosis there   is a 60% distal LAD stenosis. There is a large D1 that has 70% discrete   plaque involving the the prox LAD.   LCX - small and is 100% occluded - ; OM1 has a patent SVG graft however   there vessel is small and diffusely diseased.   RCA - large, dominant vessel with 100% mid RCA . RCA is diffusely   disease with multiple L-R collaterals.   Patent SVG to LAD  Patent SVG to OM1  Known occluded SVG to PLV (unable to engage)  Normal left sided filling pressures, LVEDP - 15mmHg    Plan:  Continue DAPT  Up titrate anti-anginal therapy; increase metoprolol to 50mg qd. Increase   IMDUR to 30mg qd. Up titrate as tolerated  Cardiac rehab    The procedure log was documented by Documenter: Marichuy Carlos RN and   verified by Alexys Gleason MD.    Date: 12/3/2024  Time: 2:15 PM       No results displayed because visit has over 200 results.      Office Visit on 12/02/2024   Component Date Value Ref Range Status    QRS Duration 12/02/2024 132  ms Final    [facility] QTC Calculation 12/02/2024 456  ms Final   Office Visit on 10/10/2024   Component Date Value Ref Range Status    POC Amphetamines 10/10/2024 Negative  Negative, Inconclusive Final    POC Barbiturates 10/10/2024 Negative  Negative, Inconclusive Final    POC Benzodiazepines 10/10/2024 Negative  Negative, Inconclusive Final    POC Cocaine 10/10/2024 Negative  Negative, Inconclusive Final    POC THC 10/10/2024 Negative  Negative, Inconclusive Final    POC Methadone 10/10/2024 Negative  Negative, Inconclusive Final    POC Methamphetamine  10/10/2024 Negative  Negative, Inconclusive Final    POC Opiates 10/10/2024 Presumptive Positive (A)  Negative, Inconclusive Final    POC Oxycodone 10/10/2024 Negative  Negative, Inconclusive Final    POC Phencyclidine 10/10/2024 Negative  Negative, Inconclusive Final    POC Methylenedioxymethamphetamine * 10/10/2024 Negative  Negative, Inconclusive Final    POC Tricyclic Antidepressants 10/10/2024 Negative  Negative, Inconclusive Final    POC Buprenorphine 10/10/2024 Negative   Final     Acceptable 10/10/2024 Yes   Final    POC Temperature (Urine) 10/10/2024 92   Final   Office Visit on 07/10/2024   Component Date Value Ref Range Status    POC Amphetamines 07/10/2024 Negative  Negative, Inconclusive Final    POC Barbiturates 07/10/2024 Negative  Negative, Inconclusive Final    POC Benzodiazepines 07/10/2024 Negative  Negative, Inconclusive Final    POC Cocaine 07/10/2024 Negative  Negative, Inconclusive Final    POC THC 07/10/2024 Negative  Negative, Inconclusive Final    POC Methadone 07/10/2024 Negative  Negative, Inconclusive Final    POC Methamphetamine 07/10/2024 Negative  Negative, Inconclusive Final    POC Opiates 07/10/2024 Presumptive Positive (A)  Negative, Inconclusive Final    POC Oxycodone 07/10/2024 Negative  Negative, Inconclusive Final    POC Phencyclidine 07/10/2024 Negative  Negative, Inconclusive Final    POC Methylenedioxymethamphetamine * 07/10/2024 Negative  Negative, Inconclusive Final    POC Tricyclic Antidepressants 07/10/2024 Negative  Negative, Inconclusive Final    POC Buprenorphine 07/10/2024 Negative   Final     Acceptable 07/10/2024 Yes   Final    POC Temperature (Urine) 07/10/2024 92   Final         Orders Placed This Encounter   Procedures    POCT Urine Drug Screen Presump     Interpretive Information:     Negative:  No drug detected at the cut off level.   Positive:  This result represents presumptive positive for the   tested drug, other substances  may yield a positive response other   than the analyte of interest. This result should be utilized for   diagnostic purpose only. Confirmation testing will be performed upon physician request only.            Requested Prescriptions     Signed Prescriptions Disp Refills    HYDROcodone-acetaminophen (NORCO)  mg per tablet 90 tablet 0     Sig: Take 1 tablet by mouth every 8 (eight) hours as needed for Pain (pain).    HYDROcodone-acetaminophen (NORCO)  mg per tablet 90 tablet 0     Sig: Take 1 tablet by mouth every 8 (eight) hours as needed for Pain (pain).    HYDROcodone-acetaminophen (NORCO)  mg per tablet 90 tablet 0     Sig: Take 1 tablet by mouth every 8 (eight) hours as needed for Pain (pain).       Assessment:     1. Lumbosacral radiculopathy    2. Cervical spondylosis without myelopathy    3. Postlaminectomy syndrome, lumbar region    4. Encounter for long-term (current) use of medications             A's of Opioid Risk Assessment  Activity:Patient can perform ADL.   Analgesia:Patients pain is partially controlled by current medication. Patient has tried OTC medications such as Tylenol and Ibuprofen with out relief.   Adverse Effects: Patient denies constipation or sedation.  Aberrant Behavior:  reviewed with no aberrant drug seeking/taking behavior.  Overdose reversal drug naloxone discussed    Drug screen reviewed      Plan:    Narcan January 2023    Anticoagulated Plavix    [facility] closed on weekends      He states he is doing quite well current medication is helping control his discomfort    Continue home exercise program as directed    Continue current medication    Follow-up 3 months    Dr. Callahan, July 2025    Bring original prescription medication bottles/container/box with labels to each visit

## 2024-12-31 NOTE — PROGRESS NOTES
Rodney Morel MD   [facility]  [address] [place] [address]     PATIENT NAME: Micheal Taylor  : 1955  DATE: 24  MRN: [id number]      Billing Provider: Rodney Morel MD  Level of Service: UT OFFICE/OUTPT VISIT, EST, LEVL IV, 30-39 MIN  Patient PCP Information       Provider PCP Type    Rodney Morel MD General            Reason for Visit / Chief Complaint: Transitional Care (TCC: patient went to Dr Galicia cardiology  was [facility] patient had heart cath 12/3 with 3 blockages. They did not do any stents due to placement of blockages increased metoprolol to 50mg daily, started isosorbide 30mg and increased hydralazine to 100mg 2x a day. Patient follows back up with cardiology .) and Sinusitis (Nasal congestion x1 week)         History of Present Illness / Problem Focused Workflow     Micheal Taylor presents to the clinic with Transitional Care (TCC: patient went to Dr Galicia cardiology  [facility] patient had heart cath 12/3 with 3 blockages. They did not do any stents due to placement of blockages increased metoprolol to 50mg daily, started isosorbide 30mg and increased hydralazine to 100mg 2x a day. Patient follows back up with cardiology .) and Sinusitis (Nasal congestion x1 week)     HPI    Review of Systems     Review of Systems   Constitutional:  Negative for activity change, appetite change, fatigue and fever.   HENT:  Negative for nasal congestion, ear pain, hearing loss, sinus pressure/congestion and sore throat.    Respiratory:  Negative for cough, chest tightness and shortness of breath.    Cardiovascular:  Positive for palpitations. Negative for chest pain.   Gastrointestinal:  Negative for abdominal pain and fecal incontinence.   Genitourinary:  Negative for bladder incontinence, difficulty urinating and erectile dysfunction.   Musculoskeletal:  Negative for arthralgias.    Integumentary:  Negative for rash.   Neurological:  Negative for dizziness and headaches.        Medical / Social / Family History     Past Medical History:   Diagnosis Date    Anticoagulant long-term use     CHF (congestive heart failure)     Chronic pain syndrome     Coronary artery disease     Diabetes mellitus, type 2     Hyperlipidemia     Hypertension     Lumbar spondylosis     Lumbosacral radiculopathy     Mild nonproliferative diabetic retinopathy of both eyes without macular edema 09/09/2022    Obstructive sleep apnea 12/3/2024    Pain in thoracic spine     PVD (peripheral vascular disease)     Renal disorder     Spondylosis without myelopathy or radiculopathy, cervical region        Past Surgical History:   Procedure Laterality Date    ADENOIDECTOMY      AMPUTATION      left index finger removed     ANGIOGRAM, CORONARY, WITH LEFT HEART CATHETERIZATION N/A 12/3/2024    Procedure: Angiogram, Coronary, with Left Heart Cath;  Surgeon: Alexys Gleason MD;  Location: [facility] CATH LAB;  Service: Cardiology;  Laterality: N/A;    CAUDAL EPIDURAL STEROID INJECTION  01/19/2016    Caudal NATHAN - Alee    CIRCUMCISION      CORONARY ARTERY BYPASS GRAFT (CABG)      EYE SURGERY      FOOT SURGERY      heel surgery    HEEL SPUR SURGERY      INJECTION OF FACET JOINT Bilateral 04/16/2015    Bilateral L3-S1 Facet Injection - Alee - 4/16/15, 11/15/12 and 9/27/12    LEFT HEART CATHETERIZATION Left 07/19/2022    Procedure: Left heart cath;  Surgeon: Venu Whitlock MD;  Location: [facility] CATH LAB;  Service: Cardiology;  Laterality: Left;  Patient has PAD and has had bilateral fem pop bypass. He is followed by Dr. Forrest.    RADIOFREQUENCY THERMOCOAGULATION Left 02/26/2013    Left L3-S1 RFTC -Alee    RADIOFREQUENCY THERMOCOAGULATION Right 02/05/2013    Right L3-S1 RFTC - Alee    TONSILLECTOMY, ADENOIDECTOMY, BILATERAL MYRINGOTOMY AND TUBES      TRANSFORAMINAL EPIDURAL INJECTION OF STEROID Left 06/19/2012    Left L3-4 TFESI -  Alee    TRANSFORAMINAL EPIDURAL INJECTION OF STEROID Right 04/12/2013    Right L3-4 TFESI - Alee - 4/12/13 and 8/7/12    VASCULAR SURGERY         Social History  Micheal Taylor  reports that he quit smoking about 20 years ago. His smoking use included cigarettes. He started smoking about 50 years ago. He has a 120 pack-year smoking history. He has been exposed to tobacco smoke. His smokeless tobacco use includes chew. He reports that he does not currently use alcohol. He reports current drug use. Drug: Hydrocodone.    Family History  Micheal Taylor  family history includes Diabetes in his mother; Heart disease in his father and mother; Hypertension in his father and mother; Liver cancer in his mother.    Medications and Allergies     Medications  Outpatient Medications Marked as Taking for the 12/16/24 encounter (Office Visit) with Rodney Morel MD   Medication Sig Dispense Refill    amLODIPine (NORVASC) 5 MG tablet Take 1 tablet (5 mg total) by mouth once daily. 90 tablet 1    aspirin (ECOTRIN) 81 MG EC tablet Take 81 mg by mouth once daily.      cholecalciferol, vitamin D3, (VITAMIN D3) 25 mcg (1,000 unit) capsule Take 1,000 Units by mouth once daily.      cinnamon bark 500 mg capsule Take 500 mg by mouth once daily.       clopidogreL (PLAVIX) 75 mg tablet Take 1 tablet (75 mg total) by mouth once daily. 30 tablet 11    cyanocobalamin (VITAMIN B-12) 1000 MCG tablet Take 100 mcg by mouth once daily.      EScitalopram oxalate (LEXAPRO) 10 MG tablet Take 1 tablet (10 mg total) by mouth once daily. 90 tablet 0    FARXIGA 10 mg tablet TAKE ONE TABLET BY MOUTH ONCE DAILY 90 tablet 0    fenofibrate (TRICOR) 145 MG tablet TAKE 1 TABLET (145 MG TOTAL) BY MOUTH ONCE DAILY. 90 tablet 0    hydrALAZINE (APRESOLINE) 100 MG tablet Take 1 tablet (100 mg total) by mouth every 12 (twelve) hours. 180 tablet 3    isosorbide mononitrate (IMDUR) 30 MG 24 hr tablet Take 1 tablet (30 mg total) by mouth once daily. 90 tablet 3     liraglutide 0.6 mg/0.1 mL, 18 mg/3 mL, subq PNIJ (VICTOZA 2-CAITLIN) 0.6 mg/0.1 mL (18 mg/3 mL) PnIj pen Inject 1.8 mg into the skin once daily. 27 mL 0    lisinopriL (PRINIVIL,ZESTRIL) 20 MG tablet Take 1 tablet (20 mg total) by mouth once daily. 90 tablet 1    magnesium 250 mg Tab Take 1 tablet by mouth once daily.      metoprolol succinate (TOPROL-XL) 50 MG 24 hr tablet Take 1 tablet (50 mg total) by mouth once daily. 90 tablet 3    naloxone (NARCAN) 4 mg/actuation Spry 1 spray by Nasal route once.      nitroGLYCERIN (NITROSTAT) 0.4 MG SL tablet Place 1 tablet (0.4 mg total) under the tongue every 5 (five) minutes as needed for Chest pain. 25 tablet 3    omeprazole (PRILOSEC) 20 MG capsule TAKE 1 CAPSULE (20 MG TOTAL) BY MOUTH ONCE DAILY. 90 capsule 0    rosuvastatin (CRESTOR) 40 MG Tab TAKE 1 TABLET (40 MG TOTAL) BY MOUTH EVERY EVENING. 90 tablet 1    TRESIBA FLEXTOUCH U-200 200 unit/mL (3 mL) insulin pen Inject into the skin once daily. On a sort of sliding scale depending on his BG readings; has a Freestyle Felton reader      VASCEPA 1 gram Cap Take 2 capsules by mouth 2 (two) times a day.      vitamin A 8000 UNIT capsule Take 8,000 Units by mouth.      vitamin E 400 UNIT capsule Take 400 Units by mouth once daily.       [DISCONTINUED] gabapentin (NEURONTIN) 300 MG capsule TAKE ONE CAPSULE BY MOUTH THREE TIMES DAILY 270 capsule 0       Allergies  Review of patient's allergies indicates:  No Known Allergies    Physical Examination     Vitals:    12/16/24 0919   BP: 136/66   Pulse: 65   Temp: 97.7 °F (36.5 °C)     Physical Exam  Constitutional:       General: He is not in acute distress.     Appearance: He is not ill-appearing.   HENT:      Head: Normocephalic and atraumatic.      Right Ear: Tympanic membrane and ear canal normal.      Left Ear: Tympanic membrane and ear canal normal.      Nose: Nose normal. No congestion or rhinorrhea.   Eyes:      Pupils: Pupils are equal, round, and reactive to light.    Cardiovascular:      Rate and Rhythm: Normal rate and regular rhythm.      Pulses: Normal pulses.      Heart sounds: No murmur heard.  Pulmonary:      Effort: No respiratory distress.      Breath sounds: No wheezing, rhonchi or rales.   Abdominal:      General: Bowel sounds are normal.      Palpations: Abdomen is soft.      Tenderness: There is no abdominal tenderness.      Hernia: No hernia is present.   Musculoskeletal:      Cervical back: Normal range of motion and neck supple.   Lymphadenopathy:      Cervical: No cervical adenopathy.   Skin:     General: Skin is warm and dry.   Neurological:      Mental Status: He is alert.   Psychiatric:         Behavior: Behavior normal.         Thought Content: Thought content normal.          Assessment and Plan (including Health Maintenance)   :    Plan:         Health Maintenance Due   Topic Date Due    TETANUS VACCINE  Never done    Colorectal Cancer Screening  Never done    RSV Vaccine (Age 60+ and Pregnant patients) (1 - Risk 60-74 years 1-dose series) Never done    Influenza Vaccine (1) 09/01/2024    COVID-19 Vaccine (1 - 2024-25 season) Never done    PROSTATE-SPECIFIC ANTIGEN  12/11/2024       Problem List Items Addressed This Visit          Cardiac/Vascular    Coronary artery disease    Overview     S/p CABG 1/23/2020- Dr. Levy  SVG to the dLAD; SVG to the OM and SVG to the PL  2/7/2019 BECKIE dLAD and mid LAD to overlap the BECKIE in the dLAD         Hyperlipidemia    Hypertension - Primary    PVD (peripheral vascular disease)    Overview     Patient reports h/o iliac stents bilaterally by Dr. Forrest            Endocrine    Type 2 diabetes mellitus with diabetic neuropathy, with long-term current use of insulin     Hypertension, unspecified type    Hyperlipidemia, unspecified hyperlipidemia type    Type 2 diabetes mellitus with diabetic neuropathy, with long-term current use of insulin    Coronary artery disease involving coronary bypass graft without angina pectoris,  unspecified whether native or transplanted heart    PVD (peripheral vascular disease)    Other orders  -     Discontinue: dexAMETHasone injection 2 mg  -     Discontinue: methylPREDNISolone acetate injection 20 mg  -     cefTRIAXone injection 1 g  -     methylPREDNISolone acetate injection 20 mg  -     dexAMETHasone injection 2 mg  -     Discontinue: cefTRIAXone injection 1 g  -     cefUROXime (CEFTIN) 250 MG tablet; Take 1 tablet (250 mg total) by mouth every 12 (twelve) hours.  Dispense: 20 tablet; Refill: 0       Health Maintenance Topics with due status: Not Due       Topic Last Completion Date    Foot Exam 03/26/2024    Diabetes Urine Screening 06/12/2024    Eye Exam 10/21/2024    Hemoglobin A1c 12/02/2024    Lipid Panel 12/03/2024    High Dose Statin 12/16/2024       Procedures     Future Appointments   Date Time Provider Department Center   1/9/2025  9:00 AM Carlos Greene PA [facility]   2/3/2025  1:00 PM [facility] VAS US1 [facility] V[facility]   2/3/2025  2:30 PM [facility]   3/17/2025  9:45 AM Patricia Galicia ACNP OB CARD [facility]   4/1/2025  1:00 PM Pinky Huang FNP [facility]   6/16/2025  8:40 [facility]        No follow-ups on file.       Signature:  Rodney Morel MD  [facility]  [address] [place] [address]  [phone] Phone  [phone] Fax    Date of encounter: 12/16/24

## 2025-01-09 ENCOUNTER — OFFICE VISIT (OUTPATIENT)
Dept: PAIN MEDICINE | Facility: CLINIC | Age: 70
End: 2025-01-09
Payer: MEDICARE

## 2025-01-09 VITALS
WEIGHT: 195 LBS | HEIGHT: 72 IN | SYSTOLIC BLOOD PRESSURE: 161 MMHG | DIASTOLIC BLOOD PRESSURE: 67 MMHG | HEART RATE: 78 BPM | BODY MASS INDEX: 26.41 KG/M2

## 2025-01-09 DIAGNOSIS — M96.1 POSTLAMINECTOMY SYNDROME, LUMBAR REGION: Chronic | ICD-10-CM

## 2025-01-09 DIAGNOSIS — M47.812 CERVICAL SPONDYLOSIS WITHOUT MYELOPATHY: Chronic | ICD-10-CM

## 2025-01-09 DIAGNOSIS — Z79.899 ENCOUNTER FOR LONG-TERM (CURRENT) USE OF MEDICATIONS: ICD-10-CM

## 2025-01-09 DIAGNOSIS — M54.17 LUMBOSACRAL RADICULOPATHY: Primary | Chronic | ICD-10-CM

## 2025-01-09 PROCEDURE — 80305 DRUG TEST PRSMV DIR OPT OBS: CPT | Mod: PBBFAC | Performed by: PHYSICIAN ASSISTANT

## 2025-01-09 PROCEDURE — 3288F FALL RISK ASSESSMENT DOCD: CPT | Mod: CPTII,,, | Performed by: PHYSICIAN ASSISTANT

## 2025-01-09 PROCEDURE — 3077F SYST BP >= 140 MM HG: CPT | Mod: CPTII,,, | Performed by: PHYSICIAN ASSISTANT

## 2025-01-09 PROCEDURE — 1125F AMNT PAIN NOTED PAIN PRSNT: CPT | Mod: CPTII,,, | Performed by: PHYSICIAN ASSISTANT

## 2025-01-09 PROCEDURE — 99999 PR PBB SHADOW E&M-EST. PATIENT-LVL V: CPT | Mod: PBBFAC,,, | Performed by: PHYSICIAN ASSISTANT

## 2025-01-09 PROCEDURE — 3008F BODY MASS INDEX DOCD: CPT | Mod: CPTII,,, | Performed by: PHYSICIAN ASSISTANT

## 2025-01-09 PROCEDURE — 99214 OFFICE O/P EST MOD 30 MIN: CPT | Mod: S$PBB,,, | Performed by: PHYSICIAN ASSISTANT

## 2025-01-09 PROCEDURE — 3078F DIAST BP <80 MM HG: CPT | Mod: CPTII,,, | Performed by: PHYSICIAN ASSISTANT

## 2025-01-09 PROCEDURE — 1101F PT FALLS ASSESS-DOCD LE1/YR: CPT | Mod: CPTII,,, | Performed by: PHYSICIAN ASSISTANT

## 2025-01-09 PROCEDURE — 1159F MED LIST DOCD IN RCRD: CPT | Mod: CPTII,,, | Performed by: PHYSICIAN ASSISTANT

## 2025-01-09 PROCEDURE — 99215 OFFICE O/P EST HI 40 MIN: CPT | Mod: PBBFAC | Performed by: PHYSICIAN ASSISTANT

## 2025-01-09 PROCEDURE — 99999PBSHW POCT URINE DRUG SCREEN PRESUMP: Mod: PBBFAC,,,

## 2025-01-09 RX ORDER — HYDROCODONE BITARTRATE AND ACETAMINOPHEN 10; 325 MG/1; MG/1
1 TABLET ORAL EVERY 8 HOURS PRN
Qty: 90 TABLET | Refills: 0 | Status: SHIPPED | OUTPATIENT
Start: 2025-02-07 | End: 2025-03-09

## 2025-01-09 RX ORDER — HYDROCODONE BITARTRATE AND ACETAMINOPHEN 10; 325 MG/1; MG/1
1 TABLET ORAL EVERY 8 HOURS PRN
Qty: 90 TABLET | Refills: 0 | Status: SHIPPED | OUTPATIENT
Start: 2025-03-11 | End: 2025-04-10

## 2025-01-09 RX ORDER — HYDROCODONE BITARTRATE AND ACETAMINOPHEN 10; 325 MG/1; MG/1
1 TABLET ORAL EVERY 8 HOURS PRN
Qty: 90 TABLET | Refills: 0 | Status: SHIPPED | OUTPATIENT
Start: 2025-01-09 | End: 2025-02-08

## 2025-02-03 ENCOUNTER — HOSPITAL ENCOUNTER (OUTPATIENT)
Dept: RADIOLOGY | Facility: HOSPITAL | Age: 70
Discharge: HOME | End: 2025-02-03
Attending: NURSE PRACTITIONER
Payer: MEDICARE

## 2025-02-03 DIAGNOSIS — I73.9 PVD (PERIPHERAL VASCULAR DISEASE): ICD-10-CM

## 2025-02-03 DIAGNOSIS — E78.5 HYPERLIPIDEMIA, UNSPECIFIED HYPERLIPIDEMIA TYPE: ICD-10-CM

## 2025-02-03 PROCEDURE — 93923 UPR/LXTR ART STDY 3+ LVLS: CPT | Mod: TC

## 2025-02-03 PROCEDURE — 93923 UPR/LXTR ART STDY 3+ LVLS: CPT | Mod: 26,,, | Performed by: SURGERY

## 2025-02-03 NOTE — TELEPHONE ENCOUNTER
----- Message from Griselda sent at 2/3/2025  4:23 PM CST -----  Emeka is requesting refills on fenofibrate and omeprazole to be sent to [facility]. He is changing pharmacies

## 2025-02-04 RX ORDER — OMEPRAZOLE 20 MG/1
20 CAPSULE, DELAYED RELEASE ORAL DAILY
Qty: 90 CAPSULE | Refills: 0 | Status: SHIPPED | OUTPATIENT
Start: 2025-02-04 | End: 2025-02-05 | Stop reason: SDUPTHER

## 2025-02-04 RX ORDER — FENOFIBRATE 145 MG/1
145 TABLET, FILM COATED ORAL DAILY
Qty: 90 TABLET | Refills: 0 | Status: SHIPPED | OUTPATIENT
Start: 2025-02-04 | End: 2025-02-05 | Stop reason: SDUPTHER

## 2025-02-05 DIAGNOSIS — E78.5 HYPERLIPIDEMIA, UNSPECIFIED HYPERLIPIDEMIA TYPE: ICD-10-CM

## 2025-02-05 RX ORDER — OMEPRAZOLE 20 MG/1
20 CAPSULE, DELAYED RELEASE ORAL DAILY
Qty: 90 CAPSULE | Refills: 1 | Status: SHIPPED | OUTPATIENT
Start: 2025-02-05

## 2025-02-05 RX ORDER — ESCITALOPRAM OXALATE 10 MG/1
10 TABLET ORAL DAILY
Qty: 90 TABLET | Refills: 1 | Status: SHIPPED | OUTPATIENT
Start: 2025-02-05

## 2025-02-05 RX ORDER — FENOFIBRATE 145 MG/1
145 TABLET, FILM COATED ORAL DAILY
Qty: 90 TABLET | Refills: 1 | Status: SHIPPED | OUTPATIENT
Start: 2025-02-05

## 2025-02-05 NOTE — TELEPHONE ENCOUNTER
Patient reports no longer using mail order. Patient request medication to be sent to [facility].

## 2025-02-05 NOTE — TELEPHONE ENCOUNTER
----- Message from Caprice sent at 2/5/2025  1:16 PM CST -----  All meds refill. [facility]. [phone]

## 2025-02-06 DIAGNOSIS — G47.30 SLEEP APNEA, UNSPECIFIED: Primary | ICD-10-CM

## 2025-02-10 ENCOUNTER — TELEPHONE (OUTPATIENT)
Dept: SURGERY | Facility: HOSPITAL | Age: 70
End: 2025-02-10
Payer: MEDICARE

## 2025-02-10 DIAGNOSIS — I73.9 PVD (PERIPHERAL VASCULAR DISEASE): Primary | ICD-10-CM

## 2025-02-10 RX ORDER — CLOPIDOGREL BISULFATE 75 MG/1
75 TABLET ORAL DAILY
Qty: 90 TABLET | Refills: 3 | Status: SHIPPED | OUTPATIENT
Start: 2025-02-10 | End: 2026-02-10

## 2025-02-10 NOTE — TELEPHONE ENCOUNTER
Vascular surgery    Notify patient of MARYSE results right MARYSE 0.92 left MARYSE normal 1.0    Decreased TBI on the right 0.54 and decreased on the left 0.47 patient denies nonhealing wounds to feet    Denies claudication symptoms reports back pain history of back surgery previous bilateral fem-pop    Refill Plavix    Repeat arterial duplex with MARYSE in 1 year call or return sooner for any issues

## 2025-02-16 ENCOUNTER — PROCEDURE VISIT (OUTPATIENT)
Dept: SLEEP MEDICINE | Facility: HOSPITAL | Age: 70
End: 2025-02-16
Attending: INTERNAL MEDICINE
Payer: MEDICARE

## 2025-02-16 DIAGNOSIS — G47.30 SLEEP APNEA, UNSPECIFIED: ICD-10-CM

## 2025-02-16 PROCEDURE — 95810 POLYSOM 6/> YRS 4/> PARAM: CPT | Mod: PO

## 2025-02-17 DIAGNOSIS — G47.33 OBSTRUCTIVE SLEEP APNEA (ADULT) (PEDIATRIC): Primary | ICD-10-CM

## 2025-02-18 ENCOUNTER — PROCEDURE VISIT (OUTPATIENT)
Dept: SLEEP MEDICINE | Facility: HOSPITAL | Age: 70
End: 2025-02-18
Attending: INTERNAL MEDICINE
Payer: MEDICARE

## 2025-02-18 DIAGNOSIS — G47.33 OBSTRUCTIVE SLEEP APNEA (ADULT) (PEDIATRIC): ICD-10-CM

## 2025-02-18 PROCEDURE — 95811 POLYSOM 6/>YRS CPAP 4/> PARM: CPT | Mod: PO

## 2025-03-17 ENCOUNTER — OFFICE VISIT (OUTPATIENT)
Dept: CARDIOLOGY | Facility: CLINIC | Age: 70
End: 2025-03-17
Payer: MEDICARE

## 2025-03-17 VITALS
SYSTOLIC BLOOD PRESSURE: 112 MMHG | DIASTOLIC BLOOD PRESSURE: 62 MMHG | OXYGEN SATURATION: 96 % | BODY MASS INDEX: 27.2 KG/M2 | HEART RATE: 58 BPM | HEIGHT: 71 IN

## 2025-03-17 DIAGNOSIS — I25.10 CORONARY ARTERY DISEASE, UNSPECIFIED VESSEL OR LESION TYPE, UNSPECIFIED WHETHER ANGINA PRESENT, UNSPECIFIED WHETHER NATIVE OR TRANSPLANTED HEART: Primary | ICD-10-CM

## 2025-03-17 DIAGNOSIS — G47.33 OBSTRUCTIVE SLEEP APNEA: ICD-10-CM

## 2025-03-17 DIAGNOSIS — I25.9 CHEST PAIN DUE TO MYOCARDIAL ISCHEMIA, UNSPECIFIED ISCHEMIC CHEST PAIN TYPE: ICD-10-CM

## 2025-03-17 DIAGNOSIS — E78.2 MIXED HYPERLIPIDEMIA: ICD-10-CM

## 2025-03-17 DIAGNOSIS — I25.10 CORONARY ARTERY DISEASE INVOLVING NATIVE CORONARY ARTERY OF NATIVE HEART, UNSPECIFIED WHETHER ANGINA PRESENT: ICD-10-CM

## 2025-03-17 DIAGNOSIS — I10 HYPERTENSION, UNSPECIFIED TYPE: ICD-10-CM

## 2025-03-17 DIAGNOSIS — I50.22 CHRONIC SYSTOLIC CONGESTIVE HEART FAILURE: ICD-10-CM

## 2025-03-17 DIAGNOSIS — I35.8 AORTIC VALVE SCLEROSIS: ICD-10-CM

## 2025-03-17 PROCEDURE — 93010 ELECTROCARDIOGRAM REPORT: CPT | Mod: S$PBB,,, | Performed by: INTERNAL MEDICINE

## 2025-03-17 PROCEDURE — 1160F RVW MEDS BY RX/DR IN RCRD: CPT | Mod: CPTII,,, | Performed by: NURSE PRACTITIONER

## 2025-03-17 PROCEDURE — 99214 OFFICE O/P EST MOD 30 MIN: CPT | Mod: S$PBB,,, | Performed by: NURSE PRACTITIONER

## 2025-03-17 PROCEDURE — 3288F FALL RISK ASSESSMENT DOCD: CPT | Mod: CPTII,,, | Performed by: NURSE PRACTITIONER

## 2025-03-17 PROCEDURE — 99215 OFFICE O/P EST HI 40 MIN: CPT | Mod: PBBFAC | Performed by: NURSE PRACTITIONER

## 2025-03-17 PROCEDURE — 93005 ELECTROCARDIOGRAM TRACING: CPT | Mod: PBBFAC | Performed by: INTERNAL MEDICINE

## 2025-03-17 PROCEDURE — 3078F DIAST BP <80 MM HG: CPT | Mod: CPTII,,, | Performed by: NURSE PRACTITIONER

## 2025-03-17 PROCEDURE — 1159F MED LIST DOCD IN RCRD: CPT | Mod: CPTII,,, | Performed by: NURSE PRACTITIONER

## 2025-03-17 PROCEDURE — 3008F BODY MASS INDEX DOCD: CPT | Mod: CPTII,,, | Performed by: NURSE PRACTITIONER

## 2025-03-17 PROCEDURE — 99999 PR PBB SHADOW E&M-EST. PATIENT-LVL V: CPT | Mod: PBBFAC,,, | Performed by: NURSE PRACTITIONER

## 2025-03-17 PROCEDURE — 3074F SYST BP LT 130 MM HG: CPT | Mod: CPTII,,, | Performed by: NURSE PRACTITIONER

## 2025-03-17 PROCEDURE — 1125F AMNT PAIN NOTED PAIN PRSNT: CPT | Mod: CPTII,,, | Performed by: NURSE PRACTITIONER

## 2025-03-17 PROCEDURE — 1101F PT FALLS ASSESS-DOCD LE1/YR: CPT | Mod: CPTII,,, | Performed by: NURSE PRACTITIONER

## 2025-03-17 RX ORDER — NITROGLYCERIN 0.4 MG/1
0.4 TABLET SUBLINGUAL EVERY 5 MIN PRN
Qty: 25 TABLET | Refills: 3 | Status: SHIPPED | OUTPATIENT
Start: 2025-03-17 | End: 2025-03-17 | Stop reason: SDUPTHER

## 2025-03-17 RX ORDER — NITROGLYCERIN 0.4 MG/1
0.4 TABLET SUBLINGUAL EVERY 5 MIN PRN
Qty: 25 TABLET | Refills: 3 | Status: SHIPPED | OUTPATIENT
Start: 2025-03-17 | End: 2026-03-17

## 2025-03-17 NOTE — ASSESSMENT & PLAN NOTE
Lab Results   Component Value Date    CHOL 110 12/03/2024    CHOL 122 06/12/2024    CHOL 78 12/11/2023     Lab Results   Component Value Date    HDL 24 (L) 12/03/2024    HDL 28 (L) 06/12/2024    HDL 24 (L) 12/11/2023     Lab Results   Component Value Date    LDLCALC 55 12/03/2024    LDLCALC 56 06/12/2024    LDLCALC 21 12/11/2023     Lab Results   Component Value Date    TRIG 153 (H) 12/03/2024    TRIG 191 (H) 06/12/2024    TRIG 166 (H) 12/11/2023       Lab Results   Component Value Date    CHOLHDL 4.6 12/03/2024    CHOLHDL 4.4 06/12/2024    CHOLHDL 3.3 12/11/2023     Crestor 40 mg po daily  Tricor 145 mg po daily  Vascepa 2 gm po bid  Lipids followed by PCP

## 2025-03-17 NOTE — ASSESSMENT & PLAN NOTE
"Patient has  heart failure with normalized LVEF that is Chronic. On presentation their CHF was well compensated. Most recent BNP and echo results are listed below.  No results for input(s): "BNP" in the last 72 hours.  Latest ECHO  Results for orders placed during the hospital encounter of 12/02/24    Echo    Interpretation Summary    Left Ventricle: The left ventricle is at the upper limits of normal in size. Mildly increased wall thickness. There is mild concentric hypertrophy. There is low normal systolic function with a visually estimated ejection fraction of 50 - 55%. Ejection fraction is approximately 50%. There is normal diastolic function.    Right Ventricle: Normal right ventricular cavity size.    Left Atrium: Left atrium is mildly dilated.    Mitral Valve: There is mild regurgitation with an eccentric jet.    IVC/SVC: Normal venous pressure at 3 mmHg.    Current Heart Failure Medications   Lisinopril, metoprolol succinate, Farxiga, hydralazine/Imdur    Plan  - Monitor strict I&Os and daily weights.    - Low sodium diet  "

## 2025-03-17 NOTE — ASSESSMENT & PLAN NOTE
S/p CABG 1/23/2020- Dr. Levy  SVG to the dLAD; SVG to the OM and SVG to the PL  2/7/2019 BECKIE dLAD and mid LAD to overlap the BECKIE in the dLAD  [facility] 12/3/2024 3 vessel CAD with patent SVG to LAD and OM; collateral circulation from septal perforators to the R-PDA; medical management recommended. He has had no chest pain in the last month on current meds with the addition of CPAP.        Continue ASA/Plavix/Zetia/tricor/Crestor

## 2025-03-17 NOTE — PROGRESS NOTES
"PCP: Rodney Morel MD    Referring Provider:   Chief Complaint   Patient presents with    Congestive Heart Failure    Edema     Goes down at night.    Shortness of Breath     Sometimes with exertion      Subjective:   Micheal Taylor is a 69 y.o. male with hx of CAD, HTN, DM, CHF, HLD, and PVD,  who presents for 3 month follow up. He states that he has had no chest pain in the last month. He states, "My medicine has kicked in". He can walk father than he did before without chest pain. He has also been wearing his CPAP for 1 month and feels more energy and less ZURITA.     12/10/2024 presents for HD follow up after admission to [facility] 12/2/2024 for ACS and LHC  12/3/2024 3 vessel CAD with patent SVG to LAD and OM; collateral circulation from septal perforators to the R-PDA; medical management recommended. He was started on Imdur and his dose of metoprolol was increased.He reports that he is doing much better with the med changes. He has had issues with  two episodes of chest pain since hospital DC that awakened him from sleep and he felt his heart racing and sob suspicious for BRAXTON.Patient states he had an appointment to be evaluated for BRAXTON when Covid hit. He reports that he has had witnessed sleep apnea while in the ED.    12/2/2023 presents for routine follow up. Patient reports more frequent episodes of angina over the last 1-2 months. He has limited his activity but has continued to have ongoing issues with chest discomfort walking a few feet. He describes this as substernal chest pain radiating to both shoulders which he identifies as the same pain as he had prior to previous MI and revascularization. He had a particularly bad episodes yesterday with severe pain which as relieved with rest and one sublingual ntg. His EKG today demonstrates a new LBBB.   Case reviewed with Dr. Howell who agrees the patient should be referred to the ED for further work up and LHC.  This was discussed the patient and his " "family member and they are in agreement. Report was called to the ED and the cardiology hospital team was notified.     2024 presents for routine follow up. He states that he is doing well and denies complaints. He does have chronic, dependent edema of the LLE since his injury of his left foot and the saphenous vein harvest from the LLE at time of CABG.     2023 presents for routine follow up. He reports episodes of left shoulder pain; dull pan similar to MI pain; resolved with rest and at times one sublingual ntg; Discomfort does not occur with exertion but after "working all day" occurs later in the evening. Patient feels may be r/t muscle pain from over use. This has been ongoing for at least 6-8 months and has occurred only 2-3 times in the last 6 months.      2023 presents for routine follow up. He states that he is doing well and has had no cardiac issues.         Fhx:  Family History   Problem Relation Name Age of Onset    Diabetes Mother      Heart disease Mother      Hypertension Mother      Liver cancer Mother      Heart disease Father      Hypertension Father       Shx:   Social History     Socioeconomic History    Marital status:     Number of children: 5   Occupational History    Occupation: retired   Tobacco Use    Smoking status: Former     Current packs/day: 0.00     Average packs/day: 4.0 packs/day for 30.0 years (120.0 ttl pk-yrs)     Types: Cigarettes     Start date:      Quit date:      Years since quittin.2     Passive exposure: Past (parent)    Smokeless tobacco: Current     Types: Chew   Substance and Sexual Activity    Alcohol use: Not Currently    Drug use: Yes     Types: Hydrocodone    Sexual activity: Not Currently     Social Drivers of Health     Financial Resource Strain: Low Risk  (12/3/2024)    Overall Financial Resource Strain (CARDIA)     Difficulty of Paying Living Expenses: Not hard at all   Food Insecurity: No Food Insecurity (12/3/2024)    " Hunger Vital Sign     Worried About Running Out of Food in the Last Year: Never true     Ran Out of Food in the Last Year: Never true   Transportation Needs: No Transportation Needs (12/3/2024)    TRANSPORTATION NEEDS     Transportation : No   Physical Activity: Inactive (12/3/2024)    Exercise Vital Sign     Days of Exercise per Week: 0 days     Minutes of Exercise per Session: 0 min   Stress: No Stress Concern Present (12/3/2024)    [employer] - Occupational Stress Questionnaire     Feeling of Stress : Not at all   Housing Stability: Unknown (12/3/2024)    Housing Stability Vital Sign     Unable to Pay for Housing in the Last Year: No     Homeless in the Last Year: No       EKG   3/17/2025 sinus bradycardia; HR 56 bpm; sinus arrhythmia with occ PVC; left axis deviation; LVH with QRS widening; compared with EKG 2/2/2024 lateral TWA resolved  12/2/2024 RSR with HR 63 bpm; left axis deviation; LBBB; when compared with EKG 5/30/2024 LBBB is now present; criteria for inferior infarct are no longer present.   5/30/2024 sinus bradycardia; HR 59 bpm; inferior infarct (cited on or before 11/30/2023); when compared with EKG 11/30/2023 TWI now evident in the inferior leads  11/30/2023 RSR with HR 60 bpm; q waves in leads III and aVF  5/31/2023 sinus bradycardia; HR 50 bpm; o/w normal EKG  7/20/2022 sinus bradycardia; HR 54 bpm; o/w normal EKG      Results for orders placed during the hospital encounter of 12/02/24    Echo    Interpretation Summary    Left Ventricle: The left ventricle is at the upper limits of normal in size. Mildly increased wall thickness. There is mild concentric hypertrophy. There is low normal systolic function with a visually estimated ejection fraction of 50 - 55%. Ejection fraction is approximately 50%. There is normal diastolic function.    Right Ventricle: Normal right ventricular cavity size.    Left Atrium: Left atrium is mildly dilated.    Mitral Valve: There is  mild regurgitation with an eccentric jet.    IVC/SVC: Normal venous pressure at 3 mmHg.    ECHO Results for orders placed during the hospital encounter of 05/25/22    Echo Saline Bubble? No    Interpretation Summary  · The left ventricle is normal in size with moderate concentric hypertrophy and normal systolic function.  · The estimated ejection fraction is 55%.  · Atrial fibrillation not observed.  · Normal left ventricular diastolic function.  · Mild-to-moderate mitral regurgitation.      Results for orders placed during the hospital encounter of 12/02/24    Cardiac catheterization    Conclusion    The Ost Cx to Prox Cx lesion was 100% stenosed.    The Mid Cx lesion was 100% stenosed.    The Prox RCA to Mid RCA lesion was 99% stenosed.    The Mid RCA lesion was 100% stenosed.    The 1st Mrg lesion was 99% stenosed.    The Prox LAD to Mid LAD lesion was 90% stenosed.    The Prox LAD lesion was 75% stenosed.    The Mid LAD to Dist LAD lesion was 60% stenosed.    The left ventricular end diastolic pressure was normal.    The pre-procedure left ventricular end diastolic pressure was 15.    The estimated blood loss was none.    There was three vessel coronary artery disease.    Severe three vessel CAD with patent SVG to LAD and SVG to OM2 and occluded SVG to R-PLV  LM - large with mild diffuse disease  LAD - large with mid LAD 90% stenosis and patent stent. The distal LAD is perfused via SVG graft. The mid LAD fills retrograde and the septals provide collateral filling of the R-PPLV. Distal to the anastomosis there is a 60% distal LAD stenosis. There is a large D1 that has 70% discrete plaque involving the the prox LAD.  LCX - small and is 100% occluded - ; OM1 has a patent SVG graft however there vessel is small and diffusely diseased.  RCA - large, dominant vessel with 100% mid RCA . RCA is diffusely disease with multiple L-R collaterals.  Patent SVG to LAD  Patent SVG to OM1  Known occluded SVG to PLV (unable  to engage)  Normal left sided filling pressures, LVEDP - 15mmHg    Plan:  Continue DAPT  Up titrate anti-anginal therapy; increase metoprolol to 50mg qd. Increase IMDUR to 30mg qd. Up titrate as tolerated  Cardiac rehab    The procedure log was documented by Documenter: Marichuy Carlos RN and verified by Alexys Gleason MD.    Date: 12/3/2024  Time: 2:15 PM    [facility] Results for orders placed during the hospital encounter of 07/19/22    Cardiac catheterization    Conclusion  · There was severe left ventricular systolic dysfunction.  · The left ventricular end diastolic pressure was severely elevated.  · The pre-procedure left ventricular end diastolic pressure was 30.  · The ejection fraction was calculated to be 25%.  · There was no mitral valve regurgitation.  · The Mid LM lesion was 95% stenosed with 15% stenosis post-intervention.  · A STENT S Buysight MR 4.00 X 12MM stent was successfully placed at 14 THERESE for 14 sec.  · The Ost Cx to Prox Cx lesion was 100% stenosed.  · The 1st Mrg lesion was 99% stenosed.  · The Mid Cx lesion was 100% stenosed.  · The Prox RCA to Mid RCA lesion was 99% stenosed.  · The Mid RCA lesion was 100% stenosed.  · The estimated blood loss was none.  · There was three vessel coronary artery disease. There was left main disease.    The procedure log was documented by Documenter: RT Pa and verified by Venu Whitlock MD.    Date: 7/19/2022  Time: 4:10 PM  Impression:     1. Severe 3 vessel coronary artery disease including 90% distal left main stenosis.     2. Patent saphenous vein graft to the mid LAD with 90% mid LAD stenosis above the graft.     3. Patent saphenous vein graft to the OM branch of the circumflex but poor runoff with severe disease either side of the anastomosis.     4. Closed saphenous vein graft to the RCA.  (PL).     5. Dilated AS, ischemic cardiomyopathy with severely impaired left ventricular systolic function, ejection fraction 25%.     6. No  significant mitral regurgitation.     7. Successful primary stent of the distal left main coronary artery using a 4.0 x 12 mm negative drawn [place].   Impression:     1. Severe 3 vessel coronary artery disease including 90% distal left main stenosis.     2. Patent saphenous vein graft to the mid LAD with 90% mid LAD stenosis above the graft.     3. Patent saphenous vein graft to the OM branch of the circumflex but poor runoff with severe disease either side of the anastomosis.     4. Closed saphenous vein graft to the RCA.  (PL).     5. Dilated AS, ischemic cardiomyopathy with severely impaired left ventricular systolic function, ejection fraction 25%.     6. No significant mitral regurgitation.     7. Successful primary stent of the distal left main coronary artery using a 4.0 x 12 mm negative drawn [employer] BECK[place].     S/p CABG 1/23/2020- Dr. Levy  SVG to the dLAD; SVG to the OM and SVG to the PL           Lab Results   Component Value Date     12/03/2024    K 4.2 12/03/2024     12/03/2024    CO2 26 12/03/2024    BUN 35 (H) 12/03/2024    CREATININE 1.60 (H) 12/03/2024    CALCIUM 8.9 12/03/2024    ANIONGAP 13 12/03/2024    ESTGFRAFRICA 47 12/12/2020    EGFRNONAA 32 (L) 07/14/2022       Lab Results   Component Value Date    CHOL 110 12/03/2024    CHOL 122 06/12/2024    CHOL 78 12/11/2023     Lab Results   Component Value Date    HDL 24 (L) 12/03/2024    HDL 28 (L) 06/12/2024    HDL 24 (L) 12/11/2023     Lab Results   Component Value Date    LDLCALC 55 12/03/2024    LDLCALC 56 06/12/2024    LDLCALC 21 12/11/2023     Lab Results   Component Value Date    TRIG 153 (H) 12/03/2024    TRIG 191 (H) 06/12/2024    TRIG 166 (H) 12/11/2023     Lab Results   Component Value Date    CHOLHDL 4.6 12/03/2024    CHOLHDL 4.4 06/12/2024    CHOLHDL 3.3 12/11/2023       Lab Results   Component Value Date    WBC 9.65 12/03/2024    HGB 11.5 (L) 12/03/2024    HCT 38.4 (L) 12/03/2024    MCV 84.4 12/03/2024     12/03/2024            Current Outpatient Medications:     amLODIPine (NORVASC) 5 MG tablet, Take 1 tablet (5 mg total) by mouth once daily., Disp: 90 tablet, Rfl: 1    aspirin (ECOTRIN) 81 MG EC tablet, Take 81 mg by mouth once daily., Disp: , Rfl:     cefUROXime (CEFTIN) 250 MG tablet, Take 1 tablet (250 mg total) by mouth every 12 (twelve) hours., Disp: 20 tablet, Rfl: 0    cholecalciferol, vitamin D3, (VITAMIN D3) 25 mcg (1,000 unit) capsule, Take 1,000 Units by mouth once daily., Disp: , Rfl:     cinnamon bark 500 mg capsule, Take 500 mg by mouth once daily. , Disp: , Rfl:     clopidogreL (PLAVIX) 75 mg tablet, Take 1 tablet (75 mg total) by mouth once daily., Disp: 90 tablet, Rfl: 3    cyanocobalamin (VITAMIN B-12) 1000 MCG tablet, Take 100 mcg by mouth once daily., Disp: , Rfl:     EScitalopram oxalate (LEXAPRO) 10 MG tablet, Take 1 tablet (10 mg total) by mouth once daily., Disp: 90 tablet, Rfl: 1    FARXIGA 10 mg tablet, TAKE ONE TABLET BY MOUTH ONCE DAILY, Disp: 90 tablet, Rfl: 0    fenofibrate (TRICOR) 145 MG tablet, Take 1 tablet (145 mg total) by mouth once daily., Disp: 90 tablet, Rfl: 1    gabapentin (NEURONTIN) 300 MG capsule, Take 1 capsule (300 mg total) by mouth 3 (three) times daily., Disp: 270 capsule, Rfl: 0    hydrALAZINE (APRESOLINE) 100 MG tablet, Take 1 tablet (100 mg total) by mouth every 12 (twelve) hours., Disp: 180 tablet, Rfl: 3    HYDROcodone-acetaminophen (NORCO)  mg per tablet, Take 1 tablet by mouth every 8 (eight) hours as needed for Pain (pain)., Disp: 90 tablet, Rfl: 0    isosorbide mononitrate (IMDUR) 30 MG 24 hr tablet, Take 1 tablet (30 mg total) by mouth once daily., Disp: 90 tablet, Rfl: 3    liraglutide 0.6 mg/0.1 mL, 18 mg/3 mL, subq PNIJ (VICTOZA 2-CAITLIN) 0.6 mg/0.1 mL (18 mg/3 mL) PnIj pen, Inject 1.8 mg into the skin once daily., Disp: 27 mL, Rfl: 0    lisinopriL (PRINIVIL,ZESTRIL) 20 MG tablet, Take 1 tablet (20 mg total) by mouth once daily., Disp: 90 tablet, Rfl: 1     "magnesium 250 mg Tab, Take 1 tablet by mouth once daily., Disp: , Rfl:     metoprolol succinate (TOPROL-XL) 50 MG 24 hr tablet, Take 1 tablet (50 mg total) by mouth once daily., Disp: 90 tablet, Rfl: 3    naloxone (NARCAN) 4 mg/actuation Spry, 1 spray by Nasal route once., Disp: , Rfl:     nitroGLYCERIN (NITROSTAT) 0.4 MG SL tablet, Place 1 tablet (0.4 mg total) under the tongue every 5 (five) minutes as needed for Chest pain., Disp: 25 tablet, Rfl: 3    omeprazole (PRILOSEC) 20 MG capsule, Take 1 capsule (20 mg total) by mouth once daily., Disp: 90 capsule, Rfl: 1    rosuvastatin (CRESTOR) 40 MG Tab, TAKE 1 TABLET (40 MG TOTAL) BY MOUTH EVERY EVENING., Disp: 90 tablet, Rfl: 1    TRESIBA FLEXTOUCH U-200 200 unit/mL (3 mL) insulin pen, Inject into the skin once daily. On a sort of sliding scale depending on his BG readings; has a Freestyle [place] reader, Disp: , Rfl:     VASCEPA 1 gram Cap, Take 2 capsules by mouth 2 (two) times a day., Disp: , Rfl:     vitamin A 8000 UNIT capsule, Take 8,000 Units by mouth., Disp: , Rfl:     vitamin E 400 UNIT capsule, Take 400 Units by mouth once daily. , Disp: , Rfl:   Meds reviewed but not reconciled. He did not bring his meds today.      Review of Systems   Respiratory:  Negative for shortness of breath.    Cardiovascular:  Positive for leg swelling. Negative for chest pain, palpitations, claudication and PND.        Edema improves at HS   Neurological:  Negative for dizziness, loss of consciousness and weakness.           Objective:   /62 (BP Location: Left arm, Patient Position: Sitting)   Pulse (!) 58   Ht 5' 11" (1.803 m)   SpO2 96%   BMI 27.20 kg/m²     Physical Exam  Vitals and nursing note reviewed.   Constitutional:       Appearance: Normal appearance. He is normal weight.   HENT:      Head: Normocephalic and atraumatic.   Neck:      Vascular: No carotid bruit.   Cardiovascular:      Rate and Rhythm: Normal rate and regular rhythm.      Pulses: Normal pulses.    " "  Heart sounds: Normal heart sounds.   Pulmonary:      Effort: Pulmonary effort is normal.      Breath sounds: Normal breath sounds.   Abdominal:      Palpations: Abdomen is soft.   Musculoskeletal:      Cervical back: Neck supple.      Right lower leg: No edema.      Left lower leg: No edema.   Skin:     General: Skin is warm and dry.      Capillary Refill: Capillary refill takes less than 2 seconds.   Neurological:      Mental Status: He is alert.           Assessment:     1. Coronary artery disease, unspecified vessel or lesion type, unspecified whether angina present, unspecified whether native or transplanted heart  EKG 12-lead      2. Aortic valve sclerosis        3. Chronic systolic congestive heart failure        4. Mixed hyperlipidemia        5. Hypertension, unspecified type        6. Obstructive sleep apnea              Plan:   Aortic valve sclerosis  Without AS by echo 12/3/2024    CHF (congestive heart failure)  Patient has  heart failure with normalized LVEF that is Chronic. On presentation their CHF was well compensated. Most recent BNP and echo results are listed below.  No results for input(s): "BNP" in the last 72 hours.  Latest ECHO  Results for orders placed during the hospital encounter of 12/02/24    Echo    Interpretation Summary    Left Ventricle: The left ventricle is at the upper limits of normal in size. Mildly increased wall thickness. There is mild concentric hypertrophy. There is low normal systolic function with a visually estimated ejection fraction of 50 - 55%. Ejection fraction is approximately 50%. There is normal diastolic function.    Right Ventricle: Normal right ventricular cavity size.    Left Atrium: Left atrium is mildly dilated.    Mitral Valve: There is mild regurgitation with an eccentric jet.    IVC/SVC: Normal venous pressure at 3 mmHg.    Current Heart Failure Medications   Lisinopril, metoprolol succinate, Farxiga, hydralazine/Imdur    Plan  - Monitor strict I&Os and " daily weights.    - Low sodium diet    Coronary artery disease  S/p CABG 1/23/2020- Dr. Levy  SVG to the dLAD; SVG to the OM and SVG to the PL  2/7/2019 BECKIE dLAD and mid LAD to overlap the BECKIE in the dLAD  C 12/3/2024 3 vessel CAD with patent SVG to LAD and OM; collateral circulation from septal perforators to the R-PDA; medical management recommended. He has had no chest pain in the last month on current meds with the addition of CPAP.        Continue ASA/Plavix/Zetia/tricor/Crestor          Hyperlipidemia  Lab Results   Component Value Date    CHOL 110 12/03/2024    CHOL 122 06/12/2024    CHOL 78 12/11/2023     Lab Results   Component Value Date    HDL 24 (L) 12/03/2024    HDL 28 (L) 06/12/2024    HDL 24 (L) 12/11/2023     Lab Results   Component Value Date    LDLCALC 55 12/03/2024    LDLCALC 56 06/12/2024    LDLCALC 21 12/11/2023     Lab Results   Component Value Date    TRIG 153 (H) 12/03/2024    TRIG 191 (H) 06/12/2024    TRIG 166 (H) 12/11/2023       Lab Results   Component Value Date    CHOLHDL 4.6 12/03/2024    CHOLHDL 4.4 06/12/2024    CHOLHDL 3.3 12/11/2023     Crestor 40 mg po daily  Tricor 145 mg po daily  Vascepa 2 gm po bid  Lipids followed by PCP    Hypertension  112/62 mmHg    Obstructive sleep apnea  Wearing cpap now x 1 month  Feeling more energy and less ZURITA      RTC:6 months

## 2025-03-18 LAB
OHS QRS DURATION: 136 MS
OHS QTC CALCULATION: 453 MS

## 2025-03-25 DIAGNOSIS — G62.9 NEUROPATHY: ICD-10-CM

## 2025-03-25 DIAGNOSIS — I10 HYPERTENSION, UNSPECIFIED TYPE: ICD-10-CM

## 2025-03-25 RX ORDER — LISINOPRIL 20 MG/1
20 TABLET ORAL DAILY
Qty: 90 TABLET | Refills: 0 | Status: SHIPPED | OUTPATIENT
Start: 2025-03-25

## 2025-03-25 RX ORDER — ROSUVASTATIN CALCIUM 40 MG/1
40 TABLET, COATED ORAL NIGHTLY
Qty: 90 TABLET | Refills: 0 | Status: SHIPPED | OUTPATIENT
Start: 2025-03-25

## 2025-03-25 RX ORDER — GABAPENTIN 300 MG/1
300 CAPSULE ORAL 3 TIMES DAILY
Qty: 270 CAPSULE | Refills: 0 | Status: SHIPPED | OUTPATIENT
Start: 2025-03-25

## 2025-03-25 NOTE — TELEPHONE ENCOUNTER
----- Message from Ghazala sent at 3/25/2025  9:44 AM CDT -----  Regarding: Rx refill  Who Called: Micheal TaylorRefill or New Rx:RefillRX Name and Strength: rosuvastatin (CRESTOR) 40 MG Tab 90 tablet 1 7/30/2024 7/30/2025 NoHow is the patient currently taking it? TAKE 1 TABLET (40 MG TOTAL) BY MOUTH EVERY EVENING. - OralRX Name and Strength:  lisinopriL (PRINIVIL,ZESTRIL) 20 MG tablet 90 tablet 1 5/30/2024 8/26/2026 NoHow is the patient currently taking it?Take 1 tablet (20 mg total) by mouth once daily. - OralIs this a 30 day or 90 day RX: 90 daysLocal or Mail Order: LocalList of preferred pharmacies on file: [facility] [place] [address] [facility] [place] [address]Tsiyq: [phone] Fax: [phone]: Not open 24 hoursPreferred Method of Contact: Phone CallPatient's Preferred Phone Number on File: [phone] Best Call Back Number, if different:Additional Information: Pt. Stated he needs Rx refilled. He is completely out of the lisinopril

## 2025-04-02 RX ORDER — DAPAGLIFLOZIN 10 MG/1
10 TABLET, FILM COATED ORAL
Qty: 60 TABLET | Refills: 2 | Status: SHIPPED | OUTPATIENT
Start: 2025-04-02

## 2025-04-02 NOTE — PROGRESS NOTES
Subjective:         Patient ID: Micheal Taylor is a 69 y.o. male.    Chief Complaint: Low-back Pain (Patient is having low back pain.)      Pain  This is a chronic problem. The current episode started more than 1 year ago. The problem occurs daily. The problem has been waxing and waning. Associated symptoms include arthralgias and neck pain. Pertinent negatives include no anorexia, chest pain, chills, diaphoresis, fever, sore throat, vertigo or vomiting.     Review of Systems   Constitutional:  Negative for activity change, appetite change, chills, diaphoresis, fever and unexpected weight change.   HENT:  Negative for drooling, ear discharge, ear pain, facial swelling, mouth sores, nosebleeds, sore throat, trouble swallowing, voice change and goiter.    Eyes:  Negative for photophobia, pain, discharge, redness and visual disturbance.   Respiratory:  Negative for apnea, choking, chest tightness, shortness of breath, wheezing and stridor.    Cardiovascular:  Negative for chest pain, palpitations and leg swelling.   Gastrointestinal:  Negative for abdominal distention, anorexia, diarrhea, rectal pain, vomiting and fecal incontinence.   Endocrine: Negative for cold intolerance, heat intolerance, polydipsia, polyphagia and polyuria.   Genitourinary:  Negative for bladder incontinence, dysuria, flank pain and frequency.   Musculoskeletal:  Positive for arthralgias, back pain, leg pain, neck pain and neck stiffness.   Integumentary:  Negative for color change and pallor.   Neurological:  Negative for dizziness, vertigo, seizures, syncope, facial asymmetry, speech difficulty, light-headedness and coordination difficulties.   Hematological:  Negative for adenopathy. Does not bruise/bleed easily.   Psychiatric/Behavioral:  Negative for agitation, behavioral problems, confusion, decreased concentration, dysphoric mood, hallucinations, self-injury and suicidal ideas. The patient is not nervous/anxious and is not hyperactive.             Past Medical History:   Diagnosis Date    Anticoagulant long-term use     CHF (congestive heart failure)     Chronic pain syndrome     Coronary artery disease     Diabetes mellitus, type 2     Hyperlipidemia     Hypertension     Lumbar spondylosis     Lumbosacral radiculopathy     Mild nonproliferative diabetic retinopathy of both eyes without macular edema 09/09/2022    Obstructive sleep apnea 12/3/2024    Pain in thoracic spine     PVD (peripheral vascular disease)     Renal disorder     Spondylosis without myelopathy or radiculopathy, cervical region      Past Surgical History:   Procedure Laterality Date    ADENOIDECTOMY      AMPUTATION      left index finger removed     ANGIOGRAM, CORONARY, WITH LEFT HEART CATHETERIZATION N/A 12/3/2024    Procedure: Angiogram, Coronary, with Left Heart Cath;  Surgeon: Alexys Gleason MD;  Location: [facility] CATH LAB;  Service: Cardiology;  Laterality: N/A;    CAUDAL EPIDURAL STEROID INJECTION  01/19/2016    Caudal NATHAN - [facility]    CIRCUMCISION      CORONARY ARTERY BYPASS GRAFT (CABG)      EYE SURGERY      FOOT SURGERY      heel surgery    HEEL SPUR SURGERY      INJECTION OF FACET JOINT Bilateral 04/16/2015    Bilateral L3-S1 Facet Injection - [facility] - 4/16/15, 11/15/12 and 9/27/12    LEFT HEART CATHETERIZATION Left 07/19/2022    Procedure: Left heart cath;  Surgeon: Venu Whitlock MD;  Location: [facility] CATH LAB;  Service: Cardiology;  Laterality: Left;  Patient has PAD and has had bilateral fem pop bypass. He is followed by Dr. Forrest.    RADIOFREQUENCY THERMOCOAGULATION Left 02/26/2013    Left L3-S1 RFTC -[facility]    RADIOFREQUENCY THERMOCOAGULATION Right 02/05/2013    Right L3-S1 RFTC - [facility]    TONSILLECTOMY, ADENOIDECTOMY, BILATERAL MYRINGOTOMY AND TUBES      TRANSFORAMINAL EPIDURAL INJECTION OF STEROID Left 06/19/2012    Left L3-4 TFESI - [facility]    TRANSFORAMINAL EPIDURAL INJECTION OF STEROID Right 04/12/2013    Right L3-4 TFESI - [facility] - 4/12/13 and 8/7/12    VASCULAR  SURGERY       Social History     Socioeconomic History    Marital status:     Number of children: 5   Occupational History    Occupation: retired   Tobacco Use    Smoking status: Former     Current packs/day: 0.00     Average packs/day: 4.0 packs/day for 30.0 years (120.0 ttl pk-yrs)     Types: Cigarettes     Start date:      Quit date:      Years since quittin.2     Passive exposure: Past (parent)    Smokeless tobacco: Current     Types: Chew   Substance and Sexual Activity    Alcohol use: Not Currently    Drug use: Yes     Types: Hydrocodone    Sexual activity: Not Currently     Social Drivers of Health     Financial Resource Strain: Low Risk  (12/3/2024)    Overall Financial Resource Strain (CARDIA)     Difficulty of Paying Living Expenses: Not hard at all   Food Insecurity: No Food Insecurity (12/3/2024)    Hunger Vital Sign     Worried About Running Out of Food in the Last Year: Never true     Ran Out of Food in the Last Year: Never true   Transportation Needs: No Transportation Needs (12/3/2024)    TRANSPORTATION NEEDS     Transportation : No   Physical Activity: Inactive (12/3/2024)    Exercise Vital Sign     Days of Exercise per Week: 0 days     Minutes of Exercise per Session: 0 min   Stress: No Stress Concern Present (12/3/2024)    [employer] - Occupational Stress Questionnaire     Feeling of Stress : Not at all   Housing Stability: Unknown (12/3/2024)    Housing Stability Vital Sign     Unable to Pay for Housing in the Last Year: No     Homeless in the Last Year: No     Family History   Problem Relation Name Age of Onset    Diabetes Mother      Heart disease Mother      Hypertension Mother      Liver cancer Mother      Heart disease Father      Hypertension Father       Review of patient's allergies indicates:  No Known Allergies     Objective:  Vitals:    25 1105 25 1106   BP: (!) 183/75    Pulse: 65    Resp: 18    Weight: 91.6 kg (202 lb)   "  Height: 5' 11" (1.803 m)    PainSc:   7   7   PainLoc: Back                Physical Exam  Vitals and nursing note reviewed. Exam conducted with a chaperone present.   Constitutional:       General: He is awake.      Appearance: Normal appearance. He is not ill-appearing, toxic-appearing or diaphoretic.   HENT:      Head: Normocephalic and atraumatic.      Nose: Nose normal.      Mouth/Throat:      Mouth: Mucous membranes are moist.      Pharynx: Oropharynx is clear.   Eyes:      Conjunctiva/sclera: Conjunctivae normal.      Pupils: Pupils are equal, round, and reactive to light.   Cardiovascular:      Rate and Rhythm: Normal rate.   Pulmonary:      Effort: Pulmonary effort is normal. No respiratory distress.   Abdominal:      Palpations: Abdomen is soft.   Musculoskeletal:      Right shoulder: Tenderness present.      Left shoulder: Tenderness present.      Cervical back: Normal range of motion and neck supple. Tenderness present.      Thoracic back: Tenderness present.      Lumbar back: Tenderness present. Decreased range of motion.   Skin:     General: Skin is warm and dry.   Neurological:      General: No focal deficit present.      Mental Status: He is alert and oriented to person, place, and time. Mental status is at baseline.      Cranial Nerves: No cranial nerve deficit (II-XII).   Psychiatric:         Mood and Affect: Mood normal.         Behavior: Behavior normal. Behavior is cooperative.         Thought Content: Thought content normal.           US ARTERIAL DOPPLER EXAM  Narrative: EXAMINATION:  US ARTERIAL DOPPLER EXAM    CLINICAL HISTORY:  Peripheral vascular disease, unspecified    TECHNIQUE:  Continuous wave Doppler common pneumatic cuffs, digital toe pressures    COMPARISON:  None    FINDINGS:  Right brachial pressure 119 mm Hg, MARYSE 0.92, toe brachial index right 0.54    Left brachial pressure 117 mm Hg, MARYSE 1.0, toe brachial index 0.47  Impression: Normal ABIs at the ankle with mild to moderate " decrease in toe brachial indices bilaterally    TECHNOLOGIST: Ronit Fox (RT) (VS) RVT    Electronically signed by: Loida Forrest  Date:    02/10/2025  Time:    10:54       Office Visit on 03/17/2025   Component Date Value Ref Range Status    QRS Duration 03/17/2025 136  ms Final    [facility] QTC Calculation 03/17/2025 453  ms Final   Office Visit on 01/09/2025   Component Date Value Ref Range Status    POC Amphetamines 01/09/2025 Negative  Negative, Inconclusive Final    POC Barbiturates 01/09/2025 Negative  Negative, Inconclusive Final    POC Benzodiazepines 01/09/2025 Negative  Negative, Inconclusive Final    POC Cocaine 01/09/2025 Negative  Negative, Inconclusive Final    POC THC 01/09/2025 Negative  Negative, Inconclusive Final    POC Methadone 01/09/2025 Negative  Negative, Inconclusive Final    POC Methamphetamine 01/09/2025 Negative  Negative, Inconclusive Final    POC Opiates 01/09/2025 Presumptive Positive (A)  Negative, Inconclusive Final    POC Oxycodone 01/09/2025 Negative  Negative, Inconclusive Final    POC Phencyclidine 01/09/2025 Negative  Negative, Inconclusive Final    POC Methylenedioxymethamphetamine * 01/09/2025 Negative  Negative, Inconclusive Final    POC Tricyclic Antidepressants 01/09/2025 Negative  Negative, Inconclusive Final    POC Buprenorphine 01/09/2025 Negative   Final     Acceptable 01/09/2025 Yes   Final    POC Temperature (Urine) 01/09/2025 94   Final   No results displayed because visit has over 200 results.      Office Visit on 12/02/2024   Component Date Value Ref Range Status    QRS Duration 12/02/2024 132  ms Final    [facility] QTC Calculation 12/02/2024 456  ms Final   Office Visit on 10/10/2024   Component Date Value Ref Range Status    POC Amphetamines 10/10/2024 Negative  Negative, Inconclusive Final    POC Barbiturates 10/10/2024 Negative  Negative, Inconclusive Final    POC Benzodiazepines 10/10/2024 Negative  Negative, Inconclusive Final    POC Cocaine 10/10/2024  Negative  Negative, Inconclusive Final    POC THC 10/10/2024 Negative  Negative, Inconclusive Final    POC Methadone 10/10/2024 Negative  Negative, Inconclusive Final    POC Methamphetamine 10/10/2024 Negative  Negative, Inconclusive Final    POC Opiates 10/10/2024 Presumptive Positive (A)  Negative, Inconclusive Final    POC Oxycodone 10/10/2024 Negative  Negative, Inconclusive Final    POC Phencyclidine 10/10/2024 Negative  Negative, Inconclusive Final    POC Methylenedioxymethamphetamine * 10/10/2024 Negative  Negative, Inconclusive Final    POC Tricyclic Antidepressants 10/10/2024 Negative  Negative, Inconclusive Final    POC Buprenorphine 10/10/2024 Negative   Final     Acceptable 10/10/2024 Yes   Final    POC Temperature (Urine) 10/10/2024 92   Final         Orders Placed This Encounter   Procedures    Controlled Substance Monitoring Panel, Random, Urine     Standing Status:   Future     Number of Occurrences:   1     Expected Date:   4/9/2025     Expiration Date:   6/8/2026     Send normal result to authorizing provider's In Basket if patient is active on MyChart::   Yes    POCT Urine Drug Screen Presump     Interpretive Information:     Negative:  No drug detected at the cut off level.   Positive:  This result represents presumptive positive for the   tested drug, other substances may yield a positive response other   than the analyte of interest. This result should be utilized for   diagnostic purpose only. Confirmation testing will be performed upon physician request only.            Requested Prescriptions     Signed Prescriptions Disp Refills    HYDROcodone-acetaminophen (NORCO)  mg per tablet 90 tablet 0     Sig: Take 1 tablet by mouth every 8 (eight) hours as needed for Pain (pain).    HYDROcodone-acetaminophen (NORCO)  mg per tablet 90 tablet 0     Sig: Take 1 tablet by mouth every 8 (eight) hours as needed for Pain (pain).    HYDROcodone-acetaminophen (NORCO)  mg per  tablet 90 tablet 0     Sig: Take 1 tablet by mouth every 8 (eight) hours as needed for Pain (pain).       Assessment:     1. Lumbosacral radiculopathy    2. Cervical spondylosis without myelopathy    3. Postlaminectomy syndrome, lumbar region    4. Encounter for long-term (current) use of medications               A's of Opioid Risk Assessment  Activity:Patient can perform ADL.   Analgesia:Patients pain is partially controlled by current medication. Patient has tried OTC medications such as Tylenol and Ibuprofen with out relief.   Adverse Effects: Patient denies constipation or sedation.  Aberrant Behavior:  reviewed with no aberrant drug seeking/taking behavior.  Overdose reversal drug naloxone discussed    Drug screen reviewed      Plan:    Narcan January 2023    Anticoagulated Plavix    [facility] closed on weekends      He has no new complaints     He states he is doing well current medication does help control his chronic discomfort     He would like to continue with current treatment plan    Continue home exercise program as directed    Continue current medication    Follow-up 3 months    Dr. Callahan, July 2025    Bring original prescription medication bottles/container/box with labels to each visit

## 2025-04-09 ENCOUNTER — OFFICE VISIT (OUTPATIENT)
Dept: PAIN MEDICINE | Facility: CLINIC | Age: 70
End: 2025-04-09
Payer: MEDICARE

## 2025-04-09 VITALS
HEART RATE: 65 BPM | RESPIRATION RATE: 18 BRPM | WEIGHT: 202 LBS | SYSTOLIC BLOOD PRESSURE: 183 MMHG | HEIGHT: 71 IN | DIASTOLIC BLOOD PRESSURE: 75 MMHG | BODY MASS INDEX: 28.28 KG/M2

## 2025-04-09 DIAGNOSIS — M54.17 LUMBOSACRAL RADICULOPATHY: Primary | Chronic | ICD-10-CM

## 2025-04-09 DIAGNOSIS — M96.1 POSTLAMINECTOMY SYNDROME, LUMBAR REGION: Chronic | ICD-10-CM

## 2025-04-09 DIAGNOSIS — M47.812 CERVICAL SPONDYLOSIS WITHOUT MYELOPATHY: Chronic | ICD-10-CM

## 2025-04-09 DIAGNOSIS — Z79.899 ENCOUNTER FOR LONG-TERM (CURRENT) USE OF MEDICATIONS: ICD-10-CM

## 2025-04-09 LAB

## 2025-04-09 PROCEDURE — 1125F AMNT PAIN NOTED PAIN PRSNT: CPT | Mod: CPTII,,, | Performed by: PHYSICIAN ASSISTANT

## 2025-04-09 PROCEDURE — 99999PBSHW POCT URINE DRUG SCREEN PRESUMP: Mod: PBBFAC,,,

## 2025-04-09 PROCEDURE — 3077F SYST BP >= 140 MM HG: CPT | Mod: CPTII,,, | Performed by: PHYSICIAN ASSISTANT

## 2025-04-09 PROCEDURE — 99999 PR PBB SHADOW E&M-EST. PATIENT-LVL V: CPT | Mod: PBBFAC,,, | Performed by: PHYSICIAN ASSISTANT

## 2025-04-09 PROCEDURE — 99214 OFFICE O/P EST MOD 30 MIN: CPT | Mod: S$PBB,,, | Performed by: PHYSICIAN ASSISTANT

## 2025-04-09 PROCEDURE — 1101F PT FALLS ASSESS-DOCD LE1/YR: CPT | Mod: CPTII,,, | Performed by: PHYSICIAN ASSISTANT

## 2025-04-09 PROCEDURE — 3288F FALL RISK ASSESSMENT DOCD: CPT | Mod: CPTII,,, | Performed by: PHYSICIAN ASSISTANT

## 2025-04-09 PROCEDURE — 1159F MED LIST DOCD IN RCRD: CPT | Mod: CPTII,,, | Performed by: PHYSICIAN ASSISTANT

## 2025-04-09 PROCEDURE — 3078F DIAST BP <80 MM HG: CPT | Mod: CPTII,,, | Performed by: PHYSICIAN ASSISTANT

## 2025-04-09 PROCEDURE — 80305 DRUG TEST PRSMV DIR OPT OBS: CPT | Mod: PBBFAC | Performed by: PHYSICIAN ASSISTANT

## 2025-04-09 PROCEDURE — 4010F ACE/ARB THERAPY RXD/TAKEN: CPT | Mod: CPTII,,, | Performed by: PHYSICIAN ASSISTANT

## 2025-04-09 PROCEDURE — 3008F BODY MASS INDEX DOCD: CPT | Mod: CPTII,,, | Performed by: PHYSICIAN ASSISTANT

## 2025-04-09 PROCEDURE — 99215 OFFICE O/P EST HI 40 MIN: CPT | Mod: PBBFAC | Performed by: PHYSICIAN ASSISTANT

## 2025-04-09 RX ORDER — HYDROCODONE BITARTRATE AND ACETAMINOPHEN 10; 325 MG/1; MG/1
1 TABLET ORAL EVERY 8 HOURS PRN
Qty: 90 TABLET | Refills: 0 | Status: SHIPPED | OUTPATIENT
Start: 2025-05-10 | End: 2025-06-09

## 2025-04-09 RX ORDER — HYDROCODONE BITARTRATE AND ACETAMINOPHEN 10; 325 MG/1; MG/1
1 TABLET ORAL EVERY 8 HOURS PRN
Qty: 90 TABLET | Refills: 0 | Status: SHIPPED | OUTPATIENT
Start: 2025-04-10 | End: 2025-05-10

## 2025-04-09 RX ORDER — HYDROCODONE BITARTRATE AND ACETAMINOPHEN 10; 325 MG/1; MG/1
1 TABLET ORAL EVERY 8 HOURS PRN
Qty: 90 TABLET | Refills: 0 | Status: SHIPPED | OUTPATIENT
Start: 2025-06-09 | End: 2025-07-09

## 2025-06-16 ENCOUNTER — OFFICE VISIT (OUTPATIENT)
Dept: FAMILY MEDICINE | Facility: CLINIC | Age: 70
End: 2025-06-16
Payer: MEDICARE

## 2025-06-16 VITALS
HEART RATE: 56 BPM | DIASTOLIC BLOOD PRESSURE: 68 MMHG | WEIGHT: 189.63 LBS | HEIGHT: 71 IN | SYSTOLIC BLOOD PRESSURE: 154 MMHG | TEMPERATURE: 98 F | BODY MASS INDEX: 26.55 KG/M2 | OXYGEN SATURATION: 97 %

## 2025-06-16 DIAGNOSIS — Z12.11 COLON CANCER SCREENING: ICD-10-CM

## 2025-06-16 DIAGNOSIS — I25.10 CORONARY ARTERY DISEASE INVOLVING NATIVE CORONARY ARTERY OF NATIVE HEART, UNSPECIFIED WHETHER ANGINA PRESENT: ICD-10-CM

## 2025-06-16 DIAGNOSIS — G62.9 NEUROPATHY: ICD-10-CM

## 2025-06-16 DIAGNOSIS — E11.40 TYPE 2 DIABETES MELLITUS WITH DIABETIC NEUROPATHY, WITH LONG-TERM CURRENT USE OF INSULIN: ICD-10-CM

## 2025-06-16 DIAGNOSIS — Z79.4 TYPE 2 DIABETES MELLITUS WITH DIABETIC NEUROPATHY, WITH LONG-TERM CURRENT USE OF INSULIN: ICD-10-CM

## 2025-06-16 DIAGNOSIS — E78.5 HYPERLIPIDEMIA, UNSPECIFIED HYPERLIPIDEMIA TYPE: ICD-10-CM

## 2025-06-16 DIAGNOSIS — Z12.5 SCREENING PSA (PROSTATE SPECIFIC ANTIGEN): ICD-10-CM

## 2025-06-16 DIAGNOSIS — I10 HYPERTENSION, UNSPECIFIED TYPE: Primary | ICD-10-CM

## 2025-06-16 LAB
ALBUMIN SERPL BCP-MCNC: 4.2 G/DL (ref 3.4–4.8)
ALBUMIN/GLOB SERPL: 1.6 {RATIO}
ALP SERPL-CCNC: 45 U/L (ref 40–150)
ALT SERPL W P-5'-P-CCNC: 22 U/L
ANION GAP SERPL CALCULATED.3IONS-SCNC: 11 MMOL/L (ref 7–16)
AST SERPL W P-5'-P-CCNC: 23 U/L (ref 11–45)
BASOPHILS # BLD AUTO: 0.05 K/UL (ref 0–0.2)
BASOPHILS NFR BLD AUTO: 0.4 % (ref 0–1)
BASOPHILS NFR BLD MANUAL: 1 % (ref 0–1)
BILIRUB SERPL-MCNC: 0.4 MG/DL
BUN SERPL-MCNC: 35 MG/DL (ref 8–26)
BUN/CREAT SERPL: 22 (ref 6–20)
CALCIUM SERPL-MCNC: 9.8 MG/DL (ref 8.8–10)
CHLORIDE SERPL-SCNC: 106 MMOL/L (ref 98–107)
CHOLEST SERPL-MCNC: 129 MG/DL
CHOLEST/HDLC SERPL: 5.2 {RATIO}
CO2 SERPL-SCNC: 29 MMOL/L (ref 23–31)
CREAT SERPL-MCNC: 1.61 MG/DL (ref 0.72–1.25)
DIFFERENTIAL METHOD BLD: ABNORMAL
EGFR (NO RACE VARIABLE) (RUSH/TITUS): 46 ML/MIN/1.73M2
EOSINOPHIL # BLD AUTO: 0.27 K/UL (ref 0–0.5)
EOSINOPHIL NFR BLD AUTO: 2.2 % (ref 1–4)
EOSINOPHIL NFR BLD MANUAL: 1 % (ref 1–4)
ERYTHROCYTE [DISTWIDTH] IN BLOOD BY AUTOMATED COUNT: 14.7 % (ref 11.5–14.5)
GLOBULIN SER-MCNC: 2.7 G/DL (ref 2–4)
GLUCOSE SERPL-MCNC: 212 MG/DL (ref 82–115)
HCT VFR BLD AUTO: 44.3 % (ref 40–54)
HDLC SERPL-MCNC: 25 MG/DL (ref 35–60)
HGB BLD-MCNC: 13.4 G/DL (ref 13.5–18)
IMM GRANULOCYTES # BLD AUTO: 0.05 K/UL (ref 0–0.04)
IMM GRANULOCYTES NFR BLD: 0.4 % (ref 0–0.4)
LYMPHOCYTES # BLD AUTO: 6.4 K/UL (ref 1–4.8)
LYMPHOCYTES NFR BLD AUTO: 51.2 % (ref 27–41)
LYMPHOCYTES NFR BLD MANUAL: 48 % (ref 27–41)
MCH RBC QN AUTO: 25.5 PG (ref 27–31)
MCHC RBC AUTO-ENTMCNC: 30.2 G/DL (ref 32–36)
MCV RBC AUTO: 84.2 FL (ref 80–96)
MONOCYTES # BLD AUTO: 1.44 K/UL (ref 0–0.8)
MONOCYTES NFR BLD AUTO: 11.5 % (ref 2–6)
MONOCYTES NFR BLD MANUAL: 5 % (ref 2–6)
MPC BLD CALC-MCNC: 12.1 FL (ref 9.4–12.4)
NEUTROPHILS # BLD AUTO: 4.3 K/UL (ref 1.8–7.7)
NEUTROPHILS NFR BLD AUTO: 34.3 % (ref 53–65)
NEUTS SEG NFR BLD MANUAL: 45 % (ref 50–62)
NONHDLC SERPL-MCNC: 104 MG/DL
NRBC # BLD AUTO: 0 X10E3/UL
NRBC, AUTO (.00): 0 %
PLATELET # BLD AUTO: 141 K/UL (ref 150–400)
PLATELET MORPHOLOGY: ABNORMAL
POTASSIUM SERPL-SCNC: 4.6 MMOL/L (ref 3.5–5.1)
PROT SERPL-MCNC: 6.9 G/DL (ref 5.8–7.6)
PSA SERPL-MCNC: 0.61 NG/ML
RBC # BLD AUTO: 5.26 M/UL (ref 4.6–6.2)
RBC MORPH BLD: NORMAL
REACTIVE LYMPHOCYTES: ABNORMAL
SMUDGE CELLS BLD QL SMEAR: ABNORMAL
SODIUM SERPL-SCNC: 141 MMOL/L (ref 136–145)
TRIGL SERPL-MCNC: 415 MG/DL (ref 34–140)
VLDLC SERPL-MCNC: ABNORMAL MG/DL
WBC # BLD AUTO: 12.51 K/UL (ref 4.5–11)

## 2025-06-16 PROCEDURE — G0103 PSA SCREENING: HCPCS | Mod: ,,, | Performed by: CLINICAL MEDICAL LABORATORY

## 2025-06-16 PROCEDURE — 80053 COMPREHEN METABOLIC PANEL: CPT | Mod: ,,, | Performed by: CLINICAL MEDICAL LABORATORY

## 2025-06-16 PROCEDURE — 1101F PT FALLS ASSESS-DOCD LE1/YR: CPT | Mod: ,,, | Performed by: FAMILY MEDICINE

## 2025-06-16 PROCEDURE — 3078F DIAST BP <80 MM HG: CPT | Mod: ,,, | Performed by: FAMILY MEDICINE

## 2025-06-16 PROCEDURE — 3077F SYST BP >= 140 MM HG: CPT | Mod: ,,, | Performed by: FAMILY MEDICINE

## 2025-06-16 PROCEDURE — 85025 COMPLETE CBC W/AUTO DIFF WBC: CPT | Mod: ,,, | Performed by: CLINICAL MEDICAL LABORATORY

## 2025-06-16 PROCEDURE — 80061 LIPID PANEL: CPT | Mod: ,,, | Performed by: CLINICAL MEDICAL LABORATORY

## 2025-06-16 PROCEDURE — 4010F ACE/ARB THERAPY RXD/TAKEN: CPT | Mod: ,,, | Performed by: FAMILY MEDICINE

## 2025-06-16 PROCEDURE — 3008F BODY MASS INDEX DOCD: CPT | Mod: ,,, | Performed by: FAMILY MEDICINE

## 2025-06-16 PROCEDURE — 1159F MED LIST DOCD IN RCRD: CPT | Mod: ,,, | Performed by: FAMILY MEDICINE

## 2025-06-16 PROCEDURE — 3288F FALL RISK ASSESSMENT DOCD: CPT | Mod: ,,, | Performed by: FAMILY MEDICINE

## 2025-06-16 PROCEDURE — 99214 OFFICE O/P EST MOD 30 MIN: CPT | Mod: ,,, | Performed by: FAMILY MEDICINE

## 2025-06-16 RX ORDER — INSULIN ASPART 100 [IU]/ML
INJECTION, SOLUTION INTRAVENOUS; SUBCUTANEOUS
COMMUNITY
Start: 2025-03-13

## 2025-06-16 RX ORDER — ROSUVASTATIN CALCIUM 40 MG/1
40 TABLET, COATED ORAL NIGHTLY
Qty: 90 TABLET | Refills: 1 | Status: SHIPPED | OUTPATIENT
Start: 2025-06-16

## 2025-06-16 RX ORDER — TEMAZEPAM 15 MG/1
15 CAPSULE ORAL NIGHTLY PRN
COMMUNITY
Start: 2025-02-05

## 2025-06-16 RX ORDER — LISINOPRIL 20 MG/1
20 TABLET ORAL DAILY
Qty: 90 TABLET | Refills: 1 | Status: SHIPPED | OUTPATIENT
Start: 2025-06-16

## 2025-06-16 RX ORDER — FENOFIBRATE 145 MG/1
145 TABLET, FILM COATED ORAL DAILY
Qty: 90 TABLET | Refills: 1 | Status: SHIPPED | OUTPATIENT
Start: 2025-06-16

## 2025-06-16 RX ORDER — DAPAGLIFLOZIN 10 MG/1
10 TABLET, FILM COATED ORAL DAILY
Qty: 60 TABLET | Refills: 5 | Status: SHIPPED | OUTPATIENT
Start: 2025-06-16

## 2025-06-16 RX ORDER — PEN NEEDLE, DIABETIC 31 GX5/16"
NEEDLE, DISPOSABLE MISCELLANEOUS
COMMUNITY
Start: 2025-05-30

## 2025-06-16 RX ORDER — TIZANIDINE 4 MG/1
TABLET ORAL
COMMUNITY

## 2025-06-16 RX ORDER — OMEPRAZOLE 20 MG/1
20 CAPSULE, DELAYED RELEASE ORAL DAILY
Qty: 90 CAPSULE | Refills: 1 | Status: SHIPPED | OUTPATIENT
Start: 2025-06-16

## 2025-06-16 RX ORDER — GABAPENTIN 300 MG/1
300 CAPSULE ORAL 3 TIMES DAILY
Qty: 270 CAPSULE | Refills: 1 | Status: SHIPPED | OUTPATIENT
Start: 2025-06-16

## 2025-06-16 RX ORDER — SEMAGLUTIDE 1.34 MG/ML
INJECTION, SOLUTION SUBCUTANEOUS
COMMUNITY
Start: 2025-05-06

## 2025-06-16 RX ORDER — ESCITALOPRAM OXALATE 10 MG/1
10 TABLET ORAL DAILY
Qty: 90 TABLET | Refills: 1 | Status: SHIPPED | OUTPATIENT
Start: 2025-06-16

## 2025-06-16 NOTE — PROGRESS NOTES
Rodney Morel MD   [facility]  [address] [place] [address]     PATIENT NAME: Micheal Taylor  : 1955  DATE: 25  MRN: [id number]      Billing Provider: Rodney Morel MD  Level of Service: IN OFFICE/OUTPT VISIT, EST, LEVL IV, 30-39 MIN  Patient PCP Information       Provider PCP Type    Rodney Morel MD General            Reason for Visit / Chief Complaint: Hypertension (Check up; Labs; Refills. Patient has Cardiology apt with [place] schedule for 25. ), Diabetes (Patient is follow by Alexander Liu in [place], [place] @ [place] for DM. ), and Health Maintenance (Patient follows an endocrinologist in [place] for DM. Patient request another Cologuard-did not use the previous one ordered. )         History of Present Illness / Problem Focused Workflow     Micheal Taylor presents to the clinic with Hypertension (Check up; Labs; Refills. Patient has Cardiology apt with [place] schedule for 25. ), Diabetes (Patient is follow by Alexander Liu in [place], [place] @ [place] for DM. ), and Health Maintenance (Patient follows an endocrinologist in [place] for DM. Patient request another Cologuard-did not use the previous one ordered. )     HPI    Review of Systems     Review of Systems   Constitutional:  Negative for activity change, appetite change, fatigue and fever.   HENT:  Negative for nasal congestion, ear pain, hearing loss, sinus pressure/congestion and sore throat.    Respiratory:  Negative for cough, chest tightness and shortness of breath.    Cardiovascular:  Negative for chest pain and palpitations.   Gastrointestinal:  Negative for abdominal pain and fecal incontinence.   Genitourinary:  Negative for bladder incontinence, difficulty urinating and erectile dysfunction.   Musculoskeletal:  Negative for arthralgias.   Integumentary:  Negative for rash.   Neurological:  Negative for dizziness and headaches.        Medical / Social / Family History     Past Medical  History:   Diagnosis Date    Anticoagulant long-term use     CHF (congestive heart failure)     Chronic pain syndrome     Coronary artery disease     Diabetes mellitus, type 2     Hyperlipidemia     Hypertension     Lumbar spondylosis     Lumbosacral radiculopathy     Mild nonproliferative diabetic retinopathy of both eyes without macular edema 09/09/2022    Obstructive sleep apnea 12/3/2024    Pain in thoracic spine     PVD (peripheral vascular disease)     Renal disorder     Spondylosis without myelopathy or radiculopathy, cervical region        Past Surgical History:   Procedure Laterality Date    ADENOIDECTOMY      AMPUTATION      left index finger removed     ANGIOGRAM, CORONARY, WITH LEFT HEART CATHETERIZATION N/A 12/3/2024    Procedure: Angiogram, Coronary, with Left Heart Cath;  Surgeon: Alexys Gleason MD;  Location: [facility] CATH LAB;  Service: Cardiology;  Laterality: N/A;    CAUDAL EPIDURAL STEROID INJECTION  01/19/2016    Caudal NATHAN - Alee    CIRCUMCISION      CORONARY ARTERY BYPASS GRAFT (CABG)      EYE SURGERY      FOOT SURGERY      heel surgery    HEEL SPUR SURGERY      INJECTION OF FACET JOINT Bilateral 04/16/2015    Bilateral L3-S1 Facet Injection - Alee - 4/16/15, 11/15/12 and 9/27/12    LEFT HEART CATHETERIZATION Left 07/19/2022    Procedure: Left heart cath;  Surgeon: Venu Whitlock MD;  Location: [facility] CATH LAB;  Service: Cardiology;  Laterality: Left;  Patient has PAD and has had bilateral fem pop bypass. He is followed by Dr. Forrest.    RADIOFREQUENCY THERMOCOAGULATION Left 02/26/2013    Left L3-S1 RFTC -Alee    RADIOFREQUENCY THERMOCOAGULATION Right 02/05/2013    Right L3-S1 RFTC - Alee    TONSILLECTOMY, ADENOIDECTOMY, BILATERAL MYRINGOTOMY AND TUBES      TRANSFORAMINAL EPIDURAL INJECTION OF STEROID Left 06/19/2012    Left L3-4 TFESI - Alee    TRANSFORAMINAL EPIDURAL INJECTION OF STEROID Right 04/12/2013    Right L3-4 TFESI - Alee - 4/12/13 and 8/7/12    VASCULAR SURGERY         Social  "History  Micheal Taylor  reports that he quit smoking about 20 years ago. His smoking use included cigarettes. He started smoking about 50 years ago. He has a 120 pack-year smoking history. He has been exposed to tobacco smoke. His smokeless tobacco use includes chew. He reports that he does not currently use alcohol. He reports current drug use. Drug: Hydrocodone.    Family History  Micheal Taylor  family history includes Diabetes in his mother; Heart disease in his father and mother; Hypertension in his father and mother; Liver cancer in his mother.    Medications and Allergies     Medications  Outpatient Medications Marked as Taking for the 6/16/25 encounter (Office Visit) with Rodney Morel MD   Medication Sig Dispense Refill    aspirin (ECOTRIN) 81 MG EC tablet Take 81 mg by mouth once daily.      BD ULTRA-FINE JOVANI PEN NEEDLE 32 gauge x 5/32" Ndle USE FOR INSULIN INJECTIONS 4 TIMES A DAY, [facility] S. 90-DAY SUPPLY.      cholecalciferol, vitamin D3, (VITAMIN D3) 25 mcg (1,000 unit) capsule Take 1,000 Units by mouth once daily.      cinnamon bark 500 mg capsule Take 500 mg by mouth once daily.       clopidogreL (PLAVIX) 75 mg tablet Take 1 tablet (75 mg total) by mouth once daily. 90 tablet 3    cyanocobalamin (VITAMIN B-12) 1000 MCG tablet Take 100 mcg by mouth once daily.      hydrALAZINE (APRESOLINE) 100 MG tablet Take 1 tablet (100 mg total) by mouth every 12 (twelve) hours. 180 tablet 3    HYDROcodone-acetaminophen (NORCO)  mg per tablet Take 1 tablet by mouth every 8 (eight) hours as needed for Pain (pain). 90 tablet 0    isosorbide mononitrate (IMDUR) 30 MG 24 hr tablet Take 1 tablet (30 mg total) by mouth once daily. 90 tablet 3    magnesium 250 mg Tab Take 1 tablet by mouth once daily.      metoprolol succinate (TOPROL-XL) 50 MG 24 hr tablet Take 1 tablet (50 mg total) by mouth once daily. 90 tablet 3    naloxone (NARCAN) 4 mg/actuation Spry 1 spray by Nasal route once.      nitroGLYCERIN " (NITROSTAT) 0.4 MG SL tablet Place 1 tablet (0.4 mg total) under the tongue every 5 (five) minutes as needed for Chest pain. 25 tablet 3    NOVOLOG FLEXPEN U-100 INSULIN 100 unit/mL (3 mL) InPn pen INJECT 12 UNIT SUB-Q 3 TIMES DAILY BEFORE MEALS FOR BLOOD SUGAR.      OZEMPIC 1 mg/dose (4 mg/3 mL) INJECT 1 MG EVERY WEEK BY SUBCUTANEOUS ROUTE FOR 84 DAYS.      temazepam (RESTORIL) 15 mg Cap Take 15 mg by mouth nightly as needed.      TRESIBA FLEXTOUCH U-200 200 unit/mL (3 mL) insulin pen Inject into the skin once daily. On a sort of sliding scale depending on his BG readings; has a Freestyle Felton reader      VASCEPA 1 gram Cap Take 2 capsules by mouth 2 (two) times a day.      vitamin A 8000 UNIT capsule Take 8,000 Units by mouth.      vitamin E 400 UNIT capsule Take 400 Units by mouth once daily.       [DISCONTINUED] amLODIPine (NORVASC) 5 MG tablet Take 1 tablet (5 mg total) by mouth once daily. 90 tablet 1    [DISCONTINUED] EScitalopram oxalate (LEXAPRO) 10 MG tablet Take 1 tablet (10 mg total) by mouth once daily. 90 tablet 1    [DISCONTINUED] FARXIGA 10 mg tablet TAKE ONE TABLET DAILY 60 tablet 2    [DISCONTINUED] fenofibrate (TRICOR) 145 MG tablet Take 1 tablet (145 mg total) by mouth once daily. 90 tablet 1    [DISCONTINUED] gabapentin (NEURONTIN) 300 MG capsule Take 1 capsule (300 mg total) by mouth 3 (three) times daily. 270 capsule 0    [DISCONTINUED] lisinopriL (PRINIVIL,ZESTRIL) 20 MG tablet Take 1 tablet (20 mg total) by mouth once daily. 90 tablet 0    [DISCONTINUED] omeprazole (PRILOSEC) 20 MG capsule Take 1 capsule (20 mg total) by mouth once daily. 90 capsule 1    [DISCONTINUED] rosuvastatin (CRESTOR) 40 MG Tab Take 1 tablet (40 mg total) by mouth every evening. 90 tablet 0       Allergies  Review of patient's allergies indicates:  No Known Allergies    Physical Examination     Vitals:    06/16/25 1027   BP: (!) 154/68   Pulse:    Temp:      Physical Exam  Constitutional:       General: He is not in  acute distress.     Appearance: He is not ill-appearing.   HENT:      Head: Normocephalic and atraumatic.      Right Ear: Tympanic membrane and ear canal normal.      Left Ear: Tympanic membrane and ear canal normal.      Nose: Nose normal. No congestion or rhinorrhea.   Eyes:      Pupils: Pupils are equal, round, and reactive to light.   Cardiovascular:      Rate and Rhythm: Normal rate and regular rhythm.      Pulses: Normal pulses.      Heart sounds: No murmur heard.  Pulmonary:      Effort: No respiratory distress.      Breath sounds: No wheezing, rhonchi or rales.   Abdominal:      General: Bowel sounds are normal.      Palpations: Abdomen is soft.      Tenderness: There is no abdominal tenderness.      Hernia: No hernia is present.   Musculoskeletal:      Cervical back: Normal range of motion and neck supple.   Lymphadenopathy:      Cervical: No cervical adenopathy.   Skin:     General: Skin is warm and dry.   Neurological:      Mental Status: He is alert.   Psychiatric:         Behavior: Behavior normal.         Thought Content: Thought content normal.          Assessment and Plan (including Health Maintenance)   :    Plan:         Health Maintenance Due   Topic Date Due    TETANUS VACCINE  Never done    Colorectal Cancer Screening  Never done    RSV Vaccine (Age 60+ and Pregnant patients) (1 - Risk 60-74 years 1-dose series) Never done    COVID-19 Vaccine (1 - 2024-25 season) Never done    Hemoglobin A1c  03/02/2025    Foot Exam  03/26/2025    Diabetes Urine Screening  06/12/2025       Problem List Items Addressed This Visit          Cardiac/Vascular    Coronary artery disease    Overview   S/p CABG 1/23/2020- Dr. Levy  SVG to the dLAD; SVG to the OM and SVG to the PL  2/7/2019 BECKIE dLAD and mid LAD to overlap the BECKIE in the dLAD         Relevant Orders    CBC Auto Differential (Completed)    Comprehensive Metabolic Panel (Completed)    Lipid Panel (Completed)    Hyperlipidemia    Relevant Medications     fenofibrate (TRICOR) 145 MG tablet    Other Relevant Orders    CBC Auto Differential (Completed)    Comprehensive Metabolic Panel (Completed)    Lipid Panel (Completed)    Hypertension - Primary    Relevant Medications    lisinopriL (PRINIVIL,ZESTRIL) 20 MG tablet    Other Relevant Orders    CBC Auto Differential (Completed)    Comprehensive Metabolic Panel (Completed)    Lipid Panel (Completed)       Endocrine    Type 2 diabetes mellitus with diabetic neuropathy, with long-term current use of insulin    Relevant Medications    NOVOLOG FLEXPEN U-100 INSULIN 100 unit/mL (3 mL) InPn pen    OZEMPIC 1 mg/dose (4 mg/3 mL)    Other Relevant Orders    CBC Auto Differential (Completed)    Comprehensive Metabolic Panel (Completed)    Lipid Panel (Completed)     Other Visit Diagnoses         Screening PSA (prostate specific antigen)        Relevant Orders    PSA, Screening (Completed)      Colon cancer screening        Relevant Orders    Cologuard Screening (Multitarget Stool DNA)      Neuropathy        Relevant Medications    gabapentin (NEURONTIN) 300 MG capsule          Hypertension, unspecified type  -     CBC Auto Differential; Future; Expected date: 06/16/2025  -     Comprehensive Metabolic Panel; Future; Expected date: 06/16/2025  -     Lipid Panel; Future; Expected date: 06/16/2025  -     lisinopriL (PRINIVIL,ZESTRIL) 20 MG tablet; Take 1 tablet (20 mg total) by mouth once daily.  Dispense: 90 tablet; Refill: 1    Hyperlipidemia, unspecified hyperlipidemia type  -     CBC Auto Differential; Future; Expected date: 06/16/2025  -     Comprehensive Metabolic Panel; Future; Expected date: 06/16/2025  -     Lipid Panel; Future; Expected date: 06/16/2025  -     fenofibrate (TRICOR) 145 MG tablet; Take 1 tablet (145 mg total) by mouth once daily.  Dispense: 90 tablet; Refill: 1    Coronary artery disease involving native coronary artery of native heart, unspecified whether angina present  -     CBC Auto Differential; Future;  Expected date: 06/16/2025  -     Comprehensive Metabolic Panel; Future; Expected date: 06/16/2025  -     Lipid Panel; Future; Expected date: 06/16/2025    Type 2 diabetes mellitus with diabetic neuropathy, with long-term current use of insulin  -     CBC Auto Differential; Future; Expected date: 06/16/2025  -     Comprehensive Metabolic Panel; Future; Expected date: 06/16/2025  -     Lipid Panel; Future; Expected date: 06/16/2025    Screening PSA (prostate specific antigen)  -     PSA, Screening; Future; Expected date: 06/16/2025    Colon cancer screening  -     Cologuard Screening (Multitarget Stool DNA); Future; Expected date: 06/16/2025    Neuropathy  -     gabapentin (NEURONTIN) 300 MG capsule; Take 1 capsule (300 mg total) by mouth 3 (three) times daily.  Dispense: 270 capsule; Refill: 1    Other orders  -     rosuvastatin (CRESTOR) 40 MG Tab; Take 1 tablet (40 mg total) by mouth every evening.  Dispense: 90 tablet; Refill: 1  -     omeprazole (PRILOSEC) 20 MG capsule; Take 1 capsule (20 mg total) by mouth once daily.  Dispense: 90 capsule; Refill: 1  -     dapagliflozin propanediol (FARXIGA) 10 mg tablet; Take 1 tablet (10 mg total) by mouth Daily.  Dispense: 60 tablet; Refill: 5  -     EScitalopram oxalate (LEXAPRO) 10 MG tablet; Take 1 tablet (10 mg total) by mouth once daily.  Dispense: 90 tablet; Refill: 1       Health Maintenance Topics with due status: Not Due       Topic Last Completion Date    Influenza Vaccine 12/11/2023    Diabetic Eye Exam 10/21/2024    PROSTATE-SPECIFIC ANTIGEN 06/16/2025    High Dose Statin 06/16/2025    Lipid Panel 06/16/2025       Procedures     Future Appointments   Date Time Provider Department Center   6/30/2025  9:15 AM NURSE ESCOBEDO [facility]OR [facility]   7/9/2025 10:00 AM Carlos Greene PA [facility]   8/5/2025 11:00 AM Pinky Huang FNP [facility] DIOR [facility]   9/17/2025  9:45 AM Patricia Galicia ACNP [facility]    12/17/2025  9:00 AM [facility]   [facility]2026  [facility]   2/10/2026  2:45 PM Nette Jurado, [facility]        No follow-ups on file.       Signature:  Rodney Morel MD  [facility]  [address] [place] [address]  [phone] Phone  [phone] Fax    Date of encounter: 6/16/25

## 2025-06-23 NOTE — PROGRESS NOTES
Subjective:         Patient ID: Micheal Taylor is a 69 y.o. male.    Chief Complaint: Back Pain      Pain  This is a chronic problem. The current episode started more than 1 year ago. The problem occurs daily. The problem has been unchanged. Associated symptoms include arthralgias and neck pain. Pertinent negatives include no anorexia, chest pain, chills, diaphoresis, fever, sore throat, vertigo or vomiting.     Review of Systems   Constitutional:  Negative for activity change, appetite change, chills, diaphoresis, fever and unexpected weight change.   HENT:  Negative for drooling, ear discharge, ear pain, facial swelling, mouth sores, nosebleeds, sore throat, trouble swallowing, voice change and goiter.    Eyes:  Negative for photophobia, pain, discharge, redness and visual disturbance.   Respiratory:  Negative for apnea, choking, chest tightness, shortness of breath, wheezing and stridor.    Cardiovascular:  Negative for chest pain, palpitations and leg swelling.   Gastrointestinal:  Negative for abdominal distention, anorexia, diarrhea, rectal pain, vomiting and fecal incontinence.   Endocrine: Negative for cold intolerance, heat intolerance, polydipsia, polyphagia and polyuria.   Genitourinary:  Negative for bladder incontinence, dysuria, flank pain and frequency.   Musculoskeletal:  Positive for arthralgias, back pain, leg pain, neck pain and neck stiffness.   Integumentary:  Negative for color change and pallor.   Neurological:  Negative for dizziness, vertigo, seizures, syncope, facial asymmetry, speech difficulty, light-headedness, coordination difficulties and memory loss.   Hematological:  Negative for adenopathy. Does not bruise/bleed easily.   Psychiatric/Behavioral:  Negative for agitation, behavioral problems, confusion, decreased concentration, dysphoric mood, hallucinations, self-injury and suicidal ideas. The patient is not nervous/anxious and is not hyperactive.            Past Medical History:    Diagnosis Date    Anticoagulant long-term use     CHF (congestive heart failure)     Chronic pain syndrome     Coronary artery disease     Diabetes mellitus, type 2     Hyperlipidemia     Hypertension     Lumbar spondylosis     Lumbosacral radiculopathy     Mild nonproliferative diabetic retinopathy of both eyes without macular edema 09/09/2022    Obstructive sleep apnea 12/3/2024    Pain in thoracic spine     PVD (peripheral vascular disease)     Renal disorder     Spondylosis without myelopathy or radiculopathy, cervical region      Past Surgical History:   Procedure Laterality Date    ADENOIDECTOMY      AMPUTATION      left index finger removed     ANGIOGRAM, CORONARY, WITH LEFT HEART CATHETERIZATION N/A 12/3/2024    Procedure: Angiogram, Coronary, with Left Heart Cath;  Surgeon: Alexys Gleason MD;  Location: [facility] CATH LAB;  Service: Cardiology;  Laterality: N/A;    CAUDAL EPIDURAL STEROID INJECTION  01/19/2016    Caudal NATHAN - Alee    CIRCUMCISION      CORONARY ARTERY BYPASS GRAFT (CABG)      EYE SURGERY      FOOT SURGERY      heel surgery    HEEL SPUR SURGERY      INJECTION OF FACET JOINT Bilateral 04/16/2015    Bilateral L3-S1 Facet Injection - Alee - 4/16/15, 11/15/12 and 9/27/12    LEFT HEART CATHETERIZATION Left 07/19/2022    Procedure: Left heart cath;  Surgeon: Venu Whitlock MD;  Location: [facility] CATH LAB;  Service: Cardiology;  Laterality: Left;  Patient has PAD and has had bilateral fem pop bypass. He is followed by Dr. Forrest.    RADIOFREQUENCY THERMOCOAGULATION Left 02/26/2013    Left L3-S1 RFTC -Alee    RADIOFREQUENCY THERMOCOAGULATION Right 02/05/2013    Right L3-S1 RFTC - Alee    TONSILLECTOMY, ADENOIDECTOMY, BILATERAL MYRINGOTOMY AND TUBES      TRANSFORAMINAL EPIDURAL INJECTION OF STEROID Left 06/19/2012    Left L3-4 TFESI - Alee    TRANSFORAMINAL EPIDURAL INJECTION OF STEROID Right 04/12/2013    Right L3-4 TFESI - Alee - 4/12/13 and 8/7/12    VASCULAR SURGERY       Social History      Socioeconomic History    Marital status:     Number of children: 5   Occupational History    Occupation: retired   Tobacco Use    Smoking status: Former     Current packs/day: 0.00     Average packs/day: 4.0 packs/day for 30.0 years (120.0 ttl pk-yrs)     Types: Cigarettes     Start date:      Quit date:      Years since quittin.5     Passive exposure: Past (parent)    Smokeless tobacco: Current     Types: Chew   Substance and Sexual Activity    Alcohol use: Not Currently    Drug use: Yes     Types: Hydrocodone    Sexual activity: Not Currently     Social Drivers of Health     Financial Resource Strain: Low Risk  (12/3/2024)    Overall Financial Resource Strain (CARDIA)     Difficulty of Paying Living Expenses: Not hard at all   Food Insecurity: No Food Insecurity (12/3/2024)    Hunger Vital Sign     Worried About Running Out of Food in the Last Year: Never true     Ran Out of Food in the Last Year: Never true   Transportation Needs: No Transportation Needs (12/3/2024)    TRANSPORTATION NEEDS     Transportation : No   Physical Activity: Inactive (12/3/2024)    Exercise Vital Sign     Days of Exercise per Week: 0 days     Minutes of Exercise per Session: 0 min   Stress: No Stress Concern Present (12/3/2024)    [employer] - Occupational Stress Questionnaire     Feeling of Stress : Not at all   Housing Stability: Unknown (12/3/2024)    Housing Stability Vital Sign     Unable to Pay for Housing in the Last Year: No     Homeless in the Last Year: No     Family History   Problem Relation Name Age of Onset    Diabetes Mother      Heart disease Mother      Hypertension Mother      Liver cancer Mother      Heart disease Father      Hypertension Father       Review of patient's allergies indicates:  No Known Allergies     Objective:  Vitals:    25 1027 25 1028   BP:  (!) 159/62   Pulse:  67   Resp: 18    SpO2: 98%    Weight: 86.2 kg (190 lb)    Height: 6'  (1.829 m)    PainSc:   7   7                 Physical Exam  Vitals and nursing note reviewed. Exam conducted with a chaperone present.   Constitutional:       General: He is awake.      Appearance: Normal appearance. He is not ill-appearing, toxic-appearing or diaphoretic.   HENT:      Head: Normocephalic and atraumatic.      Nose: Nose normal.      Mouth/Throat:      Mouth: Mucous membranes are moist.      Pharynx: Oropharynx is clear.   Eyes:      Conjunctiva/sclera: Conjunctivae normal.      Pupils: Pupils are equal, round, and reactive to light.   Cardiovascular:      Rate and Rhythm: Normal rate.   Pulmonary:      Effort: Pulmonary effort is normal. No respiratory distress.   Abdominal:      Palpations: Abdomen is soft.   Musculoskeletal:      Right shoulder: Tenderness present.      Left shoulder: Tenderness present.      Cervical back: Normal range of motion and neck supple. Tenderness present.      Thoracic back: Tenderness present.      Lumbar back: Tenderness present. Decreased range of motion.   Skin:     General: Skin is warm and dry.   Neurological:      General: No focal deficit present.      Mental Status: He is alert and oriented to person, place, and time. Mental status is at baseline.      Cranial Nerves: No cranial nerve deficit (II-XII).   Psychiatric:         Mood and Affect: Mood normal.         Behavior: Behavior normal. Behavior is cooperative.         Thought Content: Thought content normal.           US ARTERIAL DOPPLER EXAM  Narrative: EXAMINATION:  US ARTERIAL DOPPLER EXAM    CLINICAL HISTORY:  Peripheral vascular disease, unspecified    TECHNIQUE:  Continuous wave Doppler common pneumatic cuffs, digital toe pressures    COMPARISON:  None    FINDINGS:  Right brachial pressure 119 mm Hg, MARYSE 0.92, toe brachial index right 0.54    Left brachial pressure 117 mm Hg, MARYSE 1.0, toe brachial index 0.47  Impression: Normal ABIs at the ankle with mild to moderate decrease in toe brachial indices  bilaterally    TECHNOLOGIST: Ronit Fox (RT) (VS) RVT    Electronically signed by: Loida Forrest  Date:    02/10/2025  Time:    10:54       Office Visit on 06/16/2025   Component Date Value Ref Range Status    Sodium 06/16/2025 141  136 - 145 mmol/L Final    Potassium 06/16/2025 4.6  3.5 - 5.1 mmol/L Final    Chloride 06/16/2025 106  98 - 107 mmol/L Final    CO2 06/16/2025 29  23 - 31 mmol/L Final    Anion Gap 06/16/2025 11  7 - 16 mmol/L Final    Glucose 06/16/2025 212 (H)  82 - 115 mg/dL Final    BUN 06/16/2025 35 (H)  8 - 26 mg/dL Final    Creatinine 06/16/2025 1.61 (H)  0.72 - 1.25 mg/dL Final    BUN/Creatinine Ratio 06/16/2025 22 (H)  6 - 20 Final    Calcium 06/16/2025 9.8  8.8 - 10.0 mg/dL Final    Total Protein 06/16/2025 6.9  5.8 - 7.6 g/dL Final    Albumin 06/16/2025 4.2  3.4 - 4.8 g/dL Final    Globulin 06/16/2025 2.7  2.0 - 4.0 g/dL Final    A/G Ratio 06/16/2025 1.6   Final    Bilirubin, Total 06/16/2025 0.4  <=1.5 mg/dL Final    Alk Phos 06/16/2025 45  40 - 150 U/L Final    ALT 06/16/2025 22  <=55 U/L Final    AST 06/16/2025 23  11 - 45 U/L Final    eGFR 06/16/2025 46 (L)  >=60 mL/min/1.73m2 Final    Triglycerides 06/16/2025 415 (H)  34 - 140 mg/dL Final    Cholesterol 06/16/2025 129  <=200 mg/dL Final    HDL Cholesterol 06/16/2025 25 (L)  35 - 60 mg/dL Final    Cholesterol/HDL Ratio (Risk Factor) 06/16/2025 5.2   Final    Non-HDL 06/16/2025 104  mg/dL Final    VLDL 06/16/2025    Final    PSA Total 06/16/2025 0.609  <=4.000 ng/mL Final    WBC 06/16/2025 12.51 (H)  4.50 - 11.00 K/uL Final    RBC 06/16/2025 5.26  4.60 - 6.20 M/uL Final    Hemoglobin 06/16/2025 13.4 (L)  13.5 - 18.0 g/dL Final    Hematocrit 06/16/2025 44.3  40.0 - 54.0 % Final    MCV 06/16/2025 84.2  80.0 - 96.0 fL Final    MCH 06/16/2025 25.5 (L)  27.0 - 31.0 pg Final    MCHC 06/16/2025 30.2 (L)  32.0 - 36.0 g/dL Final    RDW 06/16/2025 14.7 (H)  11.5 - 14.5 % Final    Platelet Count 06/16/2025 141 (L)  150 - 400 K/uL Final    MPV  06/16/2025 12.1  9.4 - 12.4 fL Final    Neutrophils % 06/16/2025 34.3 (L)  53.0 - 65.0 % Final    Lymphocytes % 06/16/2025 51.2 (H)  27.0 - 41.0 % Final    Monocytes % 06/16/2025 11.5 (H)  2.0 - 6.0 % Final    Eosinophils % 06/16/2025 2.2  1.0 - 4.0 % Final    Basophils % 06/16/2025 0.4  0.0 - 1.0 % Final    Immature Granulocytes % 06/16/2025 0.4  0.0 - 0.4 % Final    nRBC, Auto 06/16/2025 0.0  <=0.0 % Final    Neutrophils, Abs 06/16/2025 4.30  1.80 - 7.70 K/uL Final    Lymphocytes, Absolute 06/16/2025 6.40 (H)  1.00 - 4.80 K/uL Final    Monocytes, Absolute 06/16/2025 1.44 (H)  0.00 - 0.80 K/uL Final    Eosinophils, Absolute 06/16/2025 0.27  0.00 - 0.50 K/uL Final    Basophils, Absolute 06/16/2025 0.05  0.00 - 0.20 K/uL Final    Immature Granulocytes, Absolute 06/16/2025 0.05 (H)  0.00 - 0.04 K/uL Final    nRBC, Absolute 06/16/2025 0.00  <=0.00 x10e3/uL Final    Diff Type 06/16/2025 Manual   Final    Segmented Neutrophils, Man % 06/16/2025 45 (L)  50 - 62 % Final    Lymphocytes, Man % 06/16/2025 48 (H)  27 - 41 % Final    Monocytes, Man % 06/16/2025 5  2 - 6 % Final    Eosinophils, Man % 06/16/2025 1  1 - 4 % Final    Basophils, Man % 06/16/2025 1  0 - 1 % Final    Platelet Morphology 06/16/2025 Few Large Platelets (A)  Normal Final    RBC Morphology 06/16/2025 Normal   Final    Smudge Cells 06/16/2025 Few   Final    Reactive Lymphocytes 06/16/2025 Few   Final   Office Visit on 04/09/2025   Component Date Value Ref Range Status    POC Amphetamines 04/09/2025 Negative  Negative, Inconclusive Final    POC Barbiturates 04/09/2025 Negative  Negative, Inconclusive Final    POC Benzodiazepines 04/09/2025 Negative  Negative, Inconclusive Final    POC Cocaine 04/09/2025 Negative  Negative, Inconclusive Final    POC THC 04/09/2025 Negative  Negative, Inconclusive Final    POC Methadone 04/09/2025 Negative  Negative, Inconclusive Final    POC Methamphetamine 04/09/2025 Negative  Negative, Inconclusive Final    POC Opiates  04/09/2025 Negative  Negative, Inconclusive Final    POC Oxycodone 04/09/2025 Negative  Negative, Inconclusive Final    POC Phencyclidine 04/09/2025 Negative  Negative, Inconclusive Final    POC Methylenedioxymethamphetamine * 04/09/2025 Negative  Negative, Inconclusive Final    POC Tricyclic Antidepressants 04/09/2025 Negative  Negative, Inconclusive Final    POC Buprenorphine 04/09/2025 Negative   Final     Acceptable 04/09/2025 Yes   Final    POC Temperature (Urine) 04/09/2025 92   Final    Creatinine, U 04/09/2025 58.9  mg/dL Final    Specific Gravity 04/09/2025 1.014   Final    pH 04/09/2025 6.2   Final    Oxidants 04/09/2025 Negative  Cutoff: 200 mg/L Final    Comment 04/09/2025 Normal   Final    Codeine 04/09/2025 Not Detected  Cutoff: 25 ng/mL Final    C6BG 04/09/2025 Not Detected  Cutoff: 100 ng/mL Final    Morphine 04/09/2025 Not Detected  Cutoff: 25 ng/mL Final    M6BG 04/09/2025 Not Detected  Cutoff: 100 ng/mL Final    6 Monoacetylmorphine 04/09/2025 Not Detected  Cutoff: 25 ng/mL Final    Hydrocodone 04/09/2025 Present (A)  Cutoff: 25 ng/mL Final    Norhydrocodone 04/09/2025 Present (A)  Cutoff: 25 ng/mL Final    Dihydrocodeine 04/09/2025 Not Detected  Cutoff: 25 ng/mL Final    Hydromorphone 04/09/2025 Not Detected  Cutoff: 25 ng/mL Final    Hydromorphone-3-beta-glucuronide 04/09/2025 Present (A)  Cutoff: 100 ng/mL Final    Oxycodone 04/09/2025 Not Detected  Cutoff: 25 ng/mL Final    Noroxycodone 04/09/2025 Not Detected  Cutoff: 25 ng/mL Final    Oxymorphone 04/09/2025 Not Detected  Cutoff: 25 ng/mL Final    Oxymorphone-3-beta-glucuronid 04/09/2025 Not Detected  Cutoff: 100 ng/mL Final    Noroxymorphone 04/09/2025 Not Detected  Cutoff: 25 ng/mL Final    Fentanyl 04/09/2025 Not Detected  Cutoff: 2 ng/mL Final    Norfentanyl 04/09/2025 Not Detected  Cutoff: 2 ng/mL Final    Meperidine 04/09/2025 Not Detected  Cutoff: 25 ng/mL Final    Normeperidine 04/09/2025 Not Detected  Cutoff: 25 ng/mL  Final    Naloxone 04/09/2025 Not Detected  Cutoff: 25 ng/mL Final    Naloxone-3-beta-glucuronide 04/09/2025 Not Detected  Cutoff: 100 ng/mL Final    Methadone 04/09/2025 Not Detected  Cutoff: 25 ng/mL Final    EDDP 04/09/2025 Not Detected  Cutoff: 25 ng/mL Final    Propoxyphene 04/09/2025 Not Detected  Cutoff: 25 ng/mL Final    Norpropoxyphene 04/09/2025 Not Detected  Cutoff: 25 ng/mL Final    Tramadol 04/09/2025 Not Detected  Cutoff: 25 ng/mL Final    O-desmethyltramadol 04/09/2025 Not Detected  Cutoff: 25 ng/mL Final    Tapentadol 04/09/2025 Not Detected  Cutoff: 25 ng/mL Final    N-Desmethyltapentadol 04/09/2025 Not Detected  Cutoff: 50 ng/mL Final    M TAPENTADOL-BETA-GLUCURONIDE 04/09/2025 Not Detected  Cutoff: 100 ng/mL Final    Buprenorphine 04/09/2025 Not Detected  Cutoff: 5 ng/mL Final    Norbuprenorphine 04/09/2025 Not Detected  Cutoff: 5 ng/mL Final    Norbuprenorphine glucuronide 04/09/2025 Not Detected  Cutoff: 20 ng/mL Final    Opioid Interpretation 04/09/2025 SEE COMMENTS   Final    Cocaine 04/09/2025 Negative  Cutoff: 150 ng/mL Final    Tetrahydrocannabinol 04/09/2025 Negative  Cutoff: 50 ng/mL Final    Alprazolam 04/09/2025 Not Detected  Cutoff: 10 ng/mL Final    Alpha-Hydroxyalprazolam 04/09/2025 Not Detected  Cutoff: 10 ng/mL Final    Alpha-Hydroxyalprazolam Glucuronide 04/09/2025 Not Detected  Cutoff: 50 ng/mL Final    Chlordiazepoxide 04/09/2025 Not Detected  Cutoff: 10 ng/mL Final    Clobazam 04/09/2025 Not Detected  Cutoff: 10 ng/mL Final    N-Desmethylclobazam 04/09/2025 Not Detected  Cutoff: 200 ng/mL Final    Clonazepam 04/09/2025 Not Detected  Cutoff: 10 ng/mL Final    7-aminoclonazepam 04/09/2025 Not Detected  Cutoff: 10 ng/mL Final    Diazepam 04/09/2025 Not Detected  Cutoff: 10 ng/mL Final    Nordiazepam 04/09/2025 Not Detected  Cutoff: 10 ng/mL Final    Flunitrazepam 04/09/2025 Not Detected  Cutoff: 10 ng/mL Final    7-aminoflunitrazepam 04/09/2025 Not Detected  Cutoff: 10 ng/mL Final     Flurazepam 04/09/2025 Not Detected  Cutoff: 10 ng/mL Final    2-Hydroxy Ethyl Flurazepam 04/09/2025 Not Detected  Cutoff: 10 ng/mL Final    Lorazepam 04/09/2025 Not Detected  Cutoff: 10 ng/mL Final    Lorazepam Glucuronide 04/09/2025 Not Detected  Cutoff: 50 ng/mL Final    Midazolam 04/09/2025 Not Detected  Cutoff: 10 ng/mL Final    Alpha-Hydroxy Midazolam 04/09/2025 Not Detected  Cutoff: 10 ng/mL Final    Oxazepam 04/09/2025 Not Detected  Cutoff: 10 ng/mL Final    Oxazepam Glucuronide 04/09/2025 Not Detected  Cutoff: 50 ng/mL Final    Prazepam 04/09/2025 Not Detected  Cutoff: 10 ng/mL Final    Temazepam 04/09/2025 Not Detected  Cutoff: 10 ng/mL Final    Temazepam Glucuronide 04/09/2025 Not Detected  Cutoff: 50 ng/mL Final    Triazolam 04/09/2025 Not Detected  Cutoff: 10 ng/mL Final    Alpha-Hydroxy Triazolam 04/09/2025 Not Detected  Cutoff: 10 ng/mL Final    Zolpidem 04/09/2025 Not Detected  Cutoff: 10 ng/mL Final    Zolpidem Phenyl-4-Carboxylic acid 04/09/2025 Not Detected  Cutoff: 10 ng/mL Final    Benzodiazepine Interpretation 04/09/2025 SEE COMMENTS   Final    List Patient's Current Medications 04/09/2025 unknown   Final    Methamphetamine 04/09/2025 Not Detected  Cutoff: 100 ng/mL Final    Amphetamine 04/09/2025 Not Detected  Cutoff: 100 ng/mL Final    3,4-methylenedioxymethamphetamine * 04/09/2025 Not Detected  Cutoff: 100 ng/mL Final    3,8-hjqqqrfuyaedlv-P-ethylamphetam* 04/09/2025 Not Detected  Cutoff: 100 ng/mL Final    3,4-methylenedioxyamphetamine (MDA) 04/09/2025 Not Detected  Cutoff: 100 ng/mL Final    Ephedrine 04/09/2025 Not Detected  Cutoff: 100 ng/mL Final    Pseudoephedrine 04/09/2025 Not Detected  Cutoff: 100 ng/mL Final    Phentermine 04/09/2025 Not Detected  Cutoff: 100 ng/mL Final    Phencyclidine (PCP) 04/09/2025 Not Detected  Cutoff: 20 ng/mL Final    Methylphenidate 04/09/2025 Not Detected  Cutoff: 20 ng/mL Final    Ritalinic acid 04/09/2025 Not Detected  Cutoff: 100 ng/mL Final     Stimulant Interpretation 04/09/2025 SEE COMMENTS   Final    Barbiturates 04/09/2025 Negative  Cutoff: 200 ng/mL Final   Office Visit on 03/17/2025   Component Date Value Ref Range Status    QRS Duration 03/17/2025 136  ms Final    [facility] QTC Calculation 03/17/2025 453  ms Final         Orders Placed This Encounter   Procedures    POCT Urine Drug Screen Presump     Interpretive Information:     Negative:  No drug detected at the cut off level.   Positive:  This result represents presumptive positive for the   tested drug, other substances may yield a positive response other   than the analyte of interest. This result should be utilized for   diagnostic purpose only. Confirmation testing will be performed upon physician request only.            Requested Prescriptions     Signed Prescriptions Disp Refills    HYDROcodone-acetaminophen (NORCO)  mg per tablet 120 tablet 0     Sig: Take 1 tablet by mouth every 6 (six) hours as needed for Pain (pain).    HYDROcodone-acetaminophen (NORCO)  mg per tablet 120 tablet 0     Sig: Take 1 tablet by mouth every 6 (six) hours as needed for Pain (pain).    HYDROcodone-acetaminophen (NORCO)  mg per tablet 120 tablet 0     Sig: Take 1 tablet by mouth every 6 (six) hours as needed for Pain (pain).       Assessment:     1. Lumbosacral radiculopathy    2. Cervical spondylosis without myelopathy    3. Postlaminectomy syndrome, lumbar region    4. Encounter for long-term (current) use of medications                 A's of Opioid Risk Assessment  Activity:Patient can perform ADL.   Analgesia:Patients pain is partially controlled by current medication. Patient has tried OTC medications such as Tylenol and Ibuprofen with out relief.   Adverse Effects: Patient denies constipation or sedation.  Aberrant Behavior:  reviewed with no aberrant drug seeking/taking behavior.  Overdose reversal drug naloxone discussed    Drug screen reviewed        Plan:    Narcan January  2023    Anticoagulated Plavix    [facility] closed on weekends      Bring medication  July 9, 2025    Patient presents clinic today complaint increasing neck pain back pain joint pain he states Norco 10 1 p.o. q.8 hours no longer controlling his discomfort he is requesting options     Patient has been compliant office visits drug screens in the past    Will increase Norco 10 1 p.o. q.6 hours, 120 tablets as needed    Presumptive drug screen negative today patient states he has taken a few extra tablets due to increasing pain     Will order definitive drug screen for clarification    Continue home exercise program as directed    Follow-up 3 months    Dr. Callahan, July 2025    Bring original prescription medication bottles/container/box with labels to each visit

## 2025-06-24 DIAGNOSIS — M54.17 LUMBOSACRAL RADICULOPATHY: Primary | ICD-10-CM

## 2025-06-24 DIAGNOSIS — M47.812 CERVICAL SPONDYLOSIS WITHOUT MYELOPATHY: ICD-10-CM

## 2025-06-24 DIAGNOSIS — I10 HYPERTENSION, UNSPECIFIED TYPE: ICD-10-CM

## 2025-06-24 DIAGNOSIS — M96.1 POSTLAMINECTOMY SYNDROME, LUMBAR REGION: ICD-10-CM

## 2025-06-24 RX ORDER — HYDROCODONE BITARTRATE AND ACETAMINOPHEN 10; 325 MG/1; MG/1
1 TABLET ORAL EVERY 8 HOURS PRN
Qty: 90 TABLET | Refills: 0 | Status: SHIPPED | OUTPATIENT
Start: 2025-07-09

## 2025-06-27 ENCOUNTER — PATIENT OUTREACH (OUTPATIENT)
Facility: HOSPITAL | Age: 70
End: 2025-06-27
Payer: MEDICARE

## 2025-06-27 RX ORDER — AMLODIPINE BESYLATE 5 MG/1
5 TABLET ORAL
Qty: 90 TABLET | Refills: 0 | Status: SHIPPED | OUTPATIENT
Start: 2025-06-27

## 2025-07-09 ENCOUNTER — OFFICE VISIT (OUTPATIENT)
Dept: PAIN MEDICINE | Facility: CLINIC | Age: 70
End: 2025-07-09
Payer: MEDICARE

## 2025-07-09 VITALS
OXYGEN SATURATION: 98 % | RESPIRATION RATE: 18 BRPM | SYSTOLIC BLOOD PRESSURE: 159 MMHG | HEART RATE: 67 BPM | WEIGHT: 190 LBS | DIASTOLIC BLOOD PRESSURE: 62 MMHG | BODY MASS INDEX: 25.73 KG/M2 | HEIGHT: 72 IN

## 2025-07-09 DIAGNOSIS — Z79.899 ENCOUNTER FOR LONG-TERM (CURRENT) USE OF MEDICATIONS: ICD-10-CM

## 2025-07-09 DIAGNOSIS — M54.17 LUMBOSACRAL RADICULOPATHY: Primary | Chronic | ICD-10-CM

## 2025-07-09 DIAGNOSIS — M96.1 POSTLAMINECTOMY SYNDROME, LUMBAR REGION: Chronic | ICD-10-CM

## 2025-07-09 DIAGNOSIS — M47.812 CERVICAL SPONDYLOSIS WITHOUT MYELOPATHY: Chronic | ICD-10-CM

## 2025-07-09 LAB
CTP QC/QA: YES
POC (AMP) AMPHETAMINE: NEGATIVE
POC (BAR) BARBITURATES: NEGATIVE
POC (BUP) BUPRENORPHINE: NEGATIVE
POC (BZO) BENZODIAZEPINES: NEGATIVE
POC (COC) COCAINE: NEGATIVE
POC (MDMA) METHYLENEDIOXYMETHAMPHETAMINE 3,4: NEGATIVE
POC (MET) METHAMPHETAMINE: NEGATIVE
POC (MOP) OPIATES: NEGATIVE
POC (MTD) METHADONE: NEGATIVE
POC (OXY) OXYCODONE: NEGATIVE
POC (PCP) PHENCYCLIDINE: NEGATIVE
POC (TCA) TRICYCLIC ANTIDEPRESSANTS: NEGATIVE
POC TEMPERATURE (URINE): 90
POC THC: NEGATIVE

## 2025-07-09 PROCEDURE — 99214 OFFICE O/P EST MOD 30 MIN: CPT | Mod: S$PBB,,, | Performed by: PHYSICIAN ASSISTANT

## 2025-07-09 PROCEDURE — 99999PBSHW POCT URINE DRUG SCREEN PRESUMP: Mod: PBBFAC,,,

## 2025-07-09 PROCEDURE — 80305 DRUG TEST PRSMV DIR OPT OBS: CPT | Mod: PBBFAC | Performed by: PHYSICIAN ASSISTANT

## 2025-07-09 PROCEDURE — 1101F PT FALLS ASSESS-DOCD LE1/YR: CPT | Mod: CPTII,,, | Performed by: PHYSICIAN ASSISTANT

## 2025-07-09 PROCEDURE — 4010F ACE/ARB THERAPY RXD/TAKEN: CPT | Mod: CPTII,,, | Performed by: PHYSICIAN ASSISTANT

## 2025-07-09 PROCEDURE — 1159F MED LIST DOCD IN RCRD: CPT | Mod: CPTII,,, | Performed by: PHYSICIAN ASSISTANT

## 2025-07-09 PROCEDURE — 99215 OFFICE O/P EST HI 40 MIN: CPT | Mod: PBBFAC | Performed by: PHYSICIAN ASSISTANT

## 2025-07-09 PROCEDURE — 3078F DIAST BP <80 MM HG: CPT | Mod: CPTII,,, | Performed by: PHYSICIAN ASSISTANT

## 2025-07-09 PROCEDURE — 1125F AMNT PAIN NOTED PAIN PRSNT: CPT | Mod: CPTII,,, | Performed by: PHYSICIAN ASSISTANT

## 2025-07-09 PROCEDURE — 99999 PR PBB SHADOW E&M-EST. PATIENT-LVL V: CPT | Mod: PBBFAC,,, | Performed by: PHYSICIAN ASSISTANT

## 2025-07-09 PROCEDURE — 3288F FALL RISK ASSESSMENT DOCD: CPT | Mod: CPTII,,, | Performed by: PHYSICIAN ASSISTANT

## 2025-07-09 PROCEDURE — 3077F SYST BP >= 140 MM HG: CPT | Mod: CPTII,,, | Performed by: PHYSICIAN ASSISTANT

## 2025-07-09 PROCEDURE — 3008F BODY MASS INDEX DOCD: CPT | Mod: CPTII,,, | Performed by: PHYSICIAN ASSISTANT

## 2025-07-09 RX ORDER — HYDROCODONE BITARTRATE AND ACETAMINOPHEN 10; 325 MG/1; MG/1
1 TABLET ORAL EVERY 6 HOURS PRN
Qty: 120 TABLET | Refills: 0 | Status: SHIPPED | OUTPATIENT
Start: 2025-07-09 | End: 2025-08-08

## 2025-07-09 RX ORDER — HYDROCODONE BITARTRATE AND ACETAMINOPHEN 10; 325 MG/1; MG/1
1 TABLET ORAL EVERY 6 HOURS PRN
Qty: 120 TABLET | Refills: 0 | Status: SHIPPED | OUTPATIENT
Start: 2025-09-08 | End: 2025-10-08

## 2025-07-09 RX ORDER — HYDROCODONE BITARTRATE AND ACETAMINOPHEN 10; 325 MG/1; MG/1
1 TABLET ORAL EVERY 6 HOURS PRN
Qty: 120 TABLET | Refills: 0 | Status: SHIPPED | OUTPATIENT
Start: 2025-08-08 | End: 2025-09-07

## 2025-07-10 ENCOUNTER — TELEPHONE (OUTPATIENT)
Dept: PAIN MEDICINE | Facility: CLINIC | Age: 70
End: 2025-07-10
Payer: MEDICARE

## 2025-07-10 NOTE — TELEPHONE ENCOUNTER
Call and no answer. No voicemail set up.    Gone inform patient that he refill date was yesterday.  If no refill date is dated on the Rx that means its due for  that day.         Copied from CRM #[id number]. Topic: Medications - Medication Question  >> Jul 9, 2025  3:37 PM Rakel wrote:  Who Called: Micheal Taylor    He said he is looking at his paperwork but he does not see a date to  his medication. He wanted to speak to a nurse about it. It's for the Hydrocodone     Patient's Preferred Phone Number on File: [phone]

## 2025-08-01 ENCOUNTER — PATIENT MESSAGE (OUTPATIENT)
Facility: HOSPITAL | Age: 70
End: 2025-08-01
Payer: MEDICARE

## 2025-08-06 ENCOUNTER — PATIENT MESSAGE (OUTPATIENT)
Facility: HOSPITAL | Age: 70
End: 2025-08-06
Payer: MEDICARE

## 2025-08-18 DIAGNOSIS — K21.9 GASTROESOPHAGEAL REFLUX DISEASE, UNSPECIFIED WHETHER ESOPHAGITIS PRESENT: Primary | ICD-10-CM

## 2025-08-19 RX ORDER — OMEPRAZOLE 20 MG/1
20 CAPSULE, DELAYED RELEASE ORAL DAILY
Qty: 90 CAPSULE | Refills: 0 | Status: SHIPPED | OUTPATIENT
Start: 2025-08-19

## 2025-08-25 ENCOUNTER — PATIENT MESSAGE (OUTPATIENT)
Facility: HOSPITAL | Age: 70
End: 2025-08-25
Payer: MEDICARE

## (undated) DEVICE — CHLORAPREP 10.5 ML APPLICATOR

## (undated) DEVICE — SPONGE GAUZE 4X4 12 PLY STL AMD 10/TRAY

## (undated) DEVICE — ISOVUE 370 100ML

## (undated) DEVICE — BAG-A-JET FLUID DISPENSER SYSTEM

## (undated) DEVICE — APPLICATOR CHLORAPREP LITE ORANGE 10.5ML STERILE

## (undated) DEVICE — GLOVE SURGICAL PROTEXIS PI SZ 5.5

## (undated) DEVICE — POSITIONER ULNAR NERVE

## (undated) DEVICE — GOWN NONREINF SET-IN SLV 2XL

## (undated) DEVICE — SENSOR PULSE OX ADULT

## (undated) DEVICE — INTRODUCER KIT MICRO 4FR

## (undated) DEVICE — CATH DIAG IMPULSE 6FR FL4

## (undated) DEVICE — SHEATH INTRODUCER 6FR 11CM

## (undated) DEVICE — GLOVE SURG ULTRA TOUCH 6

## (undated) DEVICE — SYRINGE 150CC INJECTABLE POWER

## (undated) DEVICE — NEEDLE PERCUTANEOUS 18GX7CM G00166

## (undated) DEVICE — CLOTH READYPREP 2%CHG 9X10.5IN

## (undated) DEVICE — DECANTER FLUID TRNSF WHITE 9IN

## (undated) DEVICE — TUBING CAPNOLINE PLUS SMART 02 CONNECTOR(ORDER 65110)

## (undated) DEVICE — GOWN SURGICAL STERILE LEVEL 3 / XX-LARGE

## (undated) DEVICE — GLOVE SURGICAL PROTEXIS PI SIZE 7

## (undated) DEVICE — Device

## (undated) DEVICE — CATH IMPULSE 6FR MULTIPACK

## (undated) DEVICE — COVER PROBE US GEL BAND

## (undated) DEVICE — CLIPPER SURGICAL BLADE ASSEMBLY STERILE SNGL USE

## (undated) DEVICE — CDS ANGIOGRAPHY PACK

## (undated) DEVICE — GLOVE BIOGEL SKINSENSE PI 7.5

## (undated) DEVICE — KIT ANGIO MANIFOLD CUSTOM RFH LEFT HEART KIT

## (undated) DEVICE — PROTECTOR ULNAR NERVE FOAM

## (undated) DEVICE — DEVICE INFLATION BLUE DIAMOND IN7112

## (undated) DEVICE — OXISENSOR ADULT DIGIT N/S

## (undated) DEVICE — GLOVE SENSICARE PI SURG 7

## (undated) DEVICE — SEAL ANGIO 6FR VIP - VASCULAR CLOSURE DEVICE BX/10

## (undated) DEVICE — TOWEL OR STERILE BLUE 4/PK 20PK/CS

## (undated) DEVICE — DRESSING IV TEGADERM 10X12CM

## (undated) DEVICE — GLOVE SURG BIOGEL SZ 7.5

## (undated) DEVICE — GUIDEWIRE MODEL CHOICE FLOP J

## (undated) DEVICE — CATH DIAG IMPULSE 6FR FR4

## (undated) DEVICE — GLOVE SURGICAL PROTEXIS PI SIZE 8.0

## (undated) DEVICE — DRESSING TRANS 4X4 TEGADERM

## (undated) DEVICE — SET IV PRIMARY ALARIS (PRIMARY)

## (undated) DEVICE — CATH GUIDE 6F LAUNCHER BACKUP SUPPORT LEFT 3.5

## (undated) DEVICE — CONTRAST ISOVUE 370 100ML

## (undated) DEVICE — CATH COR GUIDE 6FR XB3.5

## (undated) DEVICE — SET FLD DEL LG BORE SPIK 72IN

## (undated) DEVICE — GLOVE SURGICAL PROTEXIS PI SIZE 6

## (undated) DEVICE — CLIPPER BLADE MOD 4406 (CAREF)

## (undated) DEVICE — SHEATH INTRO AVANTI 6FR 11X038 BX/5

## (undated) DEVICE — SOL IRRIG NACL 0.9% 500ML POUR

## (undated) DEVICE — ETCO2 NC MICROSTR FEM ST ADLT

## (undated) DEVICE — SET IV EXTENSION 42IN W/2 PORT CLEARLINK